# Patient Record
Sex: FEMALE | Race: WHITE | Employment: OTHER | URBAN - METROPOLITAN AREA
[De-identification: names, ages, dates, MRNs, and addresses within clinical notes are randomized per-mention and may not be internally consistent; named-entity substitution may affect disease eponyms.]

---

## 2019-10-08 RX ORDER — DONEPEZIL HYDROCHLORIDE 10 MG/1
10 TABLET, FILM COATED ORAL NIGHTLY
Qty: 90 TABLET | Refills: 1 | Status: SHIPPED | OUTPATIENT
Start: 2019-10-08 | End: 2020-02-11 | Stop reason: SDUPTHER

## 2020-02-10 PROBLEM — R55 SYNCOPE AND COLLAPSE: Status: ACTIVE | Noted: 2020-02-10

## 2020-02-10 PROBLEM — F02.80 EARLY ONSET ALZHEIMER'S DEMENTIA (HCC): Status: ACTIVE | Noted: 2020-02-10

## 2020-02-10 PROBLEM — R41.3 MEMORY LOSS: Status: ACTIVE | Noted: 2020-02-10

## 2020-02-10 PROBLEM — G30.0 EARLY ONSET ALZHEIMER'S DEMENTIA (HCC): Status: ACTIVE | Noted: 2020-02-10

## 2020-02-11 ENCOUNTER — OFFICE VISIT (OUTPATIENT)
Dept: NEUROLOGY | Age: 75
End: 2020-02-11
Payer: MEDICARE

## 2020-02-11 VITALS — DIASTOLIC BLOOD PRESSURE: 70 MMHG | WEIGHT: 135.4 LBS | SYSTOLIC BLOOD PRESSURE: 120 MMHG

## 2020-02-11 PROCEDURE — 99214 OFFICE O/P EST MOD 30 MIN: CPT | Performed by: PSYCHIATRY & NEUROLOGY

## 2020-02-11 RX ORDER — IPRATROPIUM BROMIDE 42 UG/1
SPRAY, METERED NASAL
COMMUNITY
Start: 2019-05-08 | End: 2021-05-05

## 2020-02-11 RX ORDER — LINAGLIPTIN 5 MG/1
TABLET, FILM COATED ORAL
COMMUNITY
Start: 2019-12-07 | End: 2021-05-05

## 2020-02-11 RX ORDER — DONEPEZIL HYDROCHLORIDE 10 MG/1
10 TABLET, FILM COATED ORAL NIGHTLY
Qty: 90 TABLET | Refills: 1 | Status: SHIPPED | OUTPATIENT
Start: 2020-02-11 | End: 2020-10-12 | Stop reason: SDUPTHER

## 2020-02-11 RX ORDER — LEVOTHYROXINE SODIUM 0.05 MG/1
50 TABLET ORAL DAILY
COMMUNITY
Start: 2018-03-29

## 2020-02-11 RX ORDER — MEMANTINE HYDROCHLORIDE 5 MG/1
TABLET ORAL
COMMUNITY
Start: 2019-12-02 | End: 2020-08-11 | Stop reason: CLARIF

## 2020-02-11 RX ORDER — AMLODIPINE BESYLATE 5 MG/1
TABLET ORAL
COMMUNITY
Start: 2019-01-09 | End: 2021-05-05

## 2020-02-11 RX ORDER — FLUOXETINE HYDROCHLORIDE 40 MG/1
40 CAPSULE ORAL DAILY
COMMUNITY
Start: 2018-06-21

## 2020-02-11 RX ORDER — ATORVASTATIN CALCIUM 10 MG/1
TABLET, FILM COATED ORAL NIGHTLY
Status: ON HOLD | COMMUNITY
Start: 2018-03-29 | End: 2021-11-14 | Stop reason: HOSPADM

## 2020-02-11 ASSESSMENT — ENCOUNTER SYMPTOMS
PHOTOPHOBIA: 0
SHORTNESS OF BREATH: 0
CHOKING: 0
NAUSEA: 0
BACK PAIN: 0
TROUBLE SWALLOWING: 0
VOMITING: 0

## 2020-02-11 NOTE — PROGRESS NOTES
Pt resting comfortably in bed. Lethargic, but arousable. Pt able to tell CNA that he had to urinate and was educated on his condom cath. Denies any needs at this time. Bed alarm on. Bed locked and in lowest position. Call light within reach. Will continue to assess and provide care.    Subjective:      Patient ID: Aida Max is a 75 y.o. female who presents today for:  Chief Complaint   Patient presents with    6 Month Follow-Up     patient states she doesnt think her memory is bad but  states that she is still having problems. Pateint states that she does puzzles in the daily paper. HPI     history of dementia and memory loss. Patient has age-related dementia with cognitive impairment. Patient  has amnestic syndrome and continues on Aricept 10 mg and Namenda 5 mg twice a day and we  Have Followed her  in 6 months and she appears to be still independent. Has not quite declined in her activity of daily living. She still dresses and bathes herself and  she has some minor arguments and  did not want to go for the mammogram.  She has some insight to her memory issues.  is her main care provider and does much in the house. Denies  Any falls or hospitalizations or any other medical issues    No past medical history on file. No past surgical history on file.   Social History     Socioeconomic History    Marital status:      Spouse name: Not on file    Number of children: Not on file    Years of education: Not on file    Highest education level: Not on file   Occupational History    Not on file   Social Needs    Financial resource strain: Not on file    Food insecurity:     Worry: Not on file     Inability: Not on file    Transportation needs:     Medical: Not on file     Non-medical: Not on file   Tobacco Use    Smoking status: Never Smoker    Smokeless tobacco: Never Used   Substance and Sexual Activity    Alcohol use: Not on file    Drug use: Not on file    Sexual activity: Not on file   Lifestyle    Physical activity:     Days per week: Not on file     Minutes per session: Not on file    Stress: Not on file   Relationships    Social connections:     Talks on phone: Not on file     Gets together: Not on file     Attends Quaker service: Not on Sensory: No sensory deficit. Motor: No abnormal muscle tone. Coordination: Coordination normal.      Deep Tendon Reflexes: Reflexes are normal and symmetric. Babinski sign absent on the right side. Babinski sign absent on the left side. Mri Mra Brain Wo Contrast    Result Date: 6/16/2016  MRI BRAIN WITHOUT CONTRAST  COMPARISONS  NONE  CLINICAL HISTORY Memory loss, anxiety, family history of dementia. TECHNIQUE Multiplanar, multi-sequence MRI was performed on a 1.2Tesla open magnet. FINDINGS  There are no abnormal sites of restricted diffusion. The ventricles are normal In position. There is no evidence for intraaxial or extraaxial hemorrhage. There is no evidence for mass effect. There is no shift of the midline structures. There are numerous foci of increased signal on FLAIR and T2 ,  in the cerebral deep white matter, left side greater than right, which may be secondary to small vessel ischemic changes, demyelination, or aging. There is mild prominence of the cerebral sulci, commensurate with the patient's age. Flow-voids in the major intracranial blood vessels are identified and are patent by spin-echo criteria. There is a small amount of fluid within the posterior aspect of the sphenoid sinuses. Mastoid air cells are clear. The seventh and eighth nerve complexes and optic nerves are symmetrical.  No evidence for mass in the cerebellopontine angle. The corpus callosum has a normal appearance. The cerebellar tonsils are not ectopic. The pituitary gland is unremarkable. The dura and calvarium are unremarkable. IMPRESSION  NUMEROUS T2 FOCI OF INCREASED SIGNAL IN THE CEREBRAL DEEP WHITE MATTER, LEFT GREATER THAN RIGHT, NONSPECIFIC. THEY MAY BE SECONDARY TO SMALL VESSEL ISCHEMIC CHANGES, DEMYELINATION OR AGING. NO EVIDENCE FOR ACUTE CVA, MASS OR HEMORRHAGE. SMALL AMOUNT OF FLUID WITHIN THE SPHENOID SINUSES.         Weston Faustin M.D. Released By- Yuliet Trujillo M.D. Released Date Time- 06/16/16 1130  This document has been electronically signed. ------------------------------------------------------------------------------      No results found for: WBC, RBC, HGB, HCT, MCV, MCH, MCHC, RDW, PLT, MPV  No results found for: NA, K, CL, CO2, BUN, CREATININE, GFRAA, AGRATIO, LABGLOM, GLUCOSE, PROT, LABALBU, CALCIUM, BILITOT, ALKPHOS, AST, ALT  No results found for: PROTIME, INR  No results found for: TSH, [id number], FOLATE, FERRITIN, IRON, TIBC, PTRFSAT, TSH, FREET4  No results found for: TRIG, HDL, LDLCALC, LDLDIRECT, LABVLDL  No results found for: LABAMPH, BARBSCNU, LABBENZ, CANNAB, COCAINESCRN, LABMETH, OPIATESCREENURINE, PHENCYCLIDINESCREENURINE, PPXUR, ETOH  No results found for: LITHIUM, DILFRTOT, VALPROATE    Assessment:       Diagnosis Orders   1. Early onset Alzheimer's dementia without behavioral disturbance ([facility] [address]     2. Memory loss     Alzheimer's dementia with no notable behavioral issues. Patient actually is doing quite well. Patient still appears to be somewhat independent though requires some help in the house. She does not do cooking or cleaning now the  does it. She does not quite agree on certain things and there may be some argumentative behaviors though not significant. She was recommended to have a mammogram and she would not do this though she has promised me she will. She denies any physical symptoms and has not any hospitalizations. We continue on Aricept 10 mg and Namenda 5 g twice a day and she is not any side effects. She is not developed any behavioral issues either. She maintains her weight very well. She appears to be good in her hygiene. We will keep an eye on this. Plan:      No orders of the defined types were placed in this encounter. No orders of the defined types were placed in this encounter. No follow-ups on file.       Maricruz Maddox MD

## 2020-08-11 ENCOUNTER — OFFICE VISIT (OUTPATIENT)
Dept: NEUROLOGY | Age: 75
End: 2020-08-11
Payer: MEDICARE

## 2020-08-11 VITALS — DIASTOLIC BLOOD PRESSURE: 72 MMHG | WEIGHT: 129 LBS | SYSTOLIC BLOOD PRESSURE: 128 MMHG

## 2020-08-11 PROCEDURE — 99214 OFFICE O/P EST MOD 30 MIN: CPT | Performed by: PSYCHIATRY & NEUROLOGY

## 2020-08-11 RX ORDER — MEMANTINE HYDROCHLORIDE 10 MG/1
10 TABLET ORAL 2 TIMES DAILY
Qty: 180 TABLET | Refills: 1 | Status: SHIPPED | OUTPATIENT
Start: 2020-08-11 | End: 2020-11-16 | Stop reason: SDUPTHER

## 2020-08-11 ASSESSMENT — ENCOUNTER SYMPTOMS
SHORTNESS OF BREATH: 0
NAUSEA: 0
BACK PAIN: 0
TROUBLE SWALLOWING: 0
COLOR CHANGE: 0
PHOTOPHOBIA: 0
CHOKING: 0
VOMITING: 0

## 2020-08-11 NOTE — PROGRESS NOTES
Subjective:      Patient ID: Criss Brady is a 75 y.o. female who presents today for:  Chief Complaint   Patient presents with    Follow-up       HPI 75 right-handed female with history of alcohol impairment. Patient continues on Aricept as well as Namenda. She has shown some slow decline. Ring with us 1 patient is showing some more decline. She forgets where he is at times. She does help some in cleaning after he cooks her but she requires much help otherwise. She sleeps well she is not angry but occasionally frustrated. She denies side effects medication hospitalizations no other behavioral issues. She otherwise is pleasant    No past medical history on file. No past surgical history on file.   Social History     Socioeconomic History    Marital status:      Spouse name: Not on file    Number of children: Not on file    Years of education: Not on file    Highest education level: Not on file   Occupational History    Not on file   Social Needs    Financial resource strain: Not on file    Food insecurity     Worry: Not on file     Inability: Not on file    Transportation needs     Medical: Not on file     Non-medical: Not on file   Tobacco Use    Smoking status: Never Smoker    Smokeless tobacco: Never Used   Substance and Sexual Activity    Alcohol use: Not on file    Drug use: Not on file    Sexual activity: Not on file   Lifestyle    Physical activity     Days per week: Not on file     Minutes per session: Not on file    Stress: Not on file   Relationships    Social connections     Talks on phone: Not on file     Gets together: Not on file     Attends Bahai service: Not on file     Active member of club or organization: Not on file     Attends meetings of clubs or organizations: Not on file     Relationship status: Not on file    Intimate partner violence     Fear of current or ex partner: Not on file     Emotionally abused: Not on file     Physically abused: Not on file Neck:      Musculoskeletal: Normal range of motion. Cardiovascular:      Rate and Rhythm: Normal rate and regular rhythm. Heart sounds: No murmur. Pulmonary:      Effort: Pulmonary effort is normal.      Breath sounds: Normal breath sounds. Abdominal:      General: Bowel sounds are normal.   Musculoskeletal: Normal range of motion. Skin:     General: Skin is warm. Neurological:      Mental Status: She is alert. She is disoriented. Cranial Nerves: No cranial nerve deficit. Sensory: No sensory deficit. Motor: No abnormal muscle tone. Coordination: Coordination normal.      Deep Tendon Reflexes: Reflexes are normal and symmetric. Babinski sign absent on the right side. Babinski sign absent on the left side. Psychiatric:         Mood and Affect: Mood normal.         Mri Mra Brain Wo Contrast    Result Date: 6/16/2016  MRI BRAIN WITHOUT CONTRAST  COMPARISONS  NONE  CLINICAL HISTORY Memory loss, anxiety, family history of dementia. TECHNIQUE Multiplanar, multi-sequence MRI was performed on a 1.2Tesla open magnet. FINDINGS  There are no abnormal sites of restricted diffusion. The ventricles are normal In position. There is no evidence for intraaxial or extraaxial hemorrhage. There is no evidence for mass effect. There is no shift of the midline structures. There are numerous foci of increased signal on FLAIR and T2 ,  in the cerebral deep white matter, left side greater than right, which may be secondary to small vessel ischemic changes, demyelination, or aging. There is mild prominence of the cerebral sulci, commensurate with the patient's age. Flow-voids in the major intracranial blood vessels are identified and are patent by spin-echo criteria. There is a small amount of fluid within the posterior aspect of the sphenoid sinuses. Mastoid air cells are clear.  The seventh and eighth nerve complexes and optic nerves are symmetrical.  No evidence for mass in the cerebellopontine angle. The corpus callosum has a normal appearance. The cerebellar tonsils are not ectopic. The pituitary gland is unremarkable. The dura and calvarium are unremarkable. IMPRESSION  NUMEROUS T2 FOCI OF INCREASED SIGNAL IN THE CEREBRAL DEEP WHITE MATTER, LEFT GREATER THAN RIGHT, NONSPECIFIC. THEY MAY BE SECONDARY TO SMALL VESSEL ISCHEMIC CHANGES, DEMYELINATION OR AGING. NO EVIDENCE FOR ACUTE CVA, MASS OR HEMORRHAGE. SMALL AMOUNT OF FLUID WITHIN THE SPHENOID SINUSES. Ramakrishna Lopez ByLinda Nguyen M.D. Released ByLinda Nguyen M.D. Released Date Time- 06/16/16 3697  This document has been electronically signed. ------------------------------------------------------------------------------      No results found for: WBC, RBC, HGB, HCT, MCV, MCH, MCHC, RDW, PLT, MPV  No results found for: NA, K, CL, CO2, BUN, CREATININE, GFRAA, AGRATIO, LABGLOM, GLUCOSE, PROT, LABALBU, CALCIUM, BILITOT, ALKPHOS, AST, ALT  No results found for: PROTIME, INR  No results found for: TSH, [id number], FOLATE, FERRITIN, IRON, TIBC, PTRFSAT, TSH, FREET4  No results found for: TRIG, HDL, LDLCALC, LDLDIRECT, LABVLDL  No results found for: LABAMPH, BARBSCNU, LABBENZ, CANNAB, COCAINESCRN, LABMETH, OPIATESCREENURINE, PHENCYCLIDINESCREENURINE, PPXUR, ETOH  No results found for: LITHIUM, DILFRTOT, VALPROATE    Assessment:       Diagnosis Orders   1. Early onset Alzheimer's dementia without behavioral disturbance ([facility] [address]     2. Memory loss     Alzheimer's dementia. Initially this started off with mild cognitive impairment we had added Aricept and we have now added Namenda as well. In the last 2 to 3 months there is been some more decline which we expected. She occasionally forgets who her  is and she looks for her cat and dogs which is noted for 15 years. She does become frustrated with this. She is not any major behavioral issues she sleeps well and eats well.   She has not lost any weight. Patient has insight to her memory issues just partial.    We recommend that we increase the Namenda to 10 mg twice a day and maximize the neurocognitive modulators is not much else can be added as there is nothing new in the market either. Safety issues needs to be addressed and we have discussed this. Plan:      No orders of the defined types were placed in this encounter. Orders Placed This Encounter   Medications    memantine (NAMENDA) 10 MG tablet     Sig: Take 1 tablet by mouth 2 times daily     Dispense:  180 tablet     Refill:  1       No follow-ups on file.       Wojciech Wheat MD

## 2020-10-12 RX ORDER — DONEPEZIL HYDROCHLORIDE 10 MG/1
10 TABLET, FILM COATED ORAL NIGHTLY
Qty: 90 TABLET | Refills: 1 | Status: ON HOLD | OUTPATIENT
Start: 2020-10-12 | End: 2021-11-11

## 2020-11-13 NOTE — TELEPHONE ENCOUNTER
Requested Prescriptions     Pending Prescriptions Disp Refills    memantine (NAMENDA) 10 MG tablet 180 tablet 1     Sig: Take 1 tablet by mouth 2 times daily

## 2020-11-16 RX ORDER — MEMANTINE HYDROCHLORIDE 10 MG/1
10 TABLET ORAL 2 TIMES DAILY
Qty: 180 TABLET | Refills: 1 | Status: SHIPPED | OUTPATIENT
Start: 2020-11-16

## 2020-11-23 ENCOUNTER — TELEPHONE (OUTPATIENT)
Dept: NEUROLOGY | Age: 75
End: 2020-11-23

## 2020-11-23 NOTE — TELEPHONE ENCOUNTER
haider for Betty Holguin to call back. He dropped off paper work for me and it was for a PA. .. which I called the pharmacy and the namenda was delivered today with 20 dollar copay so im not under standing what he is wanting to be filled out? Spoke with patient and advised medication would be delivered today .      meg

## 2021-01-13 ENCOUNTER — TELEPHONE (OUTPATIENT)
Dept: NEUROLOGY | Age: 76
End: 2021-01-13

## 2021-01-13 NOTE — TELEPHONE ENCOUNTER
OWEN    Patients  called and states that she is now in [place] in [facility]'s Alzheimers unit .     Major Olney

## 2021-02-10 PROBLEM — F09 MILD COGNITIVE DISORDER: Status: ACTIVE | Noted: 2021-02-10

## 2021-02-10 PROBLEM — R41.82 ALTERED MENTAL STATUS, UNSPECIFIED: Status: ACTIVE | Noted: 2018-11-07

## 2021-02-10 PROBLEM — I10 ESSENTIAL HYPERTENSION: Status: ACTIVE | Noted: 2019-01-08

## 2021-02-10 PROBLEM — I25.10 ARTERIOSCLEROSIS OF CORONARY ARTERY: Status: ACTIVE | Noted: 2019-01-08

## 2021-02-10 PROBLEM — E78.5 HYPERLIPIDEMIA: Status: ACTIVE | Noted: 2021-02-10

## 2021-02-10 PROBLEM — J31.0 CHRONIC RHINITIS: Status: ACTIVE | Noted: 2021-02-10

## 2021-02-10 PROBLEM — F32.A DEPRESSIVE DISORDER: Status: ACTIVE | Noted: 2021-02-10

## 2021-02-10 PROBLEM — Z87.440 PERSONAL HISTORY OF URINARY (TRACT) INFECTIONS: Status: ACTIVE | Noted: 2018-11-07

## 2021-02-10 PROBLEM — E11.9 DIABETES MELLITUS (HCC): Status: ACTIVE | Noted: 2019-01-08

## 2021-02-10 PROBLEM — E89.0 POSTABLATIVE HYPOTHYROIDISM: Status: ACTIVE | Noted: 2019-01-08

## 2021-02-10 PROBLEM — Z78.9 NON-SMOKER: Status: ACTIVE | Noted: 2021-02-10

## 2021-02-10 PROBLEM — R31.9 HEMATURIA, UNSPECIFIED: Status: ACTIVE | Noted: 2018-11-07

## 2021-04-29 ENCOUNTER — OFFICE VISIT (OUTPATIENT)
Dept: GERIATRIC MEDICINE | Age: 76
End: 2021-04-29
Payer: MEDICARE

## 2021-04-29 DIAGNOSIS — G30.0 EARLY ONSET ALZHEIMER'S DEMENTIA WITHOUT BEHAVIORAL DISTURBANCE (HCC): Primary | ICD-10-CM

## 2021-04-29 DIAGNOSIS — M15.9 OSTEOARTHRITIS OF MULTIPLE JOINTS, UNSPECIFIED OSTEOARTHRITIS TYPE: ICD-10-CM

## 2021-04-29 DIAGNOSIS — F02.80 EARLY ONSET ALZHEIMER'S DEMENTIA WITHOUT BEHAVIORAL DISTURBANCE (HCC): Primary | ICD-10-CM

## 2021-04-29 DIAGNOSIS — E11.9 DIABETES MELLITUS WITHOUT COMPLICATION (HCC): ICD-10-CM

## 2021-04-29 DIAGNOSIS — I10 ESSENTIAL HYPERTENSION: ICD-10-CM

## 2021-05-03 LAB
A/G RATIO: NORMAL
ALBUMIN SERPL-MCNC: 4 G/DL
ALP BLD-CCNC: 78 U/L
ALT SERPL-CCNC: 12 U/L
AST SERPL-CCNC: 19 U/L
BASOPHILS ABSOLUTE: NORMAL
BASOPHILS RELATIVE PERCENT: NORMAL
BILIRUB SERPL-MCNC: 0.4 MG/DL (ref 0.1–1.4)
BILIRUBIN DIRECT: NORMAL
BILIRUBIN, INDIRECT: NORMAL
BUN BLDV-MCNC: 18 MG/DL
CALCIUM SERPL-MCNC: 9.5 MG/DL
CHLORIDE BLD-SCNC: 110 MMOL/L
CO2: 20 MMOL/L
CREAT SERPL-MCNC: 0.59 MG/DL
EOSINOPHILS ABSOLUTE: NORMAL
EOSINOPHILS RELATIVE PERCENT: NORMAL
GFR CALCULATED: NORMAL
GLOBULIN: NORMAL
GLUCOSE BLD-MCNC: 201 MG/DL
HCT VFR BLD CALC: 37.9 % (ref 36–46)
HEMOGLOBIN: 12.3 G/DL (ref 12–16)
LYMPHOCYTES ABSOLUTE: NORMAL
LYMPHOCYTES RELATIVE PERCENT: NORMAL
MCH RBC QN AUTO: 31.9 PG
MCHC RBC AUTO-ENTMCNC: 32.5 G/DL
MCV RBC AUTO: 98.4 FL
MONOCYTES ABSOLUTE: NORMAL
MONOCYTES RELATIVE PERCENT: NORMAL
NEUTROPHILS ABSOLUTE: NORMAL
NEUTROPHILS RELATIVE PERCENT: NORMAL
PLATELET # BLD: 217 K/ΜL
PMV BLD AUTO: 10.8 FL
POTASSIUM SERPL-SCNC: 4.9 MMOL/L
PROTEIN TOTAL: 6.7 G/DL
RBC # BLD: 3.85 10^6/ΜL
SODIUM BLD-SCNC: 145 MMOL/L
WBC # BLD: 8.08 10^3/ML

## 2021-05-05 RX ORDER — CHOLECALCIFEROL (VITAMIN D3) 125 MCG
500 CAPSULE ORAL DAILY
COMMUNITY

## 2021-05-05 RX ORDER — TELMISARTAN 80 MG/1
80 TABLET ORAL DAILY
Status: ON HOLD | COMMUNITY
End: 2021-09-21 | Stop reason: HOSPADM

## 2021-05-05 RX ORDER — POTASSIUM CHLORIDE 750 MG/1
10 TABLET, EXTENDED RELEASE ORAL DAILY
COMMUNITY

## 2021-05-05 RX ORDER — GLIMEPIRIDE 4 MG/1
4 TABLET ORAL
Status: ON HOLD | COMMUNITY
End: 2021-11-14 | Stop reason: HOSPADM

## 2021-05-30 NOTE — PROGRESS NOTES
Patient Name: Uriel Ang  YOB: 1945  Medical Record Number: [id number]      History of Present Illness: This 53-year-old woman was seen in her room patient has been clinically stable without evidence of new emesis fevers chills patient was admitted here from community patient has been declining ability maintain her independence functionally is pleasant interactive appears well groomed and appears to be at target weight. Patient is pain-free no recent emesis fevers or chills this time patient having no rashes she is slightly confused but able to communicate appropriately. Review of Systems   Unable to perform ROS: Dementia       Review of Systems: All 14 review of systems negative other than as stated above    Social History     Tobacco Use    Smoking status: Never Smoker    Smokeless tobacco: Never Used   Substance Use Topics    Alcohol use: Not on file    Drug use: Not on file         No past medical history on file. No past surgical history on file.       Current Outpatient Medications on File Prior to Visit   Medication Sig Dispense Refill    glimepiride (AMARYL) 4 MG tablet Take 4 mg by mouth every morning (before breakfast)      potassium chloride (KLOR-CON M) 10 MEQ extended release tablet Take 10 mEq by mouth daily      telmisartan (MICARDIS) 80 MG tablet Take 80 mg by mouth daily Hold if SBP<110 mmhg      vitamin B-12 (CYANOCOBALAMIN) 500 MCG tablet Take 500 mcg by mouth daily      CALAMINE-ZINC OXIDE EX Apply topically three times daily And as needed      memantine (NAMENDA) 10 MG tablet Take 1 tablet by mouth 2 times daily 180 tablet 1    donepezil (ARICEPT) 10 MG tablet Take 1 tablet by mouth nightly 90 tablet 1    atorvastatin (LIPITOR) 10 MG tablet Take by mouth      FLUoxetine (PROZAC) 40 MG capsule Take 40 mg by mouth daily       ONE TOUCH ULTRA TEST strip       levothyroxine (SYNTHROID) 50 MCG tablet Take by mouth       No current facility-administered medications on file prior to visit. Allergies   Allergen Reactions    Penicillins     Sulfa Antibiotics     Penicillin G Rash         No family history on file. Physical Exam:      Physical Exam  Vitals and nursing note reviewed. Constitutional:       Appearance: Normal appearance. HENT:      Head: Atraumatic. Nose: Nose normal.      Mouth/Throat:      Mouth: Mucous membranes are dry. Eyes:      Extraocular Movements: Extraocular movements intact. Cardiovascular:      Rate and Rhythm: Normal rate and regular rhythm. Heart sounds: Normal heart sounds. Pulmonary:      Breath sounds: Normal breath sounds. No stridor. Abdominal:      General: Bowel sounds are normal.      Palpations: Abdomen is soft. Hernia: No hernia is present. Musculoskeletal:         General: No swelling. Cervical back: Neck supple. Left lower leg: No edema. Skin:     General: Skin is dry. Neurological:      Mental Status: She is alert. She is disoriented. Psychiatric:         Mood and Affect: Mood normal.         There were no vitals taken for this visit.       .   Lab Results   Component Value Date    WBC 8.08 05/03/2021    HGB 12.3 05/03/2021    HCT 37.9 05/03/2021    MCV 98.4 05/03/2021     05/03/2021     Lab Results   Component Value Date     05/03/2021    K 4.9 05/03/2021     05/03/2021    CO2 20 05/03/2021    BUN 18 05/03/2021    CREATININE 0.59 05/03/2021    GLUCOSE 201 05/03/2021    CALCIUM 9.5 05/03/2021                ASSESSMENT:  Patient Active Problem List   Diagnosis    Early onset Alzheimer's dementia without behavioral disturbance ([address]    Memory loss    Syncope and collapse    Altered mental status, unspecified    Arteriosclerosis of coronary artery    Hematuria, unspecified    Postablative hypothyroidism    Personal history of urinary (tract) infections    Chronic rhinitis    Depressive disorder    Diabetes mellitus ([address]    Essential

## 2021-07-28 ENCOUNTER — OFFICE VISIT (OUTPATIENT)
Dept: GERIATRIC MEDICINE | Age: 76
End: 2021-07-28
Payer: MEDICARE

## 2021-07-28 DIAGNOSIS — G30.0 EARLY ONSET ALZHEIMER'S DEMENTIA WITHOUT BEHAVIORAL DISTURBANCE (HCC): Primary | ICD-10-CM

## 2021-07-28 DIAGNOSIS — E11.9 DIABETES MELLITUS WITHOUT COMPLICATION (HCC): ICD-10-CM

## 2021-07-28 DIAGNOSIS — I10 ESSENTIAL HYPERTENSION: ICD-10-CM

## 2021-07-28 DIAGNOSIS — F02.80 EARLY ONSET ALZHEIMER'S DEMENTIA WITHOUT BEHAVIORAL DISTURBANCE (HCC): Primary | ICD-10-CM

## 2021-08-28 NOTE — PROGRESS NOTES
SUBJECTIVE:  This 70-year-old woman was seen in follow-up visit for dementia hypertension diabetes. Patient has been clinically stable without evidence of new orthostasis. Patient has not new change in bowel bladder habit no recent falls blood sugars reviewed with nursing staff. Patient's oral intake has been good. Patient remains on the dementia unit rest benefit from close supervision there. ROS: Limited by dementia  The rest of the 14 point ROS negative    PHYSICAL EXAM: VSS per facility record  Frail appearance pupils are small poorly reactive Provigil daily arcus nose bilaterally oral mucosa moist chest showed no crackles no wheezing cardiovascular showed a regular rate abdomen soft nontender extremity trace terrestrial pulse    ASSESSMENT & PLAN:   Diagnosis Orders   1. Early onset Alzheimer's dementia without behavioral disturbance ([facility] [place] [address]     2. Essential hypertension     3. Diabetes mellitus without complication ([facility] [address]         Is on Aricept and Namenda may consider maximizing dose. Has been on Synthroid repeat TSH pending. Is on oral agent glimepiride monitor renal function closely. No past medical history on file. No past surgical history on file.       Current Outpatient Medications on File Prior to Visit   Medication Sig Dispense Refill    glimepiride (AMARYL) 4 MG tablet Take 4 mg by mouth every morning (before breakfast)      potassium chloride (KLOR-CON M) 10 MEQ extended release tablet Take 10 mEq by mouth daily      telmisartan (MICARDIS) 80 MG tablet Take 80 mg by mouth daily Hold if SBP<110 mmhg      vitamin B-12 (CYANOCOBALAMIN) 500 MCG tablet Take 500 mcg by mouth daily      CALAMINE-ZINC OXIDE EX Apply topically three times daily And as needed      memantine (NAMENDA) 10 MG tablet Take 1 tablet by mouth 2 times daily 180 tablet 1    donepezil (ARICEPT) 10 MG tablet Take 1 tablet by mouth nightly 90 tablet 1    atorvastatin (LIPITOR) 10 MG tablet Take by mouth      CALCIUM 9.5 05/03/2021        No results found for: CHOL  No results found for: TRIG  No results found for: HDL  No results found for: LDLCHOLESTEROL, LDLCALC  No results found for: LABVLDL, VLDL  No results found for: CHOLHDLRATIO    No results found for: TSHFT4, TSH    Lab Results   Component Value Date    WBC 8.08 05/03/2021    HGB 12.3 05/03/2021    HCT 37.9 05/03/2021    MCV 98.4 05/03/2021     05/03/2021       Please note orders entered on site at facility after discussion with appropriate facility nursing/therapy/ / nutritional staff. Current longstanding medical problems and acute medical issues addressed with staff. Available data and data elements in on site paper chart reviewed and analyzed. Current external consultant notes reviewed in on site chart. Ordered laboratory testing and imaging will be reviewed when available.

## 2021-09-18 ENCOUNTER — APPOINTMENT (OUTPATIENT)
Dept: GENERAL RADIOLOGY | Age: 76
DRG: 640 | End: 2021-09-18
Payer: MEDICARE

## 2021-09-18 ENCOUNTER — APPOINTMENT (OUTPATIENT)
Dept: CT IMAGING | Age: 76
DRG: 640 | End: 2021-09-18
Payer: MEDICARE

## 2021-09-18 ENCOUNTER — HOSPITAL ENCOUNTER (INPATIENT)
Age: 76
LOS: 2 days | Discharge: HOME HEALTH | DRG: 640 | End: 2021-09-21
Attending: EMERGENCY MEDICINE | Admitting: INTERNAL MEDICINE
Payer: MEDICARE

## 2021-09-18 DIAGNOSIS — E87.0 HYPERNATREMIA: ICD-10-CM

## 2021-09-18 DIAGNOSIS — R73.9 HYPERGLYCEMIA: ICD-10-CM

## 2021-09-18 DIAGNOSIS — N30.00 ACUTE CYSTITIS WITHOUT HEMATURIA: Primary | ICD-10-CM

## 2021-09-18 DIAGNOSIS — R41.82 ALTERED MENTAL STATUS, UNSPECIFIED ALTERED MENTAL STATUS TYPE: ICD-10-CM

## 2021-09-18 PROBLEM — N39.0 UTI (URINARY TRACT INFECTION): Status: ACTIVE | Noted: 2021-09-18

## 2021-09-18 PROBLEM — E86.0 DEHYDRATION: Status: ACTIVE | Noted: 2021-09-18

## 2021-09-18 LAB
ALBUMIN SERPL-MCNC: 4.1 G/DL (ref 3.5–4.6)
ALP BLD-CCNC: 101 U/L (ref 40–130)
ALT SERPL-CCNC: 44 U/L (ref 0–33)
AMMONIA: 21 UMOL/L (ref 11–51)
AMPHETAMINE SCREEN, URINE: NORMAL
ANION GAP SERPL CALCULATED.3IONS-SCNC: 14 MEQ/L (ref 9–15)
AST SERPL-CCNC: 22 U/L (ref 0–35)
BACTERIA: ABNORMAL /HPF
BARBITURATE SCREEN URINE: NORMAL
BASOPHILS ABSOLUTE: 0.1 K/UL (ref 0–0.2)
BASOPHILS RELATIVE PERCENT: 0.4 %
BENZODIAZEPINE SCREEN, URINE: NORMAL
BILIRUB SERPL-MCNC: 0.8 MG/DL (ref 0.2–0.7)
BILIRUBIN URINE: NEGATIVE
BLOOD, URINE: ABNORMAL
BUN BLDV-MCNC: 39 MG/DL (ref 8–23)
CALCIUM SERPL-MCNC: 8.9 MG/DL (ref 8.5–9.9)
CANNABINOID SCREEN URINE: NORMAL
CHLORIDE BLD-SCNC: 126 MEQ/L (ref 95–107)
CLARITY: CLEAR
CO2: 22 MEQ/L (ref 20–31)
COARSE CASTS, UA: ABNORMAL /LPF (ref 0–5)
COCAINE METABOLITE SCREEN URINE: NORMAL
COLOR: YELLOW
CREAT SERPL-MCNC: 1.05 MG/DL (ref 0.5–0.9)
EOSINOPHILS ABSOLUTE: 0 K/UL (ref 0–0.7)
EOSINOPHILS RELATIVE PERCENT: 0.1 %
EPITHELIAL CELLS, UA: ABNORMAL /HPF (ref 0–5)
ETHANOL PERCENT: NORMAL G/DL
ETHANOL: <10 MG/DL (ref 0–0.08)
GFR AFRICAN AMERICAN: >60
GFR NON-AFRICAN AMERICAN: 50.8
GLOBULIN: 2.5 G/DL (ref 2.3–3.5)
GLUCOSE BLD-MCNC: 278 MG/DL (ref 60–115)
GLUCOSE BLD-MCNC: 446 MG/DL (ref 70–99)
GLUCOSE URINE: >=1000 MG/DL
HCT VFR BLD CALC: 54.5 % (ref 37–47)
HEMOGLOBIN: 17.6 G/DL (ref 12–16)
HYALINE CASTS: ABNORMAL /HPF (ref 0–5)
KETONES, URINE: 15 MG/DL
LEUKOCYTE ESTERASE, URINE: NEGATIVE
LYMPHOCYTES ABSOLUTE: 2.4 K/UL (ref 1–4.8)
LYMPHOCYTES RELATIVE PERCENT: 14.3 %
Lab: NORMAL
MAGNESIUM: 2.5 MG/DL (ref 1.7–2.4)
MCH RBC QN AUTO: 31.8 PG (ref 27–31.3)
MCHC RBC AUTO-ENTMCNC: 32.4 % (ref 33–37)
MCV RBC AUTO: 98 FL (ref 82–100)
METHADONE SCREEN, URINE: NORMAL
MONOCYTES ABSOLUTE: 1 K/UL (ref 0.2–0.8)
MONOCYTES RELATIVE PERCENT: 6 %
NEUTROPHILS ABSOLUTE: 13.3 K/UL (ref 1.4–6.5)
NEUTROPHILS RELATIVE PERCENT: 79.2 %
NITRITE, URINE: POSITIVE
OPIATE SCREEN URINE: NORMAL
OXYCODONE URINE: NORMAL
PDW BLD-RTO: 14.3 % (ref 11.5–14.5)
PERFORMED ON: ABNORMAL
PH UA: 5 (ref 5–9)
PHENCYCLIDINE SCREEN URINE: NORMAL
PLATELET # BLD: 258 K/UL (ref 130–400)
POTASSIUM SERPL-SCNC: 4 MEQ/L (ref 3.4–4.9)
PROPOXYPHENE SCREEN: NORMAL
PROTEIN UA: 30 MG/DL
RBC # BLD: 5.55 M/UL (ref 4.2–5.4)
RBC UA: ABNORMAL /HPF (ref 0–5)
REASON FOR REJECTION: NORMAL
REJECTED TEST: NORMAL
SARS-COV-2, NAAT: NOT DETECTED
SODIUM BLD-SCNC: 162 MEQ/L (ref 135–144)
SPECIFIC GRAVITY UA: 1.03 (ref 1–1.03)
TOTAL PROTEIN: 6.6 G/DL (ref 6.3–8)
TROPONIN: <0.01 NG/ML (ref 0–0.01)
TSH SERPL DL<=0.05 MIU/L-ACNC: 0.54 UIU/ML (ref 0.44–3.86)
UROBILINOGEN, URINE: 0.2 E.U./DL
WBC # BLD: 16.8 K/UL (ref 4.8–10.8)
WBC UA: ABNORMAL /HPF (ref 0–5)

## 2021-09-18 PROCEDURE — 96361 HYDRATE IV INFUSION ADD-ON: CPT

## 2021-09-18 PROCEDURE — 80307 DRUG TEST PRSMV CHEM ANLYZR: CPT

## 2021-09-18 PROCEDURE — 87077 CULTURE AEROBIC IDENTIFY: CPT

## 2021-09-18 PROCEDURE — 2580000003 HC RX 258: Performed by: NURSE PRACTITIONER

## 2021-09-18 PROCEDURE — 6370000000 HC RX 637 (ALT 250 FOR IP): Performed by: EMERGENCY MEDICINE

## 2021-09-18 PROCEDURE — 70450 CT HEAD/BRAIN W/O DYE: CPT

## 2021-09-18 PROCEDURE — 83735 ASSAY OF MAGNESIUM: CPT

## 2021-09-18 PROCEDURE — 84484 ASSAY OF TROPONIN QUANT: CPT

## 2021-09-18 PROCEDURE — 82077 ASSAY SPEC XCP UR&BREATH IA: CPT

## 2021-09-18 PROCEDURE — 93005 ELECTROCARDIOGRAM TRACING: CPT | Performed by: EMERGENCY MEDICINE

## 2021-09-18 PROCEDURE — 2580000003 HC RX 258: Performed by: EMERGENCY MEDICINE

## 2021-09-18 PROCEDURE — 96375 TX/PRO/DX INJ NEW DRUG ADDON: CPT

## 2021-09-18 PROCEDURE — 87086 URINE CULTURE/COLONY COUNT: CPT

## 2021-09-18 PROCEDURE — 82140 ASSAY OF AMMONIA: CPT

## 2021-09-18 PROCEDURE — 87186 SC STD MICRODIL/AGAR DIL: CPT

## 2021-09-18 PROCEDURE — 87635 SARS-COV-2 COVID-19 AMP PRB: CPT

## 2021-09-18 PROCEDURE — 71045 X-RAY EXAM CHEST 1 VIEW: CPT

## 2021-09-18 PROCEDURE — 84443 ASSAY THYROID STIM HORMONE: CPT

## 2021-09-18 PROCEDURE — 81001 URINALYSIS AUTO W/SCOPE: CPT

## 2021-09-18 PROCEDURE — 80053 COMPREHEN METABOLIC PANEL: CPT

## 2021-09-18 PROCEDURE — 36415 COLL VENOUS BLD VENIPUNCTURE: CPT

## 2021-09-18 PROCEDURE — 99284 EMERGENCY DEPT VISIT MOD MDM: CPT

## 2021-09-18 PROCEDURE — 6360000002 HC RX W HCPCS: Performed by: EMERGENCY MEDICINE

## 2021-09-18 PROCEDURE — G0378 HOSPITAL OBSERVATION PER HR: HCPCS

## 2021-09-18 PROCEDURE — 6360000002 HC RX W HCPCS

## 2021-09-18 PROCEDURE — 85025 COMPLETE CBC W/AUTO DIFF WBC: CPT

## 2021-09-18 PROCEDURE — 96365 THER/PROPH/DIAG IV INF INIT: CPT

## 2021-09-18 RX ORDER — ACETAMINOPHEN 650 MG/1
650 SUPPOSITORY RECTAL EVERY 6 HOURS PRN
Status: DISCONTINUED | OUTPATIENT
Start: 2021-09-18 | End: 2021-09-21 | Stop reason: HOSPADM

## 2021-09-18 RX ORDER — LORAZEPAM 2 MG/ML
2 INJECTION INTRAMUSCULAR ONCE
Status: COMPLETED | OUTPATIENT
Start: 2021-09-18 | End: 2021-09-18

## 2021-09-18 RX ORDER — 0.9 % SODIUM CHLORIDE 0.9 %
1000 INTRAVENOUS SOLUTION INTRAVENOUS ONCE
Status: COMPLETED | OUTPATIENT
Start: 2021-09-18 | End: 2021-09-18

## 2021-09-18 RX ORDER — SODIUM CHLORIDE 450 MG/100ML
INJECTION, SOLUTION INTRAVENOUS CONTINUOUS
Status: DISCONTINUED | OUTPATIENT
Start: 2021-09-18 | End: 2021-09-18 | Stop reason: ALTCHOICE

## 2021-09-18 RX ORDER — SODIUM CHLORIDE 0.9 % (FLUSH) 0.9 %
5-40 SYRINGE (ML) INJECTION PRN
Status: DISCONTINUED | OUTPATIENT
Start: 2021-09-18 | End: 2021-09-21 | Stop reason: HOSPADM

## 2021-09-18 RX ORDER — POLYETHYLENE GLYCOL 3350 17 G/17G
17 POWDER, FOR SOLUTION ORAL DAILY PRN
Status: DISCONTINUED | OUTPATIENT
Start: 2021-09-18 | End: 2021-09-21 | Stop reason: HOSPADM

## 2021-09-18 RX ORDER — DEXTROSE MONOHYDRATE 25 G/50ML
12.5 INJECTION, SOLUTION INTRAVENOUS PRN
Status: DISCONTINUED | OUTPATIENT
Start: 2021-09-18 | End: 2021-09-21 | Stop reason: HOSPADM

## 2021-09-18 RX ORDER — LORAZEPAM 2 MG/ML
INJECTION INTRAMUSCULAR
Status: COMPLETED
Start: 2021-09-18 | End: 2021-09-18

## 2021-09-18 RX ORDER — DEXTROSE MONOHYDRATE 50 MG/ML
100 INJECTION, SOLUTION INTRAVENOUS PRN
Status: DISCONTINUED | OUTPATIENT
Start: 2021-09-18 | End: 2021-09-21 | Stop reason: HOSPADM

## 2021-09-18 RX ORDER — ONDANSETRON 4 MG/1
4 TABLET, ORALLY DISINTEGRATING ORAL EVERY 8 HOURS PRN
Status: DISCONTINUED | OUTPATIENT
Start: 2021-09-18 | End: 2021-09-21 | Stop reason: HOSPADM

## 2021-09-18 RX ORDER — NICOTINE POLACRILEX 4 MG
15 LOZENGE BUCCAL PRN
Status: DISCONTINUED | OUTPATIENT
Start: 2021-09-18 | End: 2021-09-21 | Stop reason: HOSPADM

## 2021-09-18 RX ORDER — CIPROFLOXACIN 500 MG/1
500 TABLET, FILM COATED ORAL 2 TIMES DAILY
Qty: 20 TABLET | Refills: 0 | Status: SHIPPED | OUTPATIENT
Start: 2021-09-18 | End: 2021-09-28

## 2021-09-18 RX ORDER — ACETAMINOPHEN 325 MG/1
650 TABLET ORAL EVERY 6 HOURS PRN
Status: DISCONTINUED | OUTPATIENT
Start: 2021-09-18 | End: 2021-09-21 | Stop reason: HOSPADM

## 2021-09-18 RX ORDER — SODIUM CHLORIDE 0.9 % (FLUSH) 0.9 %
5-40 SYRINGE (ML) INJECTION EVERY 12 HOURS SCHEDULED
Status: DISCONTINUED | OUTPATIENT
Start: 2021-09-18 | End: 2021-09-21 | Stop reason: HOSPADM

## 2021-09-18 RX ORDER — SODIUM CHLORIDE 9 MG/ML
25 INJECTION, SOLUTION INTRAVENOUS PRN
Status: DISCONTINUED | OUTPATIENT
Start: 2021-09-18 | End: 2021-09-21 | Stop reason: HOSPADM

## 2021-09-18 RX ORDER — ONDANSETRON 2 MG/ML
4 INJECTION INTRAMUSCULAR; INTRAVENOUS EVERY 6 HOURS PRN
Status: DISCONTINUED | OUTPATIENT
Start: 2021-09-18 | End: 2021-09-21 | Stop reason: HOSPADM

## 2021-09-18 RX ORDER — SODIUM CHLORIDE 450 MG/100ML
INJECTION, SOLUTION INTRAVENOUS CONTINUOUS
Status: DISPENSED | OUTPATIENT
Start: 2021-09-18 | End: 2021-09-19

## 2021-09-18 RX ORDER — INSULIN GLARGINE 100 [IU]/ML
10 INJECTION, SOLUTION SUBCUTANEOUS NIGHTLY
Status: DISCONTINUED | OUTPATIENT
Start: 2021-09-19 | End: 2021-09-21 | Stop reason: HOSPADM

## 2021-09-18 RX ADMIN — CEFTRIAXONE SODIUM 1000 MG: 1 INJECTION, POWDER, FOR SOLUTION INTRAMUSCULAR; INTRAVENOUS at 18:21

## 2021-09-18 RX ADMIN — INSULIN HUMAN 12 UNITS: 100 INJECTION, SOLUTION PARENTERAL at 19:22

## 2021-09-18 RX ADMIN — SODIUM CHLORIDE: 4.5 INJECTION, SOLUTION INTRAVENOUS at 21:14

## 2021-09-18 RX ADMIN — LORAZEPAM 2 MG: 2 INJECTION INTRAMUSCULAR at 20:06

## 2021-09-18 RX ADMIN — SODIUM CHLORIDE 1000 ML: 9 INJECTION, SOLUTION INTRAVENOUS at 14:52

## 2021-09-18 RX ADMIN — LORAZEPAM 2 MG: 2 INJECTION INTRAMUSCULAR; INTRAVENOUS at 20:06

## 2021-09-18 RX ADMIN — SODIUM CHLORIDE: 4.5 INJECTION, SOLUTION INTRAVENOUS at 19:30

## 2021-09-18 NOTE — ED PROVIDER NOTES
[address] [facility] ED  eMERGENCYdEPARTMENT eNCOUnter      Pt Name: Acosta Lozano  MRN: [id number]  [place] 1945  Date of evaluation: 9/18/2021  Promise Grant MD    CHIEF COMPLAINT           HPI  Acosta Lozano is a 67 y.o. female per chart review has a h/o Alzheimer's who is normally axox1-2, DM II, HTN, hpl presents to the ED with AMS. Per NH, pt is normally axox1-2 and can carry on a conversation. Since this am pt has not been able to carry on a conversation. Pt denies being in any pain. ROS  Review of Systems   Unable to perform ROS: Dementia       Except as noted above the remainder of the review of systems was reviewed and negative. PAST MEDICAL HISTORY     Past Medical History:   Diagnosis Date    Chronic rhinitis     Depression     Diabetes mellitus ([facility] [address]     Hyperlipidemia     Hypertension          SURGICAL HISTORY     History reviewed. No pertinent surgical history. CURRENTMEDICATIONS       Previous Medications    ATORVASTATIN (LIPITOR) 10 MG TABLET    Take by mouth    CALAMINE-ZINC OXIDE EX    Apply topically three times daily And as needed    DONEPEZIL (ARICEPT) 10 MG TABLET    Take 1 tablet by mouth nightly    FLUOXETINE (PROZAC) 40 MG CAPSULE    Take 40 mg by mouth daily     GLIMEPIRIDE (AMARYL) 4 MG TABLET    Take 4 mg by mouth every morning (before breakfast)    LEVOTHYROXINE (SYNTHROID) 50 MCG TABLET    Take by mouth    MEMANTINE (NAMENDA) 10 MG TABLET    Take 1 tablet by mouth 2 times daily    ONE TOUCH ULTRA TEST STRIP        POTASSIUM CHLORIDE (KLOR-CON M) 10 MEQ EXTENDED RELEASE TABLET    Take 10 mEq by mouth daily    TELMISARTAN (MICARDIS) 80 MG TABLET    Take 80 mg by mouth daily Hold if SBP<110 mmhg    VITAMIN B-12 (CYANOCOBALAMIN) 500 MCG TABLET    Take 500 mcg by mouth daily       ALLERGIES     Penicillins, Sulfa antibiotics, and Penicillin g    FAMILY HISTORY     History reviewed. No pertinent family history.        SOCIAL HISTORY       Social History     Socioeconomic History    Marital status:      Spouse name: None    Number of children: None    Years of education: None    Highest education level: None   Occupational History    None   Tobacco Use    Smoking status: Never Smoker    Smokeless tobacco: Never Used   Substance and Sexual Activity    Alcohol use: Never    Drug use: Never    Sexual activity: Not Currently   Other Topics Concern    None   Social History Narrative    None     Social Determinants of Health     Financial Resource Strain:     Difficulty of Paying Living Expenses:    Food Insecurity:     Worried About Running Out of Food in the Last Year:     Ran Out of Food in the Last Year:    Transportation Needs:     Lack of Transportation (Medical):  Lack of Transportation (Non-Medical):    Physical Activity:     Days of Exercise per Week:     Minutes of Exercise per Session:    Stress:     Feeling of Stress :    Social Connections:     Frequency of Communication with Friends and Family:     Frequency of Social Gatherings with Friends and Family:     Attends Orthodox Services:     Active Member of Clubs or Organizations:     Attends Club or Organization Meetings:     Marital Status:    Intimate Partner Violence:     Fear of Current or Ex-Partner:     Emotionally Abused:     Physically Abused:     Sexually Abused:          PHYSICAL EXAM       ED Triage Vitals   BP Temp Temp src Pulse Resp SpO2 Height Weight   -- -- -- -- -- -- -- --       Physical Exam  Vitals and nursing note reviewed. Constitutional:       Appearance: She is well-developed. HENT:      Head: Normocephalic. Right Ear: External ear normal.      Left Ear: External ear normal.   Eyes:      Conjunctiva/sclera: Conjunctivae normal.      Pupils: Pupils are equal, round, and reactive to light. Cardiovascular:      Rate and Rhythm: Normal rate and regular rhythm. Heart sounds: Normal heart sounds.    Pulmonary:      Effort: Pulmonary effort is normal.      Breath sounds: Normal breath sounds. Abdominal:      General: Bowel sounds are normal. There is no distension. Palpations: Abdomen is soft. Tenderness: There is no abdominal tenderness. Musculoskeletal:         General: Normal range of motion. Cervical back: Normal range of motion and neck supple. Skin:     General: Skin is warm and dry. Neurological:      Comments: Axox1. Diffusely normal strength, sensation   Psychiatric:      Comments: Unable to assess           MDM  68 yo female presents to the ED with AMS. Pt is afebrile, hemodynamically stable. Pt given 1 L NS in the ED. EKG shows NSR with HR 93, normal axis, normal intervals, no ST changes. Labs remarkable for WBC 17, Na 162, glucose 446, BUN 39, Cr 1.05.  UA shows UTI. Tox negative. CT head negative. Pt given IV insulin in the ED. Given severe hypernatremia, pt started on 1/2 NS at 75 cc/hr. However, given hypernatremia, UTI, hyperglycemia, case discussed with Dr. Basilio Streeter (medicine) and pt admitted to medicine in stable condition. FINAL IMPRESSION      1. Acute cystitis without hematuria    2. Altered mental status, unspecified altered mental status type    3. Hyperglycemia    4.  Hypernatremia          DISPOSITION/PLAN   DISPOSITION Admitted 09/18/2021 07:20:09 PM        DISCHARGE MEDICATIONS:  [unfilled]         Gerardo Torres MD(electronically signed)  Attending Emergency Physician            Gerardo Torres MD  09/18/21 524 Nettie Tran MD  09/18/21 [id number]

## 2021-09-19 PROBLEM — E87.0 HYPERNATREMIA: Status: ACTIVE | Noted: 2021-09-19

## 2021-09-19 LAB
ALBUMIN SERPL-MCNC: 3.8 G/DL (ref 3.5–4.6)
ALP BLD-CCNC: 86 U/L (ref 40–130)
ALT SERPL-CCNC: 47 U/L (ref 0–33)
ANION GAP SERPL CALCULATED.3IONS-SCNC: 10 MEQ/L (ref 9–15)
ANION GAP SERPL CALCULATED.3IONS-SCNC: 11 MEQ/L (ref 9–15)
ANION GAP SERPL CALCULATED.3IONS-SCNC: 12 MEQ/L (ref 9–15)
ANION GAP SERPL CALCULATED.3IONS-SCNC: 13 MEQ/L (ref 9–15)
ANION GAP SERPL CALCULATED.3IONS-SCNC: 9 MEQ/L (ref 9–15)
AST SERPL-CCNC: 35 U/L (ref 0–35)
BASOPHILS ABSOLUTE: 0.1 K/UL (ref 0–0.2)
BASOPHILS RELATIVE PERCENT: 0.7 %
BILIRUB SERPL-MCNC: 0.9 MG/DL (ref 0.2–0.7)
BUN BLDV-MCNC: 27 MG/DL (ref 8–23)
BUN BLDV-MCNC: 29 MG/DL (ref 8–23)
BUN BLDV-MCNC: 33 MG/DL (ref 8–23)
BUN BLDV-MCNC: 36 MG/DL (ref 8–23)
BUN BLDV-MCNC: 38 MG/DL (ref 8–23)
CALCIUM SERPL-MCNC: 8.4 MG/DL (ref 8.5–9.9)
CALCIUM SERPL-MCNC: 8.5 MG/DL (ref 8.5–9.9)
CALCIUM SERPL-MCNC: 8.6 MG/DL (ref 8.5–9.9)
CALCIUM SERPL-MCNC: 8.7 MG/DL (ref 8.5–9.9)
CALCIUM SERPL-MCNC: 8.9 MG/DL (ref 8.5–9.9)
CHLORIDE BLD-SCNC: 121 MEQ/L (ref 95–107)
CHLORIDE BLD-SCNC: 121 MEQ/L (ref 95–107)
CHLORIDE BLD-SCNC: 122 MEQ/L (ref 95–107)
CHLORIDE BLD-SCNC: 124 MEQ/L (ref 95–107)
CHLORIDE BLD-SCNC: 124 MEQ/L (ref 95–107)
CO2: 21 MEQ/L (ref 20–31)
CO2: 22 MEQ/L (ref 20–31)
CO2: 24 MEQ/L (ref 20–31)
CO2: 24 MEQ/L (ref 20–31)
CO2: 25 MEQ/L (ref 20–31)
CREAT SERPL-MCNC: 0.71 MG/DL (ref 0.5–0.9)
CREAT SERPL-MCNC: 0.73 MG/DL (ref 0.5–0.9)
CREAT SERPL-MCNC: 0.77 MG/DL (ref 0.5–0.9)
CREAT SERPL-MCNC: 0.94 MG/DL (ref 0.5–0.9)
CREAT SERPL-MCNC: 0.98 MG/DL (ref 0.5–0.9)
EOSINOPHILS ABSOLUTE: 0.1 K/UL (ref 0–0.7)
EOSINOPHILS RELATIVE PERCENT: 0.6 %
GFR AFRICAN AMERICAN: >60
GFR NON-AFRICAN AMERICAN: 55
GFR NON-AFRICAN AMERICAN: 57.8
GFR NON-AFRICAN AMERICAN: >60
GLOBULIN: 2.5 G/DL (ref 2.3–3.5)
GLUCOSE BLD-MCNC: 158 MG/DL (ref 60–115)
GLUCOSE BLD-MCNC: 172 MG/DL (ref 70–99)
GLUCOSE BLD-MCNC: 276 MG/DL (ref 60–115)
GLUCOSE BLD-MCNC: 280 MG/DL (ref 70–99)
GLUCOSE BLD-MCNC: 280 MG/DL (ref 70–99)
GLUCOSE BLD-MCNC: 283 MG/DL (ref 60–115)
GLUCOSE BLD-MCNC: 337 MG/DL (ref 70–99)
GLUCOSE BLD-MCNC: 436 MG/DL (ref 60–115)
GLUCOSE BLD-MCNC: 479 MG/DL (ref 70–99)
HCT VFR BLD CALC: 43.2 % (ref 37–47)
HEMOGLOBIN: 14.2 G/DL (ref 12–16)
LACTIC ACID: 1.3 MMOL/L (ref 0.5–2.2)
LYMPHOCYTES ABSOLUTE: 2.6 K/UL (ref 1–4.8)
LYMPHOCYTES RELATIVE PERCENT: 16 %
MCH RBC QN AUTO: 32.2 PG (ref 27–31.3)
MCHC RBC AUTO-ENTMCNC: 32.8 % (ref 33–37)
MCV RBC AUTO: 97.9 FL (ref 82–100)
MONOCYTES ABSOLUTE: 0.9 K/UL (ref 0.2–0.8)
MONOCYTES RELATIVE PERCENT: 5.6 %
NEUTROPHILS ABSOLUTE: 12.3 K/UL (ref 1.4–6.5)
NEUTROPHILS RELATIVE PERCENT: 77.1 %
PDW BLD-RTO: 13.9 % (ref 11.5–14.5)
PERFORMED ON: ABNORMAL
PLATELET # BLD: 202 K/UL (ref 130–400)
POTASSIUM REFLEX MAGNESIUM: 3.9 MEQ/L (ref 3.4–4.9)
POTASSIUM SERPL-SCNC: 3.4 MEQ/L (ref 3.4–4.9)
POTASSIUM SERPL-SCNC: 3.6 MEQ/L (ref 3.4–4.9)
POTASSIUM SERPL-SCNC: 3.7 MEQ/L (ref 3.4–4.9)
POTASSIUM SERPL-SCNC: 3.9 MEQ/L (ref 3.4–4.9)
RBC # BLD: 4.41 M/UL (ref 4.2–5.4)
SODIUM BLD-SCNC: 154 MEQ/L (ref 135–144)
SODIUM BLD-SCNC: 155 MEQ/L (ref 135–144)
SODIUM BLD-SCNC: 157 MEQ/L (ref 135–144)
SODIUM BLD-SCNC: 158 MEQ/L (ref 135–144)
SODIUM BLD-SCNC: 159 MEQ/L (ref 135–144)
TOTAL PROTEIN: 6.3 G/DL (ref 6.3–8)
WBC # BLD: 16 K/UL (ref 4.8–10.8)

## 2021-09-19 PROCEDURE — 2580000003 HC RX 258: Performed by: NURSE PRACTITIONER

## 2021-09-19 PROCEDURE — 6370000000 HC RX 637 (ALT 250 FOR IP): Performed by: INTERNAL MEDICINE

## 2021-09-19 PROCEDURE — 36415 COLL VENOUS BLD VENIPUNCTURE: CPT

## 2021-09-19 PROCEDURE — 85025 COMPLETE CBC W/AUTO DIFF WBC: CPT

## 2021-09-19 PROCEDURE — 6360000002 HC RX W HCPCS: Performed by: INTERNAL MEDICINE

## 2021-09-19 PROCEDURE — 80053 COMPREHEN METABOLIC PANEL: CPT

## 2021-09-19 PROCEDURE — 6360000002 HC RX W HCPCS: Performed by: NURSE PRACTITIONER

## 2021-09-19 PROCEDURE — 6370000000 HC RX 637 (ALT 250 FOR IP): Performed by: NURSE PRACTITIONER

## 2021-09-19 PROCEDURE — 2060000000 HC ICU INTERMEDIATE R&B

## 2021-09-19 PROCEDURE — 83605 ASSAY OF LACTIC ACID: CPT

## 2021-09-19 RX ORDER — HALOPERIDOL 5 MG/ML
2 INJECTION INTRAMUSCULAR EVERY 6 HOURS PRN
Status: DISCONTINUED | OUTPATIENT
Start: 2021-09-19 | End: 2021-09-21 | Stop reason: HOSPADM

## 2021-09-19 RX ORDER — SODIUM CHLORIDE 450 MG/100ML
INJECTION, SOLUTION INTRAVENOUS CONTINUOUS
Status: DISCONTINUED | OUTPATIENT
Start: 2021-09-19 | End: 2021-09-21 | Stop reason: HOSPADM

## 2021-09-19 RX ADMIN — HALOPERIDOL LACTATE 2 MG: 5 INJECTION, SOLUTION INTRAMUSCULAR at 20:23

## 2021-09-19 RX ADMIN — INSULIN GLARGINE 10 UNITS: 100 INJECTION, SOLUTION SUBCUTANEOUS at 01:05

## 2021-09-19 RX ADMIN — SODIUM CHLORIDE, PRESERVATIVE FREE 10 ML: 5 INJECTION INTRAVENOUS at 20:24

## 2021-09-19 RX ADMIN — ENOXAPARIN SODIUM 40 MG: 40 INJECTION SUBCUTANEOUS at 10:21

## 2021-09-19 RX ADMIN — CEFTRIAXONE SODIUM 1000 MG: 1 INJECTION, POWDER, FOR SOLUTION INTRAMUSCULAR; INTRAVENOUS at 16:07

## 2021-09-19 RX ADMIN — INSULIN LISPRO 12 UNITS: 100 INJECTION, SOLUTION INTRAVENOUS; SUBCUTANEOUS at 16:05

## 2021-09-19 RX ADMIN — INSULIN GLARGINE 10 UNITS: 100 INJECTION, SOLUTION SUBCUTANEOUS at 20:22

## 2021-09-19 RX ADMIN — HALOPERIDOL LACTATE 2 MG: 5 INJECTION, SOLUTION INTRAMUSCULAR at 15:27

## 2021-09-19 RX ADMIN — SODIUM CHLORIDE: 4.5 INJECTION, SOLUTION INTRAVENOUS at 04:31

## 2021-09-19 ASSESSMENT — PAIN SCALES - GENERAL: PAINLEVEL_OUTOF10: 0

## 2021-09-19 NOTE — FLOWSHEET NOTE
Pt arrived from ED, pt is out from ativan given to the er. V/s stable, on RA. NP at bedside assessing pt. [id number]/hr infusing. 2125- Meds updated based from the papers that came fro [address] [place]. NP notified, aware. 0440- Pt is still lethargic, arousable but then goes right back to sleep. V/S stable, sats stable on RA.

## 2021-09-19 NOTE — H&P
[facility]  MEDICINE    HISTORY AND PHYSICAL EXAM    PATIENT NAME:  Luisito Guevara    MRN:  [id number]  SERVICE DATE:  9/18/2021   SERVICE TIME:  8:28 PM    Primary Care Physician: Gama Truong MD     SUBJECTIVE  CHIEF COMPLAINT:  AMS    HPI:  Luisito Guevara is a 67 y.o., , female who  has a past medical history of Chronic rhinitis, Depression, Diabetes mellitus ([facility] [address] Hyperlipidemia, and Hypertension. that is hospitalized for dehydration, UTI. HPI   Sent to ER from NH w/ complaint of AMS - usually conversant but now sleepy, not talking ;since this AM.   She is afebrile. Not answering questions. Sleepy but arousable. Labs indicate severe dehydration. She has UTI. Admitted for further management. PAST MEDICAL HISTORY:    Past Medical History:   Diagnosis Date    Chronic rhinitis     Depression     Diabetes mellitus ([facility] [address]     Hyperlipidemia     Hypertension      PAST SURGICAL HISTORY:  History reviewed. No pertinent surgical history. FAMILY HISTORY:  History reviewed. No pertinent family history. SOCIAL HISTORY:    Social History     Socioeconomic History    Marital status:      Spouse name: Not on file    Number of children: Not on file    Years of education: Not on file    Highest education level: Not on file   Occupational History    Not on file   Tobacco Use    Smoking status: Never Smoker    Smokeless tobacco: Never Used   Substance and Sexual Activity    Alcohol use: Never    Drug use: Never    Sexual activity: Not Currently   Other Topics Concern    Not on file   Social History Narrative    Not on file     Social Determinants of Health     Financial Resource Strain:     Difficulty of Paying Living Expenses:    Food Insecurity:     Worried About Running Out of Food in the Last Year:     [address] in the Last Year:    Transportation Needs:     Lack of Transportation (Medical):      Lack of Transportation (Non-Medical):    Physical Activity:  Days of Exercise per Week:     Minutes of Exercise per Session:    Stress:     Feeling of Stress :    Social Connections:     Frequency of Communication with Friends and Family:     Frequency of Social Gatherings with Friends and Family:     Attends Scientologist Services:     Active Member of Clubs or Organizations:     Attends Club or Organization Meetings:     Marital Status:    Intimate Partner Violence:     Fear of Current or Ex-Partner:     Emotionally Abused:     Physically Abused:     Sexually Abused:      MEDICATIONS:   Prior to Admission medications    Medication Sig Start Date End Date Taking?  Authorizing Provider   ciprofloxacin (CIPRO) 500 MG tablet Take 1 tablet by mouth 2 times daily for 10 days 9/18/21 9/28/21 Yes Melodie Morales MD   glimepiride (AMARYL) 4 MG tablet Take 4 mg by mouth every morning (before breakfast)    Historical Provider, MD   potassium chloride (KLOR-CON M) 10 MEQ extended release tablet Take 10 mEq by mouth daily    Historical Provider, MD   telmisartan (MICARDIS) 80 MG tablet Take 80 mg by mouth daily Hold if SBP<110 mmhg    Historical Provider, MD   vitamin B-12 (CYANOCOBALAMIN) 500 MCG tablet Take 500 mcg by mouth daily    Historical Provider, MD   [facility] OXIDE EX Apply topically three times daily And as needed    Historical Provider, MD   memantine (NAMENDA) 10 MG tablet Take 1 tablet by mouth 2 times daily 11/16/20   Madeline Spears MD   donepezil (ARICEPT) 10 MG tablet Take 1 tablet by mouth nightly 10/12/20 1/10/21  Madeline Spears MD   atorvastatin (LIPITOR) 10 MG tablet Take by mouth 3/29/18   Historical Provider, MD   FLUoxetine (PROZAC) 40 MG capsule Take 40 mg by mouth daily  6/21/18   Historical Provider, MD   ONE TOUCH ULTRA TEST strip  1/29/20   Historical Provider, MD   levothyroxine (SYNTHROID) 50 MCG tablet Take by mouth 3/29/18   Historical Provider, MD       ALLERGIES: Penicillins, Sulfa antibiotics, and Penicillin g    REVIEW OF SYSTEM: Review of Systems   Unable to perform ROS: Dementia        OBJECTIVE  PHYSICAL EXAM:   Physical Exam  Vitals and nursing note reviewed. Constitutional:       General: She is awake. She is not in acute distress. Appearance: She is ill-appearing. She is not diaphoretic. Comments: Sleepy but arousable  Not answering questions. HENT:      Head: Normocephalic and atraumatic. Nose: Nose normal.      Mouth/Throat:      Mouth: Mucous membranes are dry. Eyes:      Conjunctiva/sclera: Conjunctivae normal.      Pupils: Pupils are equal, round, and reactive to light. Neck:      Vascular: No carotid bruit. Cardiovascular:      Rate and Rhythm: Normal rate and regular rhythm. Pulses: Normal pulses. Heart sounds: Normal heart sounds. No murmur heard. Comments: Occasionally tachycardic  Pulmonary:      Effort: Pulmonary effort is normal.      Breath sounds: Normal breath sounds. No wheezing or rhonchi. Abdominal:      General: Abdomen is flat. Bowel sounds are normal.      Palpations: Abdomen is soft. Tenderness: There is no abdominal tenderness. Musculoskeletal:         General: Normal range of motion. Cervical back: No muscular tenderness. Right lower leg: No edema. Left lower leg: No edema. Lymphadenopathy:      Cervical: No cervical adenopathy. Skin:     General: Skin is warm and dry. Capillary Refill: Capillary refill takes less than 2 seconds. Neurological:      Comments: Dementia. Psychiatric:         Behavior: Behavior is cooperative. /60   Pulse 90   Temp 97.8 °F (36.6 °C) (Temporal)   Resp 24   Ht 5' 4\" (1.626 m)   Wt 130 lb (59 kg)   SpO2 98%   BMI 22.31 kg/m²     DATA:     Diagnostic tests reviewed for today's visit:    Most recent labs and imaging results reviewed.      LABS:    Recent Results (from the past 24 hour(s))   CBC Auto Differential    Collection Time: 09/18/21  2:30 PM   Result Value Ref Range    WBC 16.8 (H) 4.8 - 10.8 K/uL    RBC 5.55 (H) 4.20 - 5.40 M/uL    Hemoglobin 17.6 (H) 12.0 - 16.0 g/dL    Hematocrit 54.5 (H) 37.0 - 47.0 %    MCV 98.0 82.0 - 100.0 fL    MCH 31.8 (H) 27.0 - 31.3 pg    MCHC 32.4 (L) 33.0 - 37.0 %    RDW 14.3 11.5 - 14.5 %    Platelets 979 375 - 151 K/uL    Neutrophils % 79.2 %    Lymphocytes % 14.3 %    Monocytes % 6.0 %    Eosinophils % 0.1 %    Basophils % 0.4 %    Neutrophils Absolute 13.3 (H) 1.4 - 6.5 K/uL    Lymphocytes Absolute 2.4 1.0 - 4.8 K/uL    Monocytes Absolute 1.0 (H) 0.2 - 0.8 K/uL    Eosinophils Absolute 0.0 0.0 - 0.7 K/uL    Basophils Absolute 0.1 0.0 - 0.2 K/uL   Urinalysis    Collection Time: 09/18/21  2:30 PM   Result Value Ref Range    Color, UA Yellow Straw/Yellow    Clarity, UA Clear Clear    Glucose, Ur >=1000 (A) Negative mg/dL    Bilirubin Urine Negative Negative    Ketones, Urine 15 (A) Negative mg/dL    Specific Gravity, UA 1.033 1.005 - 1.030    Blood, Urine TRACE (A) Negative    pH, UA 5.0 5.0 - 9.0    Protein, UA 30 (A) Negative mg/dL    Urobilinogen, Urine 0.2 <2.0 E.U./dL    Nitrite, Urine POSITIVE (A) Negative    Leukocyte Esterase, Urine Negative Negative   TSH without Reflex    Collection Time: 09/18/21  2:30 PM   Result Value Ref Range    TSH 0.543 0.440 - 3.860 uIU/mL   Ammonia    Collection Time: 09/18/21  2:30 PM   Result Value Ref Range    Ammonia 21 11 - 51 umol/L   Urine Drug Screen    Collection Time: 09/18/21  2:30 PM   Result Value Ref Range    Amphetamine Screen, Urine Neg Negative <1000 ng/mL    Barbiturate Screen, Ur Neg Negative < 200 ng/mL    Benzodiazepine Screen, Urine Neg Negative < 200 ng/mL    Cannabinoid Scrn, Ur Neg Negative < 50 ng/mL    Cocaine Metabolite Screen, Urine Neg Negative < 300 ng/mL    Opiate Scrn, Ur Neg Negative < 300 ng/mL    PCP Screen, Urine Neg Negative < 25 ng/mL    Methadone Screen, Urine Neg Negative <300 ng/mL    Propoxyphene Scrn, Ur Neg Negative <300 ng/mL    Oxycodone Urine Neg Negative <100 ng/mL    Drug Screen Comment: see below    Ethanol    Collection Time: 09/18/21  2:30 PM   Result Value Ref Range    Ethanol Lvl <10 mg/dL    Ethanol percent Not indicated G/dL   Microscopic Urinalysis    Collection Time: 09/18/21  2:30 PM   Result Value Ref Range    Coarse Casts, UA 0-1 0 - 5 /LPF    Bacteria, UA MANY (A) Negative /HPF    Hyaline Casts, UA 1-3 0 - 5 /HPF    WBC, UA 10-20 (A) 0 - 5 /HPF    RBC, UA 0-2 0 - 5 /HPF    Epithelial Cells, UA 0-2 0 - 5 /HPF   EKG 12 Lead - Chest Pain    Collection Time: 09/18/21  2:43 PM   Result Value Ref Range    Ventricular Rate 93 BPM    Atrial Rate 93 BPM    P-R Interval 154 ms    QRS Duration 80 ms    Q-T Interval 388 ms    QTc Calculation (Bazett) 482 ms    P Axis 32 degrees    R Axis -15 degrees    T Axis 203 degrees   COVID-19, Rapid    Collection Time: 09/18/21  2:53 PM    Specimen: Nasopharyngeal Swab   Result Value Ref Range    SARS-CoV-2, NAAT Not Detected Not Detected   SPECIMEN REJECTION    Collection Time: 09/18/21  3:39 PM   Result Value Ref Range    Rejected Test CMP/MG/TROP     Reason for Rejection see below    Comprehensive Metabolic Panel    Collection Time: 09/18/21  6:00 PM   Result Value Ref Range    Sodium 162 (HH) 135 - 144 mEq/L    Potassium 4.0 3.4 - 4.9 mEq/L    Chloride 126 (H) 95 - 107 mEq/L    CO2 22 20 - 31 mEq/L    Anion Gap 14 9 - 15 mEq/L    Glucose 446 (HH) 70 - 99 mg/dL    BUN 39 (H) 8 - 23 mg/dL    CREATININE 1.05 (H) 0.50 - 0.90 mg/dL    GFR Non-African American 50.8 (L) >60    GFR  >60.0 >60    Calcium 8.9 8.5 - 9.9 mg/dL    Total Protein 6.6 6.3 - 8.0 g/dL    Albumin 4.1 3.5 - 4.6 g/dL    Total Bilirubin 0.8 (H) 0.2 - 0.7 mg/dL    Alkaline Phosphatase 101 40 - 130 U/L    ALT 44 (H) 0 - 33 U/L    AST 22 0 - 35 U/L    Globulin 2.5 2.3 - 3.5 g/dL   Magnesium    Collection Time: 09/18/21  6:00 PM   Result Value Ref Range    Magnesium 2.5 (H) 1.7 - 2.4 mg/dL   Troponin    Collection Time: 09/18/21  6:00 PM   Result Value Ref Range Troponin <0.010 0.000 - 0.010 ng/mL       IMAGING:  CT Head WO Contrast    Result Date: 9/18/2021  EXAMINATION: CT of the brain without contrast HISTORY: Altered mental status COMPARISON: MRI brain June 16, 2016 TECHNIQUE: Multiple contiguous axial images were obtained of the brain from the skull base through the vertex. Multiplanar reformats were obtained. FINDINGS: Prominence of the sulci and ventricles compatible with moderate generalized parenchymal volume loss. Gray-white matter differentiation is preserved. No acute hemorrhage or abnormal extra-axial fluid collection. No mass effect or midline shift. The visualized paranasal sinuses and mastoid air cells are clear. Calvarium is intact. No acute intracranial process. All CT scans at this facility use dose modulation, iterative reconstruction, and/or weight based dosing when appropriate to reduce radiation dose to as low as reasonably achievable. XR CHEST PORTABLE    Result Date: 9/18/2021  Exam: XR CHEST PORTABLE History: Altered mental status Technique: AP portable view of the chest obtained. Comparison: Chest x-rays November 10, 2006 Findings: The cardiomediastinal silhouette is within normal limits. No pneumothorax, pleural effusion, or consolidation. Bones of the thorax appear intact. No radiographic evidence of acute intrathoracic process. VTE Prophylaxis: low molecular weight heparin -  start    ASSESSMENT AND PLAN  Principal Problem:    Dehydration  She appears dry,  Na 162  BUN 39  Received 1L fluid in ER. Plan: Hydrate . 45% NS,  q 6 hr BMP,  Treat hyperglycemia. Active Problems:    UTI  UA w nitrites, many bacteria, She is afebrile  No lower abdominal pain or hematuria. Plan: Rocephin  Await culture results. Early onset Alzheimer's dementia without behavioral disturbance   Nursing home resident, on namenda and aricept. Sleepy but arousable. Plan: continue meds. Diabetes mellitus   Take amaryl.     Received 10U RHI in ER  Plan: SSI. Resume oral meds at discharge. Hypertension takes micardis   /60    Plan: continue meds. Awaiting verification. Hyperlipidemia  Stable on statin - lipitor 10 mg   Hx DM2, no stroke or MI. Plan: continue statin     Hypothyroid  TSH 0.543  On synthroid 50 mcg  Plan: continue meds.          SIGNATURE: LANA Griffin - CNP  DATE: September 18, 2021  TIME: 8:28 PM   Kranthi Sigala MD  - supervising physician

## 2021-09-19 NOTE — CARE COORDINATION
[facility] EMERGENCY [facility] Case Management Initial Discharge Assessment    Met with Patient to discuss discharge plan. PCP: Shae Ponce MD                                Date of Last Visit: JULY    Discharge Planning    Living Arrangements: [facility] ASSISTED LIVING MEMORY CARE UNIT    Who do you live with? [facility]    Who helps you with your care:  self or [facility] STAFF    If lives at home:     Do you have any barriers navigating in your home? no    Patient can perform ADL? Yes WITH ASSIST OF STAFF    Current Services (outpatient and in home) : [facility]    Dialysis: No    Is transportation available to get to your appointments? Yes    DME Equipment:  No, PER SPOUSE PT WAS AMBULATING INDEPENDENTLY    Respiratory equipment: None    Respiratory provider:  no     [id number]?  No      Patient agreeable to [facility]? Yes, Company WILL DISCUSS WITH [facility]    Patient agreeable to SNF/Rehab? Other discharge needs identified? PALL CARE      Does Patient Have a High-Risk for Readmission Diagnosis (CHF, PN, MI, COPD)? No    Initial Discharge Plan? (Note: please see concurrent daily documentation for any updates after initial note). MET WITH PATIENT'S SPOUSE ALTHEA AT BEDSIDE. SPOUSE STATES PT HAS BEEN AT [facility] SINCE January. PER SPOUSE SHE WAS ABLE TO AMBULATE WITHOUT EQUIPMENT. DISCUSSED DC NEEDS, SPOUSE IS OPEN TO [facility] IF ABLE. WILL FOLLOW UP WITH [facility] TOMORROW ONCE MEDICAL PLAN KNOWN.   SPOUSE AGREES.      Readmission Risk              Risk of Unplanned Readmission:  0         Electronically signed by Jacoby Pena RN on 9/19/2021 at 10:44 AM

## 2021-09-19 NOTE — PROGRESS NOTES
Pt continuously trying to climb out of bed and remove gown. Attempts to redirect only momentarily effective. Haldol 2mg IM given in left deltoid. Will reassess.

## 2021-09-19 NOTE — PROGRESS NOTES
Hospitalist Progress Note      PCP: Gonsalo Welsh MD    Date of Admission: 9/18/2021    Chief Complaint:  No acute events, somnolent, arousable, afebrile, stable HD, on RA    Medications:  Reviewed    Infusion Medications    sodium chloride      sodium chloride      dextrose       Scheduled Medications    sodium chloride flush  5-40 mL IntraVENous 2 times per day    enoxaparin  40 mg SubCUTAneous Daily    cefTRIAXone (ROCEPHIN) IV  1,000 mg IntraVENous Q24H    insulin lispro  0-12 Units SubCUTAneous TID WC    insulin lispro  0-6 Units SubCUTAneous Nightly    insulin glargine  10 Units SubCUTAneous Nightly     PRN Meds: haloperidol lactate, sodium chloride flush, sodium chloride, ondansetron **OR** ondansetron, acetaminophen **OR** acetaminophen, polyethylene glycol, glucose, dextrose, glucagon (rDNA), dextrose      Intake/Output Summary (Last 24 hours) at 9/19/2021 1139  Last data filed at 9/18/2021 1919  Gross per 24 hour   Intake 896.67 ml   Output --   Net 896.67 ml       Exam:    /70   Pulse 62   Temp 98.8 °F (37.1 °C) (Oral)   Resp 16   Ht 5' 4\" (1.626 m)   Wt 125 lb (56.7 kg)   SpO2 (!) 86%   BMI 21.46 kg/m²     General appearance: somnolent, arousable  Respiratory:  clear to auscultation, bilaterally   Cardiovascular: Regular rate and rhythm, S1/S2   Abdomen: Soft, active bowel sounds. Musculoskeletal: No edema bilaterally. Labs:   Recent Labs     09/18/21  1430 09/19/21  0353   WBC 16.8* 16.0*   HGB 17.6* 14.2   HCT 54.5* 43.2    202     Recent Labs     09/18/21  2358 09/19/21  0353 09/19/21  0915   * 158* 157*   K 3.4 3.9 3.7   * 124* 122*   CO2 24 25 22   BUN 38* 36* 33*   CREATININE 0.98* 0.94* 0.73   CALCIUM 8.7 8.6 8.5     Recent Labs     09/18/21  1800 09/19/21  0353   AST 22 35   ALT 44* 47*   BILITOT 0.8* 0.9*   ALKPHOS 101 86     No results for input(s): INR in the last 72 hours.   Recent Labs     09/18/21  1800   TROPONINI <0.010       Urinalysis: Lab Results   Component Value Date    NITRU POSITIVE 09/18/2021    WBCUA 10-20 09/18/2021    BACTERIA MANY 09/18/2021    RBCUA 0-2 09/18/2021    BLOODU TRACE 09/18/2021    SPECGRAV 1.033 09/18/2021    GLUCOSEU >=1000 09/18/2021       Radiology:  XR CHEST PORTABLE   Final Result      No radiographic evidence of acute intrathoracic process. CT Head WO Contrast   Final Result      No acute intracranial process. All CT scans at this facility use dose modulation, iterative reconstruction, and/or weight based dosing when appropriate to reduce radiation dose to as low as reasonably achievable.               Assessment/Plan:    68 y/o female NH patient with history of HTN, DM2, dementia who presented with:    Metabolic encephalopathy  - in the setting of hypernatremia, UTI, underlying dementia  - CT head was negative for acute findings  - neurology consulted     Severe hypernatremia   - slowly improving with 1/2 NS infusion  - nephrology consulted    UTI  - on IV rocephin   - follow urine culture    DM2 with hyperglycemia  - lantus, ISS, hypoglycemia protocol              Electronically signed by Edinson Deng MD on 9/19/2021 at 11:39 AM

## 2021-09-20 ENCOUNTER — APPOINTMENT (OUTPATIENT)
Dept: MRI IMAGING | Age: 76
DRG: 640 | End: 2021-09-20
Payer: MEDICARE

## 2021-09-20 LAB
ALBUMIN SERPL-MCNC: 3.3 G/DL (ref 3.5–4.6)
ALP BLD-CCNC: 78 U/L (ref 40–130)
ALT SERPL-CCNC: 35 U/L (ref 0–33)
ANION GAP SERPL CALCULATED.3IONS-SCNC: 10 MEQ/L (ref 9–15)
ANION GAP SERPL CALCULATED.3IONS-SCNC: 11 MEQ/L (ref 9–15)
ANION GAP SERPL CALCULATED.3IONS-SCNC: 9 MEQ/L (ref 9–15)
AST SERPL-CCNC: 30 U/L (ref 0–35)
BASOPHILS ABSOLUTE: 0 K/UL (ref 0–0.2)
BASOPHILS RELATIVE PERCENT: 0.5 %
BILIRUB SERPL-MCNC: 0.9 MG/DL (ref 0.2–0.7)
BUN BLDV-MCNC: 22 MG/DL (ref 8–23)
BUN BLDV-MCNC: 22 MG/DL (ref 8–23)
BUN BLDV-MCNC: 24 MG/DL (ref 8–23)
CALCIUM SERPL-MCNC: 7.8 MG/DL (ref 8.5–9.9)
CALCIUM SERPL-MCNC: 8.4 MG/DL (ref 8.5–9.9)
CALCIUM SERPL-MCNC: 8.5 MG/DL (ref 8.5–9.9)
CHLORIDE BLD-SCNC: 114 MEQ/L (ref 95–107)
CHLORIDE BLD-SCNC: 116 MEQ/L (ref 95–107)
CHLORIDE BLD-SCNC: 122 MEQ/L (ref 95–107)
CO2: 19 MEQ/L (ref 20–31)
CO2: 22 MEQ/L (ref 20–31)
CO2: 22 MEQ/L (ref 20–31)
CREAT SERPL-MCNC: 0.59 MG/DL (ref 0.5–0.9)
CREAT SERPL-MCNC: 0.6 MG/DL (ref 0.5–0.9)
CREAT SERPL-MCNC: 0.61 MG/DL (ref 0.5–0.9)
EKG ATRIAL RATE: 93 BPM
EKG P AXIS: 32 DEGREES
EKG P-R INTERVAL: 154 MS
EKG Q-T INTERVAL: 388 MS
EKG QRS DURATION: 80 MS
EKG QTC CALCULATION (BAZETT): 482 MS
EKG R AXIS: -15 DEGREES
EKG T AXIS: 203 DEGREES
EKG VENTRICULAR RATE: 93 BPM
EOSINOPHILS ABSOLUTE: 0.2 K/UL (ref 0–0.7)
EOSINOPHILS RELATIVE PERCENT: 1.6 %
GFR AFRICAN AMERICAN: >60
GFR NON-AFRICAN AMERICAN: >60
GLOBULIN: 2.3 G/DL (ref 2.3–3.5)
GLUCOSE BLD-MCNC: 110 MG/DL (ref 60–115)
GLUCOSE BLD-MCNC: 162 MG/DL (ref 70–99)
GLUCOSE BLD-MCNC: 168 MG/DL (ref 60–115)
GLUCOSE BLD-MCNC: 187 MG/DL (ref 70–99)
GLUCOSE BLD-MCNC: 315 MG/DL (ref 60–115)
GLUCOSE BLD-MCNC: 329 MG/DL (ref 70–99)
GLUCOSE BLD-MCNC: 425 MG/DL (ref 60–115)
HCT VFR BLD CALC: 39.7 % (ref 37–47)
HEMOGLOBIN: 13.3 G/DL (ref 12–16)
LYMPHOCYTES ABSOLUTE: 2.3 K/UL (ref 1–4.8)
LYMPHOCYTES RELATIVE PERCENT: 23.8 %
MAGNESIUM: 2 MG/DL (ref 1.7–2.4)
MCH RBC QN AUTO: 32.9 PG (ref 27–31.3)
MCHC RBC AUTO-ENTMCNC: 33.6 % (ref 33–37)
MCV RBC AUTO: 98 FL (ref 82–100)
MONOCYTES ABSOLUTE: 0.5 K/UL (ref 0.2–0.8)
MONOCYTES RELATIVE PERCENT: 5.3 %
NEUTROPHILS ABSOLUTE: 6.8 K/UL (ref 1.4–6.5)
NEUTROPHILS RELATIVE PERCENT: 68.8 %
ORGANISM: ABNORMAL
PDW BLD-RTO: 13.6 % (ref 11.5–14.5)
PERFORMED ON: ABNORMAL
PERFORMED ON: NORMAL
PLATELET # BLD: 160 K/UL (ref 130–400)
POTASSIUM REFLEX MAGNESIUM: 3.3 MEQ/L (ref 3.4–4.9)
POTASSIUM SERPL-SCNC: 3.2 MEQ/L (ref 3.4–4.9)
POTASSIUM SERPL-SCNC: 3.4 MEQ/L (ref 3.4–4.9)
RBC # BLD: 4.05 M/UL (ref 4.2–5.4)
SODIUM BLD-SCNC: 142 MEQ/L (ref 135–144)
SODIUM BLD-SCNC: 149 MEQ/L (ref 135–144)
SODIUM BLD-SCNC: 154 MEQ/L (ref 135–144)
TOTAL PROTEIN: 5.6 G/DL (ref 6.3–8)
TSH REFLEX: 0.81 UIU/ML (ref 0.44–3.86)
URINE CULTURE, ROUTINE: ABNORMAL
WBC # BLD: 9.8 K/UL (ref 4.8–10.8)

## 2021-09-20 PROCEDURE — 80053 COMPREHEN METABOLIC PANEL: CPT

## 2021-09-20 PROCEDURE — 36415 COLL VENOUS BLD VENIPUNCTURE: CPT

## 2021-09-20 PROCEDURE — 6360000002 HC RX W HCPCS: Performed by: PSYCHIATRY & NEUROLOGY

## 2021-09-20 PROCEDURE — 2580000003 HC RX 258: Performed by: INTERNAL MEDICINE

## 2021-09-20 PROCEDURE — 85025 COMPLETE CBC W/AUTO DIFF WBC: CPT

## 2021-09-20 PROCEDURE — 6370000000 HC RX 637 (ALT 250 FOR IP): Performed by: INTERNAL MEDICINE

## 2021-09-20 PROCEDURE — 83735 ASSAY OF MAGNESIUM: CPT

## 2021-09-20 PROCEDURE — 2060000000 HC ICU INTERMEDIATE R&B

## 2021-09-20 PROCEDURE — 2580000003 HC RX 258: Performed by: NURSE PRACTITIONER

## 2021-09-20 PROCEDURE — 95816 EEG AWAKE AND DROWSY: CPT

## 2021-09-20 PROCEDURE — 93010 ELECTROCARDIOGRAM REPORT: CPT | Performed by: INTERNAL MEDICINE

## 2021-09-20 PROCEDURE — 99221 1ST HOSP IP/OBS SF/LOW 40: CPT | Performed by: PSYCHIATRY & NEUROLOGY

## 2021-09-20 PROCEDURE — 84443 ASSAY THYROID STIM HORMONE: CPT

## 2021-09-20 PROCEDURE — 6360000002 HC RX W HCPCS: Performed by: NURSE PRACTITIONER

## 2021-09-20 RX ORDER — LORAZEPAM 2 MG/ML
1 INJECTION INTRAMUSCULAR
Status: COMPLETED | OUTPATIENT
Start: 2021-09-20 | End: 2021-09-20

## 2021-09-20 RX ORDER — DONEPEZIL HYDROCHLORIDE 10 MG/1
10 TABLET, FILM COATED ORAL NIGHTLY
Status: DISCONTINUED | OUTPATIENT
Start: 2021-09-20 | End: 2021-09-21 | Stop reason: HOSPADM

## 2021-09-20 RX ORDER — CYANOCOBALAMIN 1000 UG/ML
1000 INJECTION INTRAMUSCULAR; SUBCUTANEOUS ONCE
Status: COMPLETED | OUTPATIENT
Start: 2021-09-20 | End: 2021-09-20

## 2021-09-20 RX ORDER — LEVOTHYROXINE SODIUM 0.05 MG/1
50 TABLET ORAL DAILY
Status: DISCONTINUED | OUTPATIENT
Start: 2021-09-20 | End: 2021-09-21 | Stop reason: HOSPADM

## 2021-09-20 RX ORDER — MEMANTINE HYDROCHLORIDE 10 MG/1
10 TABLET ORAL DAILY
Status: DISCONTINUED | OUTPATIENT
Start: 2021-09-20 | End: 2021-09-21 | Stop reason: HOSPADM

## 2021-09-20 RX ADMIN — LEVOTHYROXINE SODIUM 50 MCG: 0.05 TABLET ORAL at 09:50

## 2021-09-20 RX ADMIN — INSULIN LISPRO 8 UNITS: 100 INJECTION, SOLUTION INTRAVENOUS; SUBCUTANEOUS at 16:44

## 2021-09-20 RX ADMIN — CEFTRIAXONE SODIUM 1000 MG: 1 INJECTION, POWDER, FOR SOLUTION INTRAMUSCULAR; INTRAVENOUS at 18:13

## 2021-09-20 RX ADMIN — INSULIN LISPRO 2 UNITS: 100 INJECTION, SOLUTION INTRAVENOUS; SUBCUTANEOUS at 09:51

## 2021-09-20 RX ADMIN — SODIUM CHLORIDE: 4.5 INJECTION, SOLUTION INTRAVENOUS at 16:14

## 2021-09-20 RX ADMIN — DONEPEZIL HYDROCHLORIDE 10 MG: 10 TABLET, FILM COATED ORAL at 20:50

## 2021-09-20 RX ADMIN — MEMANTINE HYDROCHLORIDE 10 MG: 10 TABLET ORAL at 09:50

## 2021-09-20 RX ADMIN — SODIUM CHLORIDE: 4.5 INJECTION, SOLUTION INTRAVENOUS at 03:38

## 2021-09-20 RX ADMIN — CYANOCOBALAMIN 1000 MCG: 1000 INJECTION INTRAMUSCULAR; SUBCUTANEOUS at 09:49

## 2021-09-20 RX ADMIN — SODIUM CHLORIDE, PRESERVATIVE FREE 10 ML: 5 INJECTION INTRAVENOUS at 09:50

## 2021-09-20 RX ADMIN — ENOXAPARIN SODIUM 40 MG: 40 INJECTION SUBCUTANEOUS at 09:51

## 2021-09-20 RX ADMIN — LORAZEPAM 1 MG: 2 INJECTION INTRAMUSCULAR; INTRAVENOUS at 12:01

## 2021-09-20 RX ADMIN — DEXTROSE MONOHYDRATE 500 ML: 50 INJECTION, SOLUTION INTRAVENOUS at 09:49

## 2021-09-20 ASSESSMENT — ENCOUNTER SYMPTOMS
NAUSEA: 0
VOMITING: 0
BACK PAIN: 0
COLOR CHANGE: 0
SHORTNESS OF BREATH: 0
DIARRHEA: 0
COUGH: 0
CHOKING: 0
PHOTOPHOBIA: 0
TROUBLE SWALLOWING: 0

## 2021-09-20 NOTE — PROGRESS NOTES
Progress Note  Date:2021       Room:80/W180-01  Patient Roxana Rodriguez     YOB: 1945     Age:75 y.o. Subjective    Subjective:  Symptoms:  No shortness of breath, malaise, cough, chest pain, weakness, headache, chest pressure, anorexia, diarrhea or anxiety. Review of Systems   Respiratory: Negative for cough and shortness of breath. Cardiovascular: Negative for chest pain. Gastrointestinal: Negative for anorexia and diarrhea. Neurological: Negative for weakness. Objective         Vitals Last 24 Hours:  TEMPERATURE:  Temp  Av.6 °F (37 °C)  Min: 98.2 °F (36.8 °C)  Max: 99.3 °F (37.4 °C)  RESPIRATIONS RANGE: Resp  Av  Min: 18  Max: 18  PULSE OXIMETRY RANGE: SpO2  Av.5 %  Min: 95 %  Max: 98 %  PULSE RANGE: Pulse  Av  Min: 63  Max: 96  BLOOD PRESSURE RANGE: Systolic (05MXT), RIB:297 , Min:113 , RID:649   ; Diastolic (24HGE), WLM:80, Min:40, Max:117    I/O (24Hr): Intake/Output Summary (Last 24 hours) at 2021 1014  Last data filed at 2021 0954  Gross per 24 hour   Intake 240 ml   Output --   Net 240 ml     Objective:  General Appearance:  Comfortable, well-appearing and in no acute distress. Vital signs: (most recent): Blood pressure 113/66, pulse 63, temperature 98.2 °F (36.8 °C), temperature source Oral, resp. rate 18, height 5' 4\" (1.626 m), weight 127 lb (57.6 kg), SpO2 98 %. HEENT: Normal HEENT exam.    Lungs:  Normal effort. Heart: Normal rate. Regular rhythm. S1 normal.    Abdomen: Abdomen is soft. There is no epigastric area tenderness. Pulses: Distal pulses are intact. Neurological: Patient is alert. Pupils:  Pupils are equal, round, and reactive to light. Skin:  Warm and dry.       Labs/Imaging/Diagnostics    Labs:  CBC:  Recent Labs     21  1430 21  0353 21  0307   WBC 16.8* 16.0* 9.8   RBC 5.55* 4.41 4.05*   HGB 17.6* 14.2 13.3   HCT 54.5* 43.2 39.7   MCV 98.0 97.9 98.0   RDW 14.3 13.9 13.6    202 160     CHEMISTRIES:  Recent Labs     09/18/21  1800 09/18/21  2358 09/19/21  1500 09/19/21  2100 09/20/21  0307   *   < > 154* 155* 154*   K 4.0   < > 3.9 3.6 3.3*   *   < > 121* 121* 122*   CO2 22   < > 21 24 22   BUN 39*   < > 29* 27* 24*   CREATININE 1.05*   < > 0.77 0.71 0.61   GLUCOSE 446*   < > 479* 172* 162*   MG 2.5*  --   --   --  2.0    < > = values in this interval not displayed. PT/INR:No results for input(s): PROTIME, INR in the last 72 hours. APTT:No results for input(s): APTT in the last 72 hours. LIVER PROFILE:  Recent Labs     09/18/21  1800 09/19/21  0353 09/20/21  0307   AST 22 35 30   ALT 44* 47* 35*   BILITOT 0.8* 0.9* 0.9*   ALKPHOS 101 86 78       Imaging Last 24 Hours:  CT Head WO Contrast    Result Date: 9/18/2021  EXAMINATION: CT of the brain without contrast HISTORY: Altered mental status COMPARISON: MRI brain June 16, 2016 TECHNIQUE: Multiple contiguous axial images were obtained of the brain from the skull base through the vertex. Multiplanar reformats were obtained. FINDINGS: Prominence of the sulci and ventricles compatible with moderate generalized parenchymal volume loss. Gray-white matter differentiation is preserved. No acute hemorrhage or abnormal extra-axial fluid collection. No mass effect or midline shift. The visualized paranasal sinuses and mastoid air cells are clear. Calvarium is intact. No acute intracranial process. All CT scans at this facility use dose modulation, iterative reconstruction, and/or weight based dosing when appropriate to reduce radiation dose to as low as reasonably achievable. XR CHEST PORTABLE    Result Date: 9/18/2021  Exam: XR CHEST PORTABLE History: Altered mental status Technique: AP portable view of the chest obtained. Comparison: Chest x-rays November 10, 2006 Findings: The cardiomediastinal silhouette is within normal limits. No pneumothorax, pleural effusion, or consolidation.   Bones of the thorax appear intact. No radiographic evidence of acute intrathoracic process. Assessment//Plan           Hospital Problems         Last Modified POA    * (Principal) Dehydration 9/18/2021 Yes    Early onset Alzheimer's dementia without behavioral disturbance ([address] 9/18/2021 Yes    Diabetes mellitus ([address] 9/18/2021 Yes    Hypertension 9/18/2021 Yes    Hyperlipidemia 9/18/2021 Yes    UTI (urinary tract infection) 9/18/2021 Yes    Hypothyroid 9/18/2021 Yes    Hypernatremia 9/19/2021 Yes        Assessment & Plan  9/20: dementia, metabolic encephalopathy resolved. FU urine cx and sensitivity, pt needs 24 hour care for her dementia, spoke with case management about her care.   Electronically signed by Jesus Bolden MD on 9/20/21 at 10:14 AM EDT

## 2021-09-20 NOTE — CONSULTS
[address]                      [address], [address] [facility]                                  CONSULTATION    PATIENT NAME: Charis Aiken                    :        1945  MED REC NO:   [id number]                            ROOM:       W180  ACCOUNT NO:   [de-identified]                           ADMIT DATE: 2021  PROVIDER:     Chantal Montano DO    CONSULT DATE:  2021    RENAL CONSULT    HISTORY OF PRESENT ILLNESS:  A 79-year-old thin  female  admitted to the hospital with noted hypernatremia with sodium 162 and  minor change in GFR of 51 mL per minute. The patient is a poor  historian with suspected underlying dementia and at least altered mental  status. Records show that she is diabetic, hypertensive with  hyperlipidemia. She is living at home at the present time. On  admission to the hospital, the patient was noted to have evidence of  urinary tract infection. Further information would not be allowed due  to her mental status change. PAST MEDICAL HISTORY:  Dementia, hypertension, diabetes mellitus type 2,  hyperlipidemia, hypothyroidism, _____ hypernatremia. PAST SURGICAL HISTORY:  None on chart. FAMILY HISTORY:  Noncontributory. HABITS:  Noncontributory. MEDICATIONS:  At the time of her admission; Aricept, Synthroid, Prozac,  Lipitor, Namenda, Klor-Con, Micardis, Amaryl, and Rocephin IV. ALLERGIES TO MEDICATIONS:  Obtained from the chart PENICILLIN and SULFA. REVIEW OF SYSTEMS:  Noncontributory. PHYSICAL EXAMINATION:  VITAL SIGNS:  5 feet and 4 inches, 127 pounds. Blood pressure 116/60,  heart rate 85, respirations 18, temperature 99.3. HEENT:  Normocephalic. Pupils reactive to light. Sclerae are clear. Throat shows no exudates. No dryness. NECK:  Supple. No JVD, bruits, or adenopathy. CHEST:  Lungs are clear. CARDIOVASCULAR:  Heart is regular without murmurs, gallops, clicks, or  rubs.   ABDOMEN: Soft.  No guarding or rigidity. Bowel sounds are present. EXTREMITIES:  Showed the patient to have movement of all limbs. SKIN:  Warm and dry. IMPRESSION:  1. Hypernatremia related to dehydration. Urine with positive nitrites  and glucose greater than 1000 suspecting nephrotic syndrome. 2.  Diabetes mellitus type 2.  3.  Hypertension. 4.  Hyperlipidemia. 5.  Dementia. PLAN:  IV fluids, free water. Treat the infection with antibiotics  until final results culture and sensitivity to return.         Karthik Lockwood DO    D: 09/20/2021 8:29:21       T: 09/20/2021 8:32:56     [id number]  Job#: [id number]     Doc#: [id number]    CC:

## 2021-09-20 NOTE — PROGRESS NOTES
Physician Progress Note      Philippe Kuhn  [facility] #:                  [id number]  :                       1945  ADMIT DATE:       2021 2:16 PM  [address] [place] DATE:  RESPONDING  PROVIDER #:        Papo Gordillo MD          QUERY TEXT:    Pt admitted with hypernatremia, dehydration. Pt noted to have elevation in   renal labs. If possible, please document in the progress notes and discharge   summary if you are evaluating and/or treating any of the following: The medical record reflects the following:  Risk Factors: UTI, encephalopathy, HTN, DM, hypothyroidism  Clinical Indicators: creat/gfr 1.05/50.8, . 98/55.0, .94/57.8, .73/>60,   .77/>60, .71/>60, .61/>60  Treatment:  NS 1L,  [email or web address], then@100ml/hr, then@75ml/hr    Defined by Kidney Disease Improving Global Outcomes (KDIGO) clinical practice   guideline for acute kidney injury:?  -Increase in?SCr?by greater than or equal to 0.3 mg/dl within 48?hours;?or?  -Increase or decrease in?SCr?to greater than or equal to 1.5 times baseline,   which is known or presumed to have occurred within the prior 7?days;?or?  -Urine volume < 0.5ml/kg/h for 6 hours? Harper Flores BSN, RN, [facility]  [phone]  Options provided:  -- Acute kidney failure  -- Acute kidney injury  -- Other - I will add my own diagnosis  -- Disagree - Not applicable / Not valid  -- Disagree - Clinically unable to determine / Unknown  -- Refer to Clinical Documentation Reviewer    PROVIDER RESPONSE TEXT:    This patient is in Acute kidney failure.     Query created by: Carol Odell on 2021 9:52 AM      Electronically signed by:  Papo Gordillo MD 2021 12:39 PM

## 2021-09-20 NOTE — CARE COORDINATION
The LSW talked to the  at [facility] and she will have the director of nursing call the LSW back. The patient agrees to [address]. Electronically signed by VAUGHN Calvillo on 9/20/21 at 4:08 PM EDT    The LSW met with the patient's  (the patient was sleeping soundly). The patient's  agrees to [address] and verbally signed the IMM. The patient's  expressed an understanding of the medicare appeal process.  Electronically signed by VAUGHN Calvillo on 9/20/21 at 4:14 PM EDT

## 2021-09-20 NOTE — PROGRESS NOTES
Sons at the bedside, assisting with feeding. Patient also received a visit from her  earlier. Patient only eating about 50% of meals. Paula care performed. Patient now resting comfortably for the night.

## 2021-09-20 NOTE — CONSULTS
Renal consult dictated    Hypernatremia  DM type-2        UTI suspected  Nephrotic syndrome  Dementia  Hx Hypertension  Hyperlipidemia    Plan  Bolus dextrose this morning  Await urine culture and sensitivity  Repeat urine albumin/cr ratio

## 2021-09-20 NOTE — CONSULTS
Subjective:      Patient ID: Gustavo Hilario is a 67 y.o. female who presents today for encephalopathy. Dr James Roberto  HPI 72-year right-handed female transferred from a nursing home. Patient carries a diagnosis of dementia though it is not very well documented anywhere though she is in [place] when seeing her patient is very sick with word finding difficulty and she does have a tract infection her baseline is not known to me. She has garbled speech not making much sense intermittently. Review of Systems   Constitutional: Negative for fever. HENT: Negative for ear pain, tinnitus and trouble swallowing. Eyes: Negative for photophobia and visual disturbance. Respiratory: Negative for choking and shortness of breath. Cardiovascular: Negative for chest pain and palpitations. Gastrointestinal: Negative for nausea and vomiting. Musculoskeletal: Negative for back pain, gait problem, joint swelling, myalgias, neck pain and neck stiffness. Skin: Negative for color change. Allergic/Immunologic: Negative for food allergies. Neurological: Negative for dizziness, tremors, seizures, syncope, facial asymmetry, speech difficulty, weakness, light-headedness, numbness and headaches. Psychiatric/Behavioral: Negative for behavioral problems, confusion, hallucinations and sleep disturbance. Past Medical History:   Diagnosis Date    Chronic rhinitis     Depression     Diabetes mellitus (HCC)     Hyperlipidemia     Hypertension     Type 2 diabetes mellitus without complication ([facility]      History reviewed. No pertinent surgical history.   Social History     Socioeconomic History    Marital status:      Spouse name: Not on file    Number of children: Not on file    Years of education: Not on file    Highest education level: Not on file   Occupational History    Not on file   Tobacco Use    Smoking status: Never Smoker    Smokeless tobacco: Never Used   Substance and Sexual Activity  Alcohol use: Never    Drug use: Never    Sexual activity: Not Currently   Other Topics Concern    Not on file   Social History Narrative    Not on file     Social Determinants of Health     Financial Resource Strain:     Difficulty of Paying Living Expenses:    Food Insecurity:     Worried About Running Out of Food in the Last Year:     [address] in the Last Year:    Transportation Needs:     Lack of Transportation (Medical):  Lack of Transportation (Non-Medical):    Physical Activity:     Days of Exercise per Week:     Minutes of Exercise per Session:    Stress:     Feeling of Stress :    Social Connections:     Frequency of Communication with Friends and Family:     Frequency of Social Gatherings with Friends and Family:     Attends Congregation Services:     Active Member of Clubs or Organizations:     Attends Club or Organization Meetings:     Marital Status:    Intimate Partner Violence:     Fear of Current or Ex-Partner:     Emotionally Abused:     Physically Abused:     Sexually Abused:      History reviewed. No pertinent family history.   Allergies   Allergen Reactions    Penicillins     Sulfa Antibiotics     Penicillin G Rash     Current Facility-Administered Medications   Medication Dose Route Frequency Provider Last Rate Last Admin    dextrose 5% bolus 500 mL  500 mL IntraVENous Once Virgia Daily, DO        memantine [facility]) tablet 10 mg  10 mg Oral Daily Jung Puff Bescak, DO        donepezil (ARICEPT) tablet 10 mg  10 mg Oral Nightly Virgia Daily, DO        levothyroxine (SYNTHROID) tablet 50 mcg  50 mcg Oral Daily Jung Puff Bescak, DO        0.45 % sodium chloride infusion   IntraVENous Continuous Virgia Daily,  mL/hr at 09/20/21 0338 New Bag at 09/20/21 0338    haloperidol lactate (HALDOL) injection 2 mg  2 mg IntraMUSCular Q6H PRN Liliam Poole MD   2 mg at 09/19/21 2023    sodium chloride flush 0.9 % injection 5-40 mL  5-40 mL IntraVENous 2 times per day LANA Bourgeois CNP   10 mL at 09/19/21 2024    sodium chloride flush 0.9 % injection 5-40 mL  5-40 mL IntraVENous PRN LANA Bourgeois CNP        0.9 % sodium chloride infusion  25 mL IntraVENous PRN LANA Bourgeois CNP        enoxaparin (LOVENOX) injection 40 mg  40 mg SubCUTAneous Daily LANA Bourgeois CNP   40 mg at 09/19/21 1021    ondansetron (ZOFRAN-ODT) disintegrating tablet 4 mg  4 mg Oral Q8H PRN LANA Bourgeois CNP        Or    [facility]etron [facility]) injection 4 mg  4 mg IntraVENous Q6H PRN LANA Bourgeois CNP        acetaminophen (TYLENOL) tablet 650 mg  650 mg Oral Q6H PRN LANA Bourgeois CNP        Or    acetaminophen (TYLENOL) suppository 650 mg  650 mg Rectal Q6H PRN LANA Bourgeois CNP        polyethylene glycol (GLYCOLAX) packet 17 g  17 g Oral Daily PRN LANA Bourgeois CNP        cefTRIAXone (ROCEPHIN) 1000 mg IVPB in 50 mL D5W minibag  1,000 mg IntraVENous Q24H LANA Bourgeois CNP   Stopped at 09/19/21 1640    insulin lispro (HUMALOG) injection vial 0-12 Units  0-12 Units SubCUTAneous TID WC LANA Bourgeois CNP   12 Units at 09/19/21 1605    insulin lispro (HUMALOG) injection vial 0-6 Units  0-6 Units SubCUTAneous Nightly LANA Bourgeois CNP   1 Units at 09/19/21 2022    glucose (GLUTOSE) 40 % oral gel 15 g  15 g Oral PRN LANA Bourgeois CNP        dextrose 50 % IV solution  12.5 g IntraVENous PRN LANA Bourgeois CNP        glucagon (rDNA) injection 1 mg  1 mg IntraMUSCular PRN LANA Bourgeois CNP        dextrose 5 % solution  100 mL/hr IntraVENous PRN LANA Bourgeois CNP        insulin glargine (LANTUS) injection vial 10 Units  10 Units SubCUTAneous Nightly Bennie Story MD   10 Units at 09/19/21 2022 Objective:   /66   Pulse 63   Temp 98.2 °F (36.8 °C) (Oral)   Resp 18   Ht 5' 4\" (1.626 m)   Wt 127 lb (57.6 kg)   SpO2 98%   BMI 21.80 kg/m²     Physical Exam  Vitals reviewed. Eyes:      Pupils: Pupils are equal, round, and reactive to light. Cardiovascular:      Rate and Rhythm: Normal rate and regular rhythm. Heart sounds: No murmur heard. Pulmonary:      Effort: Pulmonary effort is normal.      Breath sounds: Normal breath sounds. Abdominal:      General: Bowel sounds are normal.   Musculoskeletal:         General: Normal range of motion. Cervical back: Normal range of motion. Skin:     General: Skin is warm. Neurological:      Mental Status: She is alert. She is disoriented. Cranial Nerves: No cranial nerve deficit. Sensory: No sensory deficit. Motor: No abnormal muscle tone. Coordination: Coordination normal.      Deep Tendon Reflexes: Reflexes are normal and symmetric. Babinski sign absent on the right side. Babinski sign absent on the left side. Psychiatric:         Mood and Affect: Mood normal.       In addition to this patient has a suggestion of aphasia. This appears to be mostly in expressive speech disorder which could be part of the Alzheimer's dementia or she has something new that needs to be pinpointed  CT Head WO Contrast    Result Date: 9/18/2021  EXAMINATION: CT of the brain without contrast HISTORY: Altered mental status COMPARISON: MRI brain June 16, 2016 TECHNIQUE: Multiple contiguous axial images were obtained of the brain from the skull base through the vertex. Multiplanar reformats were obtained. FINDINGS: Prominence of the sulci and ventricles compatible with moderate generalized parenchymal volume loss. Gray-white matter differentiation is preserved. No acute hemorrhage or abnormal extra-axial fluid collection. No mass effect or midline shift. The visualized paranasal sinuses and mastoid air cells are clear.  Calvarium is intact. No acute intracranial process. All CT scans at this facility use dose modulation, iterative reconstruction, and/or weight based dosing when appropriate to reduce radiation dose to as low as reasonably achievable. XR CHEST PORTABLE    Result Date: 9/18/2021  Exam: XR CHEST PORTABLE History: Altered mental status Technique: AP portable view of the chest obtained. Comparison: Chest x-rays November 10, 2006 Findings: The cardiomediastinal silhouette is within normal limits. No pneumothorax, pleural effusion, or consolidation. Bones of the thorax appear intact. No radiographic evidence of acute intrathoracic process.        Lab Results   Component Value Date    WBC 9.8 09/20/2021    RBC 4.05 09/20/2021    HGB 13.3 09/20/2021    HCT 39.7 09/20/2021    MCV 98.0 09/20/2021    MCH 32.9 09/20/2021    MCHC 33.6 09/20/2021    RDW 13.6 09/20/2021     09/20/2021    MPV 10.8 05/03/2021     Lab Results   Component Value Date     09/20/2021    K 3.3 09/20/2021     09/20/2021    CO2 22 09/20/2021    BUN 24 09/20/2021    CREATININE 0.61 09/20/2021    GFRAA >60.0 09/20/2021    LABGLOM >60.0 09/20/2021    GLUCOSE 162 09/20/2021    PROT 5.6 09/20/2021    PROT 6.7 05/03/2021    LABALBU 3.3 09/20/2021    CALCIUM 8.4 09/20/2021    BILITOT 0.9 09/20/2021    ALKPHOS 78 09/20/2021    AST 30 09/20/2021    ALT 35 09/20/2021     No results found for: PROTIME, INR  Lab Results   Component Value Date    TSH 0.543 09/18/2021     No results found for: TRIG, HDL, LDLCALC, LDLDIRECT, LABVLDL  Lab Results   Component Value Date    LABAMPH Neg 09/18/2021    BARBSCNU Neg 09/18/2021    LABBENZ Neg 09/18/2021    LABMETH Neg 09/18/2021    OPIATESCREENURINE Neg 09/18/2021    PHENCYCLIDINESCREENURINE Neg 09/18/2021    ETOH <10 09/18/2021     No results found for: LITHIUM, DILFRTOT, VALPROATE    Assessment:   Alzheimer's dementia with further worsening secondary tract infection or truly there appears to be is something underlying given the speech findings that I see at the bedside. Her baseline is not known to me. This warrants an MRI given that the examination is somewhat difficult at this time. We will continue to keep observation and follow her for the same    [facility] R.  Judy Ferguson MD, Rachel Hernandez, American Board of Psychiatry & Neurology  Board Certified in Vascular Neurology  Board Certified in Neuromuscular Medicine  Certified in [facility]:

## 2021-09-21 VITALS
BODY MASS INDEX: 21.68 KG/M2 | DIASTOLIC BLOOD PRESSURE: 57 MMHG | RESPIRATION RATE: 18 BRPM | SYSTOLIC BLOOD PRESSURE: 131 MMHG | HEART RATE: 63 BPM | HEIGHT: 64 IN | WEIGHT: 127 LBS | TEMPERATURE: 97.7 F | OXYGEN SATURATION: 96 %

## 2021-09-21 LAB
ALBUMIN SERPL-MCNC: 3.2 G/DL (ref 3.5–4.6)
ALP BLD-CCNC: 77 U/L (ref 40–130)
ALT SERPL-CCNC: 28 U/L (ref 0–33)
ANION GAP SERPL CALCULATED.3IONS-SCNC: 10 MEQ/L (ref 9–15)
AST SERPL-CCNC: 23 U/L (ref 0–35)
BASOPHILS ABSOLUTE: 0 K/UL (ref 0–0.2)
BASOPHILS RELATIVE PERCENT: 0.5 %
BILIRUB SERPL-MCNC: 0.6 MG/DL (ref 0.2–0.7)
BUN BLDV-MCNC: 13 MG/DL (ref 8–23)
CALCIUM SERPL-MCNC: 8.2 MG/DL (ref 8.5–9.9)
CHLORIDE BLD-SCNC: 117 MEQ/L (ref 95–107)
CO2: 18 MEQ/L (ref 20–31)
CREAT SERPL-MCNC: 0.43 MG/DL (ref 0.5–0.9)
EOSINOPHILS ABSOLUTE: 0.1 K/UL (ref 0–0.7)
EOSINOPHILS RELATIVE PERCENT: 1.1 %
GFR AFRICAN AMERICAN: >60
GFR NON-AFRICAN AMERICAN: >60
GLOBULIN: 2.2 G/DL (ref 2.3–3.5)
GLUCOSE BLD-MCNC: 139 MG/DL (ref 70–99)
GLUCOSE BLD-MCNC: 140 MG/DL (ref 60–115)
GLUCOSE BLD-MCNC: 142 MG/DL (ref 60–115)
GLUCOSE BLD-MCNC: 306 MG/DL (ref 60–115)
HCT VFR BLD CALC: 34.5 % (ref 37–47)
HEMOGLOBIN: 11.7 G/DL (ref 12–16)
LYMPHOCYTES ABSOLUTE: 2.1 K/UL (ref 1–4.8)
LYMPHOCYTES RELATIVE PERCENT: 28.1 %
MAGNESIUM: 1.9 MG/DL (ref 1.7–2.4)
MCH RBC QN AUTO: 32.4 PG (ref 27–31.3)
MCHC RBC AUTO-ENTMCNC: 33.9 % (ref 33–37)
MCV RBC AUTO: 95.5 FL (ref 82–100)
MONOCYTES ABSOLUTE: 0.5 K/UL (ref 0.2–0.8)
MONOCYTES RELATIVE PERCENT: 6.1 %
NEUTROPHILS ABSOLUTE: 4.8 K/UL (ref 1.4–6.5)
NEUTROPHILS RELATIVE PERCENT: 64.2 %
PDW BLD-RTO: 13.3 % (ref 11.5–14.5)
PERFORMED ON: ABNORMAL
PLATELET # BLD: 144 K/UL (ref 130–400)
POTASSIUM REFLEX MAGNESIUM: 3.5 MEQ/L (ref 3.4–4.9)
POTASSIUM SERPL-SCNC: 3.5 MEQ/L (ref 3.4–4.9)
RBC # BLD: 3.61 M/UL (ref 4.2–5.4)
SODIUM BLD-SCNC: 145 MEQ/L (ref 135–144)
TOTAL PROTEIN: 5.4 G/DL (ref 6.3–8)
WBC # BLD: 7.5 K/UL (ref 4.8–10.8)

## 2021-09-21 PROCEDURE — 97166 OT EVAL MOD COMPLEX 45 MIN: CPT

## 2021-09-21 PROCEDURE — 97162 PT EVAL MOD COMPLEX 30 MIN: CPT

## 2021-09-21 PROCEDURE — APPSS30 APP SPLIT SHARED TIME 16-30 MINUTES: Performed by: NURSE PRACTITIONER

## 2021-09-21 PROCEDURE — 6370000000 HC RX 637 (ALT 250 FOR IP): Performed by: INTERNAL MEDICINE

## 2021-09-21 PROCEDURE — 36415 COLL VENOUS BLD VENIPUNCTURE: CPT

## 2021-09-21 PROCEDURE — 6360000002 HC RX W HCPCS: Performed by: INTERNAL MEDICINE

## 2021-09-21 PROCEDURE — 99233 SBSQ HOSP IP/OBS HIGH 50: CPT | Performed by: PSYCHIATRY & NEUROLOGY

## 2021-09-21 PROCEDURE — 83735 ASSAY OF MAGNESIUM: CPT

## 2021-09-21 PROCEDURE — 80053 COMPREHEN METABOLIC PANEL: CPT

## 2021-09-21 PROCEDURE — 85025 COMPLETE CBC W/AUTO DIFF WBC: CPT

## 2021-09-21 PROCEDURE — 6360000002 HC RX W HCPCS: Performed by: NURSE PRACTITIONER

## 2021-09-21 RX ADMIN — INSULIN LISPRO 8 UNITS: 100 INJECTION, SOLUTION INTRAVENOUS; SUBCUTANEOUS at 13:04

## 2021-09-21 RX ADMIN — ENOXAPARIN SODIUM 40 MG: 40 INJECTION SUBCUTANEOUS at 08:17

## 2021-09-21 RX ADMIN — INSULIN LISPRO 2 UNITS: 100 INJECTION, SOLUTION INTRAVENOUS; SUBCUTANEOUS at 16:14

## 2021-09-21 RX ADMIN — HALOPERIDOL LACTATE 2 MG: 5 INJECTION, SOLUTION INTRAMUSCULAR at 00:36

## 2021-09-21 RX ADMIN — INSULIN LISPRO 2 UNITS: 100 INJECTION, SOLUTION INTRAVENOUS; SUBCUTANEOUS at 08:18

## 2021-09-21 RX ADMIN — MEMANTINE HYDROCHLORIDE 10 MG: 10 TABLET ORAL at 08:17

## 2021-09-21 RX ADMIN — LEVOTHYROXINE SODIUM 50 MCG: 0.05 TABLET ORAL at 06:43

## 2021-09-21 ASSESSMENT — PAIN SCALES - GENERAL
PAINLEVEL_OUTOF10: 0

## 2021-09-21 ASSESSMENT — ENCOUNTER SYMPTOMS
VOMITING: 0
ABDOMINAL PAIN: 0
TROUBLE SWALLOWING: 0
COUGH: 0

## 2021-09-21 NOTE — PROGRESS NOTES
Physical Therapy Med Surg Initial Assessment  [facility]  [facility]W180-01       NAME: Genevieve Miramontes  : 1945 [de-identified]67 y.o.)  MRN: [id number]  CODE STATUS: Prior    Date of Service: 2021    Patient Diagnosis(es): Dehydration [E86.0]  Hypernatremia [E87.0]  Hyperglycemia [R73.9]  Acute cystitis without hematuria [N30.00]  Altered mental status, unspecified altered mental status type [R41.82]   Chief Complaint   Patient presents with    Altered Mental Status     Patient Active Problem List    Diagnosis Date Noted    Hypernatremia 2021    Dehydration 2021    UTI (urinary tract infection) 2021    Hypothyroid     Chronic rhinitis 02/10/2021    Depressive disorder 02/10/2021    Hyperlipidemia 02/10/2021    Mild cognitive disorder 02/10/2021    Non-smoker 02/10/2021    Early onset Alzheimer's dementia without behavioral disturbance ([facility] [address] 02/10/2020    Memory loss 02/10/2020    Syncope and collapse 02/10/2020    Arteriosclerosis of coronary artery 2019    Postablative hypothyroidism 2019    Diabetes mellitus ([address] 2019    Hypertension 2019    Altered mental status, unspecified 2018    Hematuria, unspecified 2018    Personal history of urinary (tract) infections 2018        Past Medical History:   Diagnosis Date    Chronic rhinitis     Depression     Diabetes mellitus ([facility] [address]     Hyperlipidemia     Hypertension     Type 2 diabetes mellitus without complication ([facility] [address]      History reviewed. No pertinent surgical history. Chart Reviewed: Yes  Patient assessed for rehabilitation services?: Yes  Family / Caregiver Present: No  General Comment  Comments: Pt laying in bed, agreeable to PT eval    Restrictions:  Restrictions/Precautions: Fall Risk (high fall risk per Dominique score)     SUBJECTIVE: Subjective: Pt states \"I am feeling better. \" Pt is oriented to self only  Response To Previous Treatment: Patient with no complaints from previous session. ;Not applicable    Pain  Pre Treatment Pain Screening  Pain at present: 0  Scale Used: Numeric Score  Intervention List: Patient able to continue with treatment    Post Treatment Pain Screening:   Pain Screening  Patient Currently in Pain: No  Pain Assessment  Pain Assessment: 0-10  Pain Level: 0  Patient's Stated Pain Goal: No pain    Prior Level of Function:  Social/Functional History  Additional Comments: Pt is a poor and inconsistant historian. Per chart review pt was at [facility] prior to admission    OBJECTIVE:   Vision: Within Functional Limits  Hearing: Within functional limits    Cognition:  Overall Orientation Status: Impaired  Orientation Level: Oriented to person, Disoriented to place, Disoriented to time, Disoriented to situation  Follows Commands: Impaired (requires redirection, motivated by functional tasks)         ROM:  RLE PROM: WFL  RLE AROM: WFL  LLE PROM: WFL  LLE AROM : WFL    Strength:  Strength RLE  Strength RLE:  (observed functionally >/=3+/5)  Strength LLE  Strength LLE:  (observed functionally >/=3+/5)    Neuro:         Tone RLE  RLE Tone: Normotonic  Tone LLE  LLE Tone: Normotonic  Coordination  Movements Are Fluid And Coordinated: Yes  Motor Control  Gross Motor?: WFL       Bed mobility  Supine to Sit: Stand by assistance  Sit to Supine: Stand by assistance    Transfers  Sit to Stand: Contact guard assistance  Stand to sit: Contact guard assistance  Bed to Chair: Contact guard assistance (to/from chair with out arm rests)    Ambulation  Ambulation?: Yes  Ambulation 1  Surface: level tile  Device: Hand-Held Assist (at times but progressed to no AD)  Assistance: Minimal assistance (progressing to CGA)  Quality of Gait: narrow micaela, increased postural sway   Gait Deviations: Decreased step length;Decreased step height;Decreased arm swing (downward gaze, flexed posture)  Distance: 25ft x 2  Comments: Pt motivated by functional task of restroom and sink use    Stairs/Curb  Stairs?: No         Activity Tolerance  Activity Tolerance: Patient Tolerated treatment well  PT Equipment Recommendations  Equipment Needed:  (TBD)               ASSESSMENT:   Body structures, Functions, Activity limitations: Decreased functional mobility ; Decreased strength;Decreased safe awareness;Decreased cognition;Decreased balance;Decreased posture  Decision Making: High Complexity  History: high  Exam: high  Clinical Presentation: med    Neurological  Neurological (WDL): Within Defined Limits  Prognosis: Fair  Barriers to Learning: cognitive and memory deficits    DISCHARGE RECOMMENDATIONS:  Discharge Recommendations: Continue to assess pending progress (24/7 care)    Assessment: Pt demonstrates the above deficits and decline in functional mobility status placing them at increased risk for falls. Pt would benefit from physical therapy to address above deficits and allow for safe return home at highest level of function, decrease risk for falls, and improve QOL.     REQUIRES PT FOLLOW UP: Yes      PLAN OF CARE:  Plan  Times per week: 3-6  Times per day: Daily  Current Treatment Recommendations: Strengthening, ROM, Transfer Training, Functional Mobility Training, Balance Training, Cognitive/Perceptual Training, Neuromuscular Re-education, Cognitive Reorientation, Home Exercise Program, Safety Education & Training, Patient/Caregiver Education & Training  Safety Devices  Type of devices: Bed alarm in place, Left in bed, Call light within reach, Telesitter in use  Restraints  Initially in place: No    Goals:  Patient goals : Pt unable to state  Long term goals  Long term goal 1: Pt will be indep withbed mobility  Long term goal 2: Pt will be indep with functional transfers   Long term goal 3: Pt will be supervision with ambulation 50ft with appropriate AD vs no AD  Long term goal 4: Pt will perform 5xSTS <15.0 seconds to demonstrate decreased fall risk   Long term goal 5: Pt will be SBA for HEP to improve LE strength, balance, and activity tolerance    [facility] (6 CLICK) BASIC MOBILITY  AM-PAC Inpatient Mobility Raw Score : 17     Therapy Time:   Individual   Time In 0920   Time Out 0935   Minutes 100 Iliana Peoples [place], 09/21/21 at 10:17 AM       Definitions for assistance levels  Independent = pt does not require any physical supervision or assistance from another person for activity completion. Device may be needed.   Stand by assistance = pt requires verbal cues or instructions from another person, close to but not touching, to perform the activity  Minimal assistance= pt performs 75% or more of the activity; assistance is required to complete the activity  Moderate assistance= pt performs 50% of the activity; assistance is required to complete the activity  Maximal assistance = pt performs 25% of the activity; assistance is required to complete the activity  Dependent = pt requires total physical assistance to accomplish the task

## 2021-09-21 NOTE — PROGRESS NOTES
Nephrology Progress Note    Assessment:  Resolved SAGAR-dehdration  Dementia  Suspected UTI  DM type-2  Hypertension history      Plan: will sign off control BP/glucose  Treat UTI as required    Patient Active Problem List:     Early onset Alzheimer's dementia without behavioral disturbance (HCC)     Memory loss     Syncope and collapse     Altered mental status, unspecified     Arteriosclerosis of coronary artery     Hematuria, unspecified     Postablative hypothyroidism     Personal history of urinary (tract) infections     Chronic rhinitis     Depressive disorder     Diabetes mellitus (HCC)     Hypertension     Hyperlipidemia     Mild cognitive disorder     Non-smoker     Dehydration     UTI (urinary tract infection)     Hypothyroid     Hypernatremia      Subjective:  Admit Date: 9/18/2021    Interval History: pleasant  Afebrile      Medications:  Scheduled Meds:   memantine  10 mg Oral Daily    donepezil  10 mg Oral Nightly    levothyroxine  50 mcg Oral Daily    sodium chloride flush  5-40 mL IntraVENous 2 times per day    enoxaparin  40 mg SubCUTAneous Daily    cefTRIAXone (ROCEPHIN) IV  1,000 mg IntraVENous Q24H    insulin lispro  0-12 Units SubCUTAneous TID WC    insulin lispro  0-6 Units SubCUTAneous Nightly    insulin glargine  10 Units SubCUTAneous Nightly     Continuous Infusions:   sodium chloride 125 mL/hr at 09/20/21 1614    sodium chloride      dextrose         CBC:   Recent Labs     09/20/21  0307 09/21/21  0641   WBC 9.8 7.5   HGB 13.3 11.7*    144     CMP:    Recent Labs     09/20/21  0307 09/20/21  0907 09/20/21  1639   * 149* 142   K 3.3* 3.2* 3.4   * 116* 114*   CO2 22 22 19*   BUN 24* 22 22   CREATININE 0.61 0.60 0.59   GLUCOSE 162* 187* 329*   CALCIUM 8.4* 8.5 7.8*   LABGLOM >60.0 >60.0 >60.0     Troponin:   Recent Labs     09/18/21  1800   TROPONINI <0.010     BNP: No results for input(s): BNP in the last 72 hours.   INR: No results for input(s): INR in the last 72 hours. Lipids: No results for input(s): CHOL, LDLDIRECT, TRIG, HDL, AMYLASE, LIPASE in the last 72 hours. Liver:   Recent Labs     09/20/21  0307   AST 30   ALT 35*   ALKPHOS 78   PROT 5.6*   LABALBU 3.3*   BILITOT 0.9*     Iron:  No results for input(s): IRONS, FERRITIN in the last 72 hours. Invalid input(s): LABIRONS  Urinalysis: No results for input(s): UA in the last 72 hours.     Objective:  Vitals: /70   Pulse 62   Temp 99.2 °F (37.3 °C) (Oral)   Resp 18   Ht 5' 4\" (1.626 m)   Wt 127 lb (57.6 kg)   SpO2 95%   BMI 21.80 kg/m²    Wt Readings from Last 3 Encounters:   09/20/21 127 lb (57.6 kg)   08/11/20 129 lb (58.5 kg)   02/11/20 135 lb 6.4 oz (61.4 kg)      24HR INTAKE/OUTPUT:      Intake/Output Summary (Last 24 hours) at 9/21/2021 0754  Last data filed at 9/20/2021 1830  Gross per 24 hour   Intake 440 ml   Output --   Net 440 ml       General: alert, in no apparent distress  HEENT: normocephalic, atraumatic, anicteric  Neck: supple, no mass  Lungs: non-labored respirations, clear to auscultation bilaterally  Heart: regular rate and rhythm, no murmurs or rubs  Abdomen: soft, non-tender, non-distended  Ext: no cyanosis, no peripheral edema  Neuro: alert and oriented, no gross abnormalities  Psych: normal mood and affect  Skin: no rash      Electronically signed by Ryland Shelley DO, MD

## 2021-09-21 NOTE — DISCHARGE INSTR - COC
Continuity of Care Form    Patient Name: Jamel Madsen   :  1945  MRN:  [id number]    Admit date:  2021  Discharge date:  2021    Code Status Order: Prior   Advance Directives:     Admitting Physician: Paxton Valadez MD  PCP: Dagmar Garcia MD    Discharging Nurse: [facility] Unit/Room#: S937/M492-17  Discharging Unit Phone Number: [phone]    Emergency Contact:   Extended Emergency Contact Information  Primary Emergency Contact: García Gleason  Home Phone: [phone]  Relation: Spouse   needed? No  Secondary Emergency ContactRicky Jacome  Mobile Phone: [phone]  Relation: Child    Past Surgical History:  History reviewed. No pertinent surgical history. Immunization History: There is no immunization history on file for this patient.     Active Problems:  Patient Active Problem List   Diagnosis Code    Early onset Alzheimer's dementia without behavioral disturbance (HCC) G30.0, F02.80    Memory loss R41.3    Syncope and collapse R55    Altered mental status, unspecified R41.82    Arteriosclerosis of coronary artery I25.10    Hematuria, unspecified R31.9    Postablative hypothyroidism E89.0    Personal history of urinary (tract) infections Z87.440    Chronic rhinitis J31.0    Depressive disorder F32.9    Diabetes mellitus ([address] E11.9    Hypertension I10    Hyperlipidemia E78.5    Mild cognitive disorder F09    Non-smoker Z78.9    Dehydration E86.0    UTI (urinary tract infection) N39.0    Hypothyroid E03.9    Hypernatremia E87.0       Isolation/Infection:   Isolation          No Isolation        Patient Infection Status     Infection Onset Added Last Indicated Last Indicated By Review Planned Expiration Resolved Resolved By    None active    Resolved    COVID-19 Rule Out 21 COVID-19, Rapid (Ordered)   21 Rule-Out Test Resulted          Nurse Assessment:  Last Vital Signs: BP (!) 131/57   Pulse 63   Temp 97.7 °F (36.5 °C) (Oral)   Resp 18   Ht 5' 4\" (1.626 m)   Wt 127 lb (57.6 kg)   SpO2 96%   BMI 21.80 kg/m²     Last documented pain score (0-10 scale): Pain Level: 0  Last Weight:   Wt Readings from Last 1 Encounters:   09/20/21 127 lb (57.6 kg)     Mental Status:  oriented, alert and oriented to person only     IV Access:  - None    Nursing Mobility/ADLs:  Walking   Assisted  Transfer  Assisted  Bathing  Assisted  Dressing  Assisted  Toileting  Assisted  Feeding  Assisted  Med Admin  Assisted  Med Delivery   none    Wound Care Documentation and Therapy:        Elimination:  Continence:   · Bowel: No  · Bladder: Yes  Urinary Catheter: None   Colostomy/Ileostomy/Ileal Conduit: No       Date of Last BM: ***    Intake/Output Summary (Last 24 hours) at 9/21/2021 1316  Last data filed at 9/21/2021 1258  Gross per 24 hour   Intake 800 ml   Output --   Net 800 ml     I/O last 3 completed shifts: In: 26 [P.O.:440]  Out: -     Safety Concerns: At Risk for Falls    Impairments/Disabilities:      confusion     Nutrition Therapy:  Current Nutrition Therapy:   - Oral Diet:  General    Routes of Feeding: Oral  Liquids: Thin Liquids  Daily Fluid Restriction: no  Last Modified Barium Swallow with Video (Video Swallowing Test): {Done Not Done [facility]:[id number]}    Treatments at the Time of Hospital Discharge:   Respiratory Treatments: ***  Oxygen Therapy:  is not on home oxygen therapy.   Ventilator:    - No ventilator support    Rehab Therapies: Physical Therapy and Occupational Therapy  Weight Bearing Status/Restrictions: No weight bearing restirctions  Other Medical Equipment (for information only, NOT a DME order):  wheelchair, cane, walker and bedside commode  Other Treatments: ***    Patient's personal belongings (please select all that are sent with patient):  None    RN SIGNATURE:  {Esignature:[id number]}    CASE MANAGEMENT/SOCIAL WORK SECTION    Inpatient Status Date: ***    Readmission Risk Assessment

## 2021-09-21 NOTE — PROGRESS NOTES
[facility] Neurology Daily Progress Note  Name: Camden Fuller  Age: 67 y.o. Gender: female  CodeStatus: Prior  Allergies: Penicillins  Sulfa Antibiotics  Penicillin G    Chief Complaint:Altered Mental Status    Primary Care Provider: Elda Gordon MD  InpatientTreatment Team: Treatment Team: Attending Provider: Catrachita Baldwin MD; Consulting Physician: Kae Pace MD; Utilization Reviewer: Nehemias Jackson, RN; Registered Nurse: Inez Del Castillo RN; : Cal Lima RN; : VAUGHN Givens  Admission Date: 9/18/2021      HPI   Pt seen and examined for neuro follow up for acute metabolic encephalopathy in the setting of UTI with underlying Alzheimer's dementia.  at bedside. Patient with significant cognitive impairment at baseline. She currently resides in memory care unit of assisted living. No focal deficits. No seizures. Afebrile. Pt refused MRI    Vitals:    09/21/21 1024   BP:    Pulse: 63   Resp:    Temp:    SpO2:       Review of Systems   Unable to perform ROS: Dementia   Constitutional: Negative for fever. HENT: Negative for trouble swallowing. Respiratory: Negative for cough. Gastrointestinal: Negative for abdominal pain and vomiting. Neurological: Negative for tremors, facial asymmetry and speech difficulty. Psychiatric/Behavioral: Positive for confusion. Negative for agitation and behavioral problems. Physical Exam  Vitals and nursing note reviewed. Constitutional:       General: She is not in acute distress. Appearance: She is not diaphoretic. HENT:      Head: Normocephalic and atraumatic. Eyes:      Pupils: Pupils are equal, round, and reactive to light. Cardiovascular:      Rate and Rhythm: Normal rate and regular rhythm. Pulmonary:      Effort: Pulmonary effort is normal. No respiratory distress. Breath sounds: Normal breath sounds. Abdominal:      General: Bowel sounds are normal. There is no distension.       Palpations: Abdomen is soft. Tenderness: There is no abdominal tenderness. Skin:     General: Skin is warm and dry. Neurological:      General: No focal deficit present. Mental Status: She is alert. She is disoriented. Cranial Nerves: No cranial nerve deficit, dysarthria or facial asymmetry. Motor: No weakness, tremor or seizure activity. Coordination: Finger-Nose-Finger Test normal.               Medications:  Reviewed    Infusion Medications:    sodium chloride 125 mL/hr at 09/20/21 1614    sodium chloride      dextrose       Scheduled Medications:    memantine  10 mg Oral Daily    donepezil  10 mg Oral Nightly    levothyroxine  50 mcg Oral Daily    sodium chloride flush  5-40 mL IntraVENous 2 times per day    enoxaparin  40 mg SubCUTAneous Daily    cefTRIAXone (ROCEPHIN) IV  1,000 mg IntraVENous Q24H    insulin lispro  0-12 Units SubCUTAneous TID WC    insulin lispro  0-6 Units SubCUTAneous Nightly    insulin glargine  10 Units SubCUTAneous Nightly     PRN Meds: haloperidol lactate, sodium chloride flush, sodium chloride, ondansetron **OR** ondansetron, acetaminophen **OR** acetaminophen, polyethylene glycol, glucose, dextrose, glucagon (rDNA), dextrose    Labs:   Recent Labs     09/19/21  0353 09/20/21  0307 09/21/21  0641   WBC 16.0* 9.8 7.5   HGB 14.2 13.3 11.7*   HCT 43.2 39.7 34.5*    160 144     Recent Labs     09/20/21  0907 09/20/21  0907 09/20/21  1639 09/21/21  0641   *  --  142 145*   K 3.2*   < > 3.4 3.5  3.5   *  --  114* 117*   CO2 22  --  19* 18*   BUN 22  --  22 13   CREATININE 0.60  --  0.59 0.43*   CALCIUM 8.5  --  7.8* 8.2*    < > = values in this interval not displayed. Recent Labs     09/19/21  0353 09/20/21  0307 09/21/21  0641   AST 35 30 23   ALT 47* 35* 28   BILITOT 0.9* 0.9* 0.6   ALKPHOS 86 78 77     No results for input(s): INR in the last 72 hours.   Recent Labs     09/18/21  1800   TROPONINI <0.010       Urinalysis:   Lab Results   Component visit. Assessment/Plan:  Alzheimer's dementia with further worsening secondary tract infection or truly there appears to be is something underlying given the speech findings that I see at the bedside. Her baseline is not known to me. This warrants an MRI given that the examination is somewhat difficult at this time. We will continue to keep observation and follow her for the same    Acute metabolic encephalopathy in the setting of urinary tract infection underlying Alzheimer's dementia. CT head negative for any acute findings. Nonfocal  Patient to be discharged to [address] [place] [address]  assisted living memory care unit with home health care today. I have personally performed a face to face diagnostic evaluation on this patient, reviewed all data and investigations, and am the sole provider of all clinical decisions on the neurological status of this patient. encephalopathy/ baseline what I am told, pt refused MRI, await d/c       Donnell Kiser MD, [address], American Board of Psychiatry & Neurology  Board Certified in Vascular Neurology  Board Certified in Neuromuscular Medicine  Certified in Neurorehabilitation       Collaborating physicians: Dr Kervin Kiser    Electronically signed by LANA Nichols CNP on 9/21/2021 at 3:50 PM

## 2021-09-21 NOTE — PROGRESS NOTES
Report/update  called to [facility]; plan  time of 4:00PM. No present acute resp distress or discomfort.  at bedside and updated on time. Aware HHC to follow at discharge. Will d/c when ambulance arrives. Patient alert to person only; reoriented as needed to place time and situation.  Mayda Thompson MSN, RN

## 2021-09-21 NOTE — PROGRESS NOTES
Patient left via ambulance to [address] [place] [address]  NO questions or concerns voiced ; here and aware.

## 2021-09-21 NOTE — DISCHARGE SUMMARY
Discharge Summary    Date: 9/21/2021  Patient Name: Rere Aleman YOB: 1945 Age: 67 y.o. Admit Date: 9/18/2021  Discharge Date: 9/21/2021  Discharge Condition:    Admission Diagnosis  Dehydration (E86.0); Hypernatremia (E87.0); Hyperglycemia (R73.9); Acute cystitis without hematuria (N30.00); Altered mental status, unspecified altered mental status type (R41.82)     Discharge Diagnosis  Principal Problem: DehydrationActive Problems: Early onset Alzheimer's dementia without behavioral disturbance ([facility] [address] Diabetes mellitus ([facility] [address] Hypertension Hyperlipidemia UTI (urinary tract infection)  Hypothyroid  HypernatremiaResolved Problems:  * No resolved hospital problems. [facility] Stay  Narrative of Hospital Course:  Patient comes with metabolic encephalopathy secondary to UTI. Patient will be discharged on ciprofloxacin based on urine culture sensitivity results. Renal function improved with IV hydration. Patient has severe dementia needs to have 24-hour care. Mental status improved    Consultants:  IP CONSULT TO NEPHROLOGYIP CONSULT TO NEUROLOGYIP CONSULT TO PALLIATIVE CARE    Surgeries/procedures Performed:       Treatments:    Antibiotics    Ceftriaxone    Discharge Plan/Disposition:  Home    Hospital/Incidental Findings Requiring Follow Up:    Patient Instructions:    Diet: Regular Diet    Activity:Activity as Tolerated  For number of days (if applicable): Other Instructions:    Provider Follow-Up:   No follow-ups on file. Significant Diagnostic Studies:    Recent Labs:  Admission on 09/18/2021No results displayed because visit has over 200 results. ------------    Radiology last 7 days:  CT Head WO ContrastResult Date: 9/18/2021No acute intracranial process. All CT scans at this facility use dose modulation, iterative reconstruction, and/or weight based dosing when appropriate to reduce radiation dose to as low as reasonably achievable. XR CHEST PORTABLEResult Date: 9/18/2021No radiographic evidence of acute intrathoracic process. [unfilled]    Discharge Medications    Current Discharge Medication ListSTART taking these medicationsciprofloxacin (CIPRO) 500 MG tabletTake 1 tablet by mouth 2 times daily for 10 daysQty: 20 tablet Refills: 0    Current Discharge Medication List    Current Discharge Medication ListCONTINUE these medications which have NOT CHANGEDglimepiride (AMARYL) 4 MG tabletTake 4 mg by mouth every morning (before breakfast)potassium chloride (KLOR-CON M) 10 MEQ extended release tabletTake 10 mEq by mouth dailyvitamin B-12 (CYANOCOBALAMIN) 500 MCG tabletTake 500 mcg by mouth dailyCALAMINE-ZINC OXIDE EXApply topically three times daily And as neededmemantine (NAMENDA) 10 MG tabletTake 1 tablet by mouth 2 times dailyQty: 180 tablet Refills: 1atorvastatin (LIPITOR) 10 MG tabletTake by mouthFLUoxetine (PROZAC) 40 MG capsuleTake 40 mg by mouth daily levothyroxine (SYNTHROID) 50 MCG tabletTake by mouthdonepezil (ARICEPT) 10 MG tabletTake 1 tablet by mouth nightlyQty: 90 tablet Refills: 1ONE TOUCH ULTRA TEST strip    Current Discharge Medication ListSTOP taking these medicationstelmisartan (MICARDIS) 80 MG tabletComments:Reason for Stopping:    Time Spent on Discharge:E] minutes were spent in patient examination, evaluation, counseling as well as medication reconciliation, prescriptions for required medications, discharge plan, and follow up.     Electronically signed by Paul Hutson MD on 9/21/21 at 11:15 AM EDT   overtime on dc summary was 45 min

## 2021-09-21 NOTE — PROGRESS NOTES
[facility] OCCUPATIONAL THERAPY EVALUATION - ACUTE     NAME: Iqra Scruggs  : 1945 (67 y.o.)  MRN: [id number]  CODE STATUS: Prior  Room: 80/W180-01    Date of Service: 2021    Patient Diagnosis(es): Dehydration [E86.0]  Hypernatremia [E87.0]  Hyperglycemia [R73.9]  Acute cystitis without hematuria [N30.00]  Altered mental status, unspecified altered mental status type [R41.82]   Chief Complaint   Patient presents with    Altered Mental Status     Patient Active Problem List    Diagnosis Date Noted    Hypernatremia 2021    Dehydration 2021    UTI (urinary tract infection) 2021    Hypothyroid     Chronic rhinitis 02/10/2021    Depressive disorder 02/10/2021    Hyperlipidemia 02/10/2021    Mild cognitive disorder 02/10/2021    Non-smoker 02/10/2021    Early onset Alzheimer's dementia without behavioral disturbance ([facility] [address] 02/10/2020    Memory loss 02/10/2020    Syncope and collapse 02/10/2020    Arteriosclerosis of coronary artery 2019    Postablative hypothyroidism 2019    Diabetes mellitus ([facility] [address] 2019    Hypertension 2019    Altered mental status, unspecified 2018    Hematuria, unspecified 2018    Personal history of urinary (tract) infections 2018        Past Medical History:   Diagnosis Date    Chronic rhinitis     Depression     Diabetes mellitus ([facility] [address]     Hyperlipidemia     Hypertension     Type 2 diabetes mellitus without complication ([facility] [address]      History reviewed. No pertinent surgical history. Restrictions  Restrictions/Precautions: Fall Risk     Safety Devices: Safety Devices  Safety Devices in place: Yes  Type of devices: Nurse notified, All fall risk precautions in place, Bed alarm in place (GHADA Ball; Jean-Paul Botello)        Subjective  Pre Treatment Pain Screening  Pain at present: 0  Scale Used:  Faces    Pain Reassessment:   Pain Assessment  Patient Currently in Pain: Denies       Prior Level of Strength  Gross RUE Strength: Exceptions to [facility]  R Hand General: 3+/5  RUE Strength Comment: gross assessment    Coordination, Tone, Quality of Movement: Tone RUE  RUE Tone: Normotonic  Tone LUE  LUE Tone: Normotonic  Coordination  Movements Are Fluid And Coordinated: No  Coordination and Movement description: Fine motor impairments, Decreased speed, Decreased accuracy    Hand Dominance:  Hand Dominance  Hand Dominance: Right    ADL Status:  ADL  Feeding: Stand by assistance, Verbal cueing  Grooming: Minimal assistance, Verbal cueing  UE Bathing: Minimal assistance, Verbal cueing  LE Bathing: Minimal assistance, Verbal cueing  UE Dressing: Minimal assistance  LE Dressing: Moderate assistance  Toileting: Maximum assistance  Additional Comments: simulated ADL seated EOB.  levels as anticipated          Therapy key for assistance levels -   Independent = Pt. is able to perform task with no assistance but may require a device   Stand by assistance = Pt. does not perform task at an independent level but does not need physical assistance, requires verbal cues  Minimal, Moderate, Maximal Assistance = Pt. requires physical assistance (25%, 50%, 75% assist from helper) for task but is able to actively participate in task   Dependent = Pt. requires total assistance with task and is not able to actively participate with task completion     Functional Mobility:  Functional Mobility  Functional - Mobility Device: Other (hand held)  Assist Level: Minimal assistance  Functional Mobility Comments: side steps EOB  Transfers  Sit to stand: Minimal assistance  Stand to sit: Minimal assistance    Bed Mobility  Bed mobility  Supine to Sit: Stand by assistance  Sit to Supine: Stand by assistance  Comment: step by step verbal cues    Seated and Standing Balance:       Functional Endurance:  Activity Tolerance  Activity Tolerance: Patient Tolerated treatment well    D/C Recommendations:  OT D/C RECOMMENDATIONS  REQUIRES OT FOLLOW UP: Yes    Equipment Recommendations:       OT Education:   OT Education  OT Education: OT Role, Plan of Care    OT Follow Up:  OT D/C RECOMMENDATIONS  REQUIRES OT FOLLOW UP: Yes       Assessment/Discharge Disposition:  Assessment: Pt is a 67 yr old female presenting to [facility] with the above functional deficits. Pt would benefit from occupational therapy services to maximize safety and independence with ADL tasks, improve overall strength/endurance and balance to return to [facility].   Performance deficits / Impairments: Decreased functional mobility , Decreased strength, Decreased endurance, Decreased balance, Decreased cognition, Decreased safe awareness, Decreased ADL status  Prognosis: Good  Discharge Recommendations: Continue to assess pending progress  Decision Making: Medium Complexity  History: multiple  Exam: 7 perf deficits  Assistance / Modification: mod/max    Six Click Score   How much help for putting on and taking off regular lower body clothing?: A Lot  How much help for Bathing?: A Lot  How much help for Toileting?: A Lot  How much help for putting on and taking off regular upper body clothing?: A Little  How much help for taking care of personal grooming?: A Little  How much help for eating meals?: A Little  AM-[facility] Inpatient Daily Activity Raw Score: 15  AM-[facility] Inpatient ADL T-Scale Score : 34.69  ADL Inpatient CMS 0-100% Score: 56.46    Plan:  Plan  Times per week: 1-3x/wk  Plan weeks: length of acute stay  Current Treatment Recommendations: Strengthening, Endurance Training, Patient/Caregiver Education & Training, Equipment Evaluation, Education, & procurement, Self-Care / ADL, Balance Training, Functional Mobility Training, Safety Education & Training, Cognitive Reorientation    Goals:   Patient will:    - Improve functional endurance to tolerate/complete 30 mins of ADL's  - Be SBA in UB ADLs   - Be SBA in LB ADLs  - Be SBA in ADL transfers without LOB  - Be SBA in toileting tasks  - Improve B UE strength and endurance to 4/5 in order to participate in self-care activities as projected. - Access appropriate D/C site with as few architectural barriers as possible.     Patient Goal: Patient goals : per spouse; to return to [address]  =   Discussed and agreed upon: Yes Comments:     Therapy Time:   OT Individual Minutes  Time In: 1410  Time Out: 6815  Minutes: 15    Eval: 15 minutes     Electronically signed by:    Archie Valladares OT, ERICKR/L  9/21/2021, 2:42 PM

## 2021-09-21 NOTE — FLOWSHEET NOTE
0036: Patient medicated with haldol 2 mg for agitation. Patient continues to get out of bed. Aguilars placed in room for patient's safety.

## 2021-09-21 NOTE — CARE COORDINATION
The LSW talked with Yimi Amado. Edyth Severe states the patient's  may choose a HHC. [facility] does not need a covid test prior to discharge. [facility] [place] to call report to the floor RN at [facility]. The patient's RN was updated ([phone]). Electronically signed by VAUGHN Stone on 9/21/21 at 1:11 PM EDT    The patient's  would like [facility] (freedom of choice was given). The patient's RN and  were updated. The  will obtain and call the [place] [phone] referral.  The LSW set the discharge by cot (patient has alzheimers/dementia) for 4 pm. The LSW called and notified Db Craig,  at [facility] of the discharge time. The patient's  is also aware the patient is to return to [facility] today.  Electronically signed by Elzbieta Amador on 9/21/21 at 1:14 PM EDT

## 2021-09-21 NOTE — PLAN OF CARE
See OT evaluation for all goals and OT POC.  Electronically signed by Hany Zaman OT on 9/21/2021 at 2:44 PM

## 2021-09-25 PROCEDURE — 95816 EEG AWAKE AND DROWSY: CPT | Performed by: PSYCHIATRY & NEUROLOGY

## 2021-09-25 NOTE — PROCEDURES
[address]                      [address], [address] [facility]                          ELECTROENCEPHALOGRAM REPORT    PATIENT NAME: Ismael Crouch                    :        1945  MED REC NO:   [id number]                            ROOM:       W180  ACCOUNT NO:   [de-identified]                           ADMIT DATE: 2021  PROVIDER:     Rey Barrett MD    DATE OF EE2021    MEDICATIONS:  Namenda, Aricept, Synthroid, Lovenox, Humulin insulin. EEG FINDINGS:  This is a spontaneous 21-channel recording for this  patient with dizzy spells. This EEG is done to rule out seizures and  arrhythmias. The background rhythm of this EEG shows well-regulated low  amplitude tracings though there appeared to be mostly slowing in the  theta range. This continues throughout the EEG recording. There is no  suggestion of any asymmetries. Photic stimulation is performed without  any photic responses. There is no photo response to seizures. Hyperventilation is not performed. Surface muscle artifacts are seen  throughout the EEG recording. There is no suggestion of other  asymmetries. Low amplitude tracings continue throughout the EEG  recording. IMPRESSION:  This is an abnormal EEG recording by the virtue of diffuse  slowing consistent with a diffuse cerebral dysfunction seen in toxic  metabolic or degenerative etiology. Clinical correlation is  recommended.         Gely Eagle MD    D: 2021 10:36:24       T: 2021 10:38:47     [email or web address]  Job#: [id number]     Doc#: [id number]    CC:

## 2021-10-18 PROBLEM — N39.0 UTI (URINARY TRACT INFECTION): Status: RESOLVED | Noted: 2021-09-18 | Resolved: 2021-10-18

## 2021-10-18 PROBLEM — E86.0 DEHYDRATION: Status: RESOLVED | Noted: 2021-09-18 | Resolved: 2021-10-18

## 2021-10-29 LAB
ALBUMIN SERPL-MCNC: 4.3 G/DL
BILIRUBIN, URINE: NEGATIVE
BLOOD, URINE: POSITIVE
BUN / CREAT RATIO: ABNORMAL
BUN BLDV-MCNC: 27 MG/DL
CALCIUM SERPL-MCNC: 9.3 MG/DL
CHLORIDE BLD-SCNC: 4.2 MMOL/L
CLARITY: ABNORMAL
CO2: 22 MMOL/L
COLOR: YELLOW
CREAT SERPL-MCNC: 0.64 MG/DL
GLUCOSE URINE: ABNORMAL
GLUCOSE: 372
KETONES, URINE: POSITIVE
LEUKOCYTE ESTERASE, URINE: POSITIVE
NITRITE, URINE: NEGATIVE
PH UA: 6 (ref 4.5–8)
PHOSPHORUS: 3.9 MG/DL
POTASSIUM SERPL-SCNC: 108 MMOL/L
PROTEIN UA: POSITIVE
SODIUM BLD-SCNC: 146 MMOL/L
SPECIFIC GRAVITY, URINE: 1.03
UROBILINOGEN, URINE: NORMAL

## 2021-11-03 ENCOUNTER — OFFICE VISIT (OUTPATIENT)
Dept: GERIATRIC MEDICINE | Age: 76
End: 2021-11-03
Payer: MEDICARE

## 2021-11-03 DIAGNOSIS — R63.4 UNINTENTIONAL WEIGHT LOSS: Primary | ICD-10-CM

## 2021-11-09 ENCOUNTER — OFFICE VISIT (OUTPATIENT)
Dept: GERIATRIC MEDICINE | Age: 76
End: 2021-11-09
Payer: MEDICARE

## 2021-11-09 DIAGNOSIS — E11.69 TYPE 2 DIABETES MELLITUS WITH OTHER SPECIFIED COMPLICATION, WITH LONG-TERM CURRENT USE OF INSULIN (HCC): Primary | ICD-10-CM

## 2021-11-09 DIAGNOSIS — Z79.4 TYPE 2 DIABETES MELLITUS WITH OTHER SPECIFIED COMPLICATION, WITH LONG-TERM CURRENT USE OF INSULIN (HCC): Primary | ICD-10-CM

## 2021-11-11 ENCOUNTER — APPOINTMENT (OUTPATIENT)
Dept: GENERAL RADIOLOGY | Age: 76
DRG: 638 | End: 2021-11-11
Payer: MEDICARE

## 2021-11-11 ENCOUNTER — APPOINTMENT (OUTPATIENT)
Dept: CT IMAGING | Age: 76
DRG: 638 | End: 2021-11-11
Payer: MEDICARE

## 2021-11-11 ENCOUNTER — HOSPITAL ENCOUNTER (INPATIENT)
Age: 76
LOS: 4 days | Discharge: NURSING, REHAB OR LONG-TERM CARE | DRG: 638 | End: 2021-11-15
Attending: EMERGENCY MEDICINE
Payer: MEDICARE

## 2021-11-11 DIAGNOSIS — E03.9 HYPOTHYROIDISM, UNSPECIFIED TYPE: ICD-10-CM

## 2021-11-11 DIAGNOSIS — Z79.4 TYPE 2 DIABETES MELLITUS WITH OTHER SPECIFIED COMPLICATION, WITH LONG-TERM CURRENT USE OF INSULIN (HCC): ICD-10-CM

## 2021-11-11 DIAGNOSIS — E13.10 DIABETIC KETOACIDOSIS WITHOUT COMA ASSOCIATED WITH OTHER SPECIFIED DIABETES MELLITUS (HCC): Primary | ICD-10-CM

## 2021-11-11 DIAGNOSIS — N17.9 AKI (ACUTE KIDNEY INJURY) (HCC): ICD-10-CM

## 2021-11-11 DIAGNOSIS — E11.69 TYPE 2 DIABETES MELLITUS WITH OTHER SPECIFIED COMPLICATION, WITH LONG-TERM CURRENT USE OF INSULIN (HCC): ICD-10-CM

## 2021-11-11 DIAGNOSIS — E87.0 HYPERNATREMIA: ICD-10-CM

## 2021-11-11 DIAGNOSIS — E11.10 DKA, TYPE 2, NOT AT GOAL (HCC): ICD-10-CM

## 2021-11-11 LAB
ALBUMIN SERPL-MCNC: 4.4 G/DL (ref 3.5–4.6)
ALP BLD-CCNC: 116 U/L (ref 40–130)
ALT SERPL-CCNC: 20 U/L (ref 0–33)
ANION GAP SERPL CALCULATED.3IONS-SCNC: 14 MEQ/L (ref 9–15)
ANION GAP SERPL CALCULATED.3IONS-SCNC: 22 MEQ/L (ref 9–15)
AST SERPL-CCNC: 16 U/L (ref 0–35)
BACTERIA: ABNORMAL /HPF
BASE EXCESS VENOUS: -5 (ref -3–3)
BASOPHILS ABSOLUTE: 0.1 K/UL (ref 0–0.2)
BASOPHILS RELATIVE PERCENT: 0.8 %
BETA-HYDROXYBUTYRATE: 9.8 MG/DL (ref 0.2–2.8)
BILIRUB SERPL-MCNC: 0.5 MG/DL (ref 0.2–0.7)
BILIRUBIN URINE: NEGATIVE
BLOOD, URINE: NEGATIVE
BUN BLDV-MCNC: 30 MG/DL (ref 8–23)
BUN BLDV-MCNC: 32 MG/DL (ref 8–23)
CALCIUM IONIZED: 1.14 MMOL/L (ref 1.12–1.32)
CALCIUM SERPL-MCNC: 9.5 MG/DL (ref 8.5–9.9)
CALCIUM SERPL-MCNC: 9.9 MG/DL (ref 8.5–9.9)
CHLORIDE BLD-SCNC: 111 MEQ/L (ref 95–107)
CHLORIDE BLD-SCNC: 124 MEQ/L (ref 95–107)
CHP ED QC CHECK: NORMAL
CLARITY: CLEAR
CO2: 19 MEQ/L (ref 20–31)
CO2: 23 MEQ/L (ref 20–31)
COLOR: YELLOW
CREAT SERPL-MCNC: 0.85 MG/DL (ref 0.5–0.9)
CREAT SERPL-MCNC: 1.21 MG/DL (ref 0.5–0.9)
EKG ATRIAL RATE: 100 BPM
EKG P AXIS: 53 DEGREES
EKG P-R INTERVAL: 182 MS
EKG Q-T INTERVAL: 360 MS
EKG QRS DURATION: 100 MS
EKG QTC CALCULATION (BAZETT): 464 MS
EKG R AXIS: -41 DEGREES
EKG T AXIS: 254 DEGREES
EKG VENTRICULAR RATE: 100 BPM
EOSINOPHILS ABSOLUTE: 0.1 K/UL (ref 0–0.7)
EOSINOPHILS RELATIVE PERCENT: 0.7 %
EPITHELIAL CELLS, UA: ABNORMAL /HPF (ref 0–5)
GFR AFRICAN AMERICAN: 52.2
GFR AFRICAN AMERICAN: 58
GFR AFRICAN AMERICAN: >60
GFR NON-AFRICAN AMERICAN: 43.1
GFR NON-AFRICAN AMERICAN: 48
GFR NON-AFRICAN AMERICAN: >60
GLOBULIN: 3.2 G/DL (ref 2.3–3.5)
GLUCOSE BLD-MCNC: 225 MG/DL (ref 60–115)
GLUCOSE BLD-MCNC: 237 MG/DL (ref 60–115)
GLUCOSE BLD-MCNC: 266 MG/DL (ref 60–115)
GLUCOSE BLD-MCNC: 272 MG/DL (ref 60–115)
GLUCOSE BLD-MCNC: 274 MG/DL (ref 60–115)
GLUCOSE BLD-MCNC: 287 MG/DL (ref 60–115)
GLUCOSE BLD-MCNC: 305 MG/DL (ref 60–115)
GLUCOSE BLD-MCNC: 316 MG/DL (ref 70–99)
GLUCOSE BLD-MCNC: 367 MG/DL (ref 60–115)
GLUCOSE BLD-MCNC: 431 MG/DL
GLUCOSE BLD-MCNC: 431 MG/DL (ref 60–115)
GLUCOSE BLD-MCNC: 741 MG/DL (ref 70–99)
GLUCOSE BLD-MCNC: >600 MG/DL (ref 60–115)
GLUCOSE BLD-MCNC: >700 MG/DL (ref 60–115)
GLUCOSE URINE: >=1000 MG/DL
HCO3 VENOUS: 21 MMOL/L (ref 23–29)
HCT VFR BLD CALC: 48.7 % (ref 37–47)
HEMOGLOBIN: 15.7 G/DL (ref 12–16)
HEMOGLOBIN: 17.5 GM/DL (ref 12–16)
HYALINE CASTS: ABNORMAL /HPF (ref 0–5)
KETONES, URINE: 40 MG/DL
LACTATE: 8.25 MMOL/L (ref 0.4–2)
LEUKOCYTE ESTERASE, URINE: NEGATIVE
LYMPHOCYTES ABSOLUTE: 1.6 K/UL (ref 1–4.8)
LYMPHOCYTES RELATIVE PERCENT: 11.1 %
MAGNESIUM: 2.7 MG/DL (ref 1.7–2.4)
MAGNESIUM: 3 MG/DL (ref 1.7–2.4)
MCH RBC QN AUTO: 31.9 PG (ref 27–31.3)
MCHC RBC AUTO-ENTMCNC: 32.3 % (ref 33–37)
MCV RBC AUTO: 98.8 FL (ref 82–100)
MONOCYTES ABSOLUTE: 1.2 K/UL (ref 0.2–0.8)
MONOCYTES RELATIVE PERCENT: 8.3 %
NEUTROPHILS ABSOLUTE: 11.3 K/UL (ref 1.4–6.5)
NEUTROPHILS RELATIVE PERCENT: 79.1 %
NITRITE, URINE: POSITIVE
O2 SAT, VEN: 92 %
PCO2, VEN: 39.9 MM HG (ref 40–50)
PDW BLD-RTO: 13.9 % (ref 11.5–14.5)
PERFORMED ON: ABNORMAL
PH UA: 5.5 (ref 5–9)
PH VENOUS: 7.33 (ref 7.35–7.45)
PHOSPHORUS: 2.9 MG/DL (ref 2.3–4.8)
PLATELET # BLD: 272 K/UL (ref 130–400)
PO2, VEN: 67 MM HG
POC CHLORIDE: 118 MEQ/L (ref 99–110)
POC CREATININE: 1.1 MG/DL (ref 0.6–1.2)
POC HEMATOCRIT: 51 % (ref 36–48)
POC POTASSIUM: 4.3 MEQ/L (ref 3.5–5.1)
POC SAMPLE TYPE: ABNORMAL
POC SODIUM: 154 MEQ/L (ref 136–145)
POTASSIUM SERPL-SCNC: 3.9 MEQ/L (ref 3.4–4.9)
POTASSIUM SERPL-SCNC: 4.6 MEQ/L (ref 3.4–4.9)
PROTEIN UA: 30 MG/DL
RBC # BLD: 4.92 M/UL (ref 4.2–5.4)
RBC UA: ABNORMAL /HPF (ref 0–5)
SODIUM BLD-SCNC: 152 MEQ/L (ref 135–144)
SODIUM BLD-SCNC: 161 MEQ/L (ref 135–144)
SPECIFIC GRAVITY UA: 1.03 (ref 1–1.03)
TCO2 CALC VENOUS: 22 MMOL/L
TOTAL PROTEIN: 7.6 G/DL (ref 6.3–8)
TROPONIN: <0.01 NG/ML (ref 0–0.01)
UROBILINOGEN, URINE: 0.2 E.U./DL
WBC # BLD: 13.5 K/UL (ref 4.8–10.8)
WBC UA: ABNORMAL /HPF (ref 0–5)

## 2021-11-11 PROCEDURE — 82435 ASSAY OF BLOOD CHLORIDE: CPT

## 2021-11-11 PROCEDURE — 2580000003 HC RX 258: Performed by: EMERGENCY MEDICINE

## 2021-11-11 PROCEDURE — 2500000003 HC RX 250 WO HCPCS: Performed by: INTERNAL MEDICINE

## 2021-11-11 PROCEDURE — 70450 CT HEAD/BRAIN W/O DYE: CPT

## 2021-11-11 PROCEDURE — 83036 HEMOGLOBIN GLYCOSYLATED A1C: CPT

## 2021-11-11 PROCEDURE — 93005 ELECTROCARDIOGRAM TRACING: CPT | Performed by: EMERGENCY MEDICINE

## 2021-11-11 PROCEDURE — 80053 COMPREHEN METABOLIC PANEL: CPT

## 2021-11-11 PROCEDURE — 83735 ASSAY OF MAGNESIUM: CPT

## 2021-11-11 PROCEDURE — 2060000000 HC ICU INTERMEDIATE R&B

## 2021-11-11 PROCEDURE — 72170 X-RAY EXAM OF PELVIS: CPT

## 2021-11-11 PROCEDURE — 71045 X-RAY EXAM CHEST 1 VIEW: CPT

## 2021-11-11 PROCEDURE — 82948 REAGENT STRIP/BLOOD GLUCOSE: CPT

## 2021-11-11 PROCEDURE — 85025 COMPLETE CBC W/AUTO DIFF WBC: CPT

## 2021-11-11 PROCEDURE — 82330 ASSAY OF CALCIUM: CPT

## 2021-11-11 PROCEDURE — 93010 ELECTROCARDIOGRAM REPORT: CPT | Performed by: INTERNAL MEDICINE

## 2021-11-11 PROCEDURE — 82565 ASSAY OF CREATININE: CPT

## 2021-11-11 PROCEDURE — 84484 ASSAY OF TROPONIN QUANT: CPT

## 2021-11-11 PROCEDURE — 72125 CT NECK SPINE W/O DYE: CPT

## 2021-11-11 PROCEDURE — 81001 URINALYSIS AUTO W/SCOPE: CPT

## 2021-11-11 PROCEDURE — 6360000002 HC RX W HCPCS: Performed by: INTERNAL MEDICINE

## 2021-11-11 PROCEDURE — 84132 ASSAY OF SERUM POTASSIUM: CPT

## 2021-11-11 PROCEDURE — 36415 COLL VENOUS BLD VENIPUNCTURE: CPT

## 2021-11-11 PROCEDURE — 6370000000 HC RX 637 (ALT 250 FOR IP): Performed by: INTERNAL MEDICINE

## 2021-11-11 PROCEDURE — 6830039000 HC L3 TRAUMA ALERT

## 2021-11-11 PROCEDURE — 82803 BLOOD GASES ANY COMBINATION: CPT

## 2021-11-11 PROCEDURE — 6370000000 HC RX 637 (ALT 250 FOR IP): Performed by: EMERGENCY MEDICINE

## 2021-11-11 PROCEDURE — 83605 ASSAY OF LACTIC ACID: CPT

## 2021-11-11 PROCEDURE — 99285 EMERGENCY DEPT VISIT HI MDM: CPT

## 2021-11-11 PROCEDURE — 85014 HEMATOCRIT: CPT

## 2021-11-11 PROCEDURE — 82010 KETONE BODYS QUAN: CPT

## 2021-11-11 PROCEDURE — 84295 ASSAY OF SERUM SODIUM: CPT

## 2021-11-11 PROCEDURE — 84100 ASSAY OF PHOSPHORUS: CPT

## 2021-11-11 RX ORDER — MEMANTINE HYDROCHLORIDE 10 MG/1
10 TABLET ORAL 2 TIMES DAILY
Status: DISCONTINUED | OUTPATIENT
Start: 2021-11-11 | End: 2021-11-15 | Stop reason: HOSPADM

## 2021-11-11 RX ORDER — 0.9 % SODIUM CHLORIDE 0.9 %
15 INTRAVENOUS SOLUTION INTRAVENOUS ONCE
Status: DISCONTINUED | OUTPATIENT
Start: 2021-11-11 | End: 2021-11-12

## 2021-11-11 RX ORDER — SODIUM CHLORIDE 9 MG/ML
INJECTION, SOLUTION INTRAVENOUS CONTINUOUS
Status: DISCONTINUED | OUTPATIENT
Start: 2021-11-11 | End: 2021-11-12

## 2021-11-11 RX ORDER — 0.9 % SODIUM CHLORIDE 0.9 %
1000 INTRAVENOUS SOLUTION INTRAVENOUS ONCE
Status: COMPLETED | OUTPATIENT
Start: 2021-11-11 | End: 2021-11-11

## 2021-11-11 RX ORDER — LEVOTHYROXINE SODIUM 0.05 MG/1
50 TABLET ORAL DAILY
Status: DISCONTINUED | OUTPATIENT
Start: 2021-11-12 | End: 2021-11-15 | Stop reason: HOSPADM

## 2021-11-11 RX ORDER — ACETAMINOPHEN 325 MG/1
650 TABLET ORAL EVERY 6 HOURS PRN
Status: DISCONTINUED | OUTPATIENT
Start: 2021-11-11 | End: 2021-11-15 | Stop reason: HOSPADM

## 2021-11-11 RX ORDER — DEXTROSE MONOHYDRATE 25 G/50ML
12.5 INJECTION, SOLUTION INTRAVENOUS PRN
Status: DISCONTINUED | OUTPATIENT
Start: 2021-11-11 | End: 2021-11-15 | Stop reason: HOSPADM

## 2021-11-11 RX ORDER — CHOLECALCIFEROL (VITAMIN D3) 125 MCG
500 CAPSULE ORAL DAILY
Status: DISCONTINUED | OUTPATIENT
Start: 2021-11-11 | End: 2021-11-15 | Stop reason: HOSPADM

## 2021-11-11 RX ORDER — DEXTROSE MONOHYDRATE 50 MG/ML
100 INJECTION, SOLUTION INTRAVENOUS PRN
Status: DISCONTINUED | OUTPATIENT
Start: 2021-11-11 | End: 2021-11-15 | Stop reason: HOSPADM

## 2021-11-11 RX ORDER — POTASSIUM CHLORIDE 7.45 MG/ML
10 INJECTION INTRAVENOUS PRN
Status: DISCONTINUED | OUTPATIENT
Start: 2021-11-11 | End: 2021-11-12

## 2021-11-11 RX ORDER — ONDANSETRON 4 MG/1
4 TABLET, ORALLY DISINTEGRATING ORAL EVERY 8 HOURS PRN
Status: DISCONTINUED | OUTPATIENT
Start: 2021-11-11 | End: 2021-11-15 | Stop reason: HOSPADM

## 2021-11-11 RX ORDER — NICOTINE POLACRILEX 4 MG
15 LOZENGE BUCCAL PRN
Status: DISCONTINUED | OUTPATIENT
Start: 2021-11-11 | End: 2021-11-15 | Stop reason: HOSPADM

## 2021-11-11 RX ORDER — FLUOXETINE HYDROCHLORIDE 20 MG/1
40 CAPSULE ORAL DAILY
Status: DISCONTINUED | OUTPATIENT
Start: 2021-11-11 | End: 2021-11-15 | Stop reason: HOSPADM

## 2021-11-11 RX ORDER — ACETAMINOPHEN 650 MG/1
650 SUPPOSITORY RECTAL EVERY 6 HOURS PRN
Status: DISCONTINUED | OUTPATIENT
Start: 2021-11-11 | End: 2021-11-15 | Stop reason: HOSPADM

## 2021-11-11 RX ORDER — ATORVASTATIN CALCIUM 40 MG/1
40 TABLET, FILM COATED ORAL NIGHTLY
Status: DISCONTINUED | OUTPATIENT
Start: 2021-11-11 | End: 2021-11-15 | Stop reason: HOSPADM

## 2021-11-11 RX ORDER — SODIUM CHLORIDE 450 MG/100ML
INJECTION, SOLUTION INTRAVENOUS CONTINUOUS
Status: DISCONTINUED | OUTPATIENT
Start: 2021-11-11 | End: 2021-11-12

## 2021-11-11 RX ORDER — ONDANSETRON 2 MG/ML
4 INJECTION INTRAMUSCULAR; INTRAVENOUS EVERY 6 HOURS PRN
Status: DISCONTINUED | OUTPATIENT
Start: 2021-11-11 | End: 2021-11-15 | Stop reason: HOSPADM

## 2021-11-11 RX ORDER — MAGNESIUM SULFATE 1 G/100ML
1000 INJECTION INTRAVENOUS PRN
Status: DISCONTINUED | OUTPATIENT
Start: 2021-11-11 | End: 2021-11-12

## 2021-11-11 RX ORDER — DEXTROSE, SODIUM CHLORIDE, AND POTASSIUM CHLORIDE 5; .45; .15 G/100ML; G/100ML; G/100ML
INJECTION INTRAVENOUS CONTINUOUS PRN
Status: DISCONTINUED | OUTPATIENT
Start: 2021-11-11 | End: 2021-11-12

## 2021-11-11 RX ORDER — TELMISARTAN 80 MG/1
80 TABLET ORAL DAILY
Status: ON HOLD | COMMUNITY
End: 2021-11-14 | Stop reason: HOSPADM

## 2021-11-11 RX ADMIN — SODIUM CHLORIDE 1 UNITS/HR: 9 INJECTION, SOLUTION INTRAVENOUS at 15:02

## 2021-11-11 RX ADMIN — SODIUM CHLORIDE 1000 ML: 9 INJECTION, SOLUTION INTRAVENOUS at 11:23

## 2021-11-11 RX ADMIN — SODIUM CHLORIDE: 9 INJECTION, SOLUTION INTRAVENOUS at 14:35

## 2021-11-11 RX ADMIN — FLUOXETINE 40 MG: 20 CAPSULE ORAL at 20:59

## 2021-11-11 RX ADMIN — ENOXAPARIN SODIUM 40 MG: 100 INJECTION SUBCUTANEOUS at 22:34

## 2021-11-11 RX ADMIN — CYANOCOBALAMIN TAB 500 MCG 500 MCG: 500 TAB at 20:59

## 2021-11-11 RX ADMIN — POTASSIUM CHLORIDE, DEXTROSE MONOHYDRATE AND SODIUM CHLORIDE: 150; 5; 450 INJECTION, SOLUTION INTRAVENOUS at 20:50

## 2021-11-11 RX ADMIN — MEMANTINE HYDROCHLORIDE 10 MG: 10 TABLET ORAL at 20:59

## 2021-11-11 RX ADMIN — ATORVASTATIN CALCIUM 40 MG: 40 TABLET, FILM COATED ORAL at 20:59

## 2021-11-11 NOTE — ED PROVIDER NOTES
[address] ED  eMERGENCYdEPARTMENT eNCOUnter      Pt Name: Minnie Guzmán  MRN: [id number]  [place] 1945  Date of evaluation: 11/11/2021  Carlos Lancaster MD    CHIEF COMPLAINT           HPI  Minnie Guzmán is a 67 y.o. female per chart review has a h/o DM II, HTN, hpl, dementia who is baseline axox1. Per NH, pt had a fall prior to arrival and hit her head. Pt is axox0 upon arrival however denies any pain. ROS  Review of Systems   Unable to perform ROS: Dementia       Except as noted above the remainder of the review of systems was reviewed and negative. PAST MEDICAL HISTORY     Past Medical History:   Diagnosis Date    Chronic rhinitis     Depression     Diabetes mellitus ([facility] [address]     Hyperlipidemia     Hypertension     Type 2 diabetes mellitus without complication ([facility] [address]          SURGICAL HISTORY     No past surgical history on file. CURRENTMEDICATIONS       Previous Medications    ATORVASTATIN (LIPITOR) 10 MG TABLET    Take by mouth    CALAMINE-ZINC OXIDE EX    Apply topically three times daily And as needed    DONEPEZIL (ARICEPT) 10 MG TABLET    Take 1 tablet by mouth nightly    FLUOXETINE (PROZAC) 40 MG CAPSULE    Take 40 mg by mouth daily     GLIMEPIRIDE (AMARYL) 4 MG TABLET    Take 4 mg by mouth every morning (before breakfast)    LEVOTHYROXINE (SYNTHROID) 50 MCG TABLET    Take by mouth    MEMANTINE (NAMENDA) 10 MG TABLET    Take 1 tablet by mouth 2 times daily    ONE TOUCH ULTRA TEST STRIP        POTASSIUM CHLORIDE (KLOR-CON M) 10 MEQ EXTENDED RELEASE TABLET    Take 10 mEq by mouth daily    VITAMIN B-12 (CYANOCOBALAMIN) 500 MCG TABLET    Take 500 mcg by mouth daily       ALLERGIES     Penicillins, Sulfa antibiotics, and Penicillin g    FAMILY HISTORY     No family history on file.        SOCIAL HISTORY       Social History     Socioeconomic History    Marital status:      Spouse name: Not on file    Number of children: Not on file    Years of education: Not on file    Highest education level: Not on file   Occupational History    Not on file   Tobacco Use    Smoking status: Never Smoker    Smokeless tobacco: Never Used   Substance and Sexual Activity    Alcohol use: Never    Drug use: Never    Sexual activity: Not Currently   Other Topics Concern    Not on file   Social History Narrative    Not on file     Social Determinants of Health     Financial Resource Strain:     Difficulty of Paying Living Expenses: Not on file   Food Insecurity:     Worried About Running Out of Food in the Last Year: Not on file    Daryl of Food in the Last Year: Not on file   Transportation Needs:     Lack of Transportation (Medical): Not on file    Lack of Transportation (Non-Medical): Not on file   Physical Activity:     Days of Exercise per Week: Not on file    Minutes of Exercise per Session: Not on file   Stress:     Feeling of Stress : Not on file   Social Connections:     Frequency of Communication with Friends and Family: Not on file    Frequency of Social Gatherings with Friends and Family: Not on file    Attends Holiness Services: Not on file    Active Member of [address] [place] [address] or Organizations: Not on file    Attends Club or Organization Meetings: Not on file    Marital Status: Not on file   Intimate Partner Violence:     Fear of Current or Ex-Partner: Not on file    Emotionally Abused: Not on file    Physically Abused: Not on file    Sexually Abused: Not on file   Housing Stability:     Unable to Pay for Housing in the Last Year: Not on file    Number of [place] in the Last Year: Not on file    Unstable Housing in the Last Year: Not on file         PHYSICAL EXAM       ED Triage Vitals   BP Temp Temp src Pulse Resp SpO2 Height Weight   -- -- -- -- -- -- -- --       Physical Exam  Vitals and nursing note reviewed. Constitutional:       Appearance: She is well-developed. HENT:      Head: Normocephalic.       Right Ear: External ear normal.      Left Ear: External ear normal.   Eyes:      Conjunctiva/sclera: Conjunctivae normal.      Pupils: Pupils are equal, round, and reactive to light. Cardiovascular:      Rate and Rhythm: Normal rate and regular rhythm. Heart sounds: Normal heart sounds. Pulmonary:      Effort: Pulmonary effort is normal.      Breath sounds: Normal breath sounds. Abdominal:      General: Bowel sounds are normal. There is no distension. Palpations: Abdomen is soft. Tenderness: There is no abdominal tenderness. Musculoskeletal:         General: Normal range of motion. Cervical back: Normal range of motion and neck supple. Skin:     General: Skin is warm and dry. Neurological:      Mental Status: She is alert. Comments: Axox0. Diffusely normal strength, sensation   Psychiatric:      Comments: Unable to assess           MDM  68 yo female presents to the ED s/p fall. Pt is afebrile, hemodynamically stable. Pt given 1 L NS in the ED. EKG shows NSR with , left axis, normal intervals, no ST changes. Labs remarkable for glucose 741, BUN 32, Cr 1.21, WBC 13, Na 152 ketones 9.8. VBG shows pH 7.32. Pt with mild DKA. This was likely the cause of pt's fall. CXR negative. Pelvis XR negative. CT head, cspine negative. Pt started on NS maintenance fluids. Pt also started on IV insulin gtt. Given fall, DKA, hypernatremia, case discussed with Dr. Jackie Kapadia (medicine) and pt admitted to medicine in serious condition. Critical care time:  36 min excluding procedures        FINAL IMPRESSION      1. Diabetic ketoacidosis without coma associated with other specified diabetes mellitus ([facility] [address]    2. SAGAR (acute kidney injury) ([facility] [address]    3.  Hypernatremia          DISPOSITION/PLAN   DISPOSITION Admitted 11/11/2021 02:02:57 PM        DISCHARGE MEDICATIONS:  [unfilled]         Patricia Shoemaker MD(electronically signed)  Attending Emergency Physician            Patricia Shoemaker MD  11/11/21 3011

## 2021-11-11 NOTE — ED NOTES
Call placed to Debra at this time. Nurse stating that pt is a assist Autumn Pt is indepedant with dressing with set up. Pt is continent of bowel and bladder. Pt need assistance with pericare. Pt does not form sentences. Pt will attempt to show staff when she needs something. Pt is independent with ambulation. Pt has dementia and is alert to self. Pt naps often throughout the day. Pt will awaken easily when staff come in. Pt just finished ATB for UTI yesterday morning. Pt stay in bed often throughout the day. Alert to self. Staff states that she may recognize. Pt is retired nurse.      Cristy Logan RN  11/11/21 [id number]

## 2021-11-11 NOTE — CARE COORDINATION
Pt is from [facility]. I spoke to Jelena there and she said that pt will be able to go back there, as they are holding her space.

## 2021-11-11 NOTE — H&P
Hospital Medicine  History and Physical    Patient:  Jackie Mcrae  MRN: [id number]    CHIEF COMPLAINT:    Chief Complaint   Patient presents with    Fall       History Obtained From:  patient, electronic medical record  Primary Care Physician: Prabhakar Gar MD    HISTORY OF PRESENT ILLNESS:   The patient is a 67 y.o. female who presents with fall while at skilled nursing facility. Patient is a 79-year-old female with advanced dementia which is nonconversational at baseline she has a history of diabetes hypertension hyperlipidemia.  is at the bedside for history and states that she was found on the floor in her room at the nursing home after an unwitnessed fall. She is apparently had no symptoms that he can note and states that he recently took her out of the nursing home to eat for dinner and she was able to eat her dinner without problems. Her dementia is to the point where she is not able to provide any subjective history, patient is afebrile T-max 98.2 respirations 18 pulse 96 blood pressure 130/90 saturating 95% on room air on arrival to the ED, BMP shows sodium 152 potassium 4.6 chloride 111 BUN 32 creatinine 1.21. Lactic acid 8.21 on arrival.  Glucose of 41 with an anion gap of 22 white count 13.5 hemoglobin 17.5 platelets 734. CT head and cervical spine negative. Past Medical History:      Diagnosis Date    Chronic rhinitis     Depression     Diabetes mellitus (HCC)     Hyperlipidemia     Hypertension     Type 2 diabetes mellitus without complication ([facility] [address]        Past Surgical History:  No past surgical history on file. Medications Prior to Admission:    Prior to Admission medications    Medication Sig Start Date End Date Taking?  Authorizing Provider   telmisartan (MICARDIS) 80 MG tablet Take 80 mg by mouth daily Hold sbp less than 110   Yes Historical Provider, MD   glimepiride (AMARYL) 4 MG tablet Take 4 mg by mouth every morning (before breakfast)   Yes Historical Provider, MD potassium chloride (KLOR-CON M) 10 MEQ extended release tablet Take 10 mEq by mouth daily   Yes Historical Provider, MD   vitamin B-12 (CYANOCOBALAMIN) 500 MCG tablet Take 500 mcg by mouth daily   Yes Historical Provider, MD   memantine (NAMENDA) 10 MG tablet Take 1 tablet by mouth 2 times daily 11/16/20  Yes Sara Bal MD   atorvastatin (LIPITOR) 10 MG tablet Take by mouth nightly  3/29/18  Yes Historical Provider, MD   FLUoxetine (PROZAC) 40 MG capsule Take 40 mg by mouth daily  6/21/18  Yes Historical Provider, MD   levothyroxine (SYNTHROID) 50 MCG tablet Take by mouth 3/29/18  Yes Historical Provider, MD   [address] topically three times daily And as needed    Historical Provider, MD   ONE TOUCH ULTRA TEST strip  1/29/20   Historical Provider, MD       Allergies:  Penicillins, Sulfa antibiotics, and Penicillin g    Social History:   TOBACCO:   reports that she has never smoked. She has never used smokeless tobacco.  ETOH:   reports no history of alcohol use. OCCUPATION:  none    Family History:   No family history on file. REVIEW OF SYSTEMS:  Ten systems reviewed and negative except for as above. Physical Exam:    Vitals: BP (!) 157/93   Pulse 79   Temp 98.6 °F (37 °C) (Axillary)   Resp 22   Ht 5' 4\" (1.626 m)   Wt 127 lb (57.6 kg)   SpO2 100%   BMI 21.80 kg/m²   Constitutional: \Awake and alert but not speaking intelligible sentences  Skin: Skin color, texture, turgor normal. No rashes or lesions  Eyes:Eye: Normal external eye, conjunctiva, YASMANI. ENT: Head: Normocephalic, no lesions, without obvious abnormality.   Neck: no adenopathy, no carotid bruit, no JVD, supple, symmetrical, trachea midline and thyroid not enlarged, symmetric, no tenderness/mass/nodules  Respiratory: clear to auscultation bilaterally  Cardiovascular: regular rate and rhythm, S1, S2 normal, no murmur, click, rub or gallop  Gastrointestinal: soft, non-tender; bowel sounds normal; no masses,  no organomegaly  Genitourinary: Deferred  Musculoskeletal:extremities normal, atraumatic, no cyanosis or edema  Neurologic: Mental status AAOx0 No facial asymmetry or droop. Normal muscle strength b/l. Psychiatric: Appropriate mood and affect. Good insight and judgement  Hematologic: No obvious bruising or bleeding    Recent Labs     11/11/21  1100 11/11/21  1316   WBC 13.5*  --    HGB 15.7 17.5*     --      Recent Labs     11/11/21  1100 11/11/21  1316 11/11/21  1506   *  --   --    K 4.6  --   --    *  --   --    CO2 19*  --   --    BUN 32*  --   --    CREATININE 1.21* 1.1  --    GLUCOSE 741*  --  431   AST 16  --   --    ALT 20  --   --    BILITOT 0.5  --   --    ALKPHOS 116  --   --      Troponin T:   Recent Labs     11/11/21  1100   TROPONINI <0.010     URINALYSIS:  Recent Labs     11/11/21  1100   COLORU Yellow   PHUR 5.5   WBCUA 3-5   RBCUA 0-2   BACTERIA FEW*   CLARITYU Clear   SPECGRAV 1.030   LEUKOCYTESUR Negative   UROBILINOGEN 0.2   BILIRUBINUR Negative   BLOODU Negative   GLUCOSEU >=1000*     -----------------------------------------------------------------   XR PELVIS (1-2 VIEWS)    Result Date: 11/11/2021  EXAMINATION: AP PELVIS  CLINICAL HISTORY: Trauma, fall  COMPARISONS: None  FINDINGS: Single AP view of the pelvis is submitted There is diffuse generalized osteopenia. There is degenerative changes as well as pseudoarthrosis of visualized lower lumbar spine. Is bilateral degenerative changes of both SI joints. Possible small bone island left femoral head. No acute fractures. No dislocations. No focal bony abnormalities      NO ACUTE FRACTURES. CT Head WO Contrast    Result Date: 11/11/2021  EXAMINATION:  CT HEAD WO CONTRAST HISTORY:  Fall, closed head injury TECHNIQUE:  Serial axial images without IV contrast were obtained from the vertex to the foramen magnum. Sagittal and coronal reconstructions.  All CT scans at this facility use dose modulation, iterative reconstruction, and/or weight based dosing when appropriate to reduce radiation dose to as low as reasonably achievable. COMPARISON:  CT head 9/18/2021 RESULT: Acute change:   No evidence of an acute infarct or other acute parenchymal process. Hemorrhage:    No evidence of acute intracranial hemorrhage. Mass Lesion / Mass Effect:   There is no evidence of an intracranial mass or extraaxial fluid collection. No significant mass effect. Chronic change:   Scattered patchy foci of low attenuation are present within supratentorial white matter which is a nonspecific finding but likely represents mild microvascular ischemia. Parenchyma: There is mild to moderate generalized volume loss. Ventricles:   Ventricular enlargement concordant with the degree of parenchymal volume loss. Paranasal sinuses and skull base: There is mild opacification of the bilateral sphenoid sinuses with air-fluid levels, this may be acute in the appropriate clinical setting. Mastoid air cells are clear. The skull base is unremarkable. Soft tissues unremarkable. No acute intracranial process or significant interval change from prior. Chronic microvascular ischemic changes. CT CERVICAL SPINE WO CONTRAST    Result Date: 11/11/2021  EXAMINATION:  CT CERVICAL SPINE WITHOUT CONTRAST HISTORY:   Fall, neck pain . TECHNIQUE:  CT of the cervical spine without IV contrast.   Spiral, high resolution axial images were obtained from the skull base to the cervicothoracic junction with sagittal and coronal planar reconstructions. All CT scans at this facility use dose modulation, iterative reconstruction, and/or weight based dosing when appropriate to reduce radiation dose to as low as reasonably achievable. COMPARISON: None. RESULT: Counting reference:  Craniocervical junction. Cervical soft tissues: The paraspinal soft tissues planes are maintained. Alignment:    No traumatic malalignment.  Straightening of the cervical lordosis, may be positional or related to muscle spasm. Craniocervical junction:    Craniocervical junction is normal. Osseous structures/fracture:    No acute fracture. No destructive osseous lesions. Intervertebral disc spaces: There is mild multilevel loss of intervertebral disc height. Cervical levels: There is mild to moderate scattered multilevel degenerative changes of the cervical spinal resulting in varying degrees of central osseous and bilateral neural foraminal stenosis. The lung apices are clear. No acute fracture or traumatic malalignment. Mild to moderate multilevel degenerative changes of the cervical spine. XR CHEST PORTABLE    Result Date: 11/11/2021  EXAMINATION: CHEST PORTABLE SUPINE VIEW  CLINICAL HISTORY: Trauma, fall COMPARISONS: September 18, 2021 1503 hours  FINDINGS: Single  views of the chest is submitted. The cardiac silhouette is of normal size configuration. Pulmonary vascular unremarkable. Right sided trachea. No focal infiltrates. No Pneumothoraces. NO ACUTE ACTIVE CARDIOPULMONARY PROCESS      EKG: Normal sinus rhythm    Assessment and Plan   1. DKA: Start aggressive hydration normal saline bolus followed by D5 one half normal saline with 20 potassium per DKA protocol. Insulin infusion. Monitor electrolytes every 4 hours with potassium magnesium Mikal protocol. Check hemoglobin A1c.  2. Alzheimer's dementia without behavioral disturbance continue Namenda supportive care. 3. Hypertension: As needed hydralazine  4. Hyperlipidemia statin  5. Hypothyroid disease Synthroid check TSH  6.  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

## 2021-11-12 LAB
ANION GAP SERPL CALCULATED.3IONS-SCNC: 12 MEQ/L (ref 9–15)
ANION GAP SERPL CALCULATED.3IONS-SCNC: 8 MEQ/L (ref 9–15)
BASOPHILS ABSOLUTE: 0.1 K/UL (ref 0–0.2)
BASOPHILS RELATIVE PERCENT: 1 %
BUN BLDV-MCNC: 30 MG/DL (ref 8–23)
BUN BLDV-MCNC: 30 MG/DL (ref 8–23)
CALCIUM SERPL-MCNC: 9 MG/DL (ref 8.5–9.9)
CALCIUM SERPL-MCNC: 9.1 MG/DL (ref 8.5–9.9)
CHLORIDE BLD-SCNC: 121 MEQ/L (ref 95–107)
CHLORIDE BLD-SCNC: 124 MEQ/L (ref 95–107)
CO2: 24 MEQ/L (ref 20–31)
CO2: 25 MEQ/L (ref 20–31)
CREAT SERPL-MCNC: 0.75 MG/DL (ref 0.5–0.9)
CREAT SERPL-MCNC: 0.83 MG/DL (ref 0.5–0.9)
EOSINOPHILS ABSOLUTE: 0.4 K/UL (ref 0–0.7)
EOSINOPHILS RELATIVE PERCENT: 3.1 %
GFR AFRICAN AMERICAN: >60
GFR AFRICAN AMERICAN: >60
GFR NON-AFRICAN AMERICAN: >60
GFR NON-AFRICAN AMERICAN: >60
GLUCOSE BLD-MCNC: 156 MG/DL (ref 60–115)
GLUCOSE BLD-MCNC: 170 MG/DL (ref 60–115)
GLUCOSE BLD-MCNC: 183 MG/DL (ref 60–115)
GLUCOSE BLD-MCNC: 184 MG/DL (ref 60–115)
GLUCOSE BLD-MCNC: 186 MG/DL (ref 70–99)
GLUCOSE BLD-MCNC: 189 MG/DL (ref 60–115)
GLUCOSE BLD-MCNC: 196 MG/DL (ref 60–115)
GLUCOSE BLD-MCNC: 205 MG/DL (ref 60–115)
GLUCOSE BLD-MCNC: 208 MG/DL (ref 60–115)
GLUCOSE BLD-MCNC: 217 MG/DL (ref 60–115)
GLUCOSE BLD-MCNC: 277 MG/DL (ref 70–99)
GLUCOSE BLD-MCNC: 486 MG/DL (ref 60–115)
HBA1C MFR BLD: 10.9 % (ref 4.8–5.9)
HCT VFR BLD CALC: 43.8 % (ref 37–47)
HEMOGLOBIN: 14.2 G/DL (ref 12–16)
LYMPHOCYTES ABSOLUTE: 2.5 K/UL (ref 1–4.8)
LYMPHOCYTES RELATIVE PERCENT: 21.4 %
MAGNESIUM: 2.5 MG/DL (ref 1.7–2.4)
MAGNESIUM: 2.7 MG/DL (ref 1.7–2.4)
MCH RBC QN AUTO: 31.9 PG (ref 27–31.3)
MCHC RBC AUTO-ENTMCNC: 32.4 % (ref 33–37)
MCV RBC AUTO: 98.4 FL (ref 82–100)
MONOCYTES ABSOLUTE: 0.8 K/UL (ref 0.2–0.8)
MONOCYTES RELATIVE PERCENT: 7.2 %
NEUTROPHILS ABSOLUTE: 7.7 K/UL (ref 1.4–6.5)
NEUTROPHILS RELATIVE PERCENT: 67.3 %
PDW BLD-RTO: 14.1 % (ref 11.5–14.5)
PERFORMED ON: ABNORMAL
PHOSPHORUS: 2.6 MG/DL (ref 2.3–4.8)
PHOSPHORUS: 2.7 MG/DL (ref 2.3–4.8)
PLATELET # BLD: 207 K/UL (ref 130–400)
POTASSIUM SERPL-SCNC: 3.9 MEQ/L (ref 3.4–4.9)
POTASSIUM SERPL-SCNC: 4.1 MEQ/L (ref 3.4–4.9)
RBC # BLD: 4.46 M/UL (ref 4.2–5.4)
SODIUM BLD-SCNC: 153 MEQ/L (ref 135–144)
SODIUM BLD-SCNC: 161 MEQ/L (ref 135–144)
WBC # BLD: 11.5 K/UL (ref 4.8–10.8)

## 2021-11-12 PROCEDURE — 84100 ASSAY OF PHOSPHORUS: CPT

## 2021-11-12 PROCEDURE — 6370000000 HC RX 637 (ALT 250 FOR IP): Performed by: INTERNAL MEDICINE

## 2021-11-12 PROCEDURE — 2500000003 HC RX 250 WO HCPCS: Performed by: INTERNAL MEDICINE

## 2021-11-12 PROCEDURE — 83735 ASSAY OF MAGNESIUM: CPT

## 2021-11-12 PROCEDURE — 2580000003 HC RX 258: Performed by: INTERNAL MEDICINE

## 2021-11-12 PROCEDURE — 80048 BASIC METABOLIC PNL TOTAL CA: CPT

## 2021-11-12 PROCEDURE — 1210000000 HC MED SURG R&B

## 2021-11-12 PROCEDURE — 36415 COLL VENOUS BLD VENIPUNCTURE: CPT

## 2021-11-12 PROCEDURE — 6360000002 HC RX W HCPCS: Performed by: INTERNAL MEDICINE

## 2021-11-12 PROCEDURE — 85025 COMPLETE CBC W/AUTO DIFF WBC: CPT

## 2021-11-12 RX ORDER — INSULIN GLARGINE 100 [IU]/ML
10 INJECTION, SOLUTION SUBCUTANEOUS NIGHTLY
Status: DISCONTINUED | OUTPATIENT
Start: 2021-11-12 | End: 2021-11-13

## 2021-11-12 RX ORDER — SODIUM CHLORIDE, SODIUM LACTATE, POTASSIUM CHLORIDE, CALCIUM CHLORIDE 600; 310; 30; 20 MG/100ML; MG/100ML; MG/100ML; MG/100ML
INJECTION, SOLUTION INTRAVENOUS CONTINUOUS
Status: DISCONTINUED | OUTPATIENT
Start: 2021-11-12 | End: 2021-11-15 | Stop reason: HOSPADM

## 2021-11-12 RX ADMIN — SODIUM CHLORIDE 0.96 UNITS/HR: 9 INJECTION, SOLUTION INTRAVENOUS at 04:14

## 2021-11-12 RX ADMIN — ENOXAPARIN SODIUM 40 MG: 100 INJECTION SUBCUTANEOUS at 09:34

## 2021-11-12 RX ADMIN — INSULIN GLARGINE 10 UNITS: 100 INJECTION, SOLUTION SUBCUTANEOUS at 21:45

## 2021-11-12 RX ADMIN — INSULIN LISPRO 12 UNITS: 100 INJECTION, SOLUTION INTRAVENOUS; SUBCUTANEOUS at 12:03

## 2021-11-12 RX ADMIN — FLUOXETINE 40 MG: 20 CAPSULE ORAL at 09:34

## 2021-11-12 RX ADMIN — INSULIN LISPRO 4 UNITS: 100 INJECTION, SOLUTION INTRAVENOUS; SUBCUTANEOUS at 17:36

## 2021-11-12 RX ADMIN — ATORVASTATIN CALCIUM 40 MG: 40 TABLET, FILM COATED ORAL at 21:45

## 2021-11-12 RX ADMIN — POTASSIUM CHLORIDE, DEXTROSE MONOHYDRATE AND SODIUM CHLORIDE: 150; 5; 450 INJECTION, SOLUTION INTRAVENOUS at 03:13

## 2021-11-12 RX ADMIN — MEMANTINE HYDROCHLORIDE 10 MG: 10 TABLET ORAL at 21:45

## 2021-11-12 RX ADMIN — MEMANTINE HYDROCHLORIDE 10 MG: 10 TABLET ORAL at 09:34

## 2021-11-12 RX ADMIN — CYANOCOBALAMIN TAB 500 MCG 500 MCG: 500 TAB at 09:34

## 2021-11-12 RX ADMIN — LEVOTHYROXINE SODIUM 50 MCG: 0.05 TABLET ORAL at 06:23

## 2021-11-12 RX ADMIN — SODIUM CHLORIDE, POTASSIUM CHLORIDE, SODIUM LACTATE AND CALCIUM CHLORIDE: 600; 310; 30; 20 INJECTION, SOLUTION INTRAVENOUS at 21:49

## 2021-11-12 RX ADMIN — INSULIN LISPRO 2 UNITS: 100 INJECTION, SOLUTION INTRAVENOUS; SUBCUTANEOUS at 09:35

## 2021-11-12 ASSESSMENT — PAIN SCALES - WONG BAKER: WONGBAKER_NUMERICALRESPONSE: 0

## 2021-11-12 NOTE — FLOWSHEET NOTE
11/12/21 0124   Provider Notification   Reason for Communication Critical Value (comment)  (Na 161)   Provider Name Dr. Mariam Rahman   Provider Notification Physician   Method of Communication Secure Message   Response No new orders   Notification Time 0124   Dr. Mariam Rahman made aware pts Na at 161. RN to encourage PO fluids-sips of water to accommodate diet order. Will continue to monitor.

## 2021-11-12 NOTE — FLOWSHEET NOTE
Pt resting in bed finishing breakfast.  Pt more alert and talkative today. Bed alarm on, call bell in reach. 1300  Pt being transferred to 4wt.  at bedside. Pt left the unit per bed.   Dr Tiarra Lay to change diet from regular to a carb control

## 2021-11-12 NOTE — DISCHARGE INSTR - COC
Continuity of Care Form    Patient Name: Hong Bowen   :  1945  MRN:  [id number]    Admit date:  2021  Discharge date:  11/15/21    Code Status Order: Full Code   Advance Directives:      Admitting Physician:  Elzbieta Patton DO  PCP: Atul Gordon MD    Discharging Nurse: April, Jayant  Unit/Room#: Q103/N964-77  Discharging Unit Phone Number: [phone]    Emergency Contact:   Extended Emergency Contact Information  Primary Emergency Contact: Noah [address] Phone: [phone]  Relation: Spouse   needed? No  Secondary Emergency ContactDaniben Archibald  [employer] Phone: [phone]  Relation: Child    Past Surgical History:  No past surgical history on file. Immunization History: There is no immunization history on file for this patient.     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    Personal history of urinary (tract) infections Z87.440    Chronic rhinitis J31.0    Depressive disorder F32.9    Diabetes mellitus ([facility] [address] E11.9    Hypertension I10    Hyperlipidemia E78.5    Mild cognitive disorder F09    Non-smoker Z78.9    Hypothyroid E03.9    Hypernatremia E87.0    DKA, type 2, not at goal [facility]) E11.10       Isolation/Infection:   Isolation            No Isolation          Patient Infection Status       Infection Onset Added Last Indicated Last Indicated By Review Planned Expiration Resolved Resolved By    None active    Resolved    COVID-19 Rule Out 21 COVID-19, Rapid (Ordered)   21 Rule-Out Test Resulted            Nurse Assessment:  Last Vital Signs: /75   Pulse 77   Temp 98.2 °F (36.8 °C) (Oral)   Resp 16 Ht 5' 4\" (1.626 m)   Wt 124 lb 4.8 oz (56.4 kg)   SpO2 98%   BMI 21.34 kg/m²     Last documented pain score (0-10 scale):    Last Weight:   Wt Readings from Last 1 Encounters:   11/12/21 124 lb 4.8 oz (56.4 kg)     Mental Status:  disoriented, alert, and dementia hx    IV Access:  - None    Nursing Mobility/ADLs:  Walking   Assisted  Transfer  Assisted  Bathing  Assisted  Dressing  Assisted  Toileting  Assisted  Feeding  Assisted  Med Admin  Assisted  Med Delivery   whole    Wound Care Documentation and Therapy:        Elimination:  Continence: Bowel: Yes  Bladder: Yes  Urinary Catheter: None   Colostomy/Ileostomy/Ileal Conduit: Yes       Date of Last BM: 11/15/21    Intake/Output Summary (Last 24 hours) at 11/12/2021 1052  Last data filed at 11/12/2021 0616  Gross per 24 hour   Intake 2492.15 ml   Output 600 ml   Net 1892.15 ml     I/O last 3 completed shifts: In: 2492.2 [P.O.:480; I.V.:2012.2]  Out: 600 [Urine:600]    Safety Concerns:     Sundowners [place] and At Risk for Falls    Impairments/Disabilities:      None    Nutrition Therapy:  Current Nutrition Therapy:   - Oral Diet:  General and Carb Control 3 carbs/meal (1500kcals/day)    Routes of Feeding: Oral  Liquids: No Restrictions  Daily Fluid Restriction: no  Last Modified Barium Swallow with Video (Video Swallowing Test): not done    Treatments at the Time of Hospital Discharge:   Respiratory Treatments: n/a  Oxygen Therapy:  is not on home oxygen therapy.   Ventilator:    - No ventilator support    Rehab Therapies: Physical Therapy and Occupational Therapy  Weight Bearing Status/Restrictions: No weight bearing restirctions  Other Medical Equipment (for information only, NOT a DME order):  walker  Other Treatments:     Patient's personal belongings (please select all that are sent with patient):  None    RN SIGNATURE:  Electronically signed by Alycia Lamas RN on 11/15/21 at 4:06 PM EST    CASE MANAGEMENT/SOCIAL WORK SECTION    Inpatient Status Date: ***    Readmission Risk Assessment Score:  Readmission Risk              Risk of Unplanned Readmission:  17           Discharging to Facility/ Agency   Name: [facility]  Address:  [facility]:[phone]  Fax:    Dialysis Facility (if applicable)   Name:  Address:  Dialysis Schedule:  Phone:  Fax:    / signature: {Esignature:[id number]}    PHYSICIAN SECTION    Prognosis: {Prognosis:4585374949}    Condition at Discharge: [address] [place] [address] Patient Condition:302095324}    Rehab Potential (if transferring to Rehab): {Prognosis:4905449832}    Recommended Labs or Other Treatments After Discharge: ***    Physician Certification: I certify the above information and transfer of Hendricks Hotter  is necessary for the continuing treatment of the diagnosis listed and that she requires {Admit to Appropriate Level of Care:46633} for {GREATER/LESS:119069849} 30 days.      Update Admission H&P: {CHP DME Changes in UAUNV:590553925}    PHYSICIAN SIGNATURE:  {Esignature:[id number]}

## 2021-11-12 NOTE — FLOWSHEET NOTE
11/11/21 2031   Provider Notification   Reason for Communication Critical Value (comment)  (Na 161)   Provider Name Dr. Leavitt Seen   Provider Notification Physician   Method of Communication Secure Message   Response No new orders   Notification Time 2031   Dr. Leavitt Seen notified pt with Na of 161. Previous Na was 152 at 1100 today. Pt with no acute complaints. No new orders. Will continue to monitor.

## 2021-11-12 NOTE — CARE COORDINATION
[facility] Case Management Initial Discharge Assessment    The LSW talked to Milli Sotelo, to discuss discharge plan. PCP: Sarah Turpin MD                                Date of Last Visit:     If no PCP, list provided? N/A    Discharge Planning    Living Arrangements: From Memory Care at [facility]    Who do you live with? From [address]    Who helps you with your care:  Staff at [facility]    If lives at home:     Do you have any barriers navigating in your home? no    Patient can perform ADL? Staff assists patient    Current Services (outpatient and in home) :  None    Dialysis: No    Is transportation available to get to your appointments? Yes    DME Equipment:  Unsure    Respiratory equipment: None    Respiratory provider:  N/A     Pharmacy:      Consult with Medication Assistance Program?  No      Patient agreeable to [place] [address]? [facility]? Patient agreeable to SNF/Rehab? N/A    Other discharge needs identified? N/A     Does Patient Have a High-Risk for Readmission Diagnosis (CHF, PN, MI, COPD)? Initial Discharge Plan? (Note: please see concurrent daily documentation for any updates after initial note). Discharge plan is to return to [address] Readmission Risk              Risk of Unplanned Readmission:  16            DCCOP was complete.   Electronically signed by Candance Bramble on 11/12/2021 at 10:49 AM

## 2021-11-13 LAB
ANION GAP SERPL CALCULATED.3IONS-SCNC: 10 MEQ/L (ref 9–15)
BASOPHILS ABSOLUTE: 0.1 K/UL (ref 0–0.2)
BASOPHILS RELATIVE PERCENT: 0.9 %
BUN BLDV-MCNC: 22 MG/DL (ref 8–23)
CALCIUM SERPL-MCNC: 8.3 MG/DL (ref 8.5–9.9)
CHLORIDE BLD-SCNC: 113 MEQ/L (ref 95–107)
CO2: 21 MEQ/L (ref 20–31)
CREAT SERPL-MCNC: 0.5 MG/DL (ref 0.5–0.9)
EOSINOPHILS ABSOLUTE: 0.2 K/UL (ref 0–0.7)
EOSINOPHILS RELATIVE PERCENT: 2.4 %
GFR AFRICAN AMERICAN: >60
GFR NON-AFRICAN AMERICAN: >60
GLUCOSE BLD-MCNC: 126 MG/DL (ref 60–115)
GLUCOSE BLD-MCNC: 136 MG/DL (ref 60–115)
GLUCOSE BLD-MCNC: 249 MG/DL (ref 70–99)
GLUCOSE BLD-MCNC: 267 MG/DL (ref 60–115)
GLUCOSE BLD-MCNC: 53 MG/DL (ref 60–115)
HCT VFR BLD CALC: 36.3 % (ref 37–47)
HEMOGLOBIN: 12.3 G/DL (ref 12–16)
LYMPHOCYTES ABSOLUTE: 1.9 K/UL (ref 1–4.8)
LYMPHOCYTES RELATIVE PERCENT: 22.1 %
MCH RBC QN AUTO: 32.4 PG (ref 27–31.3)
MCHC RBC AUTO-ENTMCNC: 33.9 % (ref 33–37)
MCV RBC AUTO: 95.6 FL (ref 82–100)
MONOCYTES ABSOLUTE: 0.3 K/UL (ref 0.2–0.8)
MONOCYTES RELATIVE PERCENT: 3.8 %
NEUTROPHILS ABSOLUTE: 6 K/UL (ref 1.4–6.5)
NEUTROPHILS RELATIVE PERCENT: 70.8 %
PDW BLD-RTO: 13.7 % (ref 11.5–14.5)
PERFORMED ON: ABNORMAL
PLATELET # BLD: 180 K/UL (ref 130–400)
POTASSIUM SERPL-SCNC: 3.6 MEQ/L (ref 3.4–4.9)
RBC # BLD: 3.8 M/UL (ref 4.2–5.4)
SODIUM BLD-SCNC: 144 MEQ/L (ref 135–144)
WBC # BLD: 8.5 K/UL (ref 4.8–10.8)

## 2021-11-13 PROCEDURE — 1210000000 HC MED SURG R&B

## 2021-11-13 PROCEDURE — 2580000003 HC RX 258: Performed by: INTERNAL MEDICINE

## 2021-11-13 PROCEDURE — 85025 COMPLETE CBC W/AUTO DIFF WBC: CPT

## 2021-11-13 PROCEDURE — 6370000000 HC RX 637 (ALT 250 FOR IP): Performed by: INTERNAL MEDICINE

## 2021-11-13 PROCEDURE — 80048 BASIC METABOLIC PNL TOTAL CA: CPT

## 2021-11-13 PROCEDURE — 6360000002 HC RX W HCPCS: Performed by: INTERNAL MEDICINE

## 2021-11-13 PROCEDURE — 36415 COLL VENOUS BLD VENIPUNCTURE: CPT

## 2021-11-13 RX ORDER — INSULIN GLARGINE 100 [IU]/ML
12 INJECTION, SOLUTION SUBCUTANEOUS NIGHTLY
Status: DISCONTINUED | OUTPATIENT
Start: 2021-11-13 | End: 2021-11-15 | Stop reason: HOSPADM

## 2021-11-13 RX ADMIN — INSULIN LISPRO 6 UNITS: 100 INJECTION, SOLUTION INTRAVENOUS; SUBCUTANEOUS at 12:27

## 2021-11-13 RX ADMIN — LEVOTHYROXINE SODIUM 50 MCG: 0.05 TABLET ORAL at 05:39

## 2021-11-13 RX ADMIN — MEMANTINE HYDROCHLORIDE 10 MG: 10 TABLET ORAL at 10:13

## 2021-11-13 RX ADMIN — ENOXAPARIN SODIUM 40 MG: 100 INJECTION SUBCUTANEOUS at 10:12

## 2021-11-13 RX ADMIN — INSULIN GLARGINE 12 UNITS: 100 INJECTION, SOLUTION SUBCUTANEOUS at 20:19

## 2021-11-13 RX ADMIN — SODIUM CHLORIDE, POTASSIUM CHLORIDE, SODIUM LACTATE AND CALCIUM CHLORIDE: 600; 310; 30; 20 INJECTION, SOLUTION INTRAVENOUS at 20:16

## 2021-11-13 RX ADMIN — MEMANTINE HYDROCHLORIDE 10 MG: 10 TABLET ORAL at 20:16

## 2021-11-13 RX ADMIN — ATORVASTATIN CALCIUM 40 MG: 40 TABLET, FILM COATED ORAL at 20:16

## 2021-11-13 RX ADMIN — FLUOXETINE 40 MG: 20 CAPSULE ORAL at 10:13

## 2021-11-13 RX ADMIN — SODIUM CHLORIDE, POTASSIUM CHLORIDE, SODIUM LACTATE AND CALCIUM CHLORIDE: 600; 310; 30; 20 INJECTION, SOLUTION INTRAVENOUS at 10:13

## 2021-11-13 RX ADMIN — CYANOCOBALAMIN TAB 500 MCG 500 MCG: 500 TAB at 10:13

## 2021-11-13 NOTE — PROGRESS NOTES
Hospitalist Daily Progress Note  Name: Gerber Francis  Age: 67 y.o. Gender: female  CodeStatus: Full Code  Allergies: Penicillins  Sulfa Antibiotics  Penicillin G    Chief Complaint:Fall    Primary Care Provider: Zamzam Bhatia MD  InpatientTreatment Team: Treatment Team: Attending Provider: Raven Marks DO; : VAUGHN Maurice; Utilization Reviewer: Kitty Meléndez RN; Registered Nurse: Osvaldo Ann RN; Patient Care Tech: [facility]  Admission Date: 11/11/2021      Subjective: Patient is seen evaluated bedside remains confused however glucose is controlled she is afebrile and vitals are stable tolerating oral intake well no acute nursing concerns    Physical Exam  Constitutional:       Appearance: Normal appearance. She is not ill-appearing or diaphoretic. HENT:      Head: Normocephalic and atraumatic. Nose: Nose normal.      Mouth/Throat:      Mouth: Mucous membranes are dry. Pharynx: Oropharynx is clear. Cardiovascular:      Rate and Rhythm: Normal rate. Heart sounds: No murmur heard. No friction rub. No gallop. Pulmonary:      Breath sounds: No wheezing, rhonchi or rales. Abdominal:      General: There is no distension. Tenderness: There is no abdominal tenderness. There is no guarding. Musculoskeletal:      Right lower leg: No edema. Left lower leg: No edema. Neurological:      Mental Status: She is alert.       Comments: Awake alert oriented to self   Psychiatric:      Comments: Baseline mood and affect poor insight         Review of Systems  Reliable ROS unable to be obtained due to dementia medications:  Reviewed    Infusion Medications:    lactated ringers      dextrose       Scheduled Medications:    insulin lispro  0-12 Units SubCUTAneous TID     insulin lispro  0-6 Units SubCUTAneous Nightly    insulin glargine  10 Units SubCUTAneous Nightly    enoxaparin  40 mg SubCUTAneous Daily    vitamin B-12  500 mcg Oral Daily    memantine  10 mg Oral BID    levothyroxine  50 mcg Oral Daily    FLUoxetine  40 mg Oral Daily    atorvastatin  40 mg Oral Nightly     PRN Meds: glucose, dextrose, glucagon (rDNA), dextrose, dextrose, ondansetron **OR** ondansetron, acetaminophen **OR** acetaminophen    Labs:   Recent Labs     11/11/21  1100 11/11/21  1316 11/12/21  0712   WBC 13.5*  --  11.5*   HGB 15.7 17.5* 14.2   HCT 48.7*  --  43.8     --  207     Recent Labs     11/11/21  1838 11/11/21  2333 11/12/21  0426   * 161* 153*   K 3.9 4.1 3.9   * 124* 121*   CO2 23 25 24   BUN 30* 30* 30*   CREATININE 0.85 0.83 0.75   CALCIUM 9.5 9.1 9.0   PHOS 2.9 2.6 2.7     Recent Labs     11/11/21  1100   AST 16   ALT 20   BILITOT 0.5   ALKPHOS 116     No results for input(s): INR in the last 72 hours. Recent Labs     11/11/21  1100   TROPONINI <0.010       Urinalysis:   Lab Results   Component Value Date    NITRU POSITIVE 11/11/2021    WBCUA 3-5 11/11/2021    BACTERIA FEW 11/11/2021    RBCUA 0-2 11/11/2021    BLOODU Negative 11/11/2021    SPECGRAV 1.030 11/11/2021    GLUCOSEU >=1000 11/11/2021       Radiology:   Most recent    Chest CT      WITH CONTRAST:No results found for this or any previous visit. WITHOUT CONTRAST: No results found for this or any previous visit. CXR      2-view: No results found for this or any previous visit. Portable: Results for orders placed during the hospital encounter of 11/11/21    XR CHEST PORTABLE    Narrative  EXAMINATION: CHEST PORTABLE SUPINE VIEW    CLINICAL HISTORY: Trauma, fall    COMPARISONS: September 18, 2021 1503 hours    FINDINGS:    Single  views of the chest is submitted. The cardiac silhouette is of normal size configuration. Pulmonary vascular unremarkable. Right sided trachea. No focal infiltrates. No Pneumothoraces. Impression  NO ACUTE ACTIVE CARDIOPULMONARY PROCESS      Echo No results found for this or any previous visit.             Assessment/Plan:    Active Hospital Problems Diagnosis Date Noted    DKA, type 2, not at goal [facility]) [E11.10] 11/11/2021     1. DKA versus hyperosmolar hyperglycemic state: Continue  mL/h DKA resolved continue sliding scale insulin coverage. Alzheimer's dementia without behavioral disturbance continue Namenda supportive care. Currently on 10 units Lantus Humalog sliding scale    2. Hypertension: As needed hydralazine  3. Hyperlipidemia statin  4. Hypothyroid disease Synthroid check TSH  5. DVT prophylaxis Lovenox      Additional work up or/and treatment plan may be added today or then after based on clinical progression. I am managing a portion of pt care. Some medical issues are handled byother specialists. Additional work up and treatment should be done in out pt setting by pt PCP and other out pt providers. In addition to examining and evaluating pt, I spent additional time explaining care, normaland abnormal findings, and treatment plan. All of pt questions were answered. Counseling, diet and education were provided. Case will be discussed with nursing staff when appropriate. Family will be updated if and whenappropriate.       Electronically signed by Chin Whitmore DO on 11/12/2021 at 7:20 PM

## 2021-11-13 NOTE — PROGRESS NOTES
Pt assessment and vitals complete and stable. Patient 100% on room air. 1:1 for patient safety as pt is very impulsive and the avasys is not able to redirect pt. Pt tolerating 1:1 very well.

## 2021-11-13 NOTE — PROGRESS NOTES
Hospitalist Daily Progress Note  Name: Aida Cifuentes  Age: 67 y.o. Gender: female  CodeStatus: Full Code  Allergies: Penicillins  Sulfa Antibiotics  Penicillin G    Chief Complaint:Fall    Primary Care Provider: Celestine Sandifer, MD  InpatientTreatment Team: Treatment Team: Attending Provider: Heike Saez DO; : VAUGHN Spencer; Utilization Reviewer: Marva Galeazzi, RN; : Jet Asencio RN; Registered Nurse: Mel Rodriguez RN; Respiratory Therapist (Day): Roberta Early RCP; Utilization Reviewer: Catina Kaufman RN  Admission Date: 11/11/2021      Subjective: Patient is seen evaluated bedside. DKA resolved, pt remains hyperglycemic. Pt is confused and oriented only to self. No new concerns. pt afebrile. Vitals stable. Physical Exam  Constitutional:       Appearance: Normal appearance. She is not ill-appearing or diaphoretic. HENT:      Head: Normocephalic and atraumatic. Nose: Nose normal.      Mouth/Throat:      Mouth: Mucous membranes are dry. Pharynx: Oropharynx is clear. Cardiovascular:      Rate and Rhythm: Normal rate. Heart sounds: No murmur heard. No friction rub. No gallop. Pulmonary:      Breath sounds: No wheezing, rhonchi or rales. Abdominal:      General: There is no distension. Tenderness: There is no abdominal tenderness. There is no guarding. Musculoskeletal:      Right lower leg: No edema. Left lower leg: No edema. Neurological:      Mental Status: She is alert.       Comments: Awake alert oriented to self   Psychiatric:      Comments: Baseline mood and affect poor insight         Review of Systems  Reliable ROS unable to be obtained due to dementia medications:  Reviewed    Infusion Medications:    lactated ringers 100 mL/hr at 11/13/21 1013    dextrose       Scheduled Medications:    insulin lispro  4 Units SubCUTAneous TID WC    insulin glargine  12 Units SubCUTAneous Nightly    insulin lispro  0-12 Units SubCUTAneous TID WC  insulin lispro  0-6 Units SubCUTAneous Nightly    enoxaparin  40 mg SubCUTAneous Daily    vitamin B-12  500 mcg Oral Daily    memantine  10 mg Oral BID    levothyroxine  50 mcg Oral Daily    FLUoxetine  40 mg Oral Daily    atorvastatin  40 mg Oral Nightly     PRN Meds: glucose, dextrose, glucagon (rDNA), dextrose, dextrose, ondansetron **OR** ondansetron, acetaminophen **OR** acetaminophen    Labs:   Recent Labs     11/11/21  1100 11/11/21  1100 11/11/21  1316 11/12/21  0712 11/13/21  1235   WBC 13.5*  --   --  11.5* 8.5   HGB 15.7   < > 17.5* 14.2 12.3   HCT 48.7*  --   --  43.8 36.3*     --   --  207 180    < > = values in this interval not displayed. Recent Labs     11/11/21  1838 11/11/21  1838 11/11/21  2333 11/12/21  0426 11/13/21  1235   *   < > 161* 153* 144   K 3.9   < > 4.1 3.9 3.6   *   < > 124* 121* 113*   CO2 23   < > 25 24 21   BUN 30*   < > 30* 30* 22   CREATININE 0.85   < > 0.83 0.75 0.50   CALCIUM 9.5   < > 9.1 9.0 8.3*   PHOS 2.9  --  2.6 2.7  --     < > = values in this interval not displayed. Recent Labs     11/11/21  1100   AST 16   ALT 20   BILITOT 0.5   ALKPHOS 116     No results for input(s): INR in the last 72 hours. Recent Labs     11/11/21  1100   TROPONINI <0.010       Urinalysis:   Lab Results   Component Value Date    NITRU POSITIVE 11/11/2021    WBCUA 3-5 11/11/2021    BACTERIA FEW 11/11/2021    RBCUA 0-2 11/11/2021    BLOODU Negative 11/11/2021    SPECGRAV 1.030 11/11/2021    GLUCOSEU >=1000 11/11/2021       Radiology:   Most recent    Chest CT      WITH CONTRAST:No results found for this or any previous visit. WITHOUT CONTRAST: No results found for this or any previous visit. CXR      2-view: No results found for this or any previous visit.        Portable: Results for orders placed during the hospital encounter of 11/11/21    XR CHEST PORTABLE    Narrative  EXAMINATION: CHEST PORTABLE SUPINE VIEW    CLINICAL HISTORY: Trauma,

## 2021-11-14 LAB
ALBUMIN SERPL-MCNC: 2.9 G/DL (ref 3.5–4.6)
ALP BLD-CCNC: 82 U/L (ref 40–130)
ALT SERPL-CCNC: 23 U/L (ref 0–33)
ANION GAP SERPL CALCULATED.3IONS-SCNC: 7 MEQ/L (ref 9–15)
AST SERPL-CCNC: 17 U/L (ref 0–35)
BASOPHILS ABSOLUTE: 0 K/UL (ref 0–0.2)
BASOPHILS RELATIVE PERCENT: 0.7 %
BILIRUB SERPL-MCNC: 0.8 MG/DL (ref 0.2–0.7)
BUN BLDV-MCNC: 13 MG/DL (ref 8–23)
CALCIUM SERPL-MCNC: 8.1 MG/DL (ref 8.5–9.9)
CHLORIDE BLD-SCNC: 115 MEQ/L (ref 95–107)
CO2: 23 MEQ/L (ref 20–31)
CREAT SERPL-MCNC: 0.53 MG/DL (ref 0.5–0.9)
EOSINOPHILS ABSOLUTE: 0.1 K/UL (ref 0–0.7)
EOSINOPHILS RELATIVE PERCENT: 2 %
GFR AFRICAN AMERICAN: >60
GFR NON-AFRICAN AMERICAN: >60
GLOBULIN: 2.4 G/DL (ref 2.3–3.5)
GLUCOSE BLD-MCNC: 143 MG/DL (ref 60–115)
GLUCOSE BLD-MCNC: 203 MG/DL (ref 60–115)
GLUCOSE BLD-MCNC: 250 MG/DL (ref 60–115)
GLUCOSE BLD-MCNC: 267 MG/DL (ref 70–99)
GLUCOSE BLD-MCNC: 70 MG/DL (ref 60–115)
HCT VFR BLD CALC: 35.4 % (ref 37–47)
HEMOGLOBIN: 11.8 G/DL (ref 12–16)
LYMPHOCYTES ABSOLUTE: 1.4 K/UL (ref 1–4.8)
LYMPHOCYTES RELATIVE PERCENT: 23.4 %
MCH RBC QN AUTO: 32.1 PG (ref 27–31.3)
MCHC RBC AUTO-ENTMCNC: 33.3 % (ref 33–37)
MCV RBC AUTO: 96.2 FL (ref 82–100)
MONOCYTES ABSOLUTE: 0.3 K/UL (ref 0.2–0.8)
MONOCYTES RELATIVE PERCENT: 5.1 %
NEUTROPHILS ABSOLUTE: 4 K/UL (ref 1.4–6.5)
NEUTROPHILS RELATIVE PERCENT: 68.8 %
PDW BLD-RTO: 13.3 % (ref 11.5–14.5)
PERFORMED ON: ABNORMAL
PERFORMED ON: NORMAL
PLATELET # BLD: 158 K/UL (ref 130–400)
POTASSIUM SERPL-SCNC: 3.5 MEQ/L (ref 3.4–4.9)
RBC # BLD: 3.68 M/UL (ref 4.2–5.4)
SODIUM BLD-SCNC: 145 MEQ/L (ref 135–144)
TOTAL PROTEIN: 5.3 G/DL (ref 6.3–8)
WBC # BLD: 5.9 K/UL (ref 4.8–10.8)

## 2021-11-14 PROCEDURE — 85025 COMPLETE CBC W/AUTO DIFF WBC: CPT

## 2021-11-14 PROCEDURE — 36415 COLL VENOUS BLD VENIPUNCTURE: CPT

## 2021-11-14 PROCEDURE — 6360000002 HC RX W HCPCS: Performed by: INTERNAL MEDICINE

## 2021-11-14 PROCEDURE — 80053 COMPREHEN METABOLIC PANEL: CPT

## 2021-11-14 PROCEDURE — 6370000000 HC RX 637 (ALT 250 FOR IP): Performed by: INTERNAL MEDICINE

## 2021-11-14 PROCEDURE — 2580000003 HC RX 258: Performed by: INTERNAL MEDICINE

## 2021-11-14 PROCEDURE — 99222 1ST HOSP IP/OBS MODERATE 55: CPT | Performed by: PHYSICIAN ASSISTANT

## 2021-11-14 PROCEDURE — 1210000000 HC MED SURG R&B

## 2021-11-14 RX ORDER — ONDANSETRON 4 MG/1
4 TABLET, ORALLY DISINTEGRATING ORAL EVERY 8 HOURS PRN
DISCHARGE
Start: 2021-11-14 | End: 2021-11-19

## 2021-11-14 RX ORDER — GLUCAGON INJECTION, SOLUTION 1 MG/.2ML
1 INJECTION, SOLUTION SUBCUTANEOUS
Qty: 2 EACH | Refills: 3 | DISCHARGE
Start: 2021-11-14

## 2021-11-14 RX ORDER — NICOTINE POLACRILEX 4 MG
15 LOZENGE BUCCAL PRN
Qty: 45 G | Refills: 1 | DISCHARGE
Start: 2021-11-14

## 2021-11-14 RX ORDER — ATORVASTATIN CALCIUM 40 MG/1
40 TABLET, FILM COATED ORAL NIGHTLY
Qty: 30 TABLET | Refills: 3 | DISCHARGE
Start: 2021-11-14

## 2021-11-14 RX ORDER — INSULIN GLARGINE 100 [IU]/ML
12 INJECTION, SOLUTION SUBCUTANEOUS NIGHTLY
Qty: 10 ML | Refills: 3 | DISCHARGE
Start: 2021-11-14 | End: 2022-04-11 | Stop reason: SDUPTHER

## 2021-11-14 RX ADMIN — CYANOCOBALAMIN TAB 500 MCG 500 MCG: 500 TAB at 09:48

## 2021-11-14 RX ADMIN — LEVOTHYROXINE SODIUM 50 MCG: 0.05 TABLET ORAL at 06:34

## 2021-11-14 RX ADMIN — SODIUM CHLORIDE, POTASSIUM CHLORIDE, SODIUM LACTATE AND CALCIUM CHLORIDE: 600; 310; 30; 20 INJECTION, SOLUTION INTRAVENOUS at 06:33

## 2021-11-14 RX ADMIN — MEMANTINE HYDROCHLORIDE 10 MG: 10 TABLET ORAL at 09:42

## 2021-11-14 RX ADMIN — FLUOXETINE 40 MG: 20 CAPSULE ORAL at 09:42

## 2021-11-14 RX ADMIN — INSULIN LISPRO 2 UNITS: 100 INJECTION, SOLUTION INTRAVENOUS; SUBCUTANEOUS at 09:44

## 2021-11-14 RX ADMIN — MEMANTINE HYDROCHLORIDE 10 MG: 10 TABLET ORAL at 21:06

## 2021-11-14 RX ADMIN — INSULIN LISPRO 4 UNITS: 100 INJECTION, SOLUTION INTRAVENOUS; SUBCUTANEOUS at 12:07

## 2021-11-14 RX ADMIN — INSULIN GLARGINE 12 UNITS: 100 INJECTION, SOLUTION SUBCUTANEOUS at 21:08

## 2021-11-14 RX ADMIN — ATORVASTATIN CALCIUM 40 MG: 40 TABLET, FILM COATED ORAL at 21:06

## 2021-11-14 RX ADMIN — ENOXAPARIN SODIUM 40 MG: 100 INJECTION SUBCUTANEOUS at 09:42

## 2021-11-14 ASSESSMENT — PAIN SCALES - GENERAL: PAINLEVEL_OUTOF10: 0

## 2021-11-14 NOTE — FLOWSHEET NOTE
Received report from Faye Ulloa RN. Pt resting comfortable in room. Standard safety precaution. Avasys in room. Will continue to monitor.

## 2021-11-14 NOTE — PROGRESS NOTES
Assessment completed. 1:1 in room for pt safety, falls prevention. Denies pain or discomfort. Confused but directable. No concerns at this time. Will continue to monitor.

## 2021-11-14 NOTE — DISCHARGE SUMMARY
Hospital Medicine Discharge Summary    Coni Sheehan  :  1945  MRN:  [id number]    Admit date:  2021  Discharge date:  21    Admitting Physician:  Marynan Wiggins DO  Primary Care Physician:  Maria Alejandra Mckeon MD      Discharge Diagnoses: Active Problems:    DKA, type 2, not at goal [facility])  Resolved Problems:    * No resolved hospital problems. *  Dementia  DKA  Type 2 diabetes  Hypertension      Hospital Course:   Coni Sheehan is a 67 y.o. female that was admitted and treated at [facility] for diabetic ketoacidosis. Patient is 50-year-old female at skilled nursing [facility] who been managed with oral hypoglycemics presented to the ED with worsening confusion dehydration elevated anion gap and was diagnosed DKA. Patient was started on aggressive hydration with insulin infusion slow steady improvement cognition improved slightly. Patient anion gap is now closed she is tolerating oral diet and on subcu insulin. She will be discharged back to [facility] on Lantus 12 units nightly Humalog 4 units 3 times daily AC with sliding scale coverage. Patient is to follow-up with endocrinology as scheduled. Patient's diuretics were held at the time of discharge given recent dehydration for close monitoring before reinitiation        Patient was seen by the following consultants while admitted to [facility]:   Consults:  [address]    Physical Exam:   Constitutional:       Appearance: Normal appearance. She is not ill-appearing or diaphoretic. HENT:      Head: Normocephalic and atraumatic. Nose: Nose normal.      Mouth/Throat:      Mouth: Mucous membranes are dry. Pharynx: Oropharynx is clear. Cardiovascular:      Rate and Rhythm: Normal rate. Heart sounds: No murmur heard. No friction rub. No gallop. Pulmonary:      Breath sounds: No wheezing, rhonchi or rales. Abdominal:      General: There is no distension. Tenderness: There is no abdominal tenderness. There is no guarding. Musculoskeletal:      Right lower leg: No edema. Left lower leg: No edema. Neurological:      Mental Status: She is alert. Comments: Awake alert oriented to self   Psychiatric:      Comments: Baseline mood and affect poor insight     Significant Diagnostic Studies:  Xray hip  Narrative   EXAMINATION: AP PELVIS       CLINICAL HISTORY: Trauma, fall       COMPARISONS: None       FINDINGS:       Single AP view of the pelvis is submitted   There is diffuse generalized osteopenia. There is degenerative changes as well as pseudoarthrosis of visualized lower lumbar spine. Is bilateral degenerative changes of both SI joints. Possible small bone island left femoral head. No acute fractures. No dislocations. No focal bony abnormalities           Impression   NO ACUTE FRACTURES. Discharge Medications:  Lantus 12 units nightly  Humalog 3 units 3 times daily AC  Insulin sliding scale  Zofran as needed  Lipitor 40 daily  Fluoxetine 40 mg daily  Synthroid 50 mg daily  Memantine 10 mg daily  Potassium chloride 10 meq daily  B12 500 mcg daily  Lantus currently held given recentdehydration, evaluate volume status before initiating again    Disposition:   Discharged to [address] [place] [address]  Any [facility] needs that were indicated and/or required as been addressed and set up by Social Work. Condition at discharge: Pt was medically stable at the time of discharge. Activity: activity as tolerated    Total time taken for discharging this patient: 40 minutes. Greater than 70% of time was spent focused exclusively on this patient. Time was taken to review chart, discuss plans with consultants, reconciling medications, discussing plan answering questions with patient.      Girma Wells DO  11/14/2021, 4:02 PM

## 2021-11-14 NOTE — PROGRESS NOTES
[facility] called and stated they are unable to accommodate patient for admission tonight. They would also not be able to get her insulin this evening. Discharge in for tomorrow morning, as soon as they are able to take patient.   at bedside notified      Electronically signed by Esme Ortega RN on 11/14/2021 at 4:55 PM

## 2021-11-14 NOTE — CONSULTS
Endocrinology Consult      Ciera Ham  1945  [id number]    Date of Admission:  11/11/2021 10:50 AM  Date of Consultation:11/14/2021    Consultant:Leonid Mo PA-C  Supervising Physician: Karoline Asencio MD  PCP:  Nan Hammond MD       Reason for Consultation: Hyperglycemia    C/C:   Chief Complaint   Patient presents with    Fall       Assessment-  1. Type 2 diabetes  2. DKA-resolved  3. Hyperglycemia  4. Alzheimer's dementia with memory loss  5. Hypothyroidism    Plan-  1. Lantus 12 units at bedtime  2. Humalog 4 units 3 times daily before meals  3. Low-dose sliding scale Humalog coverage  4. Monitor glycemic control closely, avoid hypoglycemia  5. Patient may be discharged to SNF from endocrinology standpoint      HPI  History of Present Illness: Patient is a 67-year-old type II diabetic female who was admitted from the [facility] memory unit after being found on the floor of her room after an unwitnessed fall. Prior to that she was doing well with and asymptomatic, and had eaten dinner. She was taking glimepiride 4 mg by mouth daily to control her blood sugars. Upon arrival to the ED laboratory studies showed DKA and she was started on insulin drip. Her laboratory studies normalized quickly and she was placed on basal and bolus insulin with good glycemic control. She also has a history of hypothyroidism and is taking levothyroxine 50 mcg by mouth daily. Recent TSH 9/20/2021 was 0.805. Going to discontinue her glimepiride, start her on basal and bolus insulin at the nursing facility with POCT before meals and at bedtime glucose monitoring. Allergies: Allergies   Allergen Reactions    Penicillins     Sulfa Antibiotics     Penicillin G Rash       PMH: Patient has a past medical history of Chronic rhinitis, Depression, Diabetes mellitus ([address] Hyperlipidemia, Hypertension, and Type 2 diabetes mellitus without complication ([address] PSH: Patient has no past surgical history on file.     Social sounds: Normal heart sounds. Pulmonary:      Effort: Pulmonary effort is normal.      Breath sounds: Normal breath sounds. Abdominal:      General: Bowel sounds are normal.      Palpations: Abdomen is soft. Musculoskeletal:         General: No swelling. Normal range of motion. Cervical back: Normal range of motion and neck supple. Skin:     General: Skin is warm and dry. Neurological:      Mental Status: She is alert. She is disoriented and confused.          Labs:  Lab Results   Component Value Date     11/13/2021    K 3.6 11/13/2021     (H) 11/13/2021    CO2 21 11/13/2021    BUN 22 11/13/2021    CREATININE 0.50 11/13/2021    GLUCOSE 249 (H) 11/13/2021    CALCIUM 8.3 (L) 11/13/2021    PROT 7.6 11/11/2021    LABALBU 4.4 11/11/2021    BILITOT 0.5 11/11/2021    ALKPHOS 116 11/11/2021    AST 16 11/11/2021    ALT 20 11/11/2021    LABGLOM >60.0 11/13/2021    GFRAA >60.0 11/13/2021    GLOB 3.2 11/11/2021     Lab Results   Component Value Date    WBC 8.5 11/13/2021    HGB 12.3 11/13/2021    HCT 36.3 (L) 11/13/2021    MCV 95.6 11/13/2021     11/13/2021     Lab Results   Component Value Date    LABA1C 10.9 (H) 11/11/2021     No results found for: CHOLFAST, TRIGLYCFAST, HDL, LDLCALC, CHOL, TRIG  No results found for: TESTM  Lab Results   Component Value Date    TSH 0.543 09/18/2021    TSHREFLEX 0.805 09/20/2021     No results found for: TPOABS      Electronicallysigned by ANAND Garvey on 11/14/2021 at 11:40 AM

## 2021-11-14 NOTE — CARE COORDINATION
SPOKE WITH JADON AT [facility] ([phone]) ABOUT PATIENT POTENTIAL RETURN THIS WEEK. NOTIFIED THAT PATIENT WILL BE COMING BACK WITH INSULIN INJECTIONS WHICH ARE NEW FOR HER. MESSAGE LEFT ON IDENTIFYING VOICE MAIL WITH Dajuanar Tamy ([phone]) TO RETURN THIS CM CALL. 1600- UPDATE RECEIVED FROM [facility] THAT THEY ARE UNABLE TO ACCEPT PATIENT DUE TO HER INSULIN NEEDS AND LACK OF NURSING STAFF. UPDATED MD. F/U WITH [facility] IN THE AM. PHONE NUMBER [phone].  IS JADON OR PA.

## 2021-11-15 VITALS
DIASTOLIC BLOOD PRESSURE: 66 MMHG | WEIGHT: 136.4 LBS | OXYGEN SATURATION: 95 % | HEIGHT: 64 IN | BODY MASS INDEX: 23.29 KG/M2 | HEART RATE: 55 BPM | SYSTOLIC BLOOD PRESSURE: 168 MMHG | TEMPERATURE: 98.1 F | RESPIRATION RATE: 16 BRPM

## 2021-11-15 LAB
ALBUMIN SERPL-MCNC: 2.9 G/DL (ref 3.5–4.6)
ALP BLD-CCNC: 81 U/L (ref 40–130)
ALT SERPL-CCNC: 20 U/L (ref 0–33)
ANION GAP SERPL CALCULATED.3IONS-SCNC: 9 MEQ/L (ref 9–15)
AST SERPL-CCNC: 33 U/L (ref 0–35)
BASOPHILS ABSOLUTE: 0.1 K/UL (ref 0–0.2)
BASOPHILS RELATIVE PERCENT: 1.3 %
BILIRUB SERPL-MCNC: 0.8 MG/DL (ref 0.2–0.7)
BUN BLDV-MCNC: 10 MG/DL (ref 8–23)
CALCIUM SERPL-MCNC: 8.5 MG/DL (ref 8.5–9.9)
CHLORIDE BLD-SCNC: 113 MEQ/L (ref 95–107)
CO2: 22 MEQ/L (ref 20–31)
CREAT SERPL-MCNC: 0.46 MG/DL (ref 0.5–0.9)
EOSINOPHILS ABSOLUTE: 0.2 K/UL (ref 0–0.7)
EOSINOPHILS RELATIVE PERCENT: 2.7 %
GFR AFRICAN AMERICAN: >60
GFR NON-AFRICAN AMERICAN: >60
GLOBULIN: 2.7 G/DL (ref 2.3–3.5)
GLUCOSE BLD-MCNC: 115 MG/DL (ref 60–115)
GLUCOSE BLD-MCNC: 170 MG/DL (ref 70–99)
GLUCOSE BLD-MCNC: 182 MG/DL (ref 60–115)
HCT VFR BLD CALC: 35.3 % (ref 37–47)
HEMOGLOBIN: 11.9 G/DL (ref 12–16)
LYMPHOCYTES ABSOLUTE: 1.7 K/UL (ref 1–4.8)
LYMPHOCYTES RELATIVE PERCENT: 26.7 %
MCH RBC QN AUTO: 32.1 PG (ref 27–31.3)
MCHC RBC AUTO-ENTMCNC: 33.7 % (ref 33–37)
MCV RBC AUTO: 95.1 FL (ref 82–100)
MONOCYTES ABSOLUTE: 0.3 K/UL (ref 0.2–0.8)
MONOCYTES RELATIVE PERCENT: 5 %
NEUTROPHILS ABSOLUTE: 4.1 K/UL (ref 1.4–6.5)
NEUTROPHILS RELATIVE PERCENT: 64.3 %
PDW BLD-RTO: 13.4 % (ref 11.5–14.5)
PERFORMED ON: ABNORMAL
PERFORMED ON: NORMAL
PLATELET # BLD: 161 K/UL (ref 130–400)
POTASSIUM SERPL-SCNC: 4.5 MEQ/L (ref 3.4–4.9)
RBC # BLD: 3.71 M/UL (ref 4.2–5.4)
SODIUM BLD-SCNC: 144 MEQ/L (ref 135–144)
TOTAL PROTEIN: 5.6 G/DL (ref 6.3–8)
WBC # BLD: 6.3 K/UL (ref 4.8–10.8)

## 2021-11-15 PROCEDURE — 36415 COLL VENOUS BLD VENIPUNCTURE: CPT

## 2021-11-15 PROCEDURE — 80053 COMPREHEN METABOLIC PANEL: CPT

## 2021-11-15 PROCEDURE — 99231 SBSQ HOSP IP/OBS SF/LOW 25: CPT | Performed by: PHYSICIAN ASSISTANT

## 2021-11-15 PROCEDURE — 6370000000 HC RX 637 (ALT 250 FOR IP): Performed by: INTERNAL MEDICINE

## 2021-11-15 PROCEDURE — 85025 COMPLETE CBC W/AUTO DIFF WBC: CPT

## 2021-11-15 PROCEDURE — 6360000002 HC RX W HCPCS: Performed by: INTERNAL MEDICINE

## 2021-11-15 RX ADMIN — CYANOCOBALAMIN TAB 500 MCG 500 MCG: 500 TAB at 08:09

## 2021-11-15 RX ADMIN — LEVOTHYROXINE SODIUM 50 MCG: 0.05 TABLET ORAL at 05:06

## 2021-11-15 RX ADMIN — INSULIN LISPRO 2 UNITS: 100 INJECTION, SOLUTION INTRAVENOUS; SUBCUTANEOUS at 10:27

## 2021-11-15 RX ADMIN — FLUOXETINE 40 MG: 20 CAPSULE ORAL at 08:09

## 2021-11-15 RX ADMIN — ENOXAPARIN SODIUM 40 MG: 100 INJECTION SUBCUTANEOUS at 08:08

## 2021-11-15 RX ADMIN — MEMANTINE HYDROCHLORIDE 10 MG: 10 TABLET ORAL at 08:09

## 2021-11-15 NOTE — PROGRESS NOTES
Hospitalist Daily Progress Note  Name: Minnie Guzmán  Age: 67 y.o. Gender: female  CodeStatus: Full Code  Allergies: Penicillins  Sulfa Antibiotics  Penicillin G    Chief Complaint:Fall    Primary Care Provider: Liliane Meléndez MD  InpatientTreatment Team: Treatment Team: Attending Provider: Elena Ortiz DO; Utilization Reviewer: Albert Jarquin RN; Utilization Reviewer: Gricelda Schwartz RN; Consulting Physician: Cristobal Charlton MD; : Betina Bernal RN; Registered Nurse: Irma Lay RN  Admission Date: 11/11/2021      Subjective: No complaints at this time. Eating and drinking well. Physical Exam  Constitutional:       Appearance: Normal appearance. She is not ill-appearing or diaphoretic. HENT:      Head: Normocephalic and atraumatic. Nose: Nose normal.      Mouth/Throat:      Mouth: Mucous membranes are dry. Pharynx: Oropharynx is clear. Cardiovascular:      Rate and Rhythm: Normal rate. Heart sounds: No murmur heard. No friction rub. No gallop. Pulmonary:      Breath sounds: No wheezing, rhonchi or rales. Abdominal:      General: There is no distension. Tenderness: There is no abdominal tenderness. There is no guarding. Musculoskeletal:      Right lower leg: No edema. Left lower leg: No edema. Neurological:      Mental Status: She is alert.       Comments: Awake alert oriented to self   Psychiatric:      Comments: Baseline mood and affect poor insight         Review of Systems  Reliable ROS unable to be obtained due to dementia medications:  Reviewed    Infusion Medications:    lactated ringers 100 mL/hr at 11/14/21 0633    dextrose       Scheduled Medications:    insulin lispro  4 Units SubCUTAneous TID     insulin glargine  12 Units SubCUTAneous Nightly    enoxaparin  40 mg SubCUTAneous Daily    vitamin B-12  500 mcg Oral Daily    memantine  10 mg Oral BID    levothyroxine  50 mcg Oral Daily    FLUoxetine  40 mg Oral Daily    atorvastatin  40 mg 1. DKA versus hyperosmolar hyperglycemic state: Resolved. Transition to subcutaneous insulin per endocrinology recommendation  2. Alzheimer's dementia without behavioral disturbance continue Namenda supportive care. Currently on 10 units Lantus Humalog sliding scale    3. Hypertension: As needed hydralazine  4. Hyperlipidemia statin  5. Hypothyroid disease Synthroid check TSH  6. DVT prophylaxis Lovenox    Patient will be transferring to [address] [place] [address] later this afternoon. Please see discharge summary from 11/14.     Electronically signed by Cat Dowell DO on 11/15/2021 at 3:06 PM

## 2021-11-15 NOTE — CARE COORDINATION
This LSW spoke with patient and  at bedside. D/C order in for patient. I confirmed with Autumn/ham @ [facility] that patient is able to return. She verified and stated that insulin order has been submitted and will be delivered this evening. Patient to be transported via spouse to [address] [place] [address] after 5:00 insulin.   Electronically signed by Dennison Aschoff, MSW, VAUGHN on 11/15/21 at 11:50 AM EST

## 2021-11-15 NOTE — PROGRESS NOTES
Endocrinology progress note    Cristobal Gillis  1945  [id number]    C/C:   Chief Complaint   Patient presents with    Fall       Assessment-  1. Type 2 diabetes  2. DKA-resolved  3. Hyperglycemia  4. Alzheimer's dementia with memory loss  5. Hypothyroidism    Plan-  1. Lantus 12 units at bedtime  2. Humalog 4 units 3 times daily before meals  3. Low-dose sliding scale Humalog coverage  4. Monitor glycemic control closely, avoid hypoglycemia  5. Patient may be discharged to SNF from endocrinology standpoint  6. No changes to current insulin dosing regiment      HPI  History of Present Illness: Patient is a 41-year-old type II diabetic female who was admitted from the [facility] memory unit after being found on the floor of her room after an unwitnessed fall. Prior to that she was doing well with and asymptomatic, and had eaten dinner. She was taking glimepiride 4 mg by mouth daily to control her blood sugars. Upon arrival to the ED laboratory studies showed DKA and she was started on insulin drip. Her laboratory studies normalized quickly and she was placed on basal and bolus insulin with good glycemic control. She also has a history of hypothyroidism and is taking levothyroxine 50 mcg by mouth daily. Recent TSH 9/20/2021 was 0.805. I am going to discontinue her glimepiride, start her on basal and bolus insulin at the nursing facility with POCT before meals and at bedtime glucose monitoring. Glycemic control has improved on current insulin dosing regiment. Patient may be discharged to SNF from endocrinology standpoint     Allergies: Allergies   Allergen Reactions    Penicillins     Sulfa Antibiotics     Penicillin G Rash       PMH: Patient has a past medical history of Chronic rhinitis, Depression, Diabetes mellitus ([address] Hyperlipidemia, Hypertension, and Type 2 diabetes mellitus without complication ([address] PSH: Patient has no past surgical history on file.     Social History: Patient reports that she has never smoked. She has never used smokeless tobacco. She reports that she does not drink alcohol and does not use drugs. Family History: Patientsfamily history is not on file. ROS:  Review of Systems   Unable to perform ROS: Dementia       Current Meds: insulin lispro (HUMALOG) injection vial 4 Units, TID WC  insulin glargine (LANTUS) injection vial 12 Units, Nightly  lactated ringers infusion, Continuous  glucose (GLUTOSE) 40 % oral gel 15 g, PRN  dextrose 50 % IV solution, PRN  glucagon (rDNA) injection 1 mg, PRN  dextrose 5 % solution, PRN  dextrose 50 % IV solution, PRN  enoxaparin (LOVENOX) injection 40 mg, Daily  ondansetron (ZOFRAN-ODT) disintegrating tablet 4 mg, Q8H PRN   Or  ondansetron (ZOFRAN) injection 4 mg, Q6H PRN  acetaminophen (TYLENOL) tablet 650 mg, Q6H PRN   Or  acetaminophen (TYLENOL) suppository 650 mg, Q6H PRN  vitamin B-12 (CYANOCOBALAMIN) tablet 500 mcg, Daily  memantine (NAMENDA) tablet 10 mg, BID  levothyroxine (SYNTHROID) tablet 50 mcg, Daily  FLUoxetine (PROZAC) capsule 40 mg, Daily  atorvastatin (LIPITOR) tablet 40 mg, Nightly          Vitals:  BP (!) 168/66   Pulse 55   Temp 98.1 °F (36.7 °C) (Oral)   Resp 16   Ht 5' 4\" (1.626 m)   Wt 136 lb 6.4 oz (61.9 kg)   SpO2 95%   BMI 23.41 kg/m²   I/O (24Hr): Intake/Output Summary (Last 24 hours) at 11/15/2021 1800  Last data filed at 11/15/2021 0602  Gross per 24 hour   Intake 420 ml   Output --   Net 420 ml             Physical Exam  Vitals reviewed. Constitutional:       Appearance: She is well-developed. HENT:      Head: Normocephalic and atraumatic. Nose: No congestion. Eyes:      Conjunctiva/sclera: Conjunctivae normal.   Neck:      Comments: No thyromegaly or palpable nodules  Cardiovascular:      Rate and Rhythm: Normal rate and regular rhythm. Heart sounds: Normal heart sounds. Pulmonary:      Effort: Pulmonary effort is normal.      Breath sounds: Normal breath sounds.    Abdominal: General: Bowel sounds are normal.      Palpations: Abdomen is soft. Musculoskeletal:         General: No swelling. Normal range of motion. Cervical back: Normal range of motion and neck supple. Skin:     General: Skin is warm and dry. Neurological:      Mental Status: She is alert. She is disoriented and confused.          Labs:  Lab Results   Component Value Date     11/15/2021    K 4.5 11/15/2021     (H) 11/15/2021    CO2 22 11/15/2021    BUN 10 11/15/2021    CREATININE 0.46 (L) 11/15/2021    GLUCOSE 170 (H) 11/15/2021    CALCIUM 8.5 11/15/2021    PROT 5.6 (L) 11/15/2021    LABALBU 2.9 (L) 11/15/2021    BILITOT 0.8 (H) 11/15/2021    ALKPHOS 81 11/15/2021    AST 33 11/15/2021    ALT 20 11/15/2021    LABGLOM >60.0 11/15/2021    GFRAA >60.0 11/15/2021    GLOB 2.7 11/15/2021     Lab Results   Component Value Date    WBC 6.3 11/15/2021    HGB 11.9 (L) 11/15/2021    HCT 35.3 (L) 11/15/2021    MCV 95.1 11/15/2021     11/15/2021     Lab Results   Component Value Date    LABA1C 10.9 (H) 11/11/2021     No results found for: CHOLFAST, TRIGLYCFAST, HDL, LDLCALC, CHOL, TRIG  No results found for: TESTM  Lab Results   Component Value Date    TSH 0.543 09/18/2021    TSHREFLEX 0.805 09/20/2021     No results found for: TPOABS      Electronicallysigned by ANAND Arreola on 11/15/2021 at 6:00 PM

## 2021-11-15 NOTE — PROGRESS NOTES
Pt shift assessment completed. Pt is alert to self. Was able to state name and birthday. Vitals stable. Denies pain at this time. Resting comfortably. Avasys in room for pt safety. Fall precautions in place. Will continue to monitor.      Electronically signed by Tiffanie Chavez RN on 11/15/2021 at 12:28 AM

## 2021-11-15 NOTE — PROGRESS NOTES
PATIENT:  Mark Anderson    :  1945    DOS:  11/3/21    [facility]     HPI:  Patient seen today for complaint of decreased weight loss. Vital signs reviewed, within normal limits. Patient has been losing weight over the past several months. Nursing staff report fair oral intake of food and fluids. However, he is weighing approximately 120 pounds at this time. Initially admitted to facility at 135 pounds, it is his average weight. Patient is globally frail. If his levels are generally low, he stays in his room most of the time without significant activity, general fatigue with exertion, 3/5 strength on good strength. He has overall good mentation, and sharp memory. Alert and oriented 3/3. Weight loss has been ongoing. Discussed with patient. No new symptoms otherwise. He has finished approximately 50% of his meal today in the dining raza where we are meeting at his private table. Denies any headache, confusion, lightheadedness. Denies any chest pain, orthopnea, PND, or KAY. Denies any cough, SOB, POI, or wheezing. Denies any bruising, change in skin condition/wounds. Denies any indigestion, dysphagia. No new constipation or abdominal pain. Denies any urinary issues. He is semi incontinent of urine. General strength is fair. Overall stable with walker independently. Denies any depression or anxiety symptoms at this time. MEDICATIONS:  Reviewed. ALLERGIES:  Reviewed. REVIEW OF SYSTEMS:  See HPI. PHYSICAL EXAMINATION:  General:  Good hygiene, flat affect, no acute distress. Pleasant, cooperative, brightened affect with further conversation. Normal hearing bilaterally. MMM, no erythema, exudate on oropharyngeal exam.  Cardiopulmonary:  RRR, no significant lower leg edema. LSCTA. Abdominal:  Normal bowel sounds. Nontender abdomen. No distention noted. Skin:  Skin turgor within normal limits. Mental Status:  Patient is awake, alert, oriented 3/3. ASSESSMENT AND PLAN:  Unintentional weight loss-add weekly weights. We will add prealbumin as well on Friday. We will add increased supplementation and/or discuss adding appetite-stimulatory medication as well.         Electronically Signed By: Adri Navarro PA-C on 11/04/2021 17:26:58  ______________________________  Adri Navarro PA-C [id number]  D: 11/03/2021 17:27:32  T: 11/04/2021 13:33:06    cc: - Delfina

## 2021-11-15 NOTE — PROGRESS NOTES
Spiritual Care Services     Summary of Visit:      Spiritual Assessment/Intervention/Outcomes:    Encounter Summary  Services provided to[de-identified] Patient and family together  Referral/Consult From[de-identified] Rounding  Support System: Spouse  Continue Visiting: No  Complexity of Encounter: Low  Length of Encounter: 15 minutes  Spiritual Assessment Completed: Yes  Routine  Type: Initial  Assessment: Calm, Approachable  Intervention: Active listening, Sustaining presence/ Ministry of presence  Outcome: Receptive                               Care Plan:        Spiritual Care Services   Electronically signed by Eva Koch on 11/15/21 at 4:19 PM EST     To reach a  for emotional and spiritual support, place an [facility]'S \[facility] [place] [facility]\"" consult request.   If a  is needed immediately, dial 0 and ask to page the on-call .

## 2021-11-15 NOTE — PROGRESS NOTES
Pt assessment complete. Pt alert to self. Vital signs stable. Denies pain at this time. Lung sounds diminished throughout. SpO2 95% on room air. Avasys in place due to safety and confusion. Fall precautions in place. Pt pleasant and cooperative. One assist to the bathroom for safety.  called with privacy code and updated on pt's status. Call light within reach.

## 2021-11-17 LAB
BUN BLDV-MCNC: 11 MG/DL
CALCIUM SERPL-MCNC: 8.5 MG/DL
CHLORIDE BLD-SCNC: 109 MMOL/L
CO2: 21 MMOL/L
CREAT SERPL-MCNC: 0.55 MG/DL
GFR CALCULATED: NORMAL
GLUCOSE BLD-MCNC: 274 MG/DL
POTASSIUM SERPL-SCNC: 4.1 MMOL/L
SODIUM BLD-SCNC: 143 MMOL/L

## 2021-12-01 ENCOUNTER — OFFICE VISIT (OUTPATIENT)
Dept: GERIATRIC MEDICINE | Age: 76
End: 2021-12-01
Payer: MEDICARE

## 2021-12-01 DIAGNOSIS — R73.9 HYPERGLYCEMIA: ICD-10-CM

## 2021-12-01 DIAGNOSIS — Z79.4 TYPE 2 DIABETES MELLITUS WITH OTHER SPECIFIED COMPLICATION, WITH LONG-TERM CURRENT USE OF INSULIN (HCC): Primary | ICD-10-CM

## 2021-12-01 DIAGNOSIS — E11.69 TYPE 2 DIABETES MELLITUS WITH OTHER SPECIFIED COMPLICATION, WITH LONG-TERM CURRENT USE OF INSULIN (HCC): Primary | ICD-10-CM

## 2021-12-09 ENCOUNTER — TELEMEDICINE (OUTPATIENT)
Dept: GERIATRIC MEDICINE | Age: 76
End: 2021-12-09
Payer: MEDICARE

## 2021-12-09 DIAGNOSIS — E16.2 HYPOGLYCEMIA: ICD-10-CM

## 2021-12-09 DIAGNOSIS — Z79.4 TYPE 2 DIABETES MELLITUS WITH OTHER SPECIFIED COMPLICATION, WITH LONG-TERM CURRENT USE OF INSULIN (HCC): Primary | ICD-10-CM

## 2021-12-09 DIAGNOSIS — E11.69 TYPE 2 DIABETES MELLITUS WITH OTHER SPECIFIED COMPLICATION, WITH LONG-TERM CURRENT USE OF INSULIN (HCC): Primary | ICD-10-CM

## 2021-12-09 PROCEDURE — 99442 PR PHYS/QHP TELEPHONE EVALUATION 11-20 MIN: CPT | Performed by: PHYSICIAN ASSISTANT

## 2021-12-22 ENCOUNTER — HOSPITAL ENCOUNTER (EMERGENCY)
Age: 76
Discharge: NURSING, REHAB OR LONG-TERM CARE | End: 2021-12-22
Attending: EMERGENCY MEDICINE
Payer: MEDICARE

## 2021-12-22 ENCOUNTER — APPOINTMENT (OUTPATIENT)
Dept: CT IMAGING | Age: 76
End: 2021-12-22
Payer: MEDICARE

## 2021-12-22 VITALS
TEMPERATURE: 98.9 F | WEIGHT: 136.4 LBS | DIASTOLIC BLOOD PRESSURE: 81 MMHG | SYSTOLIC BLOOD PRESSURE: 150 MMHG | HEART RATE: 70 BPM | BODY MASS INDEX: 23.41 KG/M2 | RESPIRATION RATE: 14 BRPM | OXYGEN SATURATION: 98 %

## 2021-12-22 DIAGNOSIS — S09.90XA CLOSED HEAD INJURY, INITIAL ENCOUNTER: ICD-10-CM
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
## 2021-12-22 PROCEDURE — 70450 CT HEAD/BRAIN W/O DYE: CPT

## 2021-12-22 PROCEDURE — 99285 EMERGENCY DEPT VISIT HI MDM: CPT

## 2021-12-22 PROCEDURE — 72125 CT NECK SPINE W/O DYE: CPT

## 2021-12-22 PROCEDURE — 6370000000 HC RX 637 (ALT 250 FOR IP): Performed by: EMERGENCY MEDICINE

## 2021-12-22 PROCEDURE — 6830039000 HC L3 TRAUMA ALERT

## 2021-12-22 RX ORDER — TRAMADOL HYDROCHLORIDE 50 MG/1
50 TABLET ORAL EVERY 4 HOURS PRN
Qty: 18 TABLET | Refills: 0 | Status: SHIPPED | OUTPATIENT
Start: 2021-12-22 | End: 2021-12-25

## 2021-12-22 RX ORDER — OXYCODONE HYDROCHLORIDE AND ACETAMINOPHEN 5; 325 MG/1; MG/1
1 TABLET ORAL ONCE
Status: COMPLETED | OUTPATIENT
Start: 2021-12-22 | End: 2021-12-22

## 2021-12-22 RX ADMIN — OXYCODONE AND ACETAMINOPHEN 1 TABLET: 5; 325 TABLET ORAL at 16:47

## 2021-12-22 ASSESSMENT — ENCOUNTER SYMPTOMS
NAUSEA: 0
VOMITING: 0
SHORTNESS OF BREATH: 0
BACK PAIN: 0
SORE THROAT: 0
COUGH: 0
DIARRHEA: 0
ABDOMINAL PAIN: 0

## 2021-12-22 ASSESSMENT — PAIN DESCRIPTION - PAIN TYPE: TYPE: ACUTE PAIN

## 2021-12-22 ASSESSMENT — PAIN DESCRIPTION - DESCRIPTORS: DESCRIPTORS: ACHING

## 2021-12-22 ASSESSMENT — PAIN SCALES - GENERAL
PAINLEVEL_OUTOF10: 4
PAINLEVEL_OUTOF10: 4
PAINLEVEL_OUTOF10: 5

## 2021-12-22 ASSESSMENT — PAIN DESCRIPTION - FREQUENCY: FREQUENCY: CONTINUOUS

## 2021-12-22 ASSESSMENT — PAIN DESCRIPTION - LOCATION: LOCATION: HEAD

## 2021-12-22 ASSESSMENT — PAIN DESCRIPTION - ORIENTATION: ORIENTATION: RIGHT

## 2021-12-22 NOTE — ED PROVIDER NOTES
than 70, patient unable to take oral glucose, or if she is symptomatic)    GLUCOSE (GLUTOSE) 40 % GEL    Take 37.5 mLs by mouth as needed (Glucose less than 70)    INSULIN GLARGINE (LANTUS) 100 UNIT/ML INJECTION VIAL    Inject 12 Units into the skin nightly    INSULIN LISPRO (HUMALOG) 100 UNIT/ML INJECTION CARTRIDGE    Inject 3 times daily before meals. 120-160 4 units, 161-200 5 units, 201-240 6 units, 241-280 7 units, 281 or higher 8 units    INSULIN SYRINGE-NEEDLE U-100 30G X 1/2\" 1 ML MISC    1 each by Does not apply route 4 times daily (before meals and nightly)    LEVOTHYROXINE (SYNTHROID) 50 MCG TABLET    Take 50 mcg by mouth Daily Indications: Underactive Thyroid     LINAGLIPTIN (TRADJENTA) 5 MG TABLET    Take 5 mg by mouth daily Indications: Diabetes    MEMANTINE (NAMENDA) 10 MG TABLET    Take 1 tablet by mouth 2 times daily    ONE TOUCH ULTRA TEST STRIP        POTASSIUM CHLORIDE (KLOR-CON M) 10 MEQ EXTENDED RELEASE TABLET    Take 10 mEq by mouth daily Indications: Nutritional Support     VITAMIN B-12 (CYANOCOBALAMIN) 500 MCG TABLET    Take 500 mcg by mouth daily Indications: Nutritional Support        ALLERGIES     Penicillins, Sulfa antibiotics, and Penicillin g    FAMILY HISTORY     History reviewed. No pertinent family history.        SOCIAL HISTORY       Social History     Socioeconomic History    Marital status:      Spouse name: None    Number of children: None    Years of education: None    Highest education level: None   Occupational History    None   Tobacco Use    Smoking status: Never Smoker    Smokeless tobacco: Never Used   Substance and Sexual Activity    Alcohol use: Never    Drug use: Never    Sexual activity: Not Currently   Other Topics Concern    None   Social History Narrative    None     Social Determinants of Health     Financial Resource Strain:     Difficulty of Paying Living Expenses: Not on file   Food Insecurity:     Worried About Running Out of Food in the Last Year: Not on file    Ran Out of Food in the Last Year: Not on file   Transportation Needs:     Lack of Transportation (Medical): Not on file    Lack of Transportation (Non-Medical): Not on file   Physical Activity:     Days of Exercise per Week: Not on file    Minutes of Exercise per Session: Not on file   Stress:     Feeling of Stress : Not on file   Social Connections:     Frequency of Communication with Friends and Family: Not on file    Frequency of Social Gatherings with Friends and Family: Not on file    Attends Buddhism Services: Not on file    Active Member of [address] [place] [address] [employer] or Organizations: Not on file    Attends Club or Organization Meetings: Not on file    Marital Status: Not on file   Intimate Partner Violence:     Fear of Current or Ex-Partner: Not on file    Emotionally Abused: Not on file    Physically Abused: Not on file    Sexually Abused: Not on file   Housing Stability:     Unable to Pay for Housing in the Last Year: Not on file    Number of [place] in the Last Year: Not on file    Unstable Housing in the Last Year: Not on file         PHYSICAL EXAM       ED Triage Vitals   BP Temp Temp src Pulse Resp SpO2 Height Weight   -- -- -- -- -- -- -- --       Physical Exam  Vitals and nursing note reviewed. Constitutional:       Appearance: She is well-developed. HENT:      Head: Normocephalic. Comments: 3 cm cephalhematoma noted over R forehead     Right Ear: External ear normal.      Left Ear: External ear normal.   Eyes:      Conjunctiva/sclera: Conjunctivae normal.      Pupils: Pupils are equal, round, and reactive to light. Cardiovascular:      Rate and Rhythm: Normal rate and regular rhythm. Heart sounds: Normal heart sounds. Pulmonary:      Effort: Pulmonary effort is normal.      Breath sounds: Normal breath sounds. Abdominal:      General: Bowel sounds are normal. There is no distension. Palpations: Abdomen is soft. Tenderness:  There is no abdominal tenderness. Musculoskeletal:         General: Normal range of motion. Cervical back: Normal range of motion and neck supple. Skin:     General: Skin is warm and dry. Neurological:      Mental Status: She is alert and oriented to person, place, and time. Psychiatric:         Mood and Affect: Mood normal.           MDM  66 yo female presents to the ED s/p fall with closed head injury. Pt given PO percocet in the ED. CT head, cspine negative. Pt reassessed and doing well. Pt educated about falls and closed head injury. Pt given prescription for tramadol, given head injury warning signs and will f/u with pcp. FINAL IMPRESSION      1. Fall, initial encounter    2.  Closed head injury, initial encounter          DISPOSITION/PLAN   DISPOSITION Decision To Discharge 12/22/2021 05:49:44 PM        DISCHARGE MEDICATIONS:  [unfilled]         Cam Ba MD(electronically signed)  Attending Emergency Physician            Cam Ba MD  12/22/21 [id number]

## 2021-12-22 NOTE — ED NOTES
Patient resting in bed with lights on and call light within reach. Equal rise and fall of chest. No distress noted. Nurse will continue to monitor.       Johanna Shields RN  12/22/21 [id number]

## 2021-12-22 NOTE — ED NOTES
Patient medicated per MAR. All questions answered and allergies reviewed. Will continue to monitor.         Spencer Reina RN  12/22/21 5929 24.4

## 2021-12-22 NOTE — ED NOTES
Discharge instructions reviewed with patient. Medications reviewed and explained to patient. Patient denies any further questions at this time. Pt encouraged to make follow up appointments with PCP and any speciality referrals.       Spencer Reina RN  12/22/21 7950

## 2021-12-22 NOTE — ED NOTES
Pt will be DC'd back to [facility] once Ambulance is available. Pt resting in room with call light in reach.       Vanessa Ramirez RN  12/22/21 [id number]

## 2021-12-23 ENCOUNTER — OFFICE VISIT (OUTPATIENT)
Dept: GERIATRIC MEDICINE | Age: 76
End: 2021-12-23
Payer: MEDICARE

## 2021-12-23 DIAGNOSIS — R73.9 CHRONIC HYPERGLYCEMIA: ICD-10-CM

## 2021-12-23 DIAGNOSIS — Z79.4 TYPE 2 DIABETES MELLITUS WITH OTHER SPECIFIED COMPLICATION, WITH LONG-TERM CURRENT USE OF INSULIN (HCC): Primary | ICD-10-CM

## 2021-12-23 DIAGNOSIS — E11.69 TYPE 2 DIABETES MELLITUS WITH OTHER SPECIFIED COMPLICATION, WITH LONG-TERM CURRENT USE OF INSULIN (HCC): Primary | ICD-10-CM

## 2022-01-12 ENCOUNTER — OFFICE VISIT (OUTPATIENT)
Dept: GERIATRIC MEDICINE | Age: 77
End: 2022-01-12
Payer: MEDICARE

## 2022-01-12 DIAGNOSIS — R55 SYNCOPE AND COLLAPSE: ICD-10-CM

## 2022-01-12 DIAGNOSIS — I10 HYPERTENSION, UNSPECIFIED TYPE: Primary | ICD-10-CM

## 2022-01-12 DIAGNOSIS — E11.69 TYPE 2 DIABETES MELLITUS WITH OTHER SPECIFIED COMPLICATION, WITH LONG-TERM CURRENT USE OF INSULIN (HCC): ICD-10-CM

## 2022-01-12 DIAGNOSIS — Z87.440 PERSONAL HISTORY OF URINARY (TRACT) INFECTIONS: ICD-10-CM

## 2022-01-12 DIAGNOSIS — E03.9 HYPOTHYROIDISM, UNSPECIFIED TYPE: ICD-10-CM

## 2022-01-12 DIAGNOSIS — Z79.4 TYPE 2 DIABETES MELLITUS WITH OTHER SPECIFIED COMPLICATION, WITH LONG-TERM CURRENT USE OF INSULIN (HCC): ICD-10-CM

## 2022-01-14 NOTE — PROGRESS NOTES
[facility]    Vital signs have been reviewed via monthly chart. HPI:  Seen today for DM type 2. Patient is on dementia unit with severe end-stage dementia with A1c 10.5. Is on only glipizide 4 mg p.o. q.d. This treatment seems to be inappropriate for her at this time. Will sign new orders today for glimepiride 5 mg p.o. q.d., linagliptin 5 mg p.o. q.d., which may have to await for prior authorization. Accu-Cheks t.i.d. x14 days. To assess hemoglobin A1c in 6 weeks. Unable to establish any meaningful dialog with patient due to severe dementia. Otherwise, nursing staff reports no new changes. Patient is eating, drinking well without issue. Getting well with others including staff. Will reassess patient in the next few weeks to monitor Accu-Cheks and make any dosage changes as needed. MEDICATIONS:  Reviewed. ALLERGIES:  Reviewed. REVIEW OF SYSTEMS:  Unable to establish due to patient's dementia. PHYSICAL EXAM:  GENERAL:  Overall good hygiene, pleasant and cooperative during examination which is limited due to dementia. HEENT:  EOMs WNL. MMM. No erythema, exudate on limited oropharyngeal exam.  Pupils equal, round, and react to light equally, minimal.  CARDIOPULMONARY:  RRR. Respirations within normal limits. Adarsh Carver globally. ABDOMINAL:  Normoactive bowel sounds. MENTAL STATUS:  Patient is awake, alert, oriented to self only. ASSESSMENT AND PLAN:  DM type 2 See HPI for orders. We will increase meds and add linagliptin daily.   We will reassess A1c in 6 weeks

## 2022-01-21 ENCOUNTER — OFFICE VISIT (OUTPATIENT)
Dept: GERIATRIC MEDICINE | Age: 77
End: 2022-01-21
Payer: MEDICARE

## 2022-01-21 DIAGNOSIS — G30.0 EARLY ONSET ALZHEIMER'S DEMENTIA WITHOUT BEHAVIORAL DISTURBANCE (HCC): ICD-10-CM

## 2022-01-21 DIAGNOSIS — F09 MILD COGNITIVE DISORDER: ICD-10-CM

## 2022-01-21 DIAGNOSIS — F02.80 EARLY ONSET ALZHEIMER'S DEMENTIA WITHOUT BEHAVIORAL DISTURBANCE (HCC): ICD-10-CM

## 2022-01-21 DIAGNOSIS — E11.69 TYPE 2 DIABETES MELLITUS WITH OTHER SPECIFIED COMPLICATION, WITH LONG-TERM CURRENT USE OF INSULIN (HCC): Primary | ICD-10-CM

## 2022-01-21 DIAGNOSIS — Z79.4 TYPE 2 DIABETES MELLITUS WITH OTHER SPECIFIED COMPLICATION, WITH LONG-TERM CURRENT USE OF INSULIN (HCC): Primary | ICD-10-CM

## 2022-01-24 ENCOUNTER — TELEMEDICINE (OUTPATIENT)
Dept: GERIATRIC MEDICINE | Age: 77
End: 2022-01-24
Payer: MEDICARE

## 2022-01-24 DIAGNOSIS — E16.2 HYPOGLYCEMIA: Primary | ICD-10-CM

## 2022-01-24 DIAGNOSIS — E11.69 TYPE 2 DIABETES MELLITUS WITH OTHER SPECIFIED COMPLICATION, WITH LONG-TERM CURRENT USE OF INSULIN (HCC): ICD-10-CM

## 2022-01-24 DIAGNOSIS — Z79.4 TYPE 2 DIABETES MELLITUS WITH OTHER SPECIFIED COMPLICATION, WITH LONG-TERM CURRENT USE OF INSULIN (HCC): ICD-10-CM

## 2022-01-24 PROCEDURE — 99442 PR PHYS/QHP TELEPHONE EVALUATION 11-20 MIN: CPT | Performed by: PHYSICIAN ASSISTANT

## 2022-01-25 NOTE — PROGRESS NOTES
Kendall Median    :  1945    DOS:  2021    ABBI YO    HISTORY OF PRESENT ILLNESS:  The patient is seen today for DM type 2 and hyperglycemia. Today's the patient BG was greater than 300 on repeat attempts with Accu-Cheks, currently getting 5 units of Humalog per meal as well as 12 units of Lantus q.h.s. The patient is on to the memory care unit. Unable to establish any ROS for her today, unintelligible conversations. Due to this elevated BG level, we will increase Lantus to 14 units at h.s. and mealtime insulin to 6 units q. meal.  We will have Accu-Cheks done 4 times daily for 2 weeks and sent to our office to review. We will continue any oral medications she is currently taking, see chart. The patient's appetite maintains the same level. Overall good intake of food and fluids. No further concerns today. No new complaints of neuropathy or foot pain from the patient per nursing staff. The patient appears to have intact sensation to touch, temperature and pressure on lower leg/foot examination. MEDICATIONS:  Reviewed. ALLERGIES:  Reviewed. REVIEW OF SYSTEMS:  See HPI. Unable to establish due to the patient's dementia. PHYSICAL EXAMINATION:  GENERAL:  Hygiene appears to be within normal limits. Normal affect. Pleasant, uncooperative secondary to dementia. HEENT:  PERRLA, MMM on limited exam.  No erythema or exudate, moist.  CARDIOPULMONARY:  RRR. LSCTA. No significant lower leg edema. ABDOMINAL:  Normoactive bowel sounds. SKIN/NEURO:  Bilateral feet show no new evidence of wounds or skin breakdown. Intact sensation to pressure, touch, temperature to both feet. Mental Status: The patient is awake, alert, oriented x1. ASSESSMENT AND PLAN:  DM type 2/hyperglycemia. Maintain oral medications.   Increase Lantus to 14 units at h.s. and 6 units of Humalog q. meal.  Accu-Cheks 4 times daily for 2 weeks, then review and adjust insulin as needed.         Electronically Signed By: Addison Karimi PA-C on 01/24/2022 11:41:37  ______________________________  JAYSON Celestin/[id number]  D: 01/20/2022 21:58:40  T: 01/20/2022 22:31:01    cc: Dwayne Mcallister

## 2022-01-25 NOTE — PROGRESS NOTES
Hernan Ha is a 67 y.o. female evaluated via telephone on 2022. Consent:  She and/or health care decision maker is aware that that she may receive a bill for this telephone service, which includes applicable co-pays, depending on her insurance coverage, and has provided verbal consent to proceed. Documentation:  I communicated with the patient and/or health care decision maker about     PATIENT:  Lars Strickland    :  1945    DOS:  4-    [facility]    Televisit. Patient on memory care unit. Was discussed with nursing staff today patient's recent bouts of hypoglycemia, falling into the low 70s, mid 60s at times. Does receive 6 units Humalog with meals. We will change to 5 units Humalog q. meal and do a.c. and h.s. Accu-Cheks to monitor to see if hypoglycemic improves. Patient has a very little diet. We will dose post-meal if possible to ensure BG does not fall due to lack of intake. Electronically Signed By: Fernanda Mead PA-C on 2022 11:41:20  ______________________________  Fernanda Mead PA-C  IN/[id number]  D: 2022 10:47:39  T: 2022 11:04:20    [facility] Details of this discussion including any medical advice provided:   See above    I affirm this is a Patient Initiated Episode with a Patient who has not had a related appointment within my department in the past 7 days or scheduled within the next 24 hours. Patient identification was verified at the start of the visit: Yes    Total Time: minutes: 11-20 minutes    Hernan Ha was evaluated through a synchronous (real-time) audio encounter. The patient was located at home in a state where the provider was licensed to provide care.     Note: not billable if this call serves to triage the patient into an appointment for the relevant concern      Fernanda Mead, [address]

## 2022-01-31 NOTE — PROGRESS NOTES
Christel Alves    :  [birth year]-    DOS:  2021    ABBI YO    HISTORY OF PRESENT ILLNESS:  The patient seen today on regular unit. Continues to have elevated blood glucose levels in high 200s to low 300s. We will maintain current medications, however, will increase Lantus to 50 units daily. We will continue titrating up Lantus until we achieved a new baseline of 150 to 225 mg per dL on Accu-Cheks. Attempt to mitigate excessive simple carbohydrate ingestion. MEDICATIONS:  Reviewed. ALLERGIES:  Reviewed. ROS:  Unable to establish due to the patient's dementia. Physical Exam  Vitals and nursing note reviewed. Constitutional:       General: She is not in acute distress. Appearance: Normal appearance. She is not ill-appearing. HENT:      Head: Normocephalic and atraumatic. Mouth/Throat:      Mouth: Mucous membranes are moist.      Pharynx: Oropharynx is clear. Eyes:      Extraocular Movements: Extraocular movements intact. Conjunctiva/sclera: Conjunctivae normal.      Pupils: Pupils are equal, round, and reactive to light. Cardiovascular:      Rate and Rhythm: Normal rate and regular rhythm. Pulses: Normal pulses. Pulmonary:      Effort: Pulmonary effort is normal.      Breath sounds: Normal breath sounds. Abdominal:      General: Abdomen is flat. Bowel sounds are normal.      Palpations: Abdomen is soft. Musculoskeletal:         General: No swelling or tenderness. Normal range of motion. Cervical back: Normal range of motion. Skin:     General: Skin is warm and dry. Neurological:      Mental Status: She is alert. She is disoriented. Sensory: Sensory deficit (bilateral toes; difficult assesment d/t dementia) present. Motor: Weakness present.       Coordination: Coordination abnormal.      Comments: Very confused; disjointed speech   Psychiatric:         Mood and Affect: Mood normal.      Comments: Oriented to self only; impaired memory  Pleasant         ASSESSMENT AND PLAN:  DM type 2/hyperglycemia-increase Lantus to 50 units daily. Continue current remaining medications in the same level.          Electronically Signed By: Tono Urena PA-C on 01/26/2022 12:55:18  ______________________________  Tono Urena PA-C  /[id number]  D: 01/25/2022 23:29:41  T: 01/26/2022 00:11:27    cc: Chelsie Carpenter

## 2022-02-21 NOTE — PROGRESS NOTES
Hernan Ha is a 67 y.o. female evaluated via telephone on 12/9/2021. Consent:  She and/or health care decision maker is aware that that she may receive a bill for this telephone service, which includes applicable co-pays, depending on her insurance coverage, and has provided verbal consent to proceed. Documentation:  I communicated with the patient and/or health care decision maker about patient memory care unit seen today with nursing staff present for hypoglycemia. Patient is on sliding scale insulin. Did have overnight low blood glucose level. Will reduce patient sliding scale and general insulin management. See order sheet for details. Will reevaluate her trends and BG next time in facility to assess whether further changes need to be made. Details of this discussion including any medical advice provided: see above      I affirm this is a Patient Initiated Episode with a Patient who has not had a related appointment within my department in the past 7 days or scheduled within the next 24 hours. Patient identification was verified at the start of the visit: Yes    Total Time: minutes: 11-20 minutes    Hernan Ha was evaluated through a synchronous (real-time) audio encounter. The patient was located at home in a state where the provider was licensed to provide care.     Note: not billable if this call serves to triage the patient into an appointment for the relevant concern      Joanna Lopez

## 2022-02-22 NOTE — PROGRESS NOTES
PATIENT:  Aminah Guido    :  [birth year]-    DOS:  2022    ABBI YO    HISTORY OF PRESENT ILLNESS:  The patient seen today on dementia unit for annual exam.  Patient unable to establish any reliable ROS due to significantly advanced dementia. See full medical history below. No new acute changes. Patient has been routinely monitored over the past several weeks/months for her diabetes mellitus. Has had some dynamic blood sugars. Have been attempting to increase patient's insulin coverage to find a in keeping patient between 100-200 mg/dL. Mostly successful to this point, however patient does have some days where she is moderately elevated. She is frequently encouraged to follow a carb controlled diet, however does have some snacks and treats brought in from home occasionally making it difficult to adhere. Patient otherwise has been fairly stable without any increased decompensation compared to her baseline visits. Patient is very forgetful and has no orientation to where she is. Is alert to self. Patient is up-to-date on vaccinations currently. Showing no signs of cardiorespiratory distress. No evidence of stepwise decline from A cognitive standpoint. Very pleasant and gets along well with her fellow patients and care staff. MEDICATIONS:  Reviewed. ALLERGIES:  Reviewed. ROS:  Unable to establish due to the patient's dementia. Physical Exam  Vitals and nursing note reviewed. Constitutional:       General: She is not in acute distress. Appearance: Normal appearance. She is obese. She is not ill-appearing. HENT:      Head: Normocephalic and atraumatic. Mouth/Throat:      Mouth: Mucous membranes are moist.      Pharynx: Oropharynx is clear. Eyes:      Extraocular Movements: Extraocular movements intact. Conjunctiva/sclera: Conjunctivae normal.      Pupils: Pupils are equal, round, and reactive to light.    Cardiovascular:      Rate and Rhythm: Normal rate and regular rhythm. Pulses: Normal pulses. Pulmonary:      Effort: Pulmonary effort is normal.      Breath sounds: Normal breath sounds. Abdominal:      General: Abdomen is flat. Bowel sounds are normal.      Palpations: Abdomen is soft. Musculoskeletal:         General: No swelling or tenderness. Normal range of motion. Cervical back: Normal range of motion. Skin:     General: Skin is warm and dry. Neurological:      Mental Status: She is alert. Mental status is at baseline. She is disoriented. Sensory: Sensory deficit (?poor sensation in bilateral toes) present. Motor: Weakness present. Coordination: Coordination abnormal.      Comments: Confused; fragmented speech   Psychiatric:         Mood and Affect: Mood normal.      Comments: Oriented to self only; impaired memory  Pleasant         ASSESSMENT AND PLAN:    1. Hypertension-issues. Vital signs been stable. See chart for details. Monitoring and current medications without change  2. Hypothyroidism -TSH/free T4 annually. Will additional labs if abnormal conditions as needed  3. Type 2 diabetes mellitus with long-term use of insulin -we will continue insulin orders as stated. See chart. LifeVest daily. Will adjust insulin/meds as needed. No new changes today. 4.  Personal history of UTI -no evidence of UTI. Continue monitoring closely. 5.  Syncope and collapse history -no new recent syncope. Monitor closely due to dementia and poor cognition/generalized weakness. Keep close proximity to nursing staff at all times when out of room. 6.  Arteriosclerosis of coronary arteries -no new evidence of acute coronary distress, difficulty breathing, chest pain, etc.  Continue monitor closely. Vital signs as needed.

## 2022-02-24 LAB
ALBUMIN SERPL-MCNC: 4 G/DL
ALP BLD-CCNC: 90 U/L
ALT SERPL-CCNC: 14 U/L
ANION GAP SERPL CALCULATED.3IONS-SCNC: 12 MMOL/L
AST SERPL-CCNC: 15 U/L
BILIRUB SERPL-MCNC: 0.2 MG/DL (ref 0.1–1.4)
BUN BLDV-MCNC: 13 MG/DL
CALCIUM SERPL-MCNC: 9.2 MG/DL
CHLORIDE BLD-SCNC: 108 MMOL/L
CO2: 22 MMOL/L
CREAT SERPL-MCNC: 0.73 MG/DL
GFR CALCULATED: >60
GLUCOSE BLD-MCNC: 204 MG/DL
POTASSIUM SERPL-SCNC: 4.5 MMOL/L
SODIUM BLD-SCNC: 142 MMOL/L
TOTAL PROTEIN: 7.1

## 2022-02-27 NOTE — PROGRESS NOTES
Subjective:      Patient ID: Hernan Ha is a pleasant 67 y.o. female who presents today for:  No chief complaint on file. Patient seen on memory care unit for diabetes mellitus type 2. Patient is unable to provide a good ROS. Patient's Accu-Cheks have been elevated, see chart for Accu-Cheks trends. Patient is very pleasant cooperative throughout visit however unable to maintain discourse. 10 general thinking and some word salad noted throughout visit. Due to patient's dynamic and often elevated blood sugar levels, will increase Lantus to 15 units daily as well as increase mealtime NovoLog to 5 units q. meal while holding parameters for less than 25% of meal adjust.  Patient has been frequently getting some treats and high sugar foods which can get her up stable blood sugar levels. We will revisit Accu-Cheks with the next several weeks to assess and adjust medication as needed at that time. Patient Active Problem List   Diagnosis    Early onset Alzheimer's dementia without behavioral disturbance ([address]    Memory loss    Syncope and collapse    Altered mental status, unspecified    Arteriosclerosis of coronary artery    Hematuria, unspecified    Postablative hypothyroidism    Personal history of urinary (tract) infections    Chronic rhinitis    Depressive disorder    Diabetes mellitus ([address]    Hypertension    Hyperlipidemia    Mild cognitive disorder    Non-smoker    Hypothyroid    Hypernatremia    DKA, type 2, not at goal [facility])     Past Medical History:   Diagnosis Date    Chronic rhinitis     Depression     Diabetes mellitus ([address]     Hyperlipidemia     Hypertension     Type 2 diabetes mellitus without complication ([facility] [address]      No past surgical history on file.   Social History     Socioeconomic History    Marital status:      Spouse name: Not on file    Number of children: Not on file    Years of education: Not on file    Highest education level: Not on file Occupational History    Not on file   Tobacco Use    Smoking status: Never Smoker    Smokeless tobacco: Never Used   Substance and Sexual Activity    Alcohol use: Never    Drug use: Never    Sexual activity: Not Currently   Other Topics Concern    Not on file   Social History Narrative    Not on file     Social Determinants of Health     Financial Resource Strain:     Difficulty of Paying Living Expenses: Not on file   Food Insecurity:     Worried About Running Out of Food in the Last Year: Not on file    Daryl of Food in the Last Year: Not on file   Transportation Needs:     Lack of Transportation (Medical): Not on file    Lack of Transportation (Non-Medical): Not on file   Physical Activity:     Days of Exercise per Week: Not on file    Minutes of Exercise per Session: Not on file   Stress:     Feeling of Stress : Not on file   Social Connections:     Frequency of Communication with Friends and Family: Not on file    Frequency of Social Gatherings with Friends and Family: Not on file    Attends Worship Services: Not on file    Active Member of [address] [place] [address] or Organizations: Not on file    Attends Club or Organization Meetings: Not on file    Marital Status: Not on file   Intimate Partner Violence:     Fear of Current or Ex-Partner: Not on file    Emotionally Abused: Not on file    Physically Abused: Not on file    Sexually Abused: Not on file   Housing Stability:     Unable to Pay for Housing in the Last Year: Not on file    Number of [place] in the Last Year: Not on file    Unstable Housing in the Last Year: Not on file     No family history on file. Allergies   Allergen Reactions    Penicillins     Sulfa Antibiotics     Penicillin G Rash         Review of Systems   Unable to perform ROS: Dementia       Objective: There were no vitals taken for this visit. Physical Exam  Vitals and nursing note reviewed. Constitutional:       General: She is not in acute distress. Appearance: Normal appearance. She is obese. She is not ill-appearing. HENT:      Head: Normocephalic and atraumatic. Mouth/Throat:      Mouth: Mucous membranes are moist.      Pharynx: Oropharynx is clear. Eyes:      Extraocular Movements: Extraocular movements intact. Conjunctiva/sclera: Conjunctivae normal.      Pupils: Pupils are equal, round, and reactive to light. Abdominal:      General: Abdomen is flat. Bowel sounds are normal.      Palpations: Abdomen is soft. Musculoskeletal:         General: No swelling or tenderness. Normal range of motion. Cervical back: Normal range of motion. Skin:     General: Skin is warm and dry. Neurological:      Mental Status: She is alert. Mental status is at baseline. She is disoriented. Sensory: Sensory deficit (?poor sensation in bilateral toes) present. Motor: Weakness present. Coordination: Coordination abnormal.      Comments: Confused; fragmented speech   Psychiatric:         Mood and Affect: Mood normal.      Comments: Oriented to self only; impaired memory  Pleasant         Assessment:       Diagnosis Orders   1. Type 2 diabetes mellitus with other specified complication, with long-term current use of insulin ([facility] [address]     2. Early onset Alzheimer's dementia without behavioral disturbance ([facility] [address]     3. Mild cognitive disorder           Plan:      No orders of the defined types were placed in this encounter. No orders of the defined types were placed in this encounter. Up to 15 units daily. Increase mealtime insulin to 5 units q. meal, utilize postmeal and hold for anything adjusted less than 25% try to mitigate ingestion of refined carb foods and sugary beverages as tolerated. No follow-ups on file. Side effects, adverse effects of the medication prescribed today, as well as treatment plan and result expectations have been discussed withthe patient who expresses understanding and desires to proceed.     Jose Alejandro Colunga, [address]

## 2022-03-01 LAB
ALBUMIN SERPL-MCNC: 4 G/DL
ALP BLD-CCNC: 90 U/L
ALT SERPL-CCNC: 14 U/L
ANION GAP SERPL CALCULATED.3IONS-SCNC: 12 MMOL/L
AST SERPL-CCNC: 15 U/L
AVERAGE GLUCOSE: 186
BILIRUB SERPL-MCNC: 0.2 MG/DL (ref 0.1–1.4)
BUN BLDV-MCNC: 13 MG/DL
CALCIUM SERPL-MCNC: 9.2 MG/DL
CHLORIDE BLD-SCNC: 108 MMOL/L
CO2: 22 MMOL/L
CREAT SERPL-MCNC: 0.73 MG/DL
GFR CALCULATED: >60
GLUCOSE BLD-MCNC: 204 MG/DL
HBA1C MFR BLD: 8.1 %
POTASSIUM SERPL-SCNC: 4.5 MMOL/L
SODIUM BLD-SCNC: 142 MMOL/L
T4 FREE: 1
TOTAL PROTEIN: 7.1
TSH SERPL DL<=0.05 MIU/L-ACNC: 1.5 UIU/ML

## 2022-04-07 LAB
AVERAGE GLUCOSE: 177
HBA1C MFR BLD: 7.8 %

## 2022-04-11 RX ORDER — INSULIN GLARGINE 100 [IU]/ML
17 INJECTION, SOLUTION SUBCUTANEOUS NIGHTLY
Qty: 2 PEN | Refills: 3 | Status: SHIPPED | OUTPATIENT
Start: 2022-04-11 | End: 2022-05-11

## 2022-06-01 RX ORDER — TELMISARTAN 80 MG/1
TABLET ORAL
COMMUNITY
Start: 2022-03-17 | End: 2022-06-01 | Stop reason: SDUPTHER

## 2022-06-01 RX ORDER — TELMISARTAN 80 MG/1
80 TABLET ORAL DAILY
Qty: 30 TABLET | Refills: 3 | Status: SHIPPED | OUTPATIENT
Start: 2022-06-01 | End: 2022-09-29

## 2022-06-08 ENCOUNTER — OFFICE VISIT (OUTPATIENT)
Dept: GERIATRIC MEDICINE | Age: 77
End: 2022-06-08
Payer: MEDICARE

## 2022-06-08 DIAGNOSIS — Z79.4 TYPE 2 DIABETES MELLITUS WITH OTHER SPECIFIED COMPLICATION, WITH LONG-TERM CURRENT USE OF INSULIN (HCC): Primary | ICD-10-CM

## 2022-06-08 DIAGNOSIS — E11.69 TYPE 2 DIABETES MELLITUS WITH OTHER SPECIFIED COMPLICATION, WITH LONG-TERM CURRENT USE OF INSULIN (HCC): Primary | ICD-10-CM

## 2022-06-08 LAB
BILIRUBIN, URINE: NEGATIVE
BLOOD, URINE: NEGATIVE
BUN BLDV-MCNC: 18 MG/DL
CALCIUM SERPL-MCNC: 9.2 MG/DL
CHLORIDE BLD-SCNC: 105 MMOL/L
CLARITY: CLEAR
CO2: 25 MMOL/L
COLOR: NORMAL
CREAT SERPL-MCNC: 0.73 MG/DL
GFR CALCULATED: 85
GLUCOSE BLD-MCNC: 205 MG/DL
GLUCOSE URINE: NORMAL
KETONES, URINE: POSITIVE
LEUKOCYTE ESTERASE, URINE: POSITIVE
NITRITE, URINE: POSITIVE
PH UA: 6.5 (ref 4.5–8)
POTASSIUM SERPL-SCNC: 4 MMOL/L
PROTEIN UA: NEGATIVE
SODIUM BLD-SCNC: 142 MMOL/L
SPECIFIC GRAVITY, URINE: 1.02
UROBILINOGEN, URINE: NORMAL

## 2022-06-08 PROCEDURE — 1123F ACP DISCUSS/DSCN MKR DOCD: CPT | Performed by: PHYSICIAN ASSISTANT

## 2022-06-08 PROCEDURE — 3051F HG A1C>EQUAL 7.0%<8.0%: CPT | Performed by: PHYSICIAN ASSISTANT

## 2022-06-20 ENCOUNTER — OFFICE VISIT (OUTPATIENT)
Dept: GERIATRIC MEDICINE | Age: 77
End: 2022-06-20
Payer: MEDICARE

## 2022-06-20 DIAGNOSIS — Z79.4 TYPE 2 DIABETES MELLITUS WITH OTHER SPECIFIED COMPLICATION, WITH LONG-TERM CURRENT USE OF INSULIN (HCC): Primary | ICD-10-CM

## 2022-06-20 DIAGNOSIS — E11.69 TYPE 2 DIABETES MELLITUS WITH OTHER SPECIFIED COMPLICATION, WITH LONG-TERM CURRENT USE OF INSULIN (HCC): Primary | ICD-10-CM

## 2022-06-20 DIAGNOSIS — E11.65 HYPERGLYCEMIA DUE TO DIABETES MELLITUS (HCC): ICD-10-CM

## 2022-06-20 PROCEDURE — 3046F HEMOGLOBIN A1C LEVEL >9.0%: CPT | Performed by: PHYSICIAN ASSISTANT

## 2022-06-20 PROCEDURE — 1123F ACP DISCUSS/DSCN MKR DOCD: CPT | Performed by: PHYSICIAN ASSISTANT

## 2022-06-22 LAB
BASOPHILS ABSOLUTE: NORMAL
BASOPHILS RELATIVE PERCENT: NORMAL
EOSINOPHILS ABSOLUTE: NORMAL
EOSINOPHILS RELATIVE PERCENT: NORMAL
HCT VFR BLD CALC: 44 % (ref 36–46)
HEMOGLOBIN: 13.7 G/DL (ref 12–16)
LYMPHOCYTES ABSOLUTE: NORMAL
LYMPHOCYTES RELATIVE PERCENT: NORMAL
MCH RBC QN AUTO: 30.9 PG
MCHC RBC AUTO-ENTMCNC: 31.1 G/DL
MCV RBC AUTO: 99.1 FL
MONOCYTES ABSOLUTE: NORMAL
MONOCYTES RELATIVE PERCENT: NORMAL
NEUTROPHILS ABSOLUTE: NORMAL
NEUTROPHILS RELATIVE PERCENT: NORMAL
PLATELET # BLD: 233 K/ΜL
PMV BLD AUTO: 11.6 FL
RBC # BLD: 4.44 10^6/ΜL
WBC # BLD: 9.11 10^3/ML

## 2022-06-24 LAB
ALBUMIN SERPL-MCNC: NORMAL G/DL
ALP BLD-CCNC: NORMAL U/L
ALT SERPL-CCNC: NORMAL U/L
ANION GAP SERPL CALCULATED.3IONS-SCNC: NORMAL MMOL/L
AST SERPL-CCNC: NORMAL U/L
BASOPHILS ABSOLUTE: 0.12 /ΜL
BASOPHILS RELATIVE PERCENT: 1 %
BILIRUB SERPL-MCNC: NORMAL MG/DL
BILIRUBIN, URINE: NEGATIVE
BLOOD, URINE: NEGATIVE
BUN BLDV-MCNC: 24 MG/DL
BUN BLDV-MCNC: NORMAL MG/DL
CALCIUM SERPL-MCNC: 9.1 MG/DL
CALCIUM SERPL-MCNC: NORMAL MG/DL
CHLORIDE BLD-SCNC: 115 MMOL/L
CHLORIDE BLD-SCNC: NORMAL MMOL/L
CLARITY: CLEAR
CO2: 26 MMOL/L
CO2: NORMAL
COLOR: ABNORMAL
CREAT SERPL-MCNC: 0.9 MG/DL
CREAT SERPL-MCNC: NORMAL MG/DL
EOSINOPHILS ABSOLUTE: 0.26 /ΜL
EOSINOPHILS RELATIVE PERCENT: 2.1 %
GFR CALCULATED: 66
GFR CALCULATED: NORMAL
GLUCOSE BLD-MCNC: 210 MG/DL
GLUCOSE BLD-MCNC: NORMAL MG/DL
GLUCOSE URINE: ABNORMAL
HCT VFR BLD CALC: 44.8 % (ref 36–46)
HEMOGLOBIN: 14.3 G/DL (ref 12–16)
KETONES, URINE: NEGATIVE
LEUKOCYTE ESTERASE, URINE: NEGATIVE
LYMPHOCYTES ABSOLUTE: 4 /ΜL
LYMPHOCYTES RELATIVE PERCENT: 32.3 %
MCH RBC QN AUTO: 31.1 PG
MCHC RBC AUTO-ENTMCNC: 319 G/DL
MCV RBC AUTO: 97.4 FL
MONOCYTES ABSOLUTE: 0.7 /ΜL
MONOCYTES RELATIVE PERCENT: 5.7 %
NEUTROPHILS ABSOLUTE: 7.25 /ΜL
NEUTROPHILS RELATIVE PERCENT: 58.6 %
NITRITE, URINE: ABNORMAL
PH UA: 6 (ref 4.5–8)
PLATELET # BLD: 285 K/ΜL
PMV BLD AUTO: 11.3 FL
POTASSIUM SERPL-SCNC: 4.1 MMOL/L
POTASSIUM SERPL-SCNC: NORMAL MMOL/L
PROTEIN UA: POSITIVE
RBC # BLD: 4.6 10^6/ΜL
SODIUM BLD-SCNC: 153 MMOL/L
SODIUM BLD-SCNC: NORMAL MMOL/L
SPECIFIC GRAVITY, URINE: 1.01
TOTAL PROTEIN: NORMAL
UROBILINOGEN, URINE: NORMAL
WBC # BLD: 12.37 10^3/ML

## 2022-06-27 LAB
BASOPHILS ABSOLUTE: 0.12 /ΜL
BASOPHILS RELATIVE PERCENT: 1.1 %
BUN BLDV-MCNC: 14 MG/DL
CALCIUM SERPL-MCNC: 8.4 MG/DL
CHLORIDE BLD-SCNC: 108 MMOL/L
CO2: 26 MMOL/L
CREAT SERPL-MCNC: 0.82 MG/DL
EOSINOPHILS ABSOLUTE: 0.26 /ΜL
EOSINOPHILS RELATIVE PERCENT: 2.5 %
GFR CALCULATED: 74
GLUCOSE BLD-MCNC: 431 MG/DL
HCT VFR BLD CALC: 39.1 % (ref 36–46)
HEMOGLOBIN: 13.2 G/DL (ref 12–16)
LYMPHOCYTES ABSOLUTE: 2.43 /ΜL
LYMPHOCYTES RELATIVE PERCENT: 23 %
MCH RBC QN AUTO: 32.8 PG
MCHC RBC AUTO-ENTMCNC: 33.8 G/DL
MCV RBC AUTO: 97.3 FL
MONOCYTES ABSOLUTE: 0.57 /ΜL
MONOCYTES RELATIVE PERCENT: 5.4 %
NEUTROPHILS ABSOLUTE: 7.12 /ΜL
NEUTROPHILS RELATIVE PERCENT: 67.5 %
PLATELET # BLD: 254 K/ΜL
PMV BLD AUTO: 11.6 FL
POTASSIUM SERPL-SCNC: 4.5 MMOL/L
RBC # BLD: 4.02 10^6/ΜL
SODIUM BLD-SCNC: 144 MMOL/L
WBC # BLD: 10.55 10^3/ML

## 2022-06-30 NOTE — PROGRESS NOTES
Subjective:      Patient ID: Tasneem Duran is a pleasant 67 y.o. female who presents today for:  No chief complaint on file. [address] [facility]    Patient seen in memory care unit for elevated blood glucose levels and increased AMS per nursing staff. Patient does have history of DM type II that has been sometimes difficult to control. Patient is eating and drinking well without issue. Currently on Lantus 17 units subcu nightly as well as glimepiride 5 mg p.o. daily. Has been fairly well controlled on this for some time, however lately she is having increased elevated blood glucose levels. See chart. We will plan on addition of adding additional Lantus 20 units at at bedtime and monitoring Accu-Cheks with forward. No further c/o at this time. Patient Active Problem List   Diagnosis    Early onset Alzheimer's dementia without behavioral disturbance ([address]    Memory loss    Syncope and collapse    Altered mental status, unspecified    Arteriosclerosis of coronary artery    Hematuria, unspecified    Postablative hypothyroidism    Personal history of urinary (tract) infections    Chronic rhinitis    Depressive disorder    Diabetes mellitus ([address]    Hypertension    Hyperlipidemia    Mild cognitive disorder    Non-smoker    Hypothyroid    Hypernatremia    DKA, type 2, not at goal [facility])     Past Medical History:   Diagnosis Date    Chronic rhinitis     Depression     Diabetes mellitus ([facility] [address]     Hyperlipidemia     Hypertension     Type 2 diabetes mellitus without complication ([address]      No past surgical history on file.   Social History     Socioeconomic History    Marital status:      Spouse name: Not on file    Number of children: Not on file    Years of education: Not on file    Highest education level: Not on file   Occupational History    Not on file   Tobacco Use    Smoking status: Never Smoker    Smokeless tobacco: Never Used   Substance and Sexual Activity    Alcohol use: Never    Drug use: Never    Sexual activity: Not Currently   Other Topics Concern    Not on file   Social History Narrative    Not on file     Social Determinants of Health     Financial Resource Strain:     Difficulty of Paying Living Expenses: Not on file   Food Insecurity:     Worried About Running Out of Food in the Last Year: Not on file    Daryl of Food in the Last Year: Not on file   Transportation Needs:     Lack of Transportation (Medical): Not on file    Lack of Transportation (Non-Medical): Not on file   Physical Activity:     Days of Exercise per Week: Not on file    Minutes of Exercise per Session: Not on file   Stress:     Feeling of Stress : Not on file   Social Connections:     Frequency of Communication with Friends and Family: Not on file    Frequency of Social Gatherings with Friends and Family: Not on file    Attends Christianity Services: Not on file    Active Member of [address] [place] [address] or Organizations: Not on file    Attends Club or Organization Meetings: Not on file    Marital Status: Not on file   Intimate Partner Violence:     Fear of Current or Ex-Partner: Not on file    Emotionally Abused: Not on file    Physically Abused: Not on file    Sexually Abused: Not on file   Housing Stability:     Unable to Pay for Housing in the Last Year: Not on file    Number of [place] in the Last Year: Not on file    Unstable Housing in the Last Year: Not on file     No family history on file. Allergies   Allergen Reactions    Penicillins     Sulfa Antibiotics     Penicillin G Rash         Review of Systems   Unable to perform ROS: Dementia       Objective: There were no vitals taken for this visit. Physical Exam  Vitals and nursing note reviewed. Constitutional:       General: She is not in acute distress. Appearance: Normal appearance. She is obese. She is not ill-appearing. HENT:      Head: Normocephalic and atraumatic.       Mouth/Throat: Mouth: Mucous membranes are moist.      Pharynx: Oropharynx is clear. Cardiovascular:      Rate and Rhythm: Normal rate. Abdominal:      General: Bowel sounds are normal.      Palpations: Abdomen is soft. Tenderness: There is no abdominal tenderness. Musculoskeletal:         General: No swelling or tenderness. Normal range of motion. Cervical back: Normal range of motion. Skin:     General: Skin is warm and dry. Neurological:      Mental Status: She is alert. Mental status is at baseline. She is disoriented. Sensory: Sensory deficit (?poor sensation in bilateral toes) present. Motor: Weakness present. Coordination: Coordination abnormal.      Comments: Confused; fragmented speech   Psychiatric:         Mood and Affect: Mood normal.      Comments: Oriented to self only; impaired memory  Pleasant         Assessment:       Diagnosis Orders   1. Type 2 diabetes mellitus with other specified complication, with long-term current use of insulin (HCC)           Plan:        Increase Lantus to 20 units subcu nightly. Continue glimepiride glimepiride at current dose. We will follow-up with hemoglobin A1c within the next 3 to 6 months to assess for changes in long-term blood glucose levels. No follow-ups on file. Side effects, adverse effects of the medication prescribed today, as well as treatment plan and result expectations have been discussed withthe patient who expresses understanding and desires to proceed.     Joanna Villareal

## 2022-07-01 ENCOUNTER — OFFICE VISIT (OUTPATIENT)
Dept: GERIATRIC MEDICINE | Age: 77
End: 2022-07-01
Payer: MEDICARE

## 2022-07-01 DIAGNOSIS — Z87.440 HX OF RECURRENT URINARY TRACT INFECTION: ICD-10-CM

## 2022-07-01 DIAGNOSIS — R41.82 ALTERED MENTAL STATUS, UNSPECIFIED ALTERED MENTAL STATUS TYPE: Primary | ICD-10-CM

## 2022-07-01 PROCEDURE — 1123F ACP DISCUSS/DSCN MKR DOCD: CPT | Performed by: PHYSICIAN ASSISTANT

## 2022-07-05 LAB
AVERAGE GLUCOSE: 212
HBA1C MFR BLD: 9 %

## 2022-07-07 ENCOUNTER — OFFICE VISIT (OUTPATIENT)
Dept: GERIATRIC MEDICINE | Age: 77
End: 2022-07-07
Payer: MEDICARE

## 2022-07-07 DIAGNOSIS — E11.40 TYPE 2 DIABETES MELLITUS WITH DIABETIC NEUROPATHY, WITH LONG-TERM CURRENT USE OF INSULIN (HCC): Primary | ICD-10-CM

## 2022-07-07 DIAGNOSIS — R63.5 WEIGHT GAIN, ABNORMAL: ICD-10-CM

## 2022-07-07 DIAGNOSIS — Z79.4 TYPE 2 DIABETES MELLITUS WITH DIABETIC NEUROPATHY, WITH LONG-TERM CURRENT USE OF INSULIN (HCC): Primary | ICD-10-CM

## 2022-07-07 PROCEDURE — 3046F HEMOGLOBIN A1C LEVEL >9.0%: CPT | Performed by: PHYSICIAN ASSISTANT

## 2022-07-07 PROCEDURE — 1123F ACP DISCUSS/DSCN MKR DOCD: CPT | Performed by: PHYSICIAN ASSISTANT

## 2022-07-20 NOTE — PROGRESS NOTES
Subjective:      Patient ID: Marek Hyman is a pleasant 67 y.o. female who presents today for:  No chief complaint on file. 234 [facility]    Patient seen today memory care unit for changes in blood glucose trends, increasing hyperglycemia, as well as follow-up on UTI symptoms. Patient currently not showing any new signs of suprapubic tenderness to palpation or evidence of dysuria. Patient has not Been complaining since being on Macrobid. We will continue through full course. Patient currently taking combination of sliding-scale NovoLog, Lantus Lantus 20 nightly subcu, but upright 5 mg p.o. daily, as well as Tradjenta 5 mg p.o. daily. Patient continues to have elevated blood glucose levels ranging from high 100s to low 300s. A1c poorly controlled. (See lab in chart for details). Unable to establish any ROS with patient due to severe dementia. We will increase glimepiride to 6 mg p.o. daily as well as an increase in Lantus 23 units subcu nightly. Follow-up with blood glucose monitoring at later date. Patient Active Problem List   Diagnosis    Early onset Alzheimer's dementia without behavioral disturbance (HCC)    Memory loss    Syncope and collapse    Altered mental status, unspecified    Arteriosclerosis of coronary artery    Hematuria, unspecified    Postablative hypothyroidism    Personal history of urinary (tract) infections    Chronic rhinitis    Depressive disorder    Diabetes mellitus ([address]    Hypertension    Hyperlipidemia    Mild cognitive disorder    Non-smoker    Hypothyroid    Hypernatremia    DKA, type 2, not at goal [facility])     Past Medical History:   Diagnosis Date    Chronic rhinitis     Depression     Diabetes mellitus ([facility] [address]     Hyperlipidemia     Hypertension     Type 2 diabetes mellitus without complication ([address]      No past surgical history on file.   Social History     Socioeconomic History    Marital status:      Spouse name: Not on file    Number of children: Not on file    Years of education: Not on file    Highest education level: Not on file   Occupational History    Not on file   Tobacco Use    Smoking status: Never    Smokeless tobacco: Never   Substance and Sexual Activity    Alcohol use: Never    Drug use: Never    Sexual activity: Not Currently   Other Topics Concern    Not on file   Social History Narrative    Not on file     Social Determinants of Health     Financial Resource Strain: Not on file   Food Insecurity: Not on file   Transportation Needs: Not on file   Physical Activity: Not on file   Stress: Not on file   Social Connections: Not on file   Intimate Partner Violence: Not on file   Housing Stability: Not on file     No family history on file. Allergies   Allergen Reactions    Penicillins     Sulfa Antibiotics     Penicillin G Rash         Review of Systems   Unable to perform ROS: Dementia     Objective:   SEE CHART FOR VS.    Physical Exam  Vitals and nursing note reviewed. Constitutional:       General: She is not in acute distress. Appearance: Normal appearance. She is obese. She is not ill-appearing. HENT:      Head: Normocephalic and atraumatic. Mouth/Throat:      Mouth: Mucous membranes are moist.      Pharynx: Oropharynx is clear. Cardiovascular:      Rate and Rhythm: Normal rate. Abdominal:      General: Bowel sounds are normal.      Palpations: Abdomen is soft. Tenderness: There is no abdominal tenderness. Musculoskeletal:         General: No swelling or tenderness. Normal range of motion. Cervical back: Normal range of motion. Skin:     General: Skin is warm and dry. Neurological:      Mental Status: She is alert. Mental status is at baseline. She is disoriented. Sensory: Sensory deficit (?poor sensation in bilateral toes) present. Motor: Weakness present.       Coordination: Coordination abnormal.      Comments: Confused; fragmented speech   Psychiatric:         Mood and Affect: Mood normal.      Comments: Oriented to self only         Assessment:       Diagnosis Orders   1. Type 2 diabetes mellitus with other specified complication, with long-term current use of insulin ([facility] [address]        2. Hyperglycemia due to diabetes mellitus (HCC)              Plan:      Increase glimepiride to 6 mg p.o. daily, increase Lantus to 20 units subcu nightly. Maintain remaining medications. Monitor Accu-Cheks routinely. Reassess Accu-Chek trends at next visit. No follow-ups on file. Side effects, adverse effects of the medication prescribed today, as well as treatment plan and result expectations have been discussed withthe patient who expresses understanding and desires to proceed.     Fermin Blanchard, [address]

## 2022-07-31 NOTE — PROGRESS NOTES
Subjective:      Patient ID: Ciera Ham is a pleasant 67 y.o. female who presents today for:  No chief complaint on file. [address] [place] [address]    Patient seen today for complaint of some ultimately status has been a reoccurring issue lately. She recently did finish Macrobid for history of recurrent UTI. No new evidence of dysuria, suprapubic tenderness, or change in urinary status. No new foul-smelling urine per nursing staff and aids. Did perform physical exam and interview with patient today in memory care unit due to advanced dementia. No new significant change to physical.  Patient is mobile and otherwise stable from a cognitive standpoint. No new stepwise decline. No new behaviors. Continue to monitor and will perform further UA C&S if needed as well as further lab work such as renal panel patient is eating and drinking adequately at this point we will continue current care plan unless any further changes occur. Patient Active Problem List   Diagnosis    Early onset Alzheimer's dementia without behavioral disturbance (HCC)    Memory loss    Syncope and collapse    Altered mental status, unspecified    Arteriosclerosis of coronary artery    Hematuria, unspecified    Postablative hypothyroidism    Personal history of urinary (tract) infections    Chronic rhinitis    Depressive disorder    Diabetes mellitus ([facility] [address]    Hypertension    Hyperlipidemia    Mild cognitive disorder    Non-smoker    Hypothyroid    Hypernatremia    DKA, type 2, not at goal [facility])     Past Medical History:   Diagnosis Date    Chronic rhinitis     Depression     Diabetes mellitus ([facility] [address]     Hyperlipidemia     Hypertension     Type 2 diabetes mellitus without complication ([facility] [address]      No past surgical history on file.   Social History     Socioeconomic History    Marital status:      Spouse name: Not on file    Number of children: Not on file    Years of education: Not on file    Highest education level: Not on file   Occupational History    Not on file   Tobacco Use    Smoking status: Never    Smokeless tobacco: Never   Substance and Sexual Activity    Alcohol use: Never    Drug use: Never    Sexual activity: Not Currently   Other Topics Concern    Not on file   Social History Narrative    Not on file     Social Determinants of Health     Financial Resource Strain: Not on file   Food Insecurity: Not on file   Transportation Needs: Not on file   Physical Activity: Not on file   Stress: Not on file   Social Connections: Not on file   Intimate Partner Violence: Not on file   Housing Stability: Not on file     No family history on file. Allergies   Allergen Reactions    Penicillins     Sulfa Antibiotics     Penicillin G Rash         Review of Systems   Unable to perform ROS: Dementia     Objective:       Physical Exam  Vitals (See VS monthly chart) and nursing note reviewed. Constitutional:       General: She is not in acute distress. Appearance: Normal appearance. She is obese. She is not ill-appearing. HENT:      Head: Normocephalic and atraumatic. Mouth/Throat:      Mouth: Mucous membranes are moist.      Pharynx: Oropharynx is clear. Eyes:      General: No scleral icterus. Right eye: No discharge. Left eye: No discharge. Conjunctiva/sclera: Conjunctivae normal.      Pupils: Pupils are equal, round, and reactive to light. Cardiovascular:      Rate and Rhythm: Normal rate. Abdominal:      General: Bowel sounds are normal.      Palpations: Abdomen is soft. Tenderness: There is no abdominal tenderness. Musculoskeletal:         General: No swelling, tenderness or deformity. Normal range of motion. Cervical back: Normal range of motion. Right lower leg: Edema (mild) present. Left lower leg: Edema (mild) present. Skin:     General: Skin is warm and dry. Neurological:      Mental Status: She is alert. Mental status is at baseline. She is disoriented.       Motor: Weakness present. Coordination: Coordination abnormal.      Comments: Confused; fragmented speech   Psychiatric:         Mood and Affect: Mood normal.      Comments: Oriented to self only         Assessment:       Diagnosis Orders   1. Altered mental status, unspecified altered mental status type        2. Hx of recurrent urinary tract infection              Plan:        Patient showing any signs of significant change in mental status. Does have ongoing progressive dementia. No new stepwise decline at the moment. Continue current medications. Continue monitoring for dysuria and foul-smelling urine due to patient's history of recurrent UTIs and sometimes poor hygiene. Follow-up as needed. No follow-ups on file. Side effects, adverse effects of the medication prescribed today, as well as treatment plan and result expectations have been discussed withthe patient who expresses understanding and desires to proceed.     Joanna Aviles

## 2022-08-03 ENCOUNTER — OFFICE VISIT (OUTPATIENT)
Dept: GERIATRIC MEDICINE | Age: 77
End: 2022-08-03
Payer: MEDICARE

## 2022-08-03 DIAGNOSIS — G30.0 EARLY ONSET ALZHEIMER'S DEMENTIA WITHOUT BEHAVIORAL DISTURBANCE (HCC): ICD-10-CM

## 2022-08-03 DIAGNOSIS — E11.40 TYPE 2 DIABETES MELLITUS WITH DIABETIC NEUROPATHY, WITH LONG-TERM CURRENT USE OF INSULIN (HCC): Primary | ICD-10-CM

## 2022-08-03 DIAGNOSIS — F02.80 EARLY ONSET ALZHEIMER'S DEMENTIA WITHOUT BEHAVIORAL DISTURBANCE (HCC): ICD-10-CM

## 2022-08-03 DIAGNOSIS — Z79.4 TYPE 2 DIABETES MELLITUS WITH DIABETIC NEUROPATHY, WITH LONG-TERM CURRENT USE OF INSULIN (HCC): Primary | ICD-10-CM

## 2022-08-03 PROCEDURE — 1123F ACP DISCUSS/DSCN MKR DOCD: CPT | Performed by: PHYSICIAN ASSISTANT

## 2022-08-03 PROCEDURE — 3046F HEMOGLOBIN A1C LEVEL >9.0%: CPT | Performed by: PHYSICIAN ASSISTANT

## 2022-08-08 PROBLEM — N39.0 RECURRENT UTI (URINARY TRACT INFECTION): Status: ACTIVE | Noted: 2018-11-07

## 2022-08-08 PROBLEM — Z12.31 ENCOUNTER FOR SCREENING MAMMOGRAM FOR MALIGNANT NEOPLASM OF BREAST: Status: ACTIVE | Noted: 2018-01-19

## 2022-08-08 NOTE — PROGRESS NOTES
Subjective:      Patient ID: Rina Nuñez is a pleasant 67 y.o. female who presents today for:  No chief complaint on file. 234 [facility]    Patient seen in memory care unit today to go over her hemoglobin A1c of 9.1%. Patient has had weight gain as well with the past several months from 135.6 LBS early spring 2/1/1952 4 pounds in June. Patient is currently on a combination of glipizide, NovoLog with meals and Lantus 15 units at bedtime. Also takes Tradjenta 5 mg p.o. daily every day. Intermittent issues with medication compliance also refusing secondary to dementia. She maintains a good appetite, and this could be contributing to her weight gain. There is no evident fluid overload on exam.  Overall uneventful visit today as patient cannot provide adequate history. Physical exam was the same, it does appear we will intensify insulin regimen by increasing Lantus (see orders). May add additional Humalog coverage for mealtimes if Accu-Cheks deemed necessary in the coming weeks. Patient Active Problem List   Diagnosis    Early onset Alzheimer's dementia without behavioral disturbance (HCC)    Memory loss    Syncope and collapse    Altered mental status, unspecified    Arteriosclerosis of coronary artery    Hematuria, unspecified    Postablative hypothyroidism    Personal history of urinary (tract) infections    Chronic rhinitis    Depressive disorder    Diabetes mellitus ([address]    Hypertension    Hyperlipidemia    Mild cognitive disorder    Non-smoker    Hypothyroid    Hypernatremia    DKA, type 2, not at goal [facility])    Encounter for screening mammogram for malignant neoplasm of breast    Recurrent UTI (urinary tract infection)     Past Medical History:   Diagnosis Date    Chronic rhinitis     Depression     Diabetes mellitus ([address]     Hyperlipidemia     Hypertension     Type 2 diabetes mellitus without complication ([address]      No past surgical history on file.   Social History Socioeconomic History    Marital status:      Spouse name: Not on file    Number of children: Not on file    Years of education: Not on file    Highest education level: Not on file   Occupational History    Not on file   Tobacco Use    Smoking status: Never    Smokeless tobacco: Never   Substance and Sexual Activity    Alcohol use: Never    Drug use: Never    Sexual activity: Not Currently   Other Topics Concern    Not on file   Social History Narrative    Not on file     Social Determinants of Health     Financial Resource Strain: Not on file   Food Insecurity: Not on file   Transportation Needs: Not on file   Physical Activity: Not on file   Stress: Not on file   Social Connections: Not on file   Intimate Partner Violence: Not on file   Housing Stability: Not on file     No family history on file. Allergies   Allergen Reactions    Penicillins     Sulfa Antibiotics     Penicillin G Rash         Review of Systems   Unable to perform ROS: Dementia     Objective: There were no vitals taken for this visit. Physical Exam  Vitals (See VS monthly chart) and nursing note reviewed. Constitutional:       General: She is not in acute distress. Appearance: Normal appearance. She is obese. She is not ill-appearing. HENT:      Head: Normocephalic and atraumatic. Mouth/Throat:      Mouth: Mucous membranes are moist.      Pharynx: Oropharynx is clear. Eyes:      General: No scleral icterus. Right eye: No discharge. Left eye: No discharge. Conjunctiva/sclera: Conjunctivae normal.      Pupils: Pupils are equal, round, and reactive to light. Cardiovascular:      Rate and Rhythm: Normal rate and regular rhythm. Pulses: Normal pulses. Abdominal:      General: Bowel sounds are normal.      Palpations: Abdomen is soft. Tenderness: There is no abdominal tenderness. Musculoskeletal:         General: No swelling, tenderness or deformity. Normal range of motion. Cervical back: Normal range of motion. Right lower leg: Edema (mild) present. Left lower leg: Edema (mild) present. Skin:     General: Skin is warm and dry. Coloration: Skin is pale. Skin is not jaundiced. Neurological:      Mental Status: She is alert. Mental status is at baseline. She is disoriented. Sensory: Sensory deficit (?  Lack of sensation to bilateral toes with light touch.) present. Motor: Weakness present. Coordination: Coordination abnormal.      Comments: Confused; fragmented speech   Psychiatric:         Mood and Affect: Mood normal.      Comments: Oriented to self only         Assessment:       Diagnosis Orders   1. Type 2 diabetes mellitus with diabetic neuropathy, with long-term current use of insulin ([facility] [address]        2. Weight gain, abnormal              Plan: We will adjust patient's Lantus and based on her most recent Accu-Cheks and hemoglobin A1c of 9.1%. Encourage medication compliance, encourage nursing staff to attempt multiple routes administration of oral medications such as utilize to help deliver. No follow-ups on file. Side effects, adverse effects of the medication prescribed today, as well as treatment plan and result expectations have been discussed withthe patient who expresses understanding and desires to proceed.     Joanna Akbar

## 2022-08-17 ENCOUNTER — OFFICE VISIT (OUTPATIENT)
Dept: GERIATRIC MEDICINE | Age: 77
End: 2022-08-17
Payer: MEDICARE

## 2022-08-17 DIAGNOSIS — E11.40 TYPE 2 DIABETES MELLITUS WITH DIABETIC NEUROPATHY, WITH LONG-TERM CURRENT USE OF INSULIN (HCC): ICD-10-CM

## 2022-08-17 DIAGNOSIS — Z79.4 TYPE 2 DIABETES MELLITUS WITH DIABETIC NEUROPATHY, WITH LONG-TERM CURRENT USE OF INSULIN (HCC): ICD-10-CM

## 2022-08-17 DIAGNOSIS — Z97.8 USES SELF-APPLIED CONTINUOUS GLUCOSE MONITORING DEVICE: Primary | ICD-10-CM

## 2022-08-17 PROCEDURE — 1123F ACP DISCUSS/DSCN MKR DOCD: CPT | Performed by: PHYSICIAN ASSISTANT

## 2022-08-17 PROCEDURE — 3046F HEMOGLOBIN A1C LEVEL >9.0%: CPT | Performed by: PHYSICIAN ASSISTANT

## 2022-08-23 ENCOUNTER — OFFICE VISIT (OUTPATIENT)
Dept: GERIATRIC MEDICINE | Age: 77
End: 2022-08-23
Payer: MEDICARE

## 2022-08-23 DIAGNOSIS — R19.5 LOOSE STOOLS: Primary | ICD-10-CM

## 2022-08-23 DIAGNOSIS — R21 RASH OF NECK: ICD-10-CM

## 2022-08-23 PROCEDURE — 1123F ACP DISCUSS/DSCN MKR DOCD: CPT | Performed by: PHYSICIAN ASSISTANT

## 2022-08-26 LAB
ANION GAP SERPL CALCULATED.3IONS-SCNC: 10 MEQ/L (ref 9–15)
BUN BLDV-MCNC: 13 MG/DL (ref 8–23)
CALCIUM SERPL-MCNC: 8.8 MG/DL (ref 8.5–9.9)
CHLORIDE BLD-SCNC: 111 MEQ/L (ref 95–107)
CO2: 26 MEQ/L (ref 20–31)
CREAT SERPL-MCNC: 0.76 MG/DL (ref 0.5–0.9)
GFR AFRICAN AMERICAN: >60
GFR NON-AFRICAN AMERICAN: >60
GLUCOSE BLD-MCNC: 64 MG/DL (ref 70–99)
HCT VFR BLD CALC: 35.8 % (ref 37–47)
HEMOGLOBIN: 12.2 G/DL (ref 12–16)
MCH RBC QN AUTO: 32.2 PG (ref 27–31.3)
MCHC RBC AUTO-ENTMCNC: 34.1 % (ref 33–37)
MCV RBC AUTO: 94.5 FL (ref 82–100)
PDW BLD-RTO: 13.9 % (ref 11.5–14.5)
PLATELET # BLD: 220 K/UL (ref 130–400)
POTASSIUM SERPL-SCNC: 3.8 MEQ/L (ref 3.4–4.9)
RBC # BLD: 3.79 M/UL (ref 4.2–5.4)
SODIUM BLD-SCNC: 147 MEQ/L (ref 135–144)
WBC # BLD: 7.6 K/UL (ref 4.8–10.8)

## 2022-09-06 ENCOUNTER — OFFICE VISIT (OUTPATIENT)
Dept: GERIATRIC MEDICINE | Age: 77
End: 2022-09-06
Payer: MEDICARE

## 2022-09-06 DIAGNOSIS — G30.0 EARLY ONSET ALZHEIMER'S DEMENTIA WITHOUT BEHAVIORAL DISTURBANCE (HCC): ICD-10-CM

## 2022-09-06 DIAGNOSIS — E78.5 HYPERLIPIDEMIA, UNSPECIFIED HYPERLIPIDEMIA TYPE: ICD-10-CM

## 2022-09-06 DIAGNOSIS — Z79.4 TYPE 2 DIABETES MELLITUS WITH DIABETIC NEUROPATHY, WITH LONG-TERM CURRENT USE OF INSULIN (HCC): ICD-10-CM

## 2022-09-06 DIAGNOSIS — I10 HYPERTENSION, UNSPECIFIED TYPE: ICD-10-CM

## 2022-09-06 DIAGNOSIS — E89.0 POSTABLATIVE HYPOTHYROIDISM: ICD-10-CM

## 2022-09-06 DIAGNOSIS — N39.0 RECURRENT UTI: Primary | ICD-10-CM

## 2022-09-06 DIAGNOSIS — F02.80 EARLY ONSET ALZHEIMER'S DEMENTIA WITHOUT BEHAVIORAL DISTURBANCE (HCC): ICD-10-CM

## 2022-09-06 DIAGNOSIS — Z85.3 HISTORY OF BREAST CANCER IN ADULTHOOD: ICD-10-CM

## 2022-09-06 DIAGNOSIS — E11.40 TYPE 2 DIABETES MELLITUS WITH DIABETIC NEUROPATHY, WITH LONG-TERM CURRENT USE OF INSULIN (HCC): ICD-10-CM

## 2022-09-06 DIAGNOSIS — F32.A DEPRESSIVE DISORDER: ICD-10-CM

## 2022-09-06 PROCEDURE — 1123F ACP DISCUSS/DSCN MKR DOCD: CPT | Performed by: PHYSICIAN ASSISTANT

## 2022-09-06 PROCEDURE — 3046F HEMOGLOBIN A1C LEVEL >9.0%: CPT | Performed by: PHYSICIAN ASSISTANT

## 2022-09-07 PROBLEM — Z12.31 ENCOUNTER FOR SCREENING MAMMOGRAM FOR MALIGNANT NEOPLASM OF BREAST: Status: RESOLVED | Noted: 2018-01-19 | Resolved: 2022-09-07

## 2022-09-07 NOTE — PROGRESS NOTES
Subjective:      Patient ID: Ratna Stallworth is a pleasant 67 y.o. female who presents today for:  Chief Complaint   Patient presents with    Other     DM2,          234 [facility]    Patient seen on memory care unit for DM2. Pt is oriented to self only. Poor hx. Accuchecks beginning to show elevations. Will plan on increasing pt Lantus slightly to improve avg BG. Family to be updated. No fruther issues. Patient Active Problem List   Diagnosis    Early onset Alzheimer's dementia without behavioral disturbance (HCC)    Memory loss    Syncope and collapse    Altered mental status, unspecified    Arteriosclerosis of coronary artery    Hematuria, unspecified    Postablative hypothyroidism    Personal history of urinary (tract) infections    Chronic rhinitis    Depressive disorder    Diabetes mellitus ([facility]    Hypertension    Hyperlipidemia    Mild cognitive disorder    Non-smoker    Hypothyroid    Hypernatremia    DKA, type 2, not at goal [facility])    Recurrent UTI (urinary tract infection)     Past Medical History:   Diagnosis Date    Chronic rhinitis     Depression     Diabetes mellitus ([facility] [address]     Hyperlipidemia     Hypertension     Type 2 diabetes mellitus without complication ([facility] [address]      No past surgical history on file.   Social History     Socioeconomic History    Marital status:      Spouse name: Not on file    Number of children: Not on file    Years of education: Not on file    Highest education level: Not on file   Occupational History    Not on file   Tobacco Use    Smoking status: Never    Smokeless tobacco: Never   Substance and Sexual Activity    Alcohol use: Never    Drug use: Never    Sexual activity: Not Currently   Other Topics Concern    Not on file   Social History Narrative    Not on file     Social Determinants of Health     Financial Resource Strain: Not on file   Food Insecurity: Not on file   Transportation Needs: Not on file   Physical Activity: Not on file   Stress: Not on file   Social Connections: Not on file   Intimate Partner Violence: Not on file   Housing Stability: Not on file     No family history on file. Allergies   Allergen Reactions    Penicillins     Sulfa Antibiotics     Penicillin G Rash         Review of Systems   Unable to perform ROS: Dementia     Objective: There were no vitals taken for this visit. Physical Exam  Vitals (See VS monthly chart) reviewed. Constitutional:       General: She is not in acute distress. Appearance: Normal appearance. She is obese. She is not ill-appearing. HENT:      Head: Normocephalic and atraumatic. Mouth/Throat:      Mouth: Mucous membranes are moist.      Pharynx: Oropharynx is clear. Cardiovascular:      Rate and Rhythm: Normal rate and regular rhythm. Pulses: Normal pulses. Abdominal:      General: Bowel sounds are normal. There is no distension. Palpations: Abdomen is soft. Tenderness: There is no abdominal tenderness. Genitourinary:     Comments: Deferred  Musculoskeletal:         General: No swelling, tenderness or deformity. Normal range of motion. Cervical back: Normal range of motion. Right lower leg: Edema (mild) present. Left lower leg: Edema (mild) present. Skin:     General: Skin is warm and dry. Coloration: Skin is pale. Skin is not jaundiced. Neurological:      Mental Status: She is alert. Mental status is at baseline. She is disoriented. Sensory: Sensory deficit (?  Lack of sensation to bilateral toes with light touch.) present. Motor: Weakness present. Coordination: Coordination abnormal.      Comments: Pleasantly confused throughout visit   Psychiatric:         Mood and Affect: Mood normal.      Comments: Oriented to self only         Assessment:       Diagnosis Orders   1.  Type 2 diabetes mellitus with diabetic neuropathy, with long-term current use of insulin ([facility]        2. Early onset Alzheimer's dementia without behavioral disturbance [facility])              Plan: Will increase Lantus to 33u and cont to monitor fluctations in BG. F/u in 1-2 weeks. No follow-ups on file. Side effects, adverse effects of the medication prescribed today, as well as treatment plan and result expectations have been discussed withthe patient who expresses understanding and desires to proceed.     Joanna Hernandez

## 2022-09-13 ENCOUNTER — OFFICE VISIT (OUTPATIENT)
Dept: GERIATRIC MEDICINE | Age: 77
End: 2022-09-13
Payer: MEDICARE

## 2022-09-13 DIAGNOSIS — E11.65 UNCONTROLLED TYPE 2 DIABETES MELLITUS WITH HYPERGLYCEMIA (HCC): Primary | ICD-10-CM

## 2022-09-13 PROCEDURE — 1123F ACP DISCUSS/DSCN MKR DOCD: CPT | Performed by: PHYSICIAN ASSISTANT

## 2022-09-13 PROCEDURE — 3046F HEMOGLOBIN A1C LEVEL >9.0%: CPT | Performed by: PHYSICIAN ASSISTANT

## 2022-09-14 NOTE — PROGRESS NOTES
Subjective:      Patient ID: Link Warner is a pleasant 67 y.o. female who presents today for:  No chief complaint on file. [address] [facility] [address]    Patient unable to keep freestyle yesi on, seen for DM type II issue. Patient is in memory care unit with DM type II. Blood glucose levels tend to be fairly dynamic. Patient has a sweet tooth and likes to eat desserts frequently. Has had freestyle yesi for several weeks, however at this point she is having difficulty keeping it on, constantly picking at them trying to peel it off of her arm. Today we will begin placing Tegaderm over the area to prevent unnecessary manipulation to avoid costly replacement. Patient Active Problem List   Diagnosis    Early onset Alzheimer's dementia without behavioral disturbance (HCC)    Memory loss    Syncope and collapse    Altered mental status, unspecified    Arteriosclerosis of coronary artery    Hematuria, unspecified    Postablative hypothyroidism    Personal history of urinary (tract) infections    Chronic rhinitis    Depressive disorder    Diabetes mellitus ([facility] [address]    Hypertension    Hyperlipidemia    Mild cognitive disorder    Non-smoker    Hypothyroid    Hypernatremia    DKA, type 2, not at goal [facility])    Recurrent UTI (urinary tract infection)     Past Medical History:   Diagnosis Date    Chronic rhinitis     Depression     Diabetes mellitus ([facility] [address]     Hyperlipidemia     Hypertension     Type 2 diabetes mellitus without complication ([facility] [address]      No past surgical history on file.   Social History     Socioeconomic History    Marital status:      Spouse name: Not on file    Number of children: Not on file    Years of education: Not on file    Highest education level: Not on file   Occupational History    Not on file   Tobacco Use    Smoking status: Never    Smokeless tobacco: Never   Substance and Sexual Activity    Alcohol use: Never    Drug use: Never    Sexual activity: Not Currently   Other Topics Concern    Not on file   Social History Narrative    Not on file     Social Determinants of Health     Financial Resource Strain: Not on file   Food Insecurity: Not on file   Transportation Needs: Not on file   Physical Activity: Not on file   Stress: Not on file   Social Connections: Not on file   Intimate Partner Violence: Not on file   Housing Stability: Not on file     No family history on file. Allergies   Allergen Reactions    Penicillins     Sulfa Antibiotics     Penicillin G Rash         Review of Systems   Unable to perform ROS: Dementia     Objective: There were no vitals taken for this visit. Physical Exam  Vitals (See VS monthly chart) and nursing note reviewed. Constitutional:       General: She is not in acute distress. Appearance: Normal appearance. She is obese. She is not ill-appearing. HENT:      Head: Normocephalic and atraumatic. Mouth/Throat:      Mouth: Mucous membranes are moist.      Pharynx: Oropharynx is clear. Eyes:      Conjunctiva/sclera: Conjunctivae normal.   Cardiovascular:      Rate and Rhythm: Normal rate and regular rhythm. Pulmonary:      Effort: Pulmonary effort is normal. No respiratory distress. Breath sounds: Normal breath sounds. No stridor. No rhonchi. Abdominal:      General: Bowel sounds are normal.      Palpations: Abdomen is soft. Musculoskeletal:      Right lower leg: Right lower leg edema: mild. Left lower leg: Left lower leg edema: mild. Skin:     General: Skin is warm and dry. Coloration: Skin is pale (chronic). Skin is not jaundiced. Neurological:      Mental Status: She is alert. Mental status is at baseline. She is disoriented. Comments: Confused; fragmented speech   Psychiatric:         Mood and Affect: Mood normal.      Comments: Oriented to self only. Difficult to redirect from attempted Aiken removal.       Assessment:       Diagnosis Orders   1. Uses self-applied continuous glucose monitoring device        2. Type 2 diabetes mellitus with diabetic neuropathy, with long-term current use of insulin ([facility] [address]              Plan: Will apply Tegaderm and possible Tubigrip over the affected area. Attempt to redirect patient as much as possible when noticing attempt to grab/pick at [employer]. Utilize Accu-Cheks when/if patient removes yesi inadvertently. No follow-ups on file. Side effects, adverse effects of the medication prescribed today, as well as treatment plan and result expectations have been discussed withthe patient who expresses understanding and desires to proceed.     Joanna Thomas

## 2022-09-15 NOTE — PROGRESS NOTES
Subjective:      Patient ID: Jackie Mcrae is a pleasant 67 y.o. female who presents today for:  Chief Complaint   Patient presents with    Other     Neck rash, increased loose stools       [address] [facility]    Patient seen today for loose stools started week 1 week ago present week ago. Patient had questionable rash in her neck. Patient presents with nursing staff for request for evaluation. Patient currently pain-free. Oral intake is questionable. Patient Active Problem List   Diagnosis    Early onset Alzheimer's dementia without behavioral disturbance (HCC)    Memory loss    Syncope and collapse    Altered mental status, unspecified    Arteriosclerosis of coronary artery    Hematuria, unspecified    Postablative hypothyroidism    Personal history of urinary (tract) infections    Chronic rhinitis    Depressive disorder    Diabetes mellitus ([facility] [address]    Hypertension    Hyperlipidemia    Mild cognitive disorder    Non-smoker    Hypothyroid    Hypernatremia    DKA, type 2, not at goal [facility])    Recurrent UTI (urinary tract infection)     Past Medical History:   Diagnosis Date    Chronic rhinitis     Depression     Diabetes mellitus ([facility] [address]     Hyperlipidemia     Hypertension     Type 2 diabetes mellitus without complication ([facility] [address]      No past surgical history on file.   Social History     Socioeconomic History    Marital status:      Spouse name: Not on file    Number of children: Not on file    Years of education: Not on file    Highest education level: Not on file   Occupational History    Not on file   Tobacco Use    Smoking status: Never    Smokeless tobacco: Never   Substance and Sexual Activity    Alcohol use: Never    Drug use: Never    Sexual activity: Not Currently   Other Topics Concern    Not on file   Social History Narrative    Not on file     Social Determinants of Health     Financial Resource Strain: Not on file   Food Insecurity: Not on file   Transportation Needs: Not on file Physical Activity: Not on file   Stress: Not on file   Social Connections: Not on file   Intimate Partner Violence: Not on file   Housing Stability: Not on file     No family history on file. Allergies   Allergen Reactions    Penicillins     Sulfa Antibiotics     Penicillin G Rash         Review of Systems   Unable to perform ROS: Dementia     Objective:       Physical Exam  Vitals (See VS monthly chart) and nursing note reviewed. Constitutional:       General: She is not in acute distress. Appearance: Normal appearance. She is obese. She is not ill-appearing. HENT:      Head: Normocephalic and atraumatic. Mouth/Throat:      Mouth: Mucous membranes are moist.      Pharynx: Oropharynx is clear. Eyes:      Conjunctiva/sclera: Conjunctivae normal.   Cardiovascular:      Rate and Rhythm: Normal rate and regular rhythm. Pulmonary:      Effort: Pulmonary effort is normal. No respiratory distress. Breath sounds: Normal breath sounds. No stridor. No rhonchi. Abdominal:      General: Bowel sounds are normal.      Palpations: Abdomen is soft. Musculoskeletal:      Right lower leg: Right lower leg edema: mild. Left lower leg: Left lower leg edema: mild. Skin:     General: Skin is warm and dry. Coloration: Skin is pale (chronic). Skin is not jaundiced. Comments: Small rash without blisters or tracking   Neurological:      Mental Status: She is alert. Mental status is at baseline. She is disoriented. Comments: Confused; fragmented speech   Psychiatric:         Mood and Affect: Mood normal.      Comments: Oriented to self only. Difficult to redirect from attempted [facility] removal.       Assessment:       Diagnosis Orders   1. Loose stools        2. Rash of neck              Plan: We will check the patient stool for C. difficile? Consider starting Imodium. Refer to nursing also detailed.   Consider the hydrocortisone cream.  We will follow CBC BMP reevaluate as needed. No follow-ups on file. Side effects, adverse effects of the medication prescribed today, as well as treatment plan and result expectations have been discussed withthe patient who expresses understanding and desires to proceed.     Joanna Wright

## 2022-09-20 PROBLEM — R19.7 DIARRHEA: Status: ACTIVE | Noted: 2021-02-16

## 2022-09-20 PROBLEM — F33.9 RECURRENT MAJOR DEPRESSIVE EPISODES (HCC): Status: ACTIVE | Noted: 2021-02-16

## 2022-09-20 PROBLEM — E03.9 HYPOTHYROIDISM: Status: ACTIVE | Noted: 2021-02-16

## 2022-09-20 PROBLEM — E87.6 HYPOKALEMIA: Status: ACTIVE | Noted: 2021-02-16

## 2022-09-20 PROBLEM — Z85.3 HISTORY OF BREAST CANCER IN ADULTHOOD: Status: ACTIVE | Noted: 2022-09-20

## 2022-09-20 NOTE — PROGRESS NOTES
Subjective:      Patient ID: Amarilys Escalante is a pleasant 67 y.o. female who presents today for:  No chief complaint on file. [address] [place] [address]    Patient being seen today for annual visit for hypertension, early onset Alzheimer's dementia, depressive disorder, diabetes mellitus, history of breast cancer, hyperlipidemia, hypothyroidism, hypokalemia, and recurrent UTI. Patient has been near or around baseline for the past several months. She has had some frequent issues with elevated blood glucose levels. Currently on sliding-scale Humalog, 6 mg glimepiride daily, and Lantus 33 units at night. Did have freestyle yesi in place however frequently ripping off the sensor early on in application, wasting its use. Due to this family and I decided to discontinue due to cost.  No new evidence of depression anxiety, no new behaviors. She does have advanced dementia and very poor recall. Unable to establish any ROS or significant dialogue. She is pleasant and very friendly on a regular basis. Patient is currently a full code, allergies to sulfa and PCN, her diet is carb controlled. We will need to review vaccinations today, no evidence of pneumococcal or Shingrix vaccines. Patient is up-to-date on COVID-19 vaccinations including dual series and first and second booster, most recent 7/12/2022. She is eating and drinking well and maintaining her weight. Only main issue is her blood glucose levels and monitoring these. Nursing staff report no additional concerns at the moment. There is no obvious new gross to chest wall. We will continue monitoring via nursing staff and as needed visits. We will find out if family has any plans if any recurrence.     Patient Active Problem List   Diagnosis    Early onset Alzheimer's dementia without behavioral disturbance (HCC)    Memory loss    Syncope and collapse    Altered mental status, unspecified    Arteriosclerosis of coronary artery    Hematuria, unspecified    Postablative hypothyroidism    Personal history of urinary (tract) infections    Chronic rhinitis    Depressive disorder    Diabetes mellitus ([facility]    Hypertension    Hyperlipidemia    Mild cognitive disorder    Non-smoker    Hypothyroidism    Hypernatremia    DKA, type 2, not at goal [facility])    Recurrent UTI (urinary tract infection)    Diarrhea    Hypokalemia    Recurrent major depressive episodes ([facility]    Uncontrolled type 2 diabetes mellitus ([facility]    History of breast cancer in adulthood     Past Medical History:   Diagnosis Date    Chronic rhinitis     Depression     Diabetes mellitus ([facility]     Hyperlipidemia     Hypertension     Type 2 diabetes mellitus without complication ([facility]      No past surgical history on file. Social History     Socioeconomic History    Marital status:      Spouse name: Not on file    Number of children: Not on file    Years of education: Not on file    Highest education level: Not on file   Occupational History    Not on file   Tobacco Use    Smoking status: Never    Smokeless tobacco: Never   Substance and Sexual Activity    Alcohol use: Never    Drug use: Never    Sexual activity: Not Currently   Other Topics Concern    Not on file   Social History Narrative    Not on file     Social Determinants of Health     Financial Resource Strain: Not on file   Food Insecurity: Not on file   Transportation Needs: Not on file   Physical Activity: Not on file   Stress: Not on file   Social Connections: Not on file   Intimate Partner Violence: Not on file   Housing Stability: Not on file     No family history on file. Allergies   Allergen Reactions    Penicillins     Sulfa Antibiotics     Penicillin G Rash         Review of Systems   Unable to perform ROS: Dementia     Objective:   VS: See Karla Reyese. Reviewed. Physical Exam  Vitals (See VS monthly chart) and nursing note reviewed. Constitutional:       General: She is not in acute distress. Appearance: Normal appearance. levothyroxine. We will check TSH/free T4 during annual labs. 3.  Patient continues to struggle with BG control. Dementia complicates. DC freestyle yesi due to poor adherence. Continue Accu-Cheks routinely prior to meals. Will increase glimepiride to 4 mg p.o. every morning and 3 mg p.o. nightly. See orders for increase in sliding scale Humalog and Lantus. A1c along with annual labs. 4.  No new changes. No new acute behaviors. Continue monitoring and assist with ADLs. 5.  Noted evidence of depression. Continue monitoring. Continue current medications. Follow with psych 360  6. We will recheck annual labs. No new changes to meds or orders. 7.  BP well controlled see mother vital sign chart. No follow-ups on file. Joanna Villalta        Please note orders entered on site at facility after discussion with appropriate facility nursing/therapy/ / nutritional staff. Current longstanding medical problems and acute medical issues addressed with staff. Available data and data elements in on site paper chart reviewed and analyzed. Current external consultant notes reviewed in on site chart. Ordered laboratory testing and imaging will be reviewed when available. This patient's need for psychiatric medication has been reviewed. Will consider further adjustment and possible further evaluations by mental health services. Side effects, adverse effects of the medication prescribed today, as well as treatment plan and result expectations have been discussed withthe patient who expresses understanding and desires to proceed.

## 2022-09-21 NOTE — PROGRESS NOTES
Subjective:      Patient ID: Minnie Guzmán is a pleasant 67 y.o. female who presents today for:  No chief complaint on file. [address] [facility]  Patient seen today for ongoing issues with freestyle yesi CGM. Did attempt after last visit at [employer] and direct placement to an area where patient could not remove. Patient continues to grab at the area and remove Tegaderm and freestyle yesi. At this point we will completely discontinue the CGM for good due to ongoing cost prohibition. Patient Active Problem List   Diagnosis    Early onset Alzheimer's dementia without behavioral disturbance (HCC)    Memory loss    Syncope and collapse    Altered mental status, unspecified    Arteriosclerosis of coronary artery    Hematuria, unspecified    Postablative hypothyroidism    Personal history of urinary (tract) infections    Chronic rhinitis    Depressive disorder    Diabetes mellitus ([facility]    Hypertension    Hyperlipidemia    Mild cognitive disorder    Non-smoker    Hypothyroidism    Hypernatremia    DKA, type 2, not at goal [facility])    Recurrent UTI (urinary tract infection)    Diarrhea    Hypokalemia    Recurrent major depressive episodes ([facility]    Uncontrolled type 2 diabetes mellitus ([address]    History of breast cancer in adulthood     Past Medical History:   Diagnosis Date    Chronic rhinitis     Depression     Diabetes mellitus ([address]     Hyperlipidemia     Hypertension     Type 2 diabetes mellitus without complication ([address]      No past surgical history on file.   Social History     Socioeconomic History    Marital status:      Spouse name: Not on file    Number of children: Not on file    Years of education: Not on file    Highest education level: Not on file   Occupational History    Not on file   Tobacco Use    Smoking status: Never    Smokeless tobacco: Never   Substance and Sexual Activity    Alcohol use: Never    Drug use: Never    Sexual activity: Not Currently   Other Topics Concern Not on file   Social History Narrative    Not on file     Social Determinants of Health     Financial Resource Strain: Not on file   Food Insecurity: Not on file   Transportation Needs: Not on file   Physical Activity: Not on file   Stress: Not on file   Social Connections: Not on file   Intimate Partner Violence: Not on file   Housing Stability: Not on file     No family history on file. Allergies   Allergen Reactions    Penicillins     Sulfa Antibiotics     Penicillin G Rash         Review of Systems   Unable to perform ROS: Dementia     Objective:   VS: See [address] Reviewed. Physical Exam  Vitals (See VS monthly chart) and nursing note reviewed. Constitutional:       General: She is not in acute distress. Appearance: Normal appearance. She is obese. She is not ill-appearing. HENT:      Head: Normocephalic and atraumatic. Mouth/Throat:      Mouth: Mucous membranes are moist.      Pharynx: Oropharynx is clear. Eyes:      Conjunctiva/sclera: Conjunctivae normal.   Cardiovascular:      Rate and Rhythm: Normal rate and regular rhythm. Pulmonary:      Effort: Pulmonary effort is normal. No respiratory distress. Breath sounds: Normal breath sounds. No stridor. No rhonchi. Abdominal:      General: Bowel sounds are normal. There is distension. Palpations: Abdomen is soft. Tenderness: There is no abdominal tenderness. There is no guarding. Genitourinary:     Comments: Deferred  Musculoskeletal:      Cervical back: Normal range of motion and neck supple. No rigidity. Right lower leg: Right lower leg edema: mild. Left lower leg: Left lower leg edema: mild. Skin:     General: Skin is warm and dry. Coloration: Skin is pale (chronic). Skin is not jaundiced. Comments: Small rash without blisters or tracking   Neurological:      Mental Status: She is alert. Mental status is at baseline. She is disoriented. Motor: Weakness present.       Comments: Confused; fragmented speech   Psychiatric:         Mood and Affect: Mood normal.         Behavior: Behavior normal.      Comments: Oriented to self only. Difficult to redirect from attempted [facility] removal.       Assessment:       Diagnosis Orders   1. Uncontrolled type 2 diabetes mellitus with hyperglycemia ([address]              Plan: Will discontinue freestyle yesi. Continue Accu-Cheks prior to meals before any mealtime insulin dosing. See parameters. No follow-ups on file. Joanna Andersen        Please note orders entered on site at facility after discussion with appropriate facility nursing/therapy/ / nutritional staff. Current longstanding medical problems and acute medical issues addressed with staff. Available data and data elements in on site paper chart reviewed and analyzed. Current external consultant notes reviewed in on site chart. Ordered laboratory testing and imaging will be reviewed when available. This patient's need for psychiatric medication has been reviewed. Will consider further adjustment and possible further evaluations by mental health services. Side effects, adverse effects of the medication prescribed today, as well as treatment plan and result expectations have been discussed withthe patient who expresses understanding and desires to proceed.

## 2022-09-27 ENCOUNTER — OFFICE VISIT (OUTPATIENT)
Dept: GERIATRIC MEDICINE | Age: 77
End: 2022-09-27
Payer: MEDICARE

## 2022-09-27 DIAGNOSIS — J06.9 VIRAL URI: Primary | ICD-10-CM

## 2022-09-27 DIAGNOSIS — J34.89 RHINORRHEA: ICD-10-CM

## 2022-09-27 PROCEDURE — 1123F ACP DISCUSS/DSCN MKR DOCD: CPT | Performed by: PHYSICIAN ASSISTANT

## 2022-09-27 NOTE — PROGRESS NOTES
Subjective:      Patient ID: Domonique Moncada is a pleasant 67 y.o. female who presents today for:  No chief complaint on file. [address] [facility]    Patient seen today for concern per nurse of increased rhinorrhea and URI symptoms. Patient on memory care unit. She is eating lunch today. She appears to be increasingly lethargic, dour expression. She is friendly overall however and cooperative during exam.  Lung sounds are clear to auscultation bilaterally throughout, however poor command following. No obvious rhinorrhea noted today. Unable to assess patient's oral mucosa due to poor command following. Appear to be clear with no exudate/erythema to oropharynx. No apparent difficulty swallowing food or fluids. Good appetite during patient's visit. We will monitor patient by assessing COVID-19 POCT and flu test as well. We will treat symptoms with Tylenol and congestion medication if worsens. For now watchful waiting will occur. Monitor blood sugars for any acute changes during likely viral infection.       Patient Active Problem List   Diagnosis    Early onset Alzheimer's dementia without behavioral disturbance (HCC)    Memory loss    Syncope and collapse    Altered mental status, unspecified    Arteriosclerosis of coronary artery    Hematuria, unspecified    Postablative hypothyroidism    Personal history of urinary (tract) infections    Chronic rhinitis    Depressive disorder    Diabetes mellitus ([address]    Hypertension    Hyperlipidemia    Mild cognitive disorder    Non-smoker    Hypothyroidism    Hypernatremia    DKA, type 2, not at goal [facility])    Recurrent UTI (urinary tract infection)    Diarrhea    Hypokalemia    Recurrent major depressive episodes ([address]    Uncontrolled type 2 diabetes mellitus ([address]    History of breast cancer in adulthood     Past Medical History:   Diagnosis Date    Chronic rhinitis     Depression     Diabetes mellitus ([address]     Hyperlipidemia     Hypertension Type 2 diabetes mellitus without complication (HCC)      No past surgical history on file. Social History     Socioeconomic History    Marital status:      Spouse name: Not on file    Number of children: Not on file    Years of education: Not on file    Highest education level: Not on file   Occupational History    Not on file   Tobacco Use    Smoking status: Never    Smokeless tobacco: Never   Substance and Sexual Activity    Alcohol use: Never    Drug use: Never    Sexual activity: Not Currently   Other Topics Concern    Not on file   Social History Narrative    Not on file     Social Determinants of Health     Financial Resource Strain: Not on file   Food Insecurity: Not on file   Transportation Needs: Not on file   Physical Activity: Not on file   Stress: Not on file   Social Connections: Not on file   Intimate Partner Violence: Not on file   Housing Stability: Not on file     No family history on file. Allergies   Allergen Reactions    Penicillins     Sulfa Antibiotics     Penicillin G Rash         Review of Systems   Unable to perform ROS: Dementia     Objective:   VS: See [address]. Reviewed. Physical Exam  Constitutional:       General: She is not in acute distress. Appearance: She is well-developed and normal weight. She is ill-appearing. She is not diaphoretic. Comments: Lethargic/tired appearing   HENT:      Head: Normocephalic and atraumatic. Nose: Nose normal. No congestion or rhinorrhea. Mouth/Throat:      Mouth: Mucous membranes are moist.      Pharynx: Oropharynx is clear. No oropharyngeal exudate or posterior oropharyngeal erythema. Comments: Limited exam d/t poor command following  Eyes:      General: No scleral icterus. Right eye: No discharge. Left eye: No discharge. Conjunctiva/sclera: Conjunctivae normal.      Pupils: Pupils are equal, round, and reactive to light. Neck:      Vascular: No JVD. Trachea: No tracheal deviation. adverse effects of the medication prescribed today, as well as treatment plan and result expectations have been discussed withthe patient who expresses understanding and desires to proceed.

## 2022-09-28 LAB
INFLUENZA A BY PCR: NEGATIVE
INFLUENZA B BY PCR: NEGATIVE

## 2022-10-07 LAB — HBA1C MFR BLD: 8.3 % (ref 4.8–5.9)

## 2022-10-21 ENCOUNTER — HOSPITAL ENCOUNTER (INPATIENT)
Age: 77
LOS: 3 days | Discharge: HOME | DRG: 689 | End: 2022-10-24
Attending: STUDENT IN AN ORGANIZED HEALTH CARE EDUCATION/TRAINING PROGRAM | Admitting: INTERNAL MEDICINE
Payer: MEDICARE

## 2022-10-21 ENCOUNTER — APPOINTMENT (OUTPATIENT)
Dept: CT IMAGING | Age: 77
DRG: 689 | End: 2022-10-21
Payer: MEDICARE

## 2022-10-21 ENCOUNTER — APPOINTMENT (OUTPATIENT)
Dept: GENERAL RADIOLOGY | Age: 77
DRG: 689 | End: 2022-10-21
Payer: MEDICARE

## 2022-10-21 DIAGNOSIS — R41.82 ALTERED MENTAL STATUS, UNSPECIFIED ALTERED MENTAL STATUS TYPE: Primary | ICD-10-CM

## 2022-10-21 DIAGNOSIS — N30.00 ACUTE CYSTITIS WITHOUT HEMATURIA: ICD-10-CM

## 2022-10-21 LAB
ABO/RH: NORMAL
ACETAMINOPHEN LEVEL: <5 UG/ML (ref 10–30)
ALBUMIN SERPL-MCNC: 4 G/DL (ref 3.5–4.6)
ALP BLD-CCNC: 102 U/L (ref 40–130)
ALT SERPL-CCNC: 8 U/L (ref 0–33)
AMMONIA: 15 UMOL/L (ref 11–51)
AMPHETAMINE SCREEN, URINE: NORMAL
ANION GAP SERPL CALCULATED.3IONS-SCNC: 12 MEQ/L (ref 9–15)
ANTIBODY SCREEN: NORMAL
APTT: 28.2 SEC (ref 24.4–36.8)
AST SERPL-CCNC: 9 U/L (ref 0–35)
BACTERIA: ABNORMAL /HPF
BARBITURATE SCREEN URINE: NORMAL
BASOPHILS ABSOLUTE: 0.1 K/UL (ref 0–0.2)
BASOPHILS RELATIVE PERCENT: 0.4 %
BENZODIAZEPINE SCREEN, URINE: NORMAL
BILIRUB SERPL-MCNC: 1 MG/DL (ref 0.2–0.7)
BILIRUBIN URINE: NEGATIVE
BLOOD, URINE: ABNORMAL
BUN BLDV-MCNC: 9 MG/DL (ref 8–23)
CALCIUM SERPL-MCNC: 8.8 MG/DL (ref 8.5–9.9)
CANNABINOID SCREEN URINE: NORMAL
CARBOXYHEMOGLOBIN: 5.5 % (ref 0–4)
CHLORIDE BLD-SCNC: 104 MEQ/L (ref 95–107)
CLARITY: ABNORMAL
CO2: 23 MEQ/L (ref 20–31)
COCAINE METABOLITE SCREEN URINE: NORMAL
COLOR: YELLOW
CREAT SERPL-MCNC: 0.72 MG/DL (ref 0.5–0.9)
EOSINOPHILS ABSOLUTE: 0 K/UL (ref 0–0.7)
EOSINOPHILS RELATIVE PERCENT: 0.2 %
EPITHELIAL CELLS, UA: ABNORMAL /HPF (ref 0–5)
ETHANOL PERCENT: NORMAL G/DL
ETHANOL: <10 MG/DL (ref 0–0.08)
FENTANYL SCREEN, URINE: NORMAL
GFR SERPL CREATININE-BSD FRML MDRD: >60 ML/MIN/{1.73_M2}
GLOBULIN: 3.6 G/DL (ref 2.3–3.5)
GLUCOSE BLD-MCNC: 217 MG/DL (ref 70–99)
GLUCOSE BLD-MCNC: 266 MG/DL (ref 70–99)
GLUCOSE URINE: 100 MG/DL
HCT VFR BLD CALC: 39.8 % (ref 37–47)
HEMOGLOBIN: 13.1 G/DL (ref 12–16)
HYALINE CASTS: ABNORMAL /HPF (ref 0–5)
INR BLD: 1.1
KETONES, URINE: 40 MG/DL
LEUKOCYTE ESTERASE, URINE: ABNORMAL
LYMPHOCYTES ABSOLUTE: 1.2 K/UL (ref 1–4.8)
LYMPHOCYTES RELATIVE PERCENT: 7.9 %
Lab: NORMAL
MAGNESIUM: 1.9 MG/DL (ref 1.7–2.4)
MCH RBC QN AUTO: 31.1 PG (ref 27–31.3)
MCHC RBC AUTO-ENTMCNC: 32.9 % (ref 33–37)
MCV RBC AUTO: 94.6 FL (ref 79.4–94.8)
METHADONE SCREEN, URINE: NORMAL
MONOCYTES ABSOLUTE: 1.1 K/UL (ref 0.2–0.8)
MONOCYTES RELATIVE PERCENT: 7.5 %
NEUTROPHILS ABSOLUTE: 12.7 K/UL (ref 1.4–6.5)
NEUTROPHILS RELATIVE PERCENT: 84 %
NITRITE, URINE: POSITIVE
OPIATE SCREEN URINE: NORMAL
OXYCODONE URINE: NORMAL
PDW BLD-RTO: 13.5 % (ref 11.5–14.5)
PERFORMED ON: ABNORMAL
PH UA: 5.5 (ref 5–9)
PHENCYCLIDINE SCREEN URINE: NORMAL
PLATELET # BLD: 244 K/UL (ref 130–400)
POTASSIUM SERPL-SCNC: 4.2 MEQ/L (ref 3.4–4.9)
PROCALCITONIN: 0.04 NG/ML (ref 0–0.15)
PROPOXYPHENE SCREEN: NORMAL
PROTEIN UA: ABNORMAL MG/DL
PROTHROMBIN TIME: 14.1 SEC (ref 12.3–14.9)
RBC # BLD: 4.21 M/UL (ref 4.2–5.4)
RBC UA: ABNORMAL /HPF (ref 0–5)
SALICYLATE, SERUM: <0.3 MG/DL (ref 15–30)
SODIUM BLD-SCNC: 139 MEQ/L (ref 135–144)
SPECIFIC GRAVITY UA: 1.01 (ref 1–1.03)
TOTAL CK: 30 U/L (ref 0–170)
TOTAL PROTEIN: 7.6 G/DL (ref 6.3–8)
TROPONIN: <0.01 NG/ML (ref 0–0.01)
URINE REFLEX TO CULTURE: YES
UROBILINOGEN, URINE: 0.2 E.U./DL
WBC # BLD: 15.1 K/UL (ref 4.8–10.8)
WBC UA: >100 /HPF (ref 0–5)

## 2022-10-21 PROCEDURE — 1210000000 HC MED SURG R&B

## 2022-10-21 PROCEDURE — 80143 DRUG ASSAY ACETAMINOPHEN: CPT

## 2022-10-21 PROCEDURE — 84484 ASSAY OF TROPONIN QUANT: CPT

## 2022-10-21 PROCEDURE — 85025 COMPLETE CBC W/AUTO DIFF WBC: CPT

## 2022-10-21 PROCEDURE — 87077 CULTURE AEROBIC IDENTIFY: CPT

## 2022-10-21 PROCEDURE — 70450 CT HEAD/BRAIN W/O DYE: CPT

## 2022-10-21 PROCEDURE — 87186 SC STD MICRODIL/AGAR DIL: CPT

## 2022-10-21 PROCEDURE — 80053 COMPREHEN METABOLIC PANEL: CPT

## 2022-10-21 PROCEDURE — 6360000002 HC RX W HCPCS: Performed by: EMERGENCY MEDICINE

## 2022-10-21 PROCEDURE — 36415 COLL VENOUS BLD VENIPUNCTURE: CPT

## 2022-10-21 PROCEDURE — 82550 ASSAY OF CK (CPK): CPT

## 2022-10-21 PROCEDURE — 81001 URINALYSIS AUTO W/SCOPE: CPT

## 2022-10-21 PROCEDURE — 85610 PROTHROMBIN TIME: CPT

## 2022-10-21 PROCEDURE — 82077 ASSAY SPEC XCP UR&BREATH IA: CPT

## 2022-10-21 PROCEDURE — 80179 DRUG ASSAY SALICYLATE: CPT

## 2022-10-21 PROCEDURE — 82375 ASSAY CARBOXYHB QUANT: CPT

## 2022-10-21 PROCEDURE — 99285 EMERGENCY DEPT VISIT HI MDM: CPT

## 2022-10-21 PROCEDURE — 83735 ASSAY OF MAGNESIUM: CPT

## 2022-10-21 PROCEDURE — 86901 BLOOD TYPING SEROLOGIC RH(D): CPT

## 2022-10-21 PROCEDURE — 87086 URINE CULTURE/COLONY COUNT: CPT

## 2022-10-21 PROCEDURE — 84145 PROCALCITONIN (PCT): CPT

## 2022-10-21 PROCEDURE — 2580000003 HC RX 258: Performed by: EMERGENCY MEDICINE

## 2022-10-21 PROCEDURE — 85730 THROMBOPLASTIN TIME PARTIAL: CPT

## 2022-10-21 PROCEDURE — 86850 RBC ANTIBODY SCREEN: CPT

## 2022-10-21 PROCEDURE — 86900 BLOOD TYPING SEROLOGIC ABO: CPT

## 2022-10-21 PROCEDURE — 93005 ELECTROCARDIOGRAM TRACING: CPT | Performed by: STUDENT IN AN ORGANIZED HEALTH CARE EDUCATION/TRAINING PROGRAM

## 2022-10-21 PROCEDURE — 71045 X-RAY EXAM CHEST 1 VIEW: CPT

## 2022-10-21 PROCEDURE — 80307 DRUG TEST PRSMV CHEM ANLYZR: CPT

## 2022-10-21 PROCEDURE — 51798 US URINE CAPACITY MEASURE: CPT

## 2022-10-21 PROCEDURE — 0202U NFCT DS 22 TRGT SARS-COV-2: CPT

## 2022-10-21 PROCEDURE — 2580000003 HC RX 258: Performed by: NURSE PRACTITIONER

## 2022-10-21 PROCEDURE — 82140 ASSAY OF AMMONIA: CPT

## 2022-10-21 RX ORDER — ACETAMINOPHEN 325 MG/1
650 TABLET ORAL EVERY 6 HOURS PRN
Status: DISCONTINUED | OUTPATIENT
Start: 2022-10-21 | End: 2022-10-24 | Stop reason: HOSPADM

## 2022-10-21 RX ORDER — DEXTROSE MONOHYDRATE 100 MG/ML
INJECTION, SOLUTION INTRAVENOUS CONTINUOUS PRN
Status: DISCONTINUED | OUTPATIENT
Start: 2022-10-21 | End: 2022-10-24 | Stop reason: HOSPADM

## 2022-10-21 RX ORDER — ONDANSETRON 2 MG/ML
4 INJECTION INTRAMUSCULAR; INTRAVENOUS EVERY 6 HOURS PRN
Status: DISCONTINUED | OUTPATIENT
Start: 2022-10-21 | End: 2022-10-24 | Stop reason: HOSPADM

## 2022-10-21 RX ORDER — INSULIN LISPRO 100 [IU]/ML
0-4 INJECTION, SOLUTION INTRAVENOUS; SUBCUTANEOUS NIGHTLY
Status: DISCONTINUED | OUTPATIENT
Start: 2022-10-22 | End: 2022-10-24 | Stop reason: HOSPADM

## 2022-10-21 RX ORDER — ONDANSETRON 4 MG/1
4 TABLET, ORALLY DISINTEGRATING ORAL EVERY 8 HOURS PRN
Status: DISCONTINUED | OUTPATIENT
Start: 2022-10-21 | End: 2022-10-24 | Stop reason: HOSPADM

## 2022-10-21 RX ORDER — ACETAMINOPHEN 650 MG/1
650 SUPPOSITORY RECTAL EVERY 6 HOURS PRN
Status: DISCONTINUED | OUTPATIENT
Start: 2022-10-21 | End: 2022-10-24 | Stop reason: HOSPADM

## 2022-10-21 RX ORDER — POLYETHYLENE GLYCOL 3350 17 G/17G
17 POWDER, FOR SOLUTION ORAL DAILY PRN
Status: DISCONTINUED | OUTPATIENT
Start: 2022-10-21 | End: 2022-10-24 | Stop reason: HOSPADM

## 2022-10-21 RX ORDER — SODIUM CHLORIDE 0.9 % (FLUSH) 0.9 %
5-40 SYRINGE (ML) INJECTION PRN
Status: DISCONTINUED | OUTPATIENT
Start: 2022-10-21 | End: 2022-10-24 | Stop reason: HOSPADM

## 2022-10-21 RX ORDER — SODIUM CHLORIDE 0.9 % (FLUSH) 0.9 %
5-40 SYRINGE (ML) INJECTION EVERY 12 HOURS SCHEDULED
Status: DISCONTINUED | OUTPATIENT
Start: 2022-10-22 | End: 2022-10-24 | Stop reason: HOSPADM

## 2022-10-21 RX ORDER — SODIUM CHLORIDE 9 MG/ML
INJECTION, SOLUTION INTRAVENOUS PRN
Status: DISCONTINUED | OUTPATIENT
Start: 2022-10-21 | End: 2022-10-24 | Stop reason: HOSPADM

## 2022-10-21 RX ORDER — ENOXAPARIN SODIUM 100 MG/ML
40 INJECTION SUBCUTANEOUS DAILY
Status: DISCONTINUED | OUTPATIENT
Start: 2022-10-22 | End: 2022-10-24 | Stop reason: HOSPADM

## 2022-10-21 RX ORDER — INSULIN LISPRO 100 [IU]/ML
0-16 INJECTION, SOLUTION INTRAVENOUS; SUBCUTANEOUS
Status: DISCONTINUED | OUTPATIENT
Start: 2022-10-22 | End: 2022-10-24 | Stop reason: HOSPADM

## 2022-10-21 RX ORDER — SODIUM CHLORIDE 9 MG/ML
INJECTION, SOLUTION INTRAVENOUS CONTINUOUS
Status: DISCONTINUED | OUTPATIENT
Start: 2022-10-21 | End: 2022-10-24

## 2022-10-21 RX ADMIN — SODIUM CHLORIDE 900 ML: 9 INJECTION, SOLUTION INTRAVENOUS at 23:22

## 2022-10-21 RX ADMIN — CEFTRIAXONE SODIUM 1000 MG: 1 INJECTION, POWDER, FOR SOLUTION INTRAMUSCULAR; INTRAVENOUS at 20:15

## 2022-10-21 ASSESSMENT — ENCOUNTER SYMPTOMS
DIARRHEA: 0
COUGH: 0
VOMITING: 0
ABDOMINAL DISTENTION: 0

## 2022-10-21 NOTE — ED PROVIDER NOTES
[facility] ED  eMERGENCY dEPARTMENT eNCOUnter      Pt Name: Gudelia Sow  MRN: [id number]  [place] 1945  Date of evaluation: 10/21/2022  Provider: Trenton Mederos MD    CHIEF COMPLAINT       Chief Complaint   Patient presents with    Altered Mental Status     Pt to ED due to altered mental status per hortensia serrano. Pt is not at baseline mental status. HISTORY OF PRESENT ILLNESS   (Location/Symptom, Timing/Onset,Context/Setting, Quality, Duration, Modifying Factors, Severity)  Note limiting factors. Gudelia Sow is a 67 y.o. female who presents to the emergency department with complaint of altered mental status per the nursing home. Patient is usually able to walk and she can usually attempt to communicate. The nurse called the nursing home and stated that the patient is unable to walk today. Is having difficulty ambulating and they are worried that she would fall. This is different than the patient's baseline. On exam the patient was able to state her name when asked but she could not walk when attempting to ambulate her. No reports of fever, vomiting or diarrhea. Nursing home reports sometime yesterday the patient was normal.    Has severe dementia and further history is not able to be obtained. The history is provided by the nursing home and the EMS personnel. NursingNotes were reviewed. REVIEW OF SYSTEMS    (2-9 systems for level 4, 10 or more for level 5)     Review of Systems   Unable to perform ROS: Dementia   Constitutional:  Positive for activity change and appetite change. Negative for fever. Respiratory:  Negative for cough. Gastrointestinal:  Negative for abdominal distention, diarrhea and vomiting. Except as noted above the remainder of the review of systems was reviewed and negative.        PAST MEDICAL HISTORY     Past Medical History:   Diagnosis Date    Chronic rhinitis     Depression     Diabetes mellitus ([facility] [address]     Hyperlipidemia Hypertension     Type 2 diabetes mellitus without complication (HCC)          SURGICALHISTORY     No past surgical history on file. CURRENT MEDICATIONS       Previous Medications    ATORVASTATIN (LIPITOR) 40 MG TABLET    Take 1 tablet by mouth nightly    CALAMINE-ZINC OXIDE EX    Apply topically three times daily Indications: Skin Irritation And as needed     FLUOXETINE (PROZAC) 40 MG CAPSULE    Take 40 mg by mouth daily Indications: Depression     GLUCAGON (GVOKE HYPOPEN 2-PACK) 1 MG/0.2ML SOAJ    Inject 1 mg into the skin every 15 minutes as needed (Glucose less than 70, patient unable to take oral glucose, or if she is symptomatic)    GLUCOSE (GLUTOSE) 40 % GEL    Take 37.5 mLs by mouth as needed (Glucose less than 70)    INSULIN GLARGINE (LANTUS) 100 UNIT/ML INJECTION VIAL    Inject 17 Units into the skin nightly Indications: Diabetes    INSULIN LISPRO (HUMALOG) 100 UNIT/ML INJECTION CARTRIDGE    Inject 3 times daily post meals only. 151-200= 5 units, 201-250= 6 units, 251-300= 7 units, 301-350= 8 units, >400= 10 units and call MD/PA    INSULIN SYRINGE-NEEDLE U-100 30G X 1/2\" 1 ML MISC    1 each by Does not apply route 4 times daily (before meals and nightly)    LEVOTHYROXINE (SYNTHROID) 50 MCG TABLET    Take 50 mcg by mouth Daily Indications: Underactive Thyroid     LINAGLIPTIN (TRADJENTA) 5 MG TABLET    Take 5 mg by mouth daily Indications: Diabetes    MEMANTINE (NAMENDA) 10 MG TABLET    Take 1 tablet by mouth 2 times daily    ONE TOUCH ULTRA TEST STRIP        POTASSIUM CHLORIDE (KLOR-CON M) 10 MEQ EXTENDED RELEASE TABLET    Take 10 mEq by mouth daily Indications: Nutritional Support     TELMISARTAN (MICARDIS) 80 MG TABLET    Take 1 tablet by mouth daily    VITAMIN B-12 (CYANOCOBALAMIN) 500 MCG TABLET    Take 500 mcg by mouth daily Indications: Nutritional Support        ALLERGIES     Penicillins, Sulfa antibiotics, and Penicillin g    FAMILY HISTORY     No family history on file.        SOCIAL HISTORY Social History     Socioeconomic History    Marital status:    Tobacco Use    Smoking status: Never    Smokeless tobacco: Never   Substance and Sexual Activity    Alcohol use: Never    Drug use: Never    Sexual activity: Not Currently       SCREENINGS    [place] Coma Scale  Eye Opening: Spontaneous  Best Verbal Response: Oriented  Best Motor Response: Obeys commands  [place]ee Coma Scale Score: 15 @FLOW([id number])@      PHYSICAL EXAM    (up to 7 for level 4, 8 or more for level 5)     ED Triage Vitals [10/21/22 1634]   BP Temp Temp Source Heart Rate Resp SpO2 Height Weight   (!) 141/49 98.5 °F (36.9 °C) Temporal 86 18 92 % -- --       Physical Exam  Vitals and nursing note reviewed. Constitutional:       General: She is awake. She is not in acute distress. Appearance: Normal appearance. She is well-developed and normal weight. She is not ill-appearing, toxic-appearing or diaphoretic. Comments: No photophobia. No phonophobia. HENT:      Head: Normocephalic and atraumatic. No Thakur's sign. Right Ear: External ear normal.      Left Ear: External ear normal.      Nose: Nose normal. No congestion or rhinorrhea. Mouth/Throat:      Mouth: Mucous membranes are moist.      Pharynx: Oropharynx is clear. No oropharyngeal exudate or posterior oropharyngeal erythema. Eyes:      General: No scleral icterus. Right eye: No foreign body or discharge. Left eye: No discharge. Extraocular Movements: Extraocular movements intact. Conjunctiva/sclera: Conjunctivae normal.      Left eye: No exudate. Pupils: Pupils are equal, round, and reactive to light. Neck:      Vascular: No JVD. Trachea: No tracheal deviation. Comments: No meningismus. Cardiovascular:      Rate and Rhythm: Normal rate and regular rhythm. Pulses: Normal pulses. Heart sounds: Normal heart sounds. Heart sounds not distant. No murmur heard. No friction rub. No gallop.    Pulmonary: Effort: Pulmonary effort is normal. No respiratory distress. Breath sounds: Normal breath sounds. No stridor. No wheezing, rhonchi or rales. Chest:      Chest wall: No tenderness. Abdominal:      General: Abdomen is flat. Bowel sounds are normal. There is no distension. Palpations: Abdomen is soft. There is no mass. Tenderness: There is no abdominal tenderness. There is no right CVA tenderness, left CVA tenderness, guarding or rebound. Hernia: No hernia is present. Musculoskeletal:         General: No swelling, tenderness, deformity or signs of injury. Normal range of motion. Cervical back: Normal range of motion and neck supple. No rigidity. Lymphadenopathy:      Head:      Right side of head: No submental adenopathy. Left side of head: No submental adenopathy. Skin:     General: Skin is warm and dry. Capillary Refill: Capillary refill takes less than 2 seconds. Coloration: Skin is not jaundiced or pale. Findings: No bruising, erythema, lesion or rash. Neurological:      General: No focal deficit present. Cranial Nerves: No cranial nerve deficit. Sensory: No sensory deficit. Motor: No weakness. Coordination: Coordination normal.      Deep Tendon Reflexes: Reflexes are normal and symmetric. Psychiatric:         Mood and Affect: Mood normal.         Behavior: Behavior normal. Behavior is cooperative. Thought Content: Thought content normal.         Judgment: Judgment normal.       DIAGNOSTIC RESULTS     EKG: All EKG's are interpreted by the Emergency Department Physician who either signs or Co-signsthis chart in the absence of a cardiologist.    EKG: Sinus rhythm at 84 bpm.  Left axis. PVC present. Poor R wave transition the precordial lead. QT interval is 360 ms.     RADIOLOGY:   Non-plain filmimages such as CT, Ultrasound and MRI are read by the radiologist. Plain radiographic images are visualized and preliminarily interpreted by the emergency physician with the below findings:      Interpretation per the Radiologist below, if available at the time ofthis note:    XR CHEST PORTABLE   Final Result   Poor inspiratory effort limits evaluation, cannot rule out correlate for   bronchitis or early left lower lobe infiltrate/atelectatic changes.          CT HEAD WO CONTRAST    (Results Pending)         ED BEDSIDE ULTRASOUND:   Performed by ED Physician - none    LABS:  Labs Reviewed   CARBOXYHEMOGLOBIN - Abnormal; Notable for the following components:       Result Value    Carboxyhemoglobin 5.5 (*)     All other components within normal limits   CBC WITH AUTO DIFFERENTIAL - Abnormal; Notable for the following components:    WBC 15.1 (*)     MCHC 32.9 (*)     Neutrophils Absolute 12.7 (*)     Monocytes Absolute 1.1 (*)     All other components within normal limits   COMPREHENSIVE METABOLIC PANEL - Abnormal; Notable for the following components:    Glucose 217 (*)     Total Bilirubin 1.0 (*)     Globulin 3.6 (*)     All other components within normal limits   URINALYSIS WITH REFLEX TO CULTURE - Abnormal; Notable for the following components:    Clarity, UA CLOUDY (*)     Glucose, Ur 100 (*)     Ketones, Urine 40 (*)     Blood, Urine TRACE (*)     Protein, UA TRACE (*)     Nitrite, Urine POSITIVE (*)     Leukocyte Esterase, Urine LARGE (*)     All other components within normal limits   SALICYLATE LEVEL - Abnormal; Notable for the following components:    Salicylate, Serum <9.3 (*)     All other components within normal limits   ACETAMINOPHEN LEVEL - Abnormal; Notable for the following components:    Acetaminophen Level <5 (*)     All other components within normal limits   MICROSCOPIC URINALYSIS - Abnormal; Notable for the following components:    Bacteria, UA MANY (*)     WBC, UA >100 (*)     All other components within normal limits   CULTURE, URINE   AMMONIA   ETHANOL   APTT   CK   MAGNESIUM   PROTIME-INR   TROPONIN   URINE DRUG SCREEN   PROCALCITONIN   TYPE AND SCREEN       All other labs were within normal range or not returned as of this dictation. EMERGENCY DEPARTMENT COURSE and DIFFERENTIAL DIAGNOSIS/MDM:   Vitals:    Vitals:    10/21/22 1634   BP: (!) 141/49   Pulse: 86   Resp: 18   Temp: 98.5 °F (36.9 °C)   TempSrc: Temporal   SpO2: 92%       procalcitonin added due to chest x-ray showing concern for pneumonia. MDM    Scan of the head is pending. Patient is unable to ambulate. Dr. Claudine Casillas is to follow-up on the labs to see if this patient has pneumonia or not and she will end up needing to be admitted into the hospital.  Alessandra states reports that the patient started able to ambulate and significant decrease in her functional status. Signed out to Dr. Claudine Casillas at 7 PM.    Is evidence of UTI. Elevated white blood count. Based on her weakness and altered mental status she will be admitted for IV antibiotics  CONSULTS:  None    PROCEDURES:  Unless otherwise noted below, none     Procedures    FINAL IMPRESSION      1. Altered mental status, unspecified altered mental status type    2. Acute cystitis without hematuria          DISPOSITION/PLAN   DISPOSITION Decision To Admit 10/21/2022 07:56:37 PM      PATIENT REFERRED TO:  No follow-up provider specified.     DISCHARGE MEDICATIONS:  New Prescriptions    No medications on file          (Please note that portions of this note were completed with a voice recognition program.  Efforts were made to edit the dictations but occasionally words are mis-transcribed.)    Glenis Polo MD (electronically signed)  Attending Emergency Physician          Glenis Polo MD  10/21/22 1353

## 2022-10-21 NOTE — ED NOTES
Pt bladder scanned and straight cath'd by Farzana Mar RN. Pt scanned for 7ml in bladder but 30ml was obtained during straight cath.       Juan Pablo Aquino  10/21/22 1932

## 2022-10-22 LAB
ADENOVIRUS BY PCR: NOT DETECTED
ALBUMIN SERPL-MCNC: 3.5 G/DL (ref 3.5–4.6)
ALP BLD-CCNC: 96 U/L (ref 40–130)
ALT SERPL-CCNC: 7 U/L (ref 0–33)
ANION GAP SERPL CALCULATED.3IONS-SCNC: 9 MEQ/L (ref 9–15)
AST SERPL-CCNC: 7 U/L (ref 0–35)
BASOPHILS ABSOLUTE: 0.1 K/UL (ref 0–0.2)
BASOPHILS RELATIVE PERCENT: 0.4 %
BILIRUB SERPL-MCNC: 1.1 MG/DL (ref 0.2–0.7)
BORDETELLA PARAPERTUSSIS BY PCR: NOT DETECTED
BORDETELLA PERTUSSIS BY PCR: NOT DETECTED
BUN BLDV-MCNC: 13 MG/DL (ref 8–23)
CALCIUM SERPL-MCNC: 8.2 MG/DL (ref 8.5–9.9)
CHLAMYDOPHILIA PNEUMONIAE BY PCR: NOT DETECTED
CHLORIDE BLD-SCNC: 107 MEQ/L (ref 95–107)
CO2: 23 MEQ/L (ref 20–31)
CORONAVIRUS 229E BY PCR: NOT DETECTED
CORONAVIRUS HKU1 BY PCR: NOT DETECTED
CORONAVIRUS NL63 BY PCR: NOT DETECTED
CORONAVIRUS OC43 BY PCR: NOT DETECTED
CREAT SERPL-MCNC: 0.85 MG/DL (ref 0.5–0.9)
EOSINOPHILS ABSOLUTE: 0 K/UL (ref 0–0.7)
EOSINOPHILS RELATIVE PERCENT: 0.2 %
GFR SERPL CREATININE-BSD FRML MDRD: >60 ML/MIN/{1.73_M2}
GLOBULIN: 3 G/DL (ref 2.3–3.5)
GLUCOSE BLD-MCNC: 144 MG/DL (ref 70–99)
GLUCOSE BLD-MCNC: 172 MG/DL (ref 70–99)
GLUCOSE BLD-MCNC: 182 MG/DL (ref 70–99)
GLUCOSE BLD-MCNC: 228 MG/DL (ref 70–99)
GLUCOSE BLD-MCNC: 252 MG/DL (ref 70–99)
HCT VFR BLD CALC: 36.7 % (ref 37–47)
HEMOGLOBIN: 11.9 G/DL (ref 12–16)
HUMAN METAPNEUMOVIRUS BY PCR: NOT DETECTED
HUMAN RHINOVIRUS/ENTEROVIRUS BY PCR: NOT DETECTED
INFLUENZA A BY PCR: NOT DETECTED
INFLUENZA B BY PCR: NOT DETECTED
LYMPHOCYTES ABSOLUTE: 2 K/UL (ref 1–4.8)
LYMPHOCYTES RELATIVE PERCENT: 12.8 %
MAGNESIUM: 1.8 MG/DL (ref 1.7–2.4)
MCH RBC QN AUTO: 30.8 PG (ref 27–31.3)
MCHC RBC AUTO-ENTMCNC: 32.5 % (ref 33–37)
MCV RBC AUTO: 94.7 FL (ref 79.4–94.8)
MONOCYTES ABSOLUTE: 1.3 K/UL (ref 0.2–0.8)
MONOCYTES RELATIVE PERCENT: 8.5 %
MYCOPLASMA PNEUMONIAE BY PCR: NOT DETECTED
NEUTROPHILS ABSOLUTE: 12 K/UL (ref 1.4–6.5)
NEUTROPHILS RELATIVE PERCENT: 78.1 %
PARAINFLUENZA VIRUS 1 BY PCR: NOT DETECTED
PARAINFLUENZA VIRUS 2 BY PCR: NOT DETECTED
PARAINFLUENZA VIRUS 3 BY PCR: NOT DETECTED
PARAINFLUENZA VIRUS 4 BY PCR: NOT DETECTED
PDW BLD-RTO: 13.3 % (ref 11.5–14.5)
PERFORMED ON: ABNORMAL
PLATELET # BLD: 213 K/UL (ref 130–400)
POTASSIUM SERPL-SCNC: 4.2 MEQ/L (ref 3.4–4.9)
RBC # BLD: 3.87 M/UL (ref 4.2–5.4)
RESPIRATORY SYNCYTIAL VIRUS BY PCR: NOT DETECTED
SARS-COV-2, PCR: NOT DETECTED
SODIUM BLD-SCNC: 139 MEQ/L (ref 135–144)
TOTAL PROTEIN: 6.5 G/DL (ref 6.3–8)
TSH REFLEX: 1.62 UIU/ML (ref 0.44–3.86)
WBC # BLD: 15.4 K/UL (ref 4.8–10.8)

## 2022-10-22 PROCEDURE — 6360000002 HC RX W HCPCS: Performed by: NURSE PRACTITIONER

## 2022-10-22 PROCEDURE — 36415 COLL VENOUS BLD VENIPUNCTURE: CPT

## 2022-10-22 PROCEDURE — 1210000000 HC MED SURG R&B

## 2022-10-22 PROCEDURE — 84443 ASSAY THYROID STIM HORMONE: CPT

## 2022-10-22 PROCEDURE — 80053 COMPREHEN METABOLIC PANEL: CPT

## 2022-10-22 PROCEDURE — 97162 PT EVAL MOD COMPLEX 30 MIN: CPT

## 2022-10-22 PROCEDURE — 85025 COMPLETE CBC W/AUTO DIFF WBC: CPT

## 2022-10-22 PROCEDURE — 6370000000 HC RX 637 (ALT 250 FOR IP): Performed by: NURSE PRACTITIONER

## 2022-10-22 PROCEDURE — 83735 ASSAY OF MAGNESIUM: CPT

## 2022-10-22 PROCEDURE — 6370000000 HC RX 637 (ALT 250 FOR IP): Performed by: INTERNAL MEDICINE

## 2022-10-22 PROCEDURE — 2580000003 HC RX 258: Performed by: NURSE PRACTITIONER

## 2022-10-22 RX ORDER — INSULIN GLARGINE 100 [IU]/ML
14 INJECTION, SOLUTION SUBCUTANEOUS NIGHTLY
Status: DISCONTINUED | OUTPATIENT
Start: 2022-10-22 | End: 2022-10-24 | Stop reason: HOSPADM

## 2022-10-22 RX ORDER — CHOLECALCIFEROL (VITAMIN D3) 125 MCG
500 CAPSULE ORAL DAILY
Status: DISCONTINUED | OUTPATIENT
Start: 2022-10-22 | End: 2022-10-24 | Stop reason: HOSPADM

## 2022-10-22 RX ORDER — FLUOXETINE HYDROCHLORIDE 20 MG/1
40 CAPSULE ORAL DAILY
Status: DISCONTINUED | OUTPATIENT
Start: 2022-10-22 | End: 2022-10-24 | Stop reason: HOSPADM

## 2022-10-22 RX ORDER — ATORVASTATIN CALCIUM 10 MG/1
10 TABLET, FILM COATED ORAL NIGHTLY
Status: DISCONTINUED | OUTPATIENT
Start: 2022-10-22 | End: 2022-10-24 | Stop reason: HOSPADM

## 2022-10-22 RX ORDER — LEVOTHYROXINE SODIUM 0.05 MG/1
50 TABLET ORAL DAILY
Status: DISCONTINUED | OUTPATIENT
Start: 2022-10-22 | End: 2022-10-24 | Stop reason: HOSPADM

## 2022-10-22 RX ORDER — MEMANTINE HYDROCHLORIDE 10 MG/1
10 TABLET ORAL 2 TIMES DAILY
Status: DISCONTINUED | OUTPATIENT
Start: 2022-10-22 | End: 2022-10-24 | Stop reason: HOSPADM

## 2022-10-22 RX ADMIN — LEVOTHYROXINE SODIUM 50 MCG: 0.05 TABLET ORAL at 09:14

## 2022-10-22 RX ADMIN — CEFTRIAXONE SODIUM 1000 MG: 1 INJECTION, POWDER, FOR SOLUTION INTRAMUSCULAR; INTRAVENOUS at 22:16

## 2022-10-22 RX ADMIN — INSULIN GLARGINE 14 UNITS: 100 INJECTION, SOLUTION SUBCUTANEOUS at 22:05

## 2022-10-22 RX ADMIN — SODIUM CHLORIDE, PRESERVATIVE FREE 10 ML: 5 INJECTION INTRAVENOUS at 22:12

## 2022-10-22 RX ADMIN — ENOXAPARIN SODIUM 40 MG: 100 INJECTION SUBCUTANEOUS at 09:07

## 2022-10-22 RX ADMIN — CYANOCOBALAMIN TAB 500 MCG 500 MCG: 500 TAB at 09:06

## 2022-10-22 RX ADMIN — MEMANTINE HYDROCHLORIDE 10 MG: 10 TABLET ORAL at 22:04

## 2022-10-22 RX ADMIN — ATORVASTATIN CALCIUM 10 MG: 10 TABLET, FILM COATED ORAL at 22:04

## 2022-10-22 RX ADMIN — INSULIN LISPRO 4 UNITS: 100 INJECTION, SOLUTION INTRAVENOUS; SUBCUTANEOUS at 09:15

## 2022-10-22 RX ADMIN — FLUOXETINE HYDROCHLORIDE 40 MG: 20 CAPSULE ORAL at 09:06

## 2022-10-22 RX ADMIN — MEMANTINE HYDROCHLORIDE 10 MG: 10 TABLET ORAL at 09:07

## 2022-10-22 ASSESSMENT — LIFESTYLE VARIABLES: HOW OFTEN DO YOU HAVE A DRINK CONTAINING ALCOHOL: NEVER

## 2022-10-22 NOTE — PROGRESS NOTES
Physician Progress Note      Austyn Elizabeth  CSN #:                  [id number]  :                       1945  ADMIT DATE:       10/21/2022 4:38 PM  [address] [place] DATE:  [place]  PROVIDER #:        Ward Kaufman DO          QUERY TEXT:    Pt admitted with UTI. Pt noted to have AMS. If possible, please document in   the progress notes and discharge summary if you are evaluating and / or   treating any of the following: The medical record reflects the following:  Risk Factors: UTI, dementia  Clinical Indicators: H&P: \"Patient is usually able to walk and she can usually   attempt to communicate. Subjective increased confusion and difficulty   ambulating\"; Nursing Flowsheet:  GCS 10 initially improved to 12; Alert   initially decreased to responds to voice; Oriented to person only, Disoriented   to place, time & situation; unable to follow command, incomprehensible   speech, nonverbal.\"  WBC 15.1-15.4; UA:  yellow, cloudy, positive nitrite,   large leuks, WBC>100, many bacteria; UC pending  Treatment: IV Rocephin, Neuro checks, labs and monitoring    Thank you,  [employer]   Clinical Documentation Improvement Specialist  W: [phone]  Options provided:  -- AMS due to worsening dementia  -- Metabolic encephalopathy  -- Other - I will add my own diagnosis  -- Disagree - Not applicable / Not valid  -- Disagree - Clinically unable to determine / Unknown  -- Refer to Clinical Documentation Reviewer    PROVIDER RESPONSE TEXT:    This patient has metabolic encephalopathy.     Query created by: Alcon Christensen on 10/22/2022 10:53 AM      Electronically signed by:  Ward Kaufman DO 10/22/2022 11:14 AM

## 2022-10-22 NOTE — PROGRESS NOTES
Physical Therapy Med Surg Initial Assessment  Facility/Department: [facility]  Room: [place] [id number]-       NAME: Randi Schwartz  : 1945 (67 y.o.)  MRN: [id number]  CODE STATUS: Full Code    Date of Service: 10/22/2022    Patient Diagnosis(es): Acute cystitis without hematuria [N30.00]  Altered mental status, unspecified altered mental status type [R41.82]  AMS (altered mental status) [R41.82]   Chief Complaint   Patient presents with    Altered Mental Status     Pt to ED due to altered mental status per [facility]. Pt is not at baseline mental status. Patient Active Problem List    Diagnosis Date Noted    AMS (altered mental status) 10/21/2022    History of breast cancer in adulthood 2022    Diarrhea 2021    Hypokalemia 2021    Recurrent major depressive episodes ([address] 2021    Uncontrolled type 2 diabetes mellitus 2021    Recurrent UTI (urinary tract infection) 2018    DKA, type 2, not at goal [facility]) 2021    Hypernatremia 2021    Hypothyroidism 2021    Chronic rhinitis 02/10/2021    Depressive disorder 02/10/2021    Hyperlipidemia 02/10/2021    Mild cognitive disorder 02/10/2021    Non-smoker 02/10/2021    Early onset Alzheimer's dementia without behavioral disturbance ([address] 02/10/2020    Memory loss 02/10/2020    Syncope and collapse 02/10/2020    Arteriosclerosis of coronary artery 2019    Postablative hypothyroidism 2019    Diabetes mellitus ([address] 2019    Hypertension 2019    Altered mental status, unspecified 2018    Hematuria, unspecified 2018    Personal history of urinary (tract) infections 2018        Past Medical History:   Diagnosis Date    Chronic rhinitis     Depression     Diabetes mellitus ([address]     Hyperlipidemia     Hypertension     Type 2 diabetes mellitus without complication ([address]      History reviewed. No pertinent surgical history.     SUBJECTIVE:  Patient agreeable to PT evaluation. Pain   0/10 (none noted by therapist)    Post Treatment Pain Screenin/10 (none noted by therapist)    Prior Level of Function:  Social/Functional History  Lives With: Other (comment) (SNF)  Type of Home: Facility (SNF)  Additional Comments: Patient is a poor historian. Unable to obtain information from patient. All information listed above was taken from care coodinator's note. OBJECTIVE:        Cognition:  Overall Orientation Status: Impaired  Orientation Level: Disoriented to person, Disoriented to place, Disoriented to time, Disoriented to situation, Disoriented X4    Observation/Palpation  Observation: IV, bed alarm intact, RN at bedside with meds, telemonitor    ROM:  RLE AROM: WFL  RLE General AROM: difficulty with command following; AAROM  LLE AROM : WFL  LLE General AROM: difficulty with command following; AAROM    Strength:  Strength Other  Other: grossly 2+/3- out of 5; unable to accurately assess secondary to patient's inability to follow commands    Bed mobility  Rolling to Left: Dependent/Total  Rolling to Right: Dependent/Total  Bed Mobility Comments: inability to follow commands; partial rolling completed with patient not assisting therapist    Transfers  Comment: not safe/appropriate to assess this date    Ambulation  Comments: not safe/appropriate to assess this date    Activity Tolerance  Activity Tolerance Comments: Evaluation limited secondary to decreased command following. ASSESSMENT:   Body Structures, Functions, Activity Limitations Requiring Skilled Therapeutic Intervention: Decreased functional mobility ; Decreased ADL status; Decreased strength;Decreased endurance;Decreased balance  Decision Making: Medium Complexity  History: medium  Exam: medium  Clinical Presentation: medium    Treatment Diagnosis: decreased functional mobility, decreased ADL status, decreased strength, decreased endurance, decreased balance  Therapy Prognosis: Fair    DISCHARGE RECOMMENDATIONS:  Discharge Recommendations: Continue to assess pending progress    Assessment: Patient is a 67year old female who presents to hospital with UTI who is currently functioning below reported baseline given by  to care coordinator. Patient has decreased ability to follow commands, therefore, evaluation limited this date. Patient would continue to benefit from acute care PT services while in the hospital in order to work towards improving patient's ability to perform functional mobility tasks as well as improve patient's QOL. Requires PT Follow-Up: Yes      PLAN OF CARE:  Physcial Therapy Plan  General Plan: 5-7 times per week  Current Treatment Recommendations: Strengthening, Functional mobility training, Balance training, Transfer training, Endurance training, Gait training, Stair training, Neuromuscular re-education, Home exercise program, Safety education & training, Patient/Caregiver education & training, Equipment evaluation, education, & procurement, Positioning  Safety Devices  Type of Devices: All fall risk precautions in place, Bed alarm in place, Call light within reach, Left in bed, Nurse notified    Goals:  Long Term Goals  Long Term Goal 1: The patient will perform bed mobility tasks with moderate assistance of 2 persons in order to get in/out of bed. Long Term Goal 2: The patient will perform transfers with moderate assistance of 2 persons with LRD in order to get to/from surfaces. Long Term Goal 3: The patient will ambulate 10' x 2 with LRD with moderate assistance of 2 persons in order to get to/from restroom.     [facility] (6 CLICK) BASIC MOBILITY  AM-PAC Inpatient Mobility Raw Score : 6     Therapy Time:   Individual   Time In 0917   Time Out 0929   Minutes 12   Timed Code Treatment Minutes: 0 Minutes       Tammie Fuchs PT, 10/22/22 at 11:55 AM         Definitions for assistance levels  Independent = pt does not require any physical supervision or assistance from another person for activity completion. Device may be needed.   Stand by assistance = pt requires verbal cues or instructions from another person, close to but not touching, to perform the activity  Minimal assistance= pt performs 75% or more of the activity; assistance is required to complete the activity  Moderate assistance= pt performs 50% of the activity; assistance is required to complete the activity  Maximal assistance = pt performs 25% of the activity; assistance is required to complete the activity  Dependent = pt requires total physical assistance to accomplish the task

## 2022-10-22 NOTE — PROGRESS NOTES
Physician Progress Note    10/22/2022   3:28 PM    Name:  Magdy Pederson  MRN:    [id number]      Day: 1     Admit Date: 10/21/2022  4:38 PM  PCP: Octavia Rios MD    Code Status:  Full Code    Subjective:     She denies complaints. Unclear if she knows why she is in the hospital.  She is able to tell me her name and have some simple conversation but cannot tell me where she is or the month/year.     Current Facility-Administered Medications   Medication Dose Route Frequency Provider Last Rate Last Admin    atorvastatin (LIPITOR) tablet 10 mg  10 mg Oral Nightly Mollie Sorrel, DO        FLUoxetine (PROZAC) capsule 40 mg  40 mg Oral Daily Mollie Sorrel, DO   40 mg at 10/22/22 2082    levothyroxine (SYNTHROID) tablet 50 mcg  50 mcg Oral Daily Mollie Sorrel, DO   50 mcg at 10/22/22 0914    memantine (NAMENDA) tablet 10 mg  10 mg Oral BID Mollie Sorrel, DO   10 mg at 10/22/22 3123    vitamin B-12 (CYANOCOBALAMIN) tablet 500 mcg  500 mcg Oral Daily Mollie Sorrel, DO   500 mcg at 10/22/22 3761    insulin glargine (LANTUS) injection vial 14 Units  14 Units SubCUTAneous Nightly Steve ODESSA Chiu, DO        sodium chloride flush 0.9 % injection 5-40 mL  5-40 mL IntraVENous 2 times per day Nicoletto Bolzan-Roche, APRN - CNP        sodium chloride flush 0.9 % injection 5-40 mL  5-40 mL IntraVENous PRN Nicoletto Bolzan-Roche, APRN - CNP        0.9 % sodium chloride infusion   IntraVENous PRN Nicoletto Bolzan-Roche, APRN - CNP        enoxaparin (LOVENOX) injection 40 mg  40 mg SubCUTAneous Daily Nicoletto Bolzan-Roche, APRN - CNP   40 mg at 10/22/22 0907    ondansetron (ZOFRAN-ODT) disintegrating tablet 4 mg  4 mg Oral Q8H PRN Nicoletto Bolzan-Roche, APRN - CNP        Or    ondansetron (ZOFRAN) injection 4 mg  4 mg IntraVENous Q6H PRN Nicoletto Bolzan-Roche, APRN - CNP        polyethylene glycol (GLYCOLAX) packet 17 g  17 g Oral Daily PRN Nicoletto Bolzan-Roche, APRN - CNP        acetaminophen (TYLENOL) tablet 650 mg 650 mg Oral Q6H PRN Nicoletto Bolzan-Roche, APRN - CNP        Or    acetaminophen (TYLENOL) suppository 650 mg  650 mg Rectal Q6H PRN Nicoletto Bolzan-Roche, APRN - CNP        cefTRIAXone (ROCEPHIN) 1,000 mg in dextrose 5 % 50 mL IVPB mini-bag  1,000 mg IntraVENous Q24H Nicoletto Bolzan-Roche, APRN - CNP        0.9 % sodium chloride infusion   IntraVENous Continuous Nicoletto Bolzan-Roche, APRN - CNP 75 mL/hr at 10/21/22 2322 900 mL at 10/21/22 2322    glucose chewable tablet 16 g  4 tablet Oral PRN Nicoletto Bolzan-Roche, APRN - CNP        dextrose bolus 10% 125 mL  125 mL IntraVENous PRN Nicoletto Bolzan-Roche, APRN - CNP        Or    dextrose bolus 10% 250 mL  250 mL IntraVENous PRN Nicoletto Bolzan-Roche, APRN - CNP        glucagon (rDNA) injection 1 mg  1 mg SubCUTAneous PRN Nicoletto Bolzan-Roche, APRN - CNP        dextrose 10 % infusion   IntraVENous Continuous PRN Nicoletto Bolzan-Roche, APRN - CNP        insulin lispro (HUMALOG) injection vial 0-16 Units  0-16 Units SubCUTAneous TID WC Nicoletto Bolzan-Roche, APRN - CNP   4 Units at 10/22/22 0915    insulin lispro (HUMALOG) injection vial 0-4 Units  0-4 Units SubCUTAneous Nightly Nicoletto Bolzan-Roche, APRN - CNP           Physical Examination:      Vitals:  BP (!) 116/50   Pulse 85   Temp 99 °F (37.2 °C)   Resp 16   Ht 5' 4\" (1.626 m)   Wt 150 lb (68 kg)   SpO2 97%   BMI 25.75 kg/m²   Temp (24hrs), Av.6 °F (37 °C), Min:98.2 °F (36.8 °C), Max:99 °F (37.2 °C)      General appearance: Elderly and chronically ill-appearing. Able answer simple questions and follow commands but only oriented to self. Lungs: clear to auscultation bilaterally, normal effort  Heart: regular rate and rhythm, no murmur  Abdomen: soft, nontender, nondistended, bowel sounds present, no masses  Extremities: no edema, redness, tenderness in the calves.  Cap refill <2s  Skin: no gross lesions, rashes    Data:     Labs:  Recent Labs     10/21/22  1722 10/22/22  0502   WBC 15.1* 15.4*   HGB 13.1 11.9*    213     Recent Labs     10/21/22  1720 10/22/22  0502    139   K 4.2 4.2    107   CO2 23 23   BUN 9 13   CREATININE 0.72 0.85   GLUCOSE 217* 252*     Recent Labs     10/21/22  1720 10/22/22  0502   AST 9 7   ALT 8 7   BILITOT 1.0* 1.1*   ALKPHOS 102 96       Assessment and Plan:        1. Metabolic, infectious encephalopathy secondary to UTI in setting of baseline dementia  -Ceftriaxone. Follow culture data  -Supportive care    Type 2 diabetes hyperglycemia: Add Lantus  Hyperlipidemia  Hypothyroidism    Diet: ADULT DIET;  Regular; 4 carb choices (60 gm/meal)  Ppx: lovenox  Full Code    >35 minutes in total care time    Electronically signed by Beata Sow DO on 10/22/2022 at 3:28 PM

## 2022-10-22 NOTE — PROGRESS NOTES
Shift assessment complete. Meds as ordered. Patient care provided at this time. Bed in lowest position. Call light within reach. Will continue to monitor.

## 2022-10-22 NOTE — H&P
[place] [address] MEDICINE    HISTORY AND PHYSICAL EXAM    PATIENT NAME:  Becca Grant    MRN:  [id number]  SERVICE DATE:  10/21/2022   SERVICE TIME:  10:09 PM    Primary Care Physician: Chrissy Harrison MD         SUBJECTIVE  CHIEF COMPLAINT:  Altered Mental Status      HPI: Patient being admitted for UTI. Patient is a pleasantly confused alert and oriented to self patient with dementia. Patient is a 65-year-old  female. Patient's  is at the bedside to help provide current as well as past medical history. Patient is a resident of [facility] and the staff states that patient has been less talkative than normal and has had difficulty ambulating.  reports that she gets like that when she has UTIs. Otherwise, patient denies any complaints of chest pain, shortness of breath, abdominal pain, dysuria, nausea, vomiting.  also states that she has not complained of anything to him and he is unaware of any fevers that she has had. He reports that she is at her baseline mentally. Patient's past medical history along with dementia includes DM 2, hypothyroidism, HLD, and HTN. PAST MEDICAL HISTORY:    Past Medical History:   Diagnosis Date    Chronic rhinitis     Depression     Diabetes mellitus ([facility] [address]     Hyperlipidemia     Hypertension     Type 2 diabetes mellitus without complication ([facility] [address]      PAST SURGICAL HISTORY:  No past surgical history on file. FAMILY HISTORY:  No family history on file.   SOCIAL HISTORY:    Social History     Socioeconomic History    Marital status:      Spouse name: Not on file    Number of children: Not on file    Years of education: Not on file    Highest education level: Not on file   Occupational History    Not on file   Tobacco Use    Smoking status: Never    Smokeless tobacco: Never   Substance and Sexual Activity    Alcohol use: Never    Drug use: Never    Sexual activity: Not Currently   Other Topics Concern    Not on file   Social History Narrative    Not on file     Social Determinants of Health     Financial Resource Strain: Not on file   Food Insecurity: Not on file   Transportation Needs: Not on file   Physical Activity: Not on file   Stress: Not on file   Social Connections: Not on file   Intimate Partner Violence: Not on file   Housing Stability: Not on file     MEDICATIONS:   Prior to Admission medications    Medication Sig Start Date End Date Taking? Authorizing Provider   telmisartan (MICARDIS) 80 MG tablet Take 1 tablet by mouth daily 6/1/22 9/29/22  ANAND Monterroso   insulin glargine (LANTUS) 100 UNIT/ML injection vial Inject 17 Units into the skin nightly Indications: Diabetes 4/11/22 5/11/22  ANAND Monterroso   insulin lispro (HUMALOG) 100 UNIT/ML injection cartridge Inject 3 times daily post meals only.  151-200= 5 units, 201-250= 6 units, 251-300= 7 units, 301-350= 8 units, >400= 10 units and call MD/PA 4/11/22   ANAND Monterroso   linagliptin (TRADJENTA) 5 MG tablet Take 5 mg by mouth daily Indications: Diabetes    Historical Provider, MD   glucose (GLUTOSE) 40 % GEL Take 37.5 mLs by mouth as needed (Glucose less than 70) 11/14/21   ANAND Cannon   Glucagon (GVOKE HYPOPEN 2-PACK) 1 MG/0.2ML SOAJ Inject 1 mg into the skin every 15 minutes as needed (Glucose less than 70, patient unable to take oral glucose, or if she is symptomatic) 11/14/21   ANAND Cannon   Insulin Syringe-Needle U-100 30G X 1/2\" 1 ML MISC 1 each by Does not apply route 4 times daily (before meals and nightly) 11/14/21   ANAND Cannon   atorvastatin (LIPITOR) 40 MG tablet Take 1 tablet by mouth nightly  Patient taking differently: Take 10 mg by mouth nightly Indications: High Amount of Fats in the Blood  11/14/21   Guillermo Wells,    potassium chloride (KLOR-CON M) 10 MEQ extended release tablet Take 10 mEq by mouth daily Indications: Nutritional Support     Historical Provider, MD   vitamin B-12 (CYANOCOBALAMIN) 500 MCG tablet Take 500 mcg by mouth daily Indications: Nutritional Support     Historical Provider, MD   [facility] OXIDE EX Apply topically three times daily Indications: Skin Irritation And as needed     Historical Provider, MD   memantine (NAMENDA) 10 MG tablet Take 1 tablet by mouth 2 times daily  Patient taking differently: Take 10 mg by mouth 2 times daily Indications: Decline in Cognition due to a Brain Disease  11/16/20   Petty Marie MD   FLUoxetine (PROZAC) 40 MG capsule Take 40 mg by mouth daily Indications: Depression  6/21/18   Historical Provider, MD   Velazquez Miller TEST strip  1/29/20   Historical Provider, MD   levothyroxine (SYNTHROID) 50 MCG tablet Take 50 mcg by mouth Daily Indications: Underactive Thyroid  3/29/18   Historical Provider, MD       ALLERGIES: Penicillins, Sulfa antibiotics, and Penicillin g    REVIEW OF SYSTEM:   Review of Systems   Unable to perform ROS: Dementia       OBJECTIVE  PHYSICAL EXAM: BP (!) 113/56   Pulse 91   Temp 98.5 °F (36.9 °C) (Temporal)   Resp 18   SpO2 98%     Physical Exam  Vitals and nursing note reviewed. Constitutional:       General: She is not in acute distress. Appearance: She is well-developed. She is not ill-appearing or toxic-appearing. HENT:      Head: Normocephalic and atraumatic. Nose: Nose normal.      Mouth/Throat:      Mouth: Mucous membranes are moist.   Eyes:      Pupils: Pupils are equal, round, and reactive to light. Cardiovascular:      Rate and Rhythm: Normal rate and regular rhythm. Pulses: Normal pulses. Heart sounds: Normal heart sounds. Pulmonary:      Effort: Pulmonary effort is normal. No respiratory distress. Breath sounds: Normal breath sounds. No wheezing or rales. Abdominal:      General: Bowel sounds are normal. There is no distension. Palpations: Abdomen is soft. There is no mass. Tenderness: There is no abdominal tenderness. There is no guarding or rebound. Hernia: No hernia is present.    Musculoskeletal: General: Normal range of motion. Cervical back: Normal range of motion. Skin:     General: Skin is warm and dry. Capillary Refill: Capillary refill takes less than 2 seconds. Neurological:      Mental Status: She is alert. Mental status is at baseline. She is confused. Psychiatric:         Mood and Affect: Mood normal.       DATA:     Diagnostic tests reviewed for today's visit:    Most recent labs and imaging results reviewed.      LABS:    Recent Results (from the past 24 hour(s))   Ammonia    Collection Time: 10/21/22  5:18 PM   Result Value Ref Range    Ammonia 15 11 - 51 umol/L   Ethanol    Collection Time: 10/21/22  5:19 PM   Result Value Ref Range    Ethanol Lvl <10 mg/dL    Ethanol percent Not indicated G/dL   CARBOXYHEMOGLOBIN    Collection Time: 10/21/22  5:19 PM   Result Value Ref Range    Carboxyhemoglobin 5.5 (H) 0.0 - 4.0 %   APTT    Collection Time: 10/21/22  5:19 PM   Result Value Ref Range    aPTT 28.2 24.4 - 36.8 sec   Protime-INR    Collection Time: 10/21/22  5:19 PM   Result Value Ref Range    Protime 14.1 12.3 - 14.9 sec    INR 1.1    CK    Collection Time: 10/21/22  5:20 PM   Result Value Ref Range    Total CK 30 0 - 170 U/L   Comprehensive Metabolic Panel    Collection Time: 10/21/22  5:20 PM   Result Value Ref Range    Sodium 139 135 - 144 mEq/L    Potassium 4.2 3.4 - 4.9 mEq/L    Chloride 104 95 - 107 mEq/L    CO2 23 20 - 31 mEq/L    Anion Gap 12 9 - 15 mEq/L    Glucose 217 (H) 70 - 99 mg/dL    BUN 9 8 - 23 mg/dL    Creatinine 0.72 0.50 - 0.90 mg/dL    Est, Glom Filt Rate >60.0 >60    Calcium 8.8 8.5 - 9.9 mg/dL    Total Protein 7.6 6.3 - 8.0 g/dL    Albumin 4.0 3.5 - 4.6 g/dL    Total Bilirubin 1.0 (H) 0.2 - 0.7 mg/dL    Alkaline Phosphatase 102 40 - 130 U/L    ALT 8 0 - 33 U/L    AST 9 0 - 35 U/L    Globulin 3.6 (H) 2.3 - 3.5 g/dL   Magnesium    Collection Time: 10/21/22  5:20 PM   Result Value Ref Range    Magnesium 1.9 1.7 - 2.4 mg/dL   Troponin    Collection Time: 10/21/22  5:20 PM   Result Value Ref Range    Troponin <0.010 0.000 - 6.665 ng/mL   Salicylate    Collection Time: 10/21/22  5:20 PM   Result Value Ref Range    Salicylate, Serum <9.1 (L) 15.0 - 30.0 mg/dL   Acetaminophen Level    Collection Time: 10/21/22  5:20 PM   Result Value Ref Range    Acetaminophen Level <5 (L) 10 - 30 ug/mL   CBC with Auto Differential    Collection Time: 10/21/22  5:22 PM   Result Value Ref Range    WBC 15.1 (H) 4.8 - 10.8 K/uL    RBC 4.21 4.20 - 5.40 M/uL    Hemoglobin 13.1 12.0 - 16.0 g/dL    Hematocrit 39.8 37.0 - 47.0 %    MCV 94.6 79.4 - 94.8 fL    MCH 31.1 27.0 - 31.3 pg    MCHC 32.9 (L) 33.0 - 37.0 %    RDW 13.5 11.5 - 14.5 %    Platelets 596 979 - 541 K/uL    Neutrophils % 84.0 %    Lymphocytes % 7.9 %    Monocytes % 7.5 %    Eosinophils % 0.2 %    Basophils % 0.4 %    Neutrophils Absolute 12.7 (H) 1.4 - 6.5 K/uL    Lymphocytes Absolute 1.2 1.0 - 4.8 K/uL    Monocytes Absolute 1.1 (H) 0.2 - 0.8 K/uL    Eosinophils Absolute 0.0 0.0 - 0.7 K/uL    Basophils Absolute 0.1 0.0 - 0.2 K/uL   TYPE AND SCREEN    Collection Time: 10/21/22  5:22 PM   Result Value Ref Range    ABO/Rh A NEG     Antibody Screen NEG    EKG 12 Lead    Collection Time: 10/21/22  5:24 PM   Result Value Ref Range    Ventricular Rate 84 BPM    Atrial Rate 84 BPM    P-R Interval 196 ms    QRS Duration 84 ms    Q-T Interval 360 ms    QTc Calculation (Bazett) 425 ms    P Axis 35 degrees    R Axis -36 degrees    T Axis 76 degrees   Urinalysis with Reflex to Culture    Collection Time: 10/21/22  6:49 PM    Specimen: Urine, clean catch   Result Value Ref Range    Color, UA Yellow Straw/Yellow    Clarity, UA CLOUDY (A) Clear    Glucose, Ur 100 (A) Negative mg/dL    Bilirubin Urine Negative Negative    Ketones, Urine 40 (A) Negative mg/dL    Specific Gravity, UA 1.015 1.005 - 1.030    Blood, Urine TRACE (A) Negative    pH, UA 5.5 5.0 - 9.0    Protein, UA TRACE (A) Negative mg/dL    Urobilinogen, Urine 0.2 <2.0 E.U./dL Nitrite, Urine POSITIVE (A) Negative    Leukocyte Esterase, Urine LARGE (A) Negative    Urine Reflex to Culture Yes    Urine Drug Screen    Collection Time: 10/21/22  6:49 PM   Result Value Ref Range    Amphetamine Screen, Urine Neg Negative <1000 ng/mL    Barbiturate Screen, Ur Neg Negative < 200 ng/mL    Benzodiazepine Screen, Urine Neg Negative < 200 ng/mL    Cannabinoid Scrn, Ur Neg Negative < 50 ng/mL    Cocaine Metabolite Screen, Urine Neg Negative < 300 ng/mL    Opiate Scrn, Ur Neg Negative < 300 ng/mL    PCP Screen, Urine Neg Negative < 25 ng/mL    Methadone Screen, Urine Neg Negative <300 ng/mL    Propoxyphene Scrn, Ur Neg Negative <300 ng/mL    Oxycodone Urine Neg Negative <100 ng/mL    FENTANYL SCREEN, URINE Neg Negative < 50 ng/mL    Drug Screen Comment: see below    Microscopic Urinalysis    Collection Time: 10/21/22  6:49 PM   Result Value Ref Range    Bacteria, UA MANY (A) Negative /HPF    Hyaline Casts, UA 0-1 0 - 5 /HPF    WBC, UA >100 (H) 0 - 5 /HPF    RBC, UA 0-2 0 - 5 /HPF    Epithelial Cells, UA 0-2 0 - 5 /HPF   Procalcitonin    Collection Time: 10/21/22  7:35 PM   Result Value Ref Range    Procalcitonin 0.04 0.00 - 0.15 ng/mL       IMAGING:   CT HEAD WO CONTRAST    Result Date: 10/21/2022  1. No acute intracranial pathology seen. 2.  Moderate degree of involutional changes and senescent changes. Intracranial atherosclerotic disease. 3.  Fluid levels in the sphenoid sinuses and partial opacification of the ethmoid air cells. XR CHEST PORTABLE    Result Date: 10/21/2022  Poor inspiratory effort limits evaluation, cannot rule out correlate for bronchitis or early left lower lobe infiltrate/atelectatic changes. VTE Prophylaxis: low molecular weight heparin -  start     ASSESSMENT AND PLAN    Principal Problem:  UTI: UA positive for greater than 100 WBC and nitrites. Subjective increased confusion and difficulty ambulating. Serum WBC 15.1. Patient afebrile.   Patient given Rocephin IV in ED.  Urine culture sent. Blood culture sent. We will continue IV Rocephin and follow urine cultures and blood cultures. Gentle IVF. We will follow CBC daily. Active problem  DM2: Insulin-dependent. We will monitor and cover with SSI before meals and at bedtime. We will resume any long-acting insulin. Dementia: Patient on home meds to control. Will resume home meds. We will get PT OT.  HLD: PT on home meds to control. Will resume home meds, as tolerated. Hypothyroidism: PT on home meds to control. Will resume home meds, as tolerated.     Plan of care discussed with: patient and spouse    SIGNATURE: Dennie Sings, APRN - CNP  DATE: October 21, 2022  TIME: 10:09 PM     Kurtis Porter MD - supervising physician

## 2022-10-22 NOTE — PLAN OF CARE
Therapy evaluation completed. Please see daily notes and/or progress notes for details related to planned treatment interventions, goals and functional performance.      Electronically signed by Nely Medina PT on 10/22/2022 at 11:52 AM

## 2022-10-23 LAB
ALBUMIN SERPL-MCNC: 2.8 G/DL (ref 3.5–4.6)
ALP BLD-CCNC: 83 U/L (ref 40–130)
ALT SERPL-CCNC: 6 U/L (ref 0–33)
ANION GAP SERPL CALCULATED.3IONS-SCNC: 11 MEQ/L (ref 9–15)
AST SERPL-CCNC: 10 U/L (ref 0–35)
BASOPHILS ABSOLUTE: 0.1 K/UL (ref 0–0.2)
BASOPHILS RELATIVE PERCENT: 0.5 %
BILIRUB SERPL-MCNC: 0.5 MG/DL (ref 0.2–0.7)
BUN BLDV-MCNC: 11 MG/DL (ref 8–23)
CALCIUM SERPL-MCNC: 7.9 MG/DL (ref 8.5–9.9)
CHLORIDE BLD-SCNC: 108 MEQ/L (ref 95–107)
CO2: 21 MEQ/L (ref 20–31)
CREAT SERPL-MCNC: 0.59 MG/DL (ref 0.5–0.9)
EOSINOPHILS ABSOLUTE: 0.1 K/UL (ref 0–0.7)
EOSINOPHILS RELATIVE PERCENT: 1.2 %
GFR SERPL CREATININE-BSD FRML MDRD: >60 ML/MIN/{1.73_M2}
GLOBULIN: 3.4 G/DL (ref 2.3–3.5)
GLUCOSE BLD-MCNC: 141 MG/DL (ref 70–99)
GLUCOSE BLD-MCNC: 155 MG/DL (ref 70–99)
GLUCOSE BLD-MCNC: 204 MG/DL (ref 70–99)
GLUCOSE BLD-MCNC: 310 MG/DL (ref 70–99)
GLUCOSE BLD-MCNC: 90 MG/DL (ref 70–99)
HCT VFR BLD CALC: 33.8 % (ref 37–47)
HEMOGLOBIN: 11.1 G/DL (ref 12–16)
LYMPHOCYTES ABSOLUTE: 1.7 K/UL (ref 1–4.8)
LYMPHOCYTES RELATIVE PERCENT: 14.1 %
MAGNESIUM: 2.1 MG/DL (ref 1.7–2.4)
MCH RBC QN AUTO: 31 PG (ref 27–31.3)
MCHC RBC AUTO-ENTMCNC: 32.7 % (ref 33–37)
MCV RBC AUTO: 94.7 FL (ref 79.4–94.8)
MONOCYTES ABSOLUTE: 0.8 K/UL (ref 0.2–0.8)
MONOCYTES RELATIVE PERCENT: 6.6 %
NEUTROPHILS ABSOLUTE: 9.1 K/UL (ref 1.4–6.5)
NEUTROPHILS RELATIVE PERCENT: 77.6 %
ORGANISM: ABNORMAL
PDW BLD-RTO: 13.3 % (ref 11.5–14.5)
PERFORMED ON: ABNORMAL
PERFORMED ON: NORMAL
PLATELET # BLD: 203 K/UL (ref 130–400)
POTASSIUM SERPL-SCNC: 3.9 MEQ/L (ref 3.4–4.9)
RBC # BLD: 3.57 M/UL (ref 4.2–5.4)
SODIUM BLD-SCNC: 140 MEQ/L (ref 135–144)
TOTAL PROTEIN: 6.2 G/DL (ref 6.3–8)
URINE CULTURE, ROUTINE: ABNORMAL
URINE CULTURE, ROUTINE: ABNORMAL
WBC # BLD: 11.7 K/UL (ref 4.8–10.8)

## 2022-10-23 PROCEDURE — 6370000000 HC RX 637 (ALT 250 FOR IP): Performed by: NURSE PRACTITIONER

## 2022-10-23 PROCEDURE — 6360000002 HC RX W HCPCS: Performed by: NURSE PRACTITIONER

## 2022-10-23 PROCEDURE — 1210000000 HC MED SURG R&B

## 2022-10-23 PROCEDURE — 2580000003 HC RX 258: Performed by: NURSE PRACTITIONER

## 2022-10-23 PROCEDURE — 6370000000 HC RX 637 (ALT 250 FOR IP): Performed by: INTERNAL MEDICINE

## 2022-10-23 PROCEDURE — 85025 COMPLETE CBC W/AUTO DIFF WBC: CPT

## 2022-10-23 PROCEDURE — 83735 ASSAY OF MAGNESIUM: CPT

## 2022-10-23 PROCEDURE — 36415 COLL VENOUS BLD VENIPUNCTURE: CPT

## 2022-10-23 PROCEDURE — 80053 COMPREHEN METABOLIC PANEL: CPT

## 2022-10-23 RX ORDER — BENZONATATE 100 MG/1
100 CAPSULE ORAL 3 TIMES DAILY PRN
Status: DISCONTINUED | OUTPATIENT
Start: 2022-10-23 | End: 2022-10-24 | Stop reason: HOSPADM

## 2022-10-23 RX ORDER — GUAIFENESIN/DEXTROMETHORPHAN 100-10MG/5
5 SYRUP ORAL EVERY 4 HOURS PRN
Status: DISCONTINUED | OUTPATIENT
Start: 2022-10-23 | End: 2022-10-24 | Stop reason: HOSPADM

## 2022-10-23 RX ADMIN — LEVOTHYROXINE SODIUM 50 MCG: 0.05 TABLET ORAL at 06:31

## 2022-10-23 RX ADMIN — CYANOCOBALAMIN TAB 500 MCG 500 MCG: 500 TAB at 09:25

## 2022-10-23 RX ADMIN — ATORVASTATIN CALCIUM 10 MG: 10 TABLET, FILM COATED ORAL at 20:50

## 2022-10-23 RX ADMIN — MEMANTINE HYDROCHLORIDE 10 MG: 10 TABLET ORAL at 20:50

## 2022-10-23 RX ADMIN — INSULIN GLARGINE 14 UNITS: 100 INJECTION, SOLUTION SUBCUTANEOUS at 20:50

## 2022-10-23 RX ADMIN — FLUOXETINE HYDROCHLORIDE 40 MG: 20 CAPSULE ORAL at 09:25

## 2022-10-23 RX ADMIN — MEMANTINE HYDROCHLORIDE 10 MG: 10 TABLET ORAL at 09:25

## 2022-10-23 RX ADMIN — ENOXAPARIN SODIUM 40 MG: 100 INJECTION SUBCUTANEOUS at 09:25

## 2022-10-23 RX ADMIN — CEFTRIAXONE SODIUM 1000 MG: 1 INJECTION, POWDER, FOR SOLUTION INTRAMUSCULAR; INTRAVENOUS at 21:05

## 2022-10-23 RX ADMIN — INSULIN LISPRO 12 UNITS: 100 INJECTION, SOLUTION INTRAVENOUS; SUBCUTANEOUS at 12:00

## 2022-10-23 RX ADMIN — ACETAMINOPHEN 650 MG: 325 TABLET ORAL at 20:50

## 2022-10-23 NOTE — PROGRESS NOTES
Shift assessment complete. Meds as ordered. Avasys continues at bedside. Patient more alert and talkative. Pt care provided. Bed in lowest position. Call light within reach. Will continue to monitor.

## 2022-10-23 NOTE — CARE COORDINATION
[facility] [place] Case Management Initial Discharge Assessment    Met  and pt to discuss discharge plan. PCP: Kwaku Cortes MD                                Date of Last Visit:  AT [address] [place] [address] AND HAS BEEN THERE 1.5 YEARS    [facility] Patient: No        [facility] Notified: no    If no PCP, list provided? N/A     said that her bed is on hold at this time and will be able to go back.

## 2022-10-23 NOTE — PROGRESS NOTES
Physician Progress Note    10/23/2022   11:09 AM    Name:  Estevan Arnett  MRN:    [id number]      Day: 2     Admit Date: 10/21/2022  4:38 PM  PCP: Alma John MD    Code Status:  Full Code    Subjective:     No complaints.     Current Facility-Administered Medications   Medication Dose Route Frequency Provider Last Rate Last Admin    atorvastatin (LIPITOR) tablet 10 mg  10 mg Oral Nightly Glorious Nations, DO   10 mg at 10/22/22 2204    FLUoxetine (PROZAC) capsule 40 mg  40 mg Oral Daily Glorious Nations, DO   40 mg at 10/23/22 9085    levothyroxine (SYNTHROID) tablet 50 mcg  50 mcg Oral Daily Glorious Nations, DO   50 mcg at 10/23/22 0631    memantine (NAMENDA) tablet 10 mg  10 mg Oral BID Glorious Nations, DO   10 mg at 10/23/22 7384    vitamin B-12 (CYANOCOBALAMIN) tablet 500 mcg  500 mcg Oral Daily Glorious Nations, DO   500 mcg at 10/23/22 0176    insulin glargine (LANTUS) injection vial 14 Units  14 Units SubCUTAneous Nightly Glorious Nations, DO   14 Units at 10/22/22 2205    sodium chloride flush 0.9 % injection 5-40 mL  5-40 mL IntraVENous 2 times per day Nicoletto Bolzan-Roche, APRN - CNP   10 mL at 10/22/22 2212    sodium chloride flush 0.9 % injection 5-40 mL  5-40 mL IntraVENous PRN Nicoletto Bolzan-Roche, APRN - CNP        0.9 % sodium chloride infusion   IntraVENous PRN Nicoletto Bolzan-Roche, APRN - CNP        enoxaparin (LOVENOX) injection 40 mg  40 mg SubCUTAneous Daily Nicoletto Bolzan-Roche, APRN - CNP   40 mg at 10/23/22 0925    ondansetron (ZOFRAN-ODT) disintegrating tablet 4 mg  4 mg Oral Q8H PRN Nicoletto Bolzan-Roche, APRN - CNP        Or    ondansetron (ZOFRAN) injection 4 mg  4 mg IntraVENous Q6H PRN Nicoletto Bolzan-Roche, APRN - CNP        polyethylene glycol (GLYCOLAX) packet 17 g  17 g Oral Daily PRN Nicoletto Bolzan-Roche, APRN - CNP        acetaminophen (TYLENOL) tablet 650 mg  650 mg Oral Q6H PRN LANA Ramirez - CNP        Or    acetaminophen (TYLENOL) suppository 650 mg  650 mg Rectal Q6H PRN Nicoletto Bolzan-Roche, APRN - CNP        cefTRIAXone (ROCEPHIN) 1,000 mg in dextrose 5 % 50 mL IVPB mini-bag  1,000 mg IntraVENous Q24H Nicoletto Bolzan-Roche, APRN - CNP   Stopped at 10/22/22 2251    0.9 % sodium chloride infusion   IntraVENous Continuous Nicoletto Bolzan-Roche, APRN - CNP 75 mL/hr at 10/21/22 2322 900 mL at 10/21/22 2322    glucose chewable tablet 16 g  4 tablet Oral PRN Nicoletto Normazan-Roche, APRN - CNP        dextrose bolus 10% 125 mL  125 mL IntraVENous PRN Nicoletto Bolzan-Roche, APRN - CNP        Or    dextrose bolus 10% 250 mL  250 mL IntraVENous PRN Nicoletto Bolzan-Roche, APRN - CNP        glucagon (rDNA) injection 1 mg  1 mg SubCUTAneous PRN Nicoletto Bolzan-Roche, APRN - CNP        dextrose 10 % infusion   IntraVENous Continuous PRN Nicoreneo Normazan-Roche, APRN - CNP        insulin lispro (HUMALOG) injection vial 0-16 Units  0-16 Units SubCUTAneous TID WC Nicoletto Bolzan-Roche, APRN - CNP   4 Units at 10/22/22 0915    insulin lispro (HUMALOG) injection vial 0-4 Units  0-4 Units SubCUTAneous Nightly Nicoletto Bolzan-Roche, APRN - CNP           Physical Examination:      Vitals:  BP (!) 158/113   Pulse 73   Temp 99.3 °F (37.4 °C)   Resp 17   Ht 5' 4\" (1.626 m)   Wt 150 lb (68 kg)   SpO2 98%   BMI 25.75 kg/m²   Temp (24hrs), Av.3 °F (37.4 °C), Min:99.3 °F (37.4 °C), Max:99.3 °F (37.4 °C)      General appearance: Elderly and chronically ill-appearing. Able answer simple questions and follow commands but only oriented to self. Lungs: clear to auscultation bilaterally, normal effort  Heart: regular rate and rhythm, no murmur  Abdomen: soft, nontender, nondistended, bowel sounds present, no masses  Extremities: no edema, redness, tenderness in the calves.  Cap refill <2s  Skin: no gross lesions, rashes    Data:     Labs:  Recent Labs     10/22/22  0502 10/23/22  0541   WBC 15.4* 11.7*   HGB 11.9* 11.1*    203     Recent Labs     10/22/22  0502 10/23/22  0541    140   K 4.2 3.9    108*   CO2 23 21   BUN 13 11   CREATININE 0.85 0.59   GLUCOSE 252* 141*     Recent Labs     10/22/22  0502 10/23/22  0541   AST 7 10   ALT 7 6   BILITOT 1.1* 0.5   ALKPHOS 96 83       Assessment and Plan:        1. Metabolic, infectious encephalopathy secondary to GNR UTI in setting of baseline dementia  -Ceftriaxone. Follow culture data  -Supportive care    2. URI: Supportive care. Cough suppressant prn    Type 2 diabetes hyperglycemia: Added Lantus. Glycemic control improved  Hyperlipidemia  Hypothyroidism    Diet: ADULT DIET;  Regular; 4 carb choices (60 gm/meal)  Ppx: lovenox  Full Code    Tentative plan for return to nursing home tomorrow 10/24    >35 minutes in total care time    Electronically signed by Steve Sommer DO on 10/23/2022 at 11:09 AM

## 2022-10-23 NOTE — PROGRESS NOTES
10/23/22    From: Delaney Dwyer    Admit: INABILITY TO WALK AS PER HER BASELINE    PMH: DEMENTIA    Anticipated Discharge Disposition:RETURN TO LTC    Patient Mobility or PT/OT ordered:PT = 6 ON 10/22, WAITING ON OT    Consults: NONE    Clinical: WBC 11.9  POSITIVE UA WITH REFLEX TO CULTURE IN [facility]  IV ROCEPHIN  URINE CX ____________      Barriers to Discharge:WEAKNESS    Assessments: NH

## 2022-10-24 VITALS
RESPIRATION RATE: 18 BRPM | OXYGEN SATURATION: 96 % | HEIGHT: 64 IN | BODY MASS INDEX: 25.61 KG/M2 | DIASTOLIC BLOOD PRESSURE: 59 MMHG | TEMPERATURE: 98.2 F | WEIGHT: 150 LBS | SYSTOLIC BLOOD PRESSURE: 135 MMHG | HEART RATE: 66 BPM

## 2022-10-24 LAB
ALBUMIN SERPL-MCNC: 2.7 G/DL (ref 3.5–4.6)
ALP BLD-CCNC: 113 U/L (ref 40–130)
ALT SERPL-CCNC: 6 U/L (ref 0–33)
ANION GAP SERPL CALCULATED.3IONS-SCNC: 12 MEQ/L (ref 9–15)
AST SERPL-CCNC: 12 U/L (ref 0–35)
BASOPHILS ABSOLUTE: 0 K/UL (ref 0–0.2)
BASOPHILS RELATIVE PERCENT: 0.3 %
BILIRUB SERPL-MCNC: 0.3 MG/DL (ref 0.2–0.7)
BUN BLDV-MCNC: 9 MG/DL (ref 8–23)
CALCIUM SERPL-MCNC: 8 MG/DL (ref 8.5–9.9)
CHLORIDE BLD-SCNC: 111 MEQ/L (ref 95–107)
CO2: 18 MEQ/L (ref 20–31)
CREAT SERPL-MCNC: 0.48 MG/DL (ref 0.5–0.9)
EKG ATRIAL RATE: 84 BPM
EKG P AXIS: 35 DEGREES
EKG P-R INTERVAL: 196 MS
EKG Q-T INTERVAL: 360 MS
EKG QRS DURATION: 84 MS
EKG QTC CALCULATION (BAZETT): 425 MS
EKG R AXIS: -36 DEGREES
EKG T AXIS: 76 DEGREES
EKG VENTRICULAR RATE: 84 BPM
EOSINOPHILS ABSOLUTE: 0.2 K/UL (ref 0–0.7)
EOSINOPHILS RELATIVE PERCENT: 1.8 %
GFR SERPL CREATININE-BSD FRML MDRD: >60 ML/MIN/{1.73_M2}
GLOBULIN: 3.4 G/DL (ref 2.3–3.5)
GLUCOSE BLD-MCNC: 192 MG/DL (ref 70–99)
GLUCOSE BLD-MCNC: 213 MG/DL (ref 70–99)
GLUCOSE BLD-MCNC: 232 MG/DL (ref 70–99)
HCT VFR BLD CALC: 34.5 % (ref 37–47)
HEMOGLOBIN: 11.1 G/DL (ref 12–16)
LYMPHOCYTES ABSOLUTE: 1.6 K/UL (ref 1–4.8)
LYMPHOCYTES RELATIVE PERCENT: 15.6 %
MAGNESIUM: 2 MG/DL (ref 1.7–2.4)
MCH RBC QN AUTO: 30.8 PG (ref 27–31.3)
MCHC RBC AUTO-ENTMCNC: 32.2 % (ref 33–37)
MCV RBC AUTO: 95.6 FL (ref 79.4–94.8)
MONOCYTES ABSOLUTE: 0.7 K/UL (ref 0.2–0.8)
MONOCYTES RELATIVE PERCENT: 6.4 %
NEUTROPHILS ABSOLUTE: 7.9 K/UL (ref 1.4–6.5)
NEUTROPHILS RELATIVE PERCENT: 75.9 %
PDW BLD-RTO: 13.5 % (ref 11.5–14.5)
PERFORMED ON: ABNORMAL
PERFORMED ON: ABNORMAL
PLATELET # BLD: 235 K/UL (ref 130–400)
PLATELET SLIDE REVIEW: NORMAL
POTASSIUM SERPL-SCNC: 4.1 MEQ/L (ref 3.4–4.9)
RBC # BLD: 3.6 M/UL (ref 4.2–5.4)
RBC # BLD: NORMAL 10*6/UL
SARS-COV-2, NAAT: NOT DETECTED
SODIUM BLD-SCNC: 141 MEQ/L (ref 135–144)
TOTAL PROTEIN: 6.1 G/DL (ref 6.3–8)
WBC # BLD: 10.4 K/UL (ref 4.8–10.8)

## 2022-10-24 PROCEDURE — 85025 COMPLETE CBC W/AUTO DIFF WBC: CPT

## 2022-10-24 PROCEDURE — 36415 COLL VENOUS BLD VENIPUNCTURE: CPT

## 2022-10-24 PROCEDURE — 80053 COMPREHEN METABOLIC PANEL: CPT

## 2022-10-24 PROCEDURE — 97166 OT EVAL MOD COMPLEX 45 MIN: CPT

## 2022-10-24 PROCEDURE — 6370000000 HC RX 637 (ALT 250 FOR IP): Performed by: NURSE PRACTITIONER

## 2022-10-24 PROCEDURE — 87635 SARS-COV-2 COVID-19 AMP PRB: CPT

## 2022-10-24 PROCEDURE — 2580000003 HC RX 258: Performed by: NURSE PRACTITIONER

## 2022-10-24 PROCEDURE — 6360000002 HC RX W HCPCS: Performed by: NURSE PRACTITIONER

## 2022-10-24 PROCEDURE — 83735 ASSAY OF MAGNESIUM: CPT

## 2022-10-24 PROCEDURE — 6370000000 HC RX 637 (ALT 250 FOR IP): Performed by: INTERNAL MEDICINE

## 2022-10-24 RX ORDER — CEPHALEXIN 250 MG/1
250 CAPSULE ORAL EVERY 6 HOURS SCHEDULED
Status: DISCONTINUED | OUTPATIENT
Start: 2022-10-24 | End: 2022-10-24

## 2022-10-24 RX ORDER — CEPHALEXIN 500 MG/1
500 CAPSULE ORAL EVERY 8 HOURS SCHEDULED
Qty: 11 CAPSULE | Refills: 0 | DISCHARGE
Start: 2022-10-24 | End: 2022-10-24 | Stop reason: SDUPTHER

## 2022-10-24 RX ORDER — CEPHALEXIN 500 MG/1
500 CAPSULE ORAL EVERY 8 HOURS SCHEDULED
Qty: 11 CAPSULE | Refills: 0 | Status: SHIPPED | OUTPATIENT
Start: 2022-10-24 | End: 2022-10-28

## 2022-10-24 RX ORDER — CEPHALEXIN 500 MG/1
500 CAPSULE ORAL EVERY 8 HOURS SCHEDULED
Status: DISCONTINUED | OUTPATIENT
Start: 2022-10-24 | End: 2022-10-24 | Stop reason: HOSPADM

## 2022-10-24 RX ADMIN — ENOXAPARIN SODIUM 40 MG: 100 INJECTION SUBCUTANEOUS at 10:09

## 2022-10-24 RX ADMIN — FLUOXETINE HYDROCHLORIDE 40 MG: 20 CAPSULE ORAL at 10:08

## 2022-10-24 RX ADMIN — MEMANTINE HYDROCHLORIDE 10 MG: 10 TABLET ORAL at 10:08

## 2022-10-24 RX ADMIN — SODIUM CHLORIDE, PRESERVATIVE FREE 10 ML: 5 INJECTION INTRAVENOUS at 10:09

## 2022-10-24 RX ADMIN — SODIUM CHLORIDE: 9 INJECTION, SOLUTION INTRAVENOUS at 05:41

## 2022-10-24 RX ADMIN — LEVOTHYROXINE SODIUM 50 MCG: 0.05 TABLET ORAL at 05:43

## 2022-10-24 RX ADMIN — CYANOCOBALAMIN TAB 500 MCG 500 MCG: 500 TAB at 10:08

## 2022-10-24 RX ADMIN — INSULIN LISPRO 4 UNITS: 100 INJECTION, SOLUTION INTRAVENOUS; SUBCUTANEOUS at 12:55

## 2022-10-24 RX ADMIN — CEPHALEXIN 500 MG: 500 CAPSULE ORAL at 13:55

## 2022-10-24 NOTE — DISCHARGE INSTR - COC
Continuity of Care Form    Patient Name: Morenita Reagan   :  1945  MRN:  [id number]    Admit date:  10/21/2022  Discharge date:  10/24/2022    Code Status Order: Full Code   Advance Directives:     Admitting Physician: Candida Duran MD  PCP: Flaquito Leonard MD    Discharging Nurse: [facility] Unit/Room#: T458/M449-84  Discharging Unit Phone Number: [phone]    Emergency Contact:   Extended Emergency Contact Information  Primary Emergency Contact: Gabbie Sheridan  Home Phone: [phone]  Relation: Spouse   needed? No  Secondary Emergency ContactScotty Miller  [employer] Phone: [phone]  Relation: Child    Past Surgical History:  History reviewed. No pertinent surgical history. Immunization History: There is no immunization history on file for this patient. 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 A    Diabetes mellitus ([address] E11.9    Hypertension I10    Hyperlipidemia E78.5    Mild cognitive disorder F09    Non-smoker Z78.9    Hypothyroidism E03.9    Hypernatremia E87.0    DKA, type 2, not at goal [facility]) E11.10    Recurrent UTI (urinary tract infection) N39.0    Diarrhea R19.7    Hypokalemia E87.6    Recurrent major depressive episodes (HCC) F33.9    Uncontrolled type 2 diabetes mellitus [id number]    History of breast cancer in adulthood Z85.3    AMS (altered mental status) R41.82       Isolation/Infection:   Isolation            No Isolation          Patient Infection Status       Infection Onset Added Last Indicated Last Indicated By Review Planned Expiration Resolved Resolved By    None active    Resolved    COVID-19 (Rule Out) 10/21/22 10/21/22 10/21/22 Respiratory Panel, Molecular, with COVID-19 (Restricted: peds pts or suitable admitted adults) (Ordered)   10/22/22 Rule-Out Test Resulted    COVID-19 (Rule Out) 09/18/21 09/18/21 09/18/21 COVID-19, Rapid (Ordered)   09/18/21 Rule-Out Test Resulted            Nurse Assessment:  Last Vital Signs: BP (!) 133/52   Pulse 74   Temp 99 °F (37.2 °C) (Oral)   Resp 20   Ht 5' 4\" (1.626 m)   Wt 150 lb (68 kg)   SpO2 97%   BMI 25.75 kg/m²     Last documented pain score (0-10 scale):    Last Weight:   Wt Readings from Last 1 Encounters:   10/21/22 150 lb (68 kg)     Mental Status:  disoriented    IV Access:  - None    Nursing Mobility/ADLs:  Walking   Dependent  Transfer  Dependent  Bathing  Dependent  Dressing  Dependent  Toileting  Dependent  Feeding  Assisted  Med Admin  Assisted  Med Delivery   whole    Wound Care Documentation and Therapy:  Wound 12/22/21 Face Right;Upper (Active)   Number of days: 305        Elimination:  Continence: Bowel: Yes  Bladder: Yes  Urinary Catheter: None   Colostomy/Ileostomy/Ileal Conduit: No       Date of Last BM: 10/24/2022    Intake/Output Summary (Last 24 hours) at 10/24/2022 0851  Last data filed at 10/24/2022 0542  Gross per 24 hour   Intake 990 ml   Output --   Net 990 ml     I/O last 3 completed shifts: In: 1000 [P.O.:240; I.V.:760]  Out: -     Safety Concerns: At Risk for Falls    Impairments/Disabilities:      Hx of Dementia    Nutrition Therapy:  Current Nutrition Therapy:   - Oral Diet:  General    Routes of Feeding: Oral  Liquids: No Restrictions  Daily Fluid Restriction: no  Last Modified Barium Swallow with Video (Video Swallowing Test): not done    Treatments at the Time of Hospital Discharge:   Respiratory Treatments: N/A  Oxygen Therapy:  is not on home oxygen therapy.   Ventilator:    - No ventilator support    Rehab Therapies: Physical Therapy and Occupational Therapy  Weight Bearing Status/Restrictions: No weight bearing restrictions  Other Medical Equipment (for information only, NOT a DME order):  None  Other Treatments: N/A    Patient's personal belongings (please select all that are sent with patient):  Clothes/Shoes    RN SIGNATURE:  Electronically signed by Richa Bacon RN on 10/24/22 at 10:45 AM EDT    CASE MANAGEMENT/SOCIAL WORK SECTION    Inpatient Status Date: ***    Readmission Risk Assessment Score:  Readmission Risk              Risk of Unplanned Readmission:  18           Discharging to Facility/ Agency   Name: [employer]  Address:  [facility]:[phone]  Fax:      / signature: Electronically signed by Lourdes Christianson RN on 10/24/22 at 10:05 AM EDT    PHYSICIAN SECTION    Prognosis: {Prognosis:0034620558}    Condition at Discharge: [address] Patient Condition:[id number]}    Rehab Potential (if transferring to Rehab): {Prognosis:9913422580}    Recommended Labs or Other Treatments After Discharge: ***    Physician Certification: I certify the above information and transfer of Lenny Muñiz  is necessary for the continuing treatment of the diagnosis listed and that she requires {Admit to Appropriate Level of Care:25092} for {GREATER/LESS:728255544} 30 days.      Update Admission H&P: {CHP DME Changes in IQJ:628443732}    PHYSICIAN SIGNATURE:  Electronically signed by Perry Capps DO on 10/24/22 at 8:51 AM EDT

## 2022-10-24 NOTE — CARE COORDINATION
DC ORDER NOTED. [facility], NOTIFIED OF PLAN TO DC TODAY. ALSO SPOKE WITH SPOUSE TO NOTIFY  OF PLAN FOR DC.  WILL TRANSPORT WHEN DC PAPERWORK COMPLETED.  NURSE AWARE OF PLAN

## 2022-10-24 NOTE — PROGRESS NOTES
Physician Progress Note      PATIENTRogers Slice  CSN #:                  [id number]  :                       1945  ADMIT DATE:       10/21/2022 4:38 PM  [address] [place] DATE:  Jose Cyessywilbur Benton  PROVIDER #:        Domonique Coronado DO          QUERY TEXT:    Pt admitted with \"metabolic, infectious encephalopathy secondary to GNR UTI. Noted: WBC 15.1. If possible, please document in the progress notes and   discharge summary if you are evaluating and /or treating any of the following: The medical record reflects the following:  Risk Factors: age 67  HTN  dementia  DM2  Clinical Indicators: Abnormal  Urine Culture, Routine Escherichia coli?>[id number]   CFU/ML  Metabolic, infectious encephalopathy secondary to GNR UTI in setting of   baseline dementia   URI: Supportive care  Treatment: UA/UC  IV  IVF  [facility]  [phone]  Options provided:  -- Sepsis, present on admission  -- Localized infection of  UTI, without Sepsis  -- Other - I will add my own diagnosis  -- Disagree - Not applicable / Not valid  -- Disagree - Clinically unable to determine / Unknown  -- Refer to Clinical Documentation Reviewer    PROVIDER RESPONSE TEXT:    This patient has a localized infection of UTI without Sepsis.     Query created by: Rafat Bynum on 10/24/2022 7:51 AM      Electronically signed by:  Domonique Coronado DO 10/24/2022 8:46 AM

## 2022-10-24 NOTE — PROGRESS NOTES
1410: Report called to Kevin Buchanan RN over at [facility]. Kevin Buchanan states that they will need an actual printed script for the Cephalexin 500mg prescription. Dr. Scott Hilario made aware. Script is being printed. Pt's  is on the unit ready to transport pt to the care facility himself. Admission documents, AVS, and printed script for Cephalexin provided to . 1417: Transport put in for patient at this time.     Electronically signed by Leticia Loza RN on 10/24/22 at 2:16 PM EDT

## 2022-10-24 NOTE — PROGRESS NOTES
1020: Assessment complete. VSS on RA. Pt A+Ox1, oriented to self only with Hx of dementia. Morning med pass complete-pt takes pills whole, individually with water as she tends to \"chew\" on the pills. Avasys in place for safety. Rapid Covid swab obtained at this time. Safety maintained, call light within reach. No complaints. 1140: Phone Call made out to pt's  \"Mario\" who will be picking up patient and transporting her to [facility].  Mario states that he will be here to pick pt up after lunch around 1:00-1:30pm.     Electronically signed by Leticia Loza RN on 10/24/22 at 11:44 AM EDT

## 2022-10-24 NOTE — DISCHARGE SUMMARY
[facility] Medicine Discharge Summary    Steve Forrester  :  1945  MRN:  [id number]    Admit date:  10/21/2022    Discharge date:  10/24/2022    Admitting Physician: Day Mallory MD  Primary Care Physician:  Kt Colby MD    Discharge Diagnoses:    Principal Problem:    AMS (altered mental status)  Resolved Problems:    * No resolved hospital problems. *    Chief Complaint   Patient presents with    Altered Mental Status     Pt to ED due to altered mental status per [facility]. Pt is not at baseline mental status. Condition: improved   Activity: no strenuous activity   Diet: diabetic  Disposition: Nursing home  Functional Status: ambulatory with assistance    Significant Findings:     Ucx: E coli    Hospital Course:   62-year-old female with dementia, type 2 diabetes was admitted for metabolic, infectious encephalopathy secondary to E. coli UTI in the setting of baseline dementia. Her mental status returned to baseline with antibiotics. She will complete a course of antibiotics at discharge. She did have some congestion and cough throughout the hospitalization-this is felt to be viral upper respiratory tract infection which should subside with supportive care (decongestants, cough suppressants as needed). Her  confirmed that she was acting at baseline prior to discharge back to her nursing home. Exam on Discharge:   BP (!) 135/59   Pulse 66   Temp 98.2 °F (36.8 °C) (Oral)   Resp 18   Ht 5' 4\" (1.626 m)   Wt 150 lb (68 kg)   SpO2 96%   BMI 25.75 kg/m²   General appearance: Elderly and chronically ill-appearing. Able answer simple questions and follow commands but only oriented to self. Lungs: clear to auscultation bilaterally, normal effort  Heart: regular rate and rhythm, no murmur  Abdomen: soft, nontender, nondistended, bowel sounds present, no masses  Extremities: no edema, redness, tenderness in the calves.  Cap refill <2s  Skin: no gross lesions, rashes    Discharge Medication List:     Medication List        START taking these medications      cephALEXin 500 MG capsule  Commonly known as: KEFLEX  Take 1 capsule by mouth every 8 hours for 11 doses            CONTINUE taking these medications      CALAMINE-ZINC OXIDE EX     FLUoxetine 40 MG capsule  Commonly known as: PROzac     glucose 40 % Gel  Commonly known as: GLUTOSE  Take 37.5 mLs by mouth as needed (Glucose less than 70)     Gvoke HypoPen 2-Pack 1 MG/0.2ML Soaj  Generic drug: Glucagon  Inject 1 mg into the skin every 15 minutes as needed (Glucose less than 70, patient unable to take oral glucose, or if she is symptomatic)     insulin glargine 100 UNIT/ML injection vial  Commonly known as: LANTUS  Inject 17 Units into the skin nightly Indications: Diabetes     insulin lispro 100 UNIT/ML injection cartridge  Commonly known as: HumaLOG  Inject 3 times daily post meals only.  151-200= 5 units, 201-250= 6 units, 251-300= 7 units, 301-350= 8 units, >400= 10 units and call MD/PA     Insulin Syringe-Needle U-100 30G X 1/2\" 1 ML Misc  1 each by Does not apply route 4 times daily (before meals and nightly)     levothyroxine 50 MCG tablet  Commonly known as: SYNTHROID     linagliptin 5 MG tablet  Commonly known as: TRADJENTA     memantine 10 MG tablet  Commonly known as: NAMENDA  Take 1 tablet by mouth 2 times daily     ONE TOUCH ULTRA TEST strip  Generic drug: blood glucose test strips     potassium chloride 10 MEQ extended release tablet  Commonly known as: KLOR-CON M     telmisartan 80 MG tablet  Commonly known as: MICARDIS  Take 1 tablet by mouth daily     vitamin B-12 500 MCG tablet  Commonly known as: CYANOCOBALAMIN            ASK your doctor about these medications      atorvastatin 40 MG tablet  Commonly known as: LIPITOR  Take 1 tablet by mouth nightly               Where to Get Your Medications        You can get these medications from any pharmacy    Bring a paper prescription for each of these medications  cephALEXin 500 MG capsule         DC time 37 minutes    Signed:  Marilu Watkins DO  10/24/2022, 4:05 PM

## 2022-10-24 NOTE — PROGRESS NOTES
[facility] OCCUPATIONAL THERAPY EVALUATION - ACUTE     NAME: Tori Atkins  : 1945 (67 y.o.)  MRN: [id number]  CODE STATUS: Full Code  [facility]    Date of Service: 10/24/2022    Patient Diagnosis(es): Acute cystitis without hematuria [N30.00]  Altered mental status, unspecified altered mental status type [R41.82]  AMS (altered mental status) [R41.82]   Patient Active Problem List    Diagnosis Date Noted    AMS (altered mental status) 10/21/2022    History of breast cancer in adulthood 2022    Diarrhea 2021    Hypokalemia 2021    Recurrent major depressive episodes [address] [address] 2021    Uncontrolled type 2 diabetes mellitus 2021    Recurrent UTI (urinary tract infection) 2018    DKA, type 2, not at goal [facility]) 2021    Hypernatremia 2021    Hypothyroidism 2021    Chronic rhinitis 02/10/2021    Depressive disorder 02/10/2021    Hyperlipidemia 02/10/2021    Mild cognitive disorder 02/10/2021    Non-smoker 02/10/2021    Early onset Alzheimer's dementia without behavioral disturbance ([address] 02/10/2020    Memory loss 02/10/2020    Syncope and collapse 02/10/2020    Arteriosclerosis of coronary artery 2019    Postablative hypothyroidism 2019    Diabetes mellitus ([address] 2019    Hypertension 2019    Altered mental status, unspecified 2018    Hematuria, unspecified 2018    Personal history of urinary (tract) infections 2018        Past Medical History:   Diagnosis Date    Chronic rhinitis     Depression     Diabetes mellitus ([address]     Hyperlipidemia     Hypertension     Type 2 diabetes mellitus without complication ([address]      History reviewed. No pertinent surgical history. Restrictions  Restrictions/Precautions: Fall Risk              Safety Devices: Safety Devices  Type of Devices: All fall risk precautions in place;Call light within reach; Left in bed;Telesitter in use     Patient's date of birth confirmed: Pt verbally unable, verified via wrist bandPt able to state name only. General:       Subjective:Pt pleasant and cooperative. Pt speech at times is tangential and nonsensical.          Pain at start of treatment: No    Pain at end of treatment: No    Location:   Nursing notified: Not Applicable  RN:   Intervention: Repositioned    Prior Level of Function:  Social/Functional History  Lives With: Alone  Type of Home: Facility  Home Layout: One level  Additional Comments: Pt unable to provide at this time secondary to cognition    OBJECTIVE:     Orientation Status:  Orientation  Orientation Level: Oriented to person (name only)    Observation:  Observation/Palpation  Posture: Fair  Observation: retrograde lean noted in seated and standing positions    Cognition Status:  Cognition  Cognition Comment: inconsistently follows one step commands with increased time and repetition. Verbal cues to remain on task. Perception Status:  Perception  Overall Perceptual Status: WFL    Vision and Hearing Status:  Hearing  Hearing: Within functional limits   Vision - Basic Assessment  Prior Vision: No visual deficits  Visual History: No significant visual history  Patient Visual Report: No visual complaint reported. Visual Field Cut: No  Oculo Motor Control: WNL    GROSS ASSESSMENT AROM/PROM:  AROM: Within functional limits       ROM:   LUE AROM (degrees)  LUE AROM : WFL  Left Hand AROM (degrees)  Left Hand AROM: WFL  RUE AROM (degrees)  RUE AROM : WFL  Right Hand AROM (degrees)  Right Hand AROM: WFL    UE STRENGTH:  Strength: Within functional limits (4-/5 bilateral UE's)    UE COORDINATION:  Coordination:  (impaired fmc secondary to cognition)    UE TONE:  Tone: Normal    UE SENSATION:  Sensation: Intact    Hand Dominance:  Hand Dominance  Hand Dominance: Right    ADL Status:  ADL  Feeding: Setup;Verbal cueing  Grooming: Minimal assistance;Verbal cueing; Increased time to complete  UE Bathing:  Moderate assistance;Verbal cueing; Increased time to complete  LE Bathing: Moderate assistance;Verbal cueing; Increased time to complete  UE Dressing: Minimal assistance;Verbal cueing; Increased time to complete  LE Dressing: Verbal cueing; Increased time to complete;Minimal assistance  Toileting: Unable to assess(comment)    Functional Mobility:    Transfers  Sit to stand: Minimal assistance  Stand to sit: Contact guard assistance    Bed Mobility  Bed mobility  Supine to Sit: Maximum assistance  Sit to Supine: Minimal assistance  Scooting: Maximal assistance    Seated and Standing Balance:  Balance  Sitting:  (SBA)  Standing:  (CGA/Min A)    Functional Endurance:  Activity Tolerance  Activity Tolerance: Treatment limited secondary to decreased cognition;Treatment limited secondary to medical complications (free text); Patient limited by fatigue    D/C Recommendations:  OT D/C RECOMMENDATIONS  REQUIRES OT FOLLOW-UP: Yes    Equipment Recommendations:       OT Education:        OT Follow Up:    OT D/C RECOMMENDATIONS  REQUIRES OT FOLLOW-UP: Yes       Assessment/Discharge Disposition:     Performance deficits / Impairments: Decreased functional mobility , Decreased safe awareness, Decreased balance, Decreased ADL status, Decreased cognition, Decreased posture, Decreased endurance, Decreased strength, Decreased fine motor control  Prognosis: Fair  Discharge Recommendations: Continue to assess pending progress  Decision Making: Medium Complexity  History: 4 complexities  Exam: 9 deficits  Assistance / Modification: Min/mod A    AMPAC (Six Click) Self care Score    How much help for putting on and taking off regular lower body clothing?: A Little  How much help for Bathing?: A Little  How much help for Toileting?: A Lot  How much help for putting on and taking off regular upper body clothing?: A Little  How much help for taking care of personal grooming?: A Little  How much help for eating meals?: A Little  AM-PAC Inpatient Daily Activity Raw Score: 16  AM-[facility] Inpatient ADL T-Scale Score : 37.26  ADL Inpatient CMS 0-100% Score: 50.11    Therapy key for assistance levels -   Independent/Mod I = Pt. is able to perform task with no assistance but may require a device   Stand by assistance = Pt. does not perform task at an independent level but does not need physical assistance, requires verbal cues  Minimal, Moderate, Maximal Assistance = Pt. requires physical assistance (25%, 50%, 75% assist from helper) for task but is able to actively participate in task   Dependent = Pt. requires total assistance with task and is not able to actively participate with task completion     Plan:  Occupational Therapy Plan  Times Per Week: 1-4x/wk  Current Treatment Recommendations: Strengthening, Balance training, Functional mobility training, Neuromuscular re-education, Endurance training, Cognitive reorientation, Cognitive/Perceptual training, Self-Care / ADL    Goals:   Patient will:    - Be SBA in UB ADLs   - Be SBA in LB ADLs  - Be Supervision in ADL transfers without LOB  - Be Min A in toileting tasks  - Sequence self-care tasks with occasional verbal cues    Patient Goal: Patient goals : Not stated at this time     Discussed and agreed upon: No Comments: Cognition at this time      Therapy Time:   Individual   Time In 0825   Time Out 0838   Minutes 13          Eval: 13 minutes     Electronically signed by:    MIGUELANGEL Nava,   58/91/3331, 9:26 AM Electronically signed by MIGUELANGEL Nava on 32/43/64 at 9:27 AM EDT

## 2022-10-25 ENCOUNTER — OFFICE VISIT (OUTPATIENT)
Dept: GERIATRIC MEDICINE | Age: 77
End: 2022-10-25
Payer: MEDICARE

## 2022-10-25 DIAGNOSIS — J06.9 VIRAL URI: ICD-10-CM

## 2022-10-25 DIAGNOSIS — R41.82 ALTERED MENTAL STATUS, UNSPECIFIED ALTERED MENTAL STATUS TYPE: ICD-10-CM

## 2022-10-25 DIAGNOSIS — N39.0 RECURRENT UTI: Primary | ICD-10-CM

## 2022-10-25 DIAGNOSIS — R62.7 FAILURE TO THRIVE IN ADULT: ICD-10-CM

## 2022-10-25 PROCEDURE — 1123F ACP DISCUSS/DSCN MKR DOCD: CPT | Performed by: PHYSICIAN ASSISTANT

## 2022-10-25 NOTE — PROGRESS NOTES
Physical Therapy  Facility/Department: [facility] MED SURG V047/O470-50  Physical Therapy Discharge      NAME: Woo Covert    : 1945 (67 y.o.)  MRN: [id number]    Account: [de-identified]  Gender: female      Patient has been discharged from acute care hospital. DC patient from current PT program.      Electronically signed by Zeus Holcomb PT on 10/25/22 at 1:06 PM EDT

## 2022-11-05 NOTE — PROGRESS NOTES
Subjective:      Patient ID: Shawn Butcher is a pleasant 65 y.o. female who presents today for:  No chief complaint on file. [facility]    Patient seen today for quarterly visit to follow-up on history of UTI, AMS, failure to thrive and ?viral URI. No new evidence of urinary tract infection. Patient has ongoing progressive dementia, no evidence of dysuria or suprapubic tenderness. No foul-smelling urine per nursing staff or aids. Patient is eating and drinking well. Weight has been stable. See chart for details. Patient is a mild upper respiratory congestion, no evident rhinorrhea or sinus congestion/tenderness. Overall patient seems to be stable with mild URI. Vital signs stable, see ZACHERY. Patient Active Problem List   Diagnosis    Early onset Alzheimer's dementia without behavioral disturbance (HCC)    Memory loss    Syncope and collapse    Altered mental status, unspecified    Arteriosclerosis of coronary artery    Hematuria, unspecified    Postablative hypothyroidism    Personal history of urinary (tract) infections    Chronic rhinitis    Depressive disorder    Diabetes mellitus ([facility] [address]    Hypertension    Hyperlipidemia    Mild cognitive disorder    Non-smoker    Hypothyroidism    Hypernatremia    DKA, type 2, not at goal [facility])    Recurrent UTI (urinary tract infection)    Diarrhea    Hypokalemia    Recurrent major depressive episodes ([address]    Uncontrolled type 2 diabetes mellitus    History of breast cancer in adulthood    AMS (altered mental status)     Past Medical History:   Diagnosis Date    Chronic rhinitis     Depression     Diabetes mellitus ([facility] [address]     Hyperlipidemia     Hypertension     Type 2 diabetes mellitus without complication ([facility] [address]      No past surgical history on file.   Social History     Socioeconomic History    Marital status:      Spouse name: Not on file    Number of children: Not on file    Years of education: Not on file    Highest education level: Not on file   Occupational History    Not on file   Tobacco Use    Smoking status: Never    Smokeless tobacco: Never   Substance and Sexual Activity    Alcohol use: Never    Drug use: Never    Sexual activity: Not Currently   Other Topics Concern    Not on file   Social History Narrative    Not on file     Social Determinants of Health     Financial Resource Strain: Not on file   Food Insecurity: Not on file   Transportation Needs: Not on file   Physical Activity: Not on file   Stress: Not on file   Social Connections: Not on file   Intimate Partner Violence: Not on file   Housing Stability: Not on file     No family history on file. Allergies   Allergen Reactions    Penicillins     Sulfa Antibiotics     Penicillin G Rash         Review of Systems   Unable to perform ROS: Dementia     Objective:   VS: See [address] Reviewed. Physical Exam  Constitutional:       General: She is not in acute distress. Appearance: She is well-developed and normal weight. She is ill-appearing. She is not diaphoretic. Comments: Lethargic/tired appearing   HENT:      Head: Normocephalic and atraumatic. Nose: Nose normal. No congestion or rhinorrhea. Mouth/Throat:      Mouth: Mucous membranes are moist.      Pharynx: Oropharynx is clear. No oropharyngeal exudate or posterior oropharyngeal erythema. Comments: Limited exam d/t poor command following  Eyes:      General: No scleral icterus. Right eye: No discharge. Left eye: No discharge. Conjunctiva/sclera: Conjunctivae normal.      Pupils: Pupils are equal, round, and reactive to light. Neck:      Vascular: No JVD. Trachea: No tracheal deviation. Cardiovascular:      Rate and Rhythm: Normal rate. Heart sounds: Normal heart sounds. Pulmonary:      Effort: Pulmonary effort is normal. No respiratory distress. Breath sounds: Normal breath sounds. No stridor. No wheezing or rhonchi.    Abdominal:      General: Bowel sounds are normal. There is no distension. Palpations: Abdomen is soft. Tenderness: There is no abdominal tenderness. Musculoskeletal:         General: No tenderness or deformity. Normal range of motion. Cervical back: Normal range of motion and neck supple. Lymphadenopathy:      Cervical: No cervical adenopathy. Skin:     General: Skin is warm and dry. Neurological:      Mental Status: She is alert. Mental status is at baseline. She is disoriented. Motor: Weakness present. Psychiatric:         Mood and Affect: Mood normal.         Behavior: Behavior normal. Behavior is cooperative. Assessment:       Diagnosis Orders   1. Recurrent UTI        2. Altered mental status, unspecified altered mental status type        3. Failure to thrive in adult        4. Viral URI              Plan:      No orders of the defined types were placed in this encounter. No orders of the defined types were placed in this encounter. No new changes at this time. Continue monitoring weights and vital signs periodically. Overall patient appears to be clinically stable. Monitor upper respiratory symptoms. Patient is eating and drinking well without issue. No new evidence of aspiration. No new evidence of UTI symptoms. No follow-ups on file. [place], [place]        Please note orders entered on site at facility after discussion with appropriate facility nursing/therapy/ / nutritional staff. Current longstanding medical problems and acute medical issues addressed with staff. Available data and data elements in on site paper chart reviewed and analyzed. Current external consultant notes reviewed in on site chart. Ordered laboratory testing and imaging will be reviewed when available. This patient's need for psychiatric medication has been reviewed. Will consider further adjustment and possible further evaluations by mental health services.     Side effects, adverse effects of the medication prescribed today, as well as treatment plan and result expectations have been discussed withthe patient who expresses understanding and desires to proceed.

## 2022-11-15 ENCOUNTER — OFFICE VISIT (OUTPATIENT)
Dept: GERIATRIC MEDICINE | Age: 77
End: 2022-11-15
Payer: MEDICARE

## 2022-11-15 DIAGNOSIS — R11.2 NAUSEA, VOMITING AND DIARRHEA: Primary | ICD-10-CM

## 2022-11-15 DIAGNOSIS — R19.7 NAUSEA, VOMITING AND DIARRHEA: Primary | ICD-10-CM

## 2022-11-15 DIAGNOSIS — A08.4 GASTROENTERITIS AND COLITIS, VIRAL: ICD-10-CM

## 2022-11-15 PROCEDURE — 1123F ACP DISCUSS/DSCN MKR DOCD: CPT | Performed by: PHYSICIAN ASSISTANT

## 2022-11-16 LAB
ANION GAP SERPL CALCULATED.3IONS-SCNC: 13 MEQ/L (ref 9–15)
BUN BLDV-MCNC: 33 MG/DL (ref 8–23)
CALCIUM SERPL-MCNC: 8.6 MG/DL (ref 8.5–9.9)
CHLORIDE BLD-SCNC: 107 MEQ/L (ref 95–107)
CO2: 19 MEQ/L (ref 20–31)
CREAT SERPL-MCNC: 1.02 MG/DL (ref 0.5–0.9)
GFR SERPL CREATININE-BSD FRML MDRD: 56.3 ML/MIN/{1.73_M2}
GLUCOSE BLD-MCNC: 210 MG/DL (ref 70–99)
POTASSIUM SERPL-SCNC: 4.3 MEQ/L (ref 3.4–4.9)
SODIUM BLD-SCNC: 139 MEQ/L (ref 135–144)

## 2022-11-22 LAB — HBA1C MFR BLD: 8 % (ref 4.8–5.9)

## 2022-12-04 NOTE — PROGRESS NOTES
Subjective:      Patient ID: Lenny Muñiz is a pleasant 65 y.o. female who presents today for:  No chief complaint on file. [address] [facility]    Patient seen today for nausea vomiting diarrhea over the past 3 days. No clear etiology at this time. There has been a series of patients throughout memory care unit who have had similar symptoms. Likely GI virus,? Norovirus. Today we will order flu, COVID, and norovirus assessments. BMP as well. Patient is up-to-date on flu shot and COVID-19 vaccinations. We will add Zofran ODT 4 mg p.o. every 6 hours as needed x3 days. Patient's vital signs today 144/73, 85 bpm, 18 RR, 90.5 °F, 96% SPO2 on room air. She seems to be improving. Her intake is still poor, but is improving. We will continue monitoring. We will consider additional p.o. fluid emphasis as well as possible transfer to SNF for short duration stay if patient's condition worsens. Will monitor closely.       Patient Active Problem List   Diagnosis    Early onset Alzheimer's dementia without behavioral disturbance (HCC)    Memory loss    Syncope and collapse    Altered mental status, unspecified    Arteriosclerosis of coronary artery    Hematuria, unspecified    Postablative hypothyroidism    Personal history of urinary (tract) infections    Chronic rhinitis    Depressive disorder    Diabetes mellitus ([address]    Hypertension    Hyperlipidemia    Mild cognitive disorder    Non-smoker    Hypothyroidism    Hypernatremia    DKA, type 2, not at goal [facility])    Recurrent UTI (urinary tract infection)    Diarrhea    Hypokalemia    Recurrent major depressive episodes ([address]    Uncontrolled type 2 diabetes mellitus    History of breast cancer in adulthood    AMS (altered mental status)     Past Medical History:   Diagnosis Date    Chronic rhinitis     Depression     Diabetes mellitus ([address]     Hyperlipidemia     Hypertension     Type 2 diabetes mellitus without complication ([address]      No past surgical history on file. Social History     Socioeconomic History    Marital status:      Spouse name: Not on file    Number of children: Not on file    Years of education: Not on file    Highest education level: Not on file   Occupational History    Not on file   Tobacco Use    Smoking status: Never    Smokeless tobacco: Never   Substance and Sexual Activity    Alcohol use: Never    Drug use: Never    Sexual activity: Not Currently   Other Topics Concern    Not on file   Social History Narrative    Not on file     Social Determinants of Health     Financial Resource Strain: Not on file   Food Insecurity: Not on file   Transportation Needs: Not on file   Physical Activity: Not on file   Stress: Not on file   Social Connections: Not on file   Intimate Partner Violence: Not on file   Housing Stability: Not on file     No family history on file. Allergies   Allergen Reactions    Penicillins     Sulfa Antibiotics     Penicillin G Rash         Review of Systems   Unable to perform ROS: Dementia     Objective:   VS: See [facility]. Reviewed. Physical Exam  Vitals reviewed. Constitutional:       General: She is not in acute distress. Appearance: She is well-developed and normal weight. She is ill-appearing. She is not diaphoretic. Comments: Fairly lethargic, more talkative than past 2 days per nurse. HENT:      Head: Normocephalic and atraumatic. Nose: Nose normal. No congestion or rhinorrhea. Mouth/Throat:      Mouth: Mucous membranes are moist.      Pharynx: Oropharynx is clear. No oropharyngeal exudate or posterior oropharyngeal erythema. Comments: Limited exam d/t poor command following  Eyes:      General: No scleral icterus. Right eye: No discharge. Left eye: No discharge. Conjunctiva/sclera: Conjunctivae normal.      Pupils: Pupils are equal, round, and reactive to light. Neck:      Vascular: No JVD. Trachea: No tracheal deviation.    Cardiovascular: further evaluations by mental health services. Side effects, adverse effects of the medication prescribed today, as well as treatment plan and result expectations have been discussed withthe patient who expresses understanding and desires to proceed.

## 2023-01-10 ENCOUNTER — OFFICE VISIT (OUTPATIENT)
Dept: GERIATRIC MEDICINE | Age: 78
End: 2023-01-10
Payer: MEDICARE

## 2023-01-10 DIAGNOSIS — F02.80 EARLY ONSET ALZHEIMER'S DEMENTIA WITHOUT BEHAVIORAL DISTURBANCE (HCC): ICD-10-CM

## 2023-01-10 DIAGNOSIS — G30.0 EARLY ONSET ALZHEIMER'S DEMENTIA WITHOUT BEHAVIORAL DISTURBANCE (HCC): ICD-10-CM

## 2023-01-10 DIAGNOSIS — U07.1 COVID-19 VIRUS INFECTION: Primary | ICD-10-CM

## 2023-01-10 PROCEDURE — 1123F ACP DISCUSS/DSCN MKR DOCD: CPT | Performed by: PHYSICIAN ASSISTANT

## 2023-01-10 PROCEDURE — 99348 HOME/RES VST EST LOW MDM 30: CPT | Performed by: PHYSICIAN ASSISTANT

## 2023-01-11 LAB
ANION GAP SERPL CALCULATED.3IONS-SCNC: 12 MEQ/L (ref 9–15)
BASOPHILS ABSOLUTE: 0 K/UL (ref 0–0.2)
BASOPHILS RELATIVE PERCENT: 0.7 %
BUN BLDV-MCNC: 10 MG/DL (ref 8–23)
CALCIUM SERPL-MCNC: 8.3 MG/DL (ref 8.5–9.9)
CHLORIDE BLD-SCNC: 107 MEQ/L (ref 95–107)
CO2: 24 MEQ/L (ref 20–31)
CREAT SERPL-MCNC: 0.57 MG/DL (ref 0.5–0.9)
EOSINOPHILS ABSOLUTE: 0.2 K/UL (ref 0–0.7)
EOSINOPHILS RELATIVE PERCENT: 3.5 %
GFR SERPL CREATININE-BSD FRML MDRD: >60 ML/MIN/{1.73_M2}
GLUCOSE BLD-MCNC: 105 MG/DL (ref 70–99)
HCT VFR BLD CALC: 38.4 % (ref 37–47)
HEMOGLOBIN: 12.9 G/DL (ref 12–16)
LYMPHOCYTES ABSOLUTE: 1.7 K/UL (ref 1–4.8)
LYMPHOCYTES RELATIVE PERCENT: 27.1 %
MCH RBC QN AUTO: 31.1 PG (ref 27–31.3)
MCHC RBC AUTO-ENTMCNC: 33.6 % (ref 33–37)
MCV RBC AUTO: 92.7 FL (ref 79.4–94.8)
MONOCYTES ABSOLUTE: 0.5 K/UL (ref 0.2–0.8)
MONOCYTES RELATIVE PERCENT: 8.7 %
NEUTROPHILS ABSOLUTE: 3.8 K/UL (ref 1.4–6.5)
NEUTROPHILS RELATIVE PERCENT: 60 %
PDW BLD-RTO: 14.4 % (ref 11.5–14.5)
PLATELET # BLD: 196 K/UL (ref 130–400)
POTASSIUM SERPL-SCNC: 3.9 MEQ/L (ref 3.4–4.9)
RBC # BLD: 4.14 M/UL (ref 4.2–5.4)
SODIUM BLD-SCNC: 143 MEQ/L (ref 135–144)
WBC # BLD: 6.3 K/UL (ref 4.8–10.8)

## 2023-01-13 LAB
ANION GAP SERPL CALCULATED.3IONS-SCNC: 13 MEQ/L (ref 9–15)
BASOPHILS ABSOLUTE: 0 K/UL (ref 0–0.2)
BASOPHILS RELATIVE PERCENT: 0.4 %
BUN BLDV-MCNC: 14 MG/DL (ref 8–23)
CALCIUM SERPL-MCNC: 8.6 MG/DL (ref 8.5–9.9)
CHLORIDE BLD-SCNC: 108 MEQ/L (ref 95–107)
CO2: 22 MEQ/L (ref 20–31)
CREAT SERPL-MCNC: 0.87 MG/DL (ref 0.5–0.9)
EOSINOPHILS ABSOLUTE: 0.1 K/UL (ref 0–0.7)
EOSINOPHILS RELATIVE PERCENT: 1.6 %
GFR SERPL CREATININE-BSD FRML MDRD: >60 ML/MIN/{1.73_M2}
GLUCOSE BLD-MCNC: 173 MG/DL (ref 70–99)
HCT VFR BLD CALC: 39.4 % (ref 37–47)
HEMOGLOBIN: 13.3 G/DL (ref 12–16)
LYMPHOCYTES ABSOLUTE: 2.1 K/UL (ref 1–4.8)
LYMPHOCYTES RELATIVE PERCENT: 22.7 %
MCH RBC QN AUTO: 31.3 PG (ref 27–31.3)
MCHC RBC AUTO-ENTMCNC: 33.8 % (ref 33–37)
MCV RBC AUTO: 92.7 FL (ref 79.4–94.8)
MONOCYTES ABSOLUTE: 0.6 K/UL (ref 0.2–0.8)
MONOCYTES RELATIVE PERCENT: 6.4 %
NEUTROPHILS ABSOLUTE: 6.3 K/UL (ref 1.4–6.5)
NEUTROPHILS RELATIVE PERCENT: 68.9 %
PDW BLD-RTO: 14.4 % (ref 11.5–14.5)
PLATELET # BLD: 216 K/UL (ref 130–400)
POTASSIUM SERPL-SCNC: 4.3 MEQ/L (ref 3.4–4.9)
RBC # BLD: 4.25 M/UL (ref 4.2–5.4)
SODIUM BLD-SCNC: 143 MEQ/L (ref 135–144)
WBC # BLD: 9.1 K/UL (ref 4.8–10.8)

## 2023-02-01 NOTE — PROGRESS NOTES
Subjective:      Patient ID: Regla Starkey is a pleasant 77 y.o. female who presents today for:  No chief complaint on file.      [facility]    Patient is seen today due to recent COVID-19 infection.  Patient is overall showing mild signs of lethargy,, and URI symptoms.  No cardiorespiratory distress.  Pleasant cooperative overall.  Patient continues to eat and drink as normal.  No new NVD, wheezing, SOB, POI, chest tenderness/CP, or increased cough.  Patient is vaccinated for COVID-19.  She continues to be alert and oriented x1 and is pleasant and cooperative overall, albeit confused as baseline finding.  We will continue current order set and follow-up as needed.      Patient Active Problem List   Diagnosis    Early onset Alzheimer's dementia without behavioral disturbance (HCC)    Memory loss    Syncope and collapse    Altered mental status, unspecified    Arteriosclerosis of coronary artery    Hematuria, unspecified    Postablative hypothyroidism    Personal history of urinary (tract) infections    Chronic rhinitis    Depressive disorder    Diabetes mellitus (HCC)    Hypertension    Hyperlipidemia    Mild cognitive disorder    Non-smoker    Hypothyroidism    Hypernatremia    DKA, type 2, not at goal (HCC)    Recurrent UTI (urinary tract infection)    Diarrhea    Hypokalemia    Recurrent major depressive episodes (HCC)    Uncontrolled type 2 diabetes mellitus    History of breast cancer in adulthood    AMS (altered mental status)     Past Medical History:   Diagnosis Date    Chronic rhinitis     Depression     Diabetes mellitus (HCC)     Hyperlipidemia     Hypertension     Type 2 diabetes mellitus without complication (HCC)      No past surgical history on file.  Social History     Socioeconomic History    Marital status:      Spouse name: Not on file    Number of children: Not on file    Years of education: Not on file    Highest education level: Not on file   Occupational History     Not on file   Tobacco Use    Smoking status: Never    Smokeless tobacco: Never   Substance and Sexual Activity    Alcohol use: Never    Drug use: Never    Sexual activity: Not Currently   Other Topics Concern    Not on file   Social History Narrative    Not on file     Social Determinants of Health     Financial Resource Strain: Not on file   Food Insecurity: Not on file   Transportation Needs: Not on file   Physical Activity: Not on file   Stress: Not on file   Social Connections: Not on file   Intimate Partner Violence: Not on file   Housing Stability: Not on file     No family history on file. Allergies   Allergen Reactions    Penicillins     Sulfa Antibiotics     Penicillin G Rash           Review of Systems   Unable to perform ROS: Dementia   All other systems reviewed and are negative. Objective:   VS: See [address] Reviewed. Physical Exam  Vitals reviewed. Constitutional:       General: She is not in acute distress. Appearance: Normal appearance. She is well-developed and normal weight. She is not ill-appearing or diaphoretic. Comments: Fairly lethargic, more talkative than past 2 days per nurse. HENT:      Head: Normocephalic and atraumatic. Nose: Nose normal. No congestion or rhinorrhea. Mouth/Throat:      Mouth: Mucous membranes are moist.      Pharynx: Oropharynx is clear. No oropharyngeal exudate or posterior oropharyngeal erythema. Eyes:      Extraocular Movements: Extraocular movements intact. Conjunctiva/sclera: Conjunctivae normal.      Pupils: Pupils are equal, round, and reactive to light. Neck:      Vascular: No JVD. Trachea: No tracheal deviation. Cardiovascular:      Rate and Rhythm: Normal rate and regular rhythm. Heart sounds: Normal heart sounds. Pulmonary:      Effort: Pulmonary effort is normal. No respiratory distress. Breath sounds: Normal breath sounds. No wheezing or rhonchi.    Abdominal:      General: Bowel sounds are normal. Palpations: Abdomen is soft. Musculoskeletal:         General: No tenderness or deformity. Normal range of motion. Cervical back: Normal range of motion and neck supple. Skin:     General: Skin is warm and dry. Neurological:      Mental Status: She is alert. Mental status is at baseline. She is disoriented. Motor: Weakness present. Psychiatric:         Mood and Affect: Mood normal.         Behavior: Behavior normal. Behavior is cooperative. Assessment:       Diagnosis Orders   1. COVID-19 virus infection        2. Early onset Alzheimer's dementia without behavioral disturbance ([facility] [address]              Plan:          No change to current orders. We will continue monitoring vital signs and labs as indicated. Will discontinue labs as patient continues to be stable. Patient remain on quarantine until further indicated per CDC guidelines. No follow-ups on file. [facility]      Please note orders entered on site at facility after discussion with appropriate facility nursing/therapy/ / nutritional staff. Current longstanding medical problems and acute medical issues addressed with staff. Available data and data elements in on site paper chart reviewed and analyzed. Current external consultant notes reviewed in on site chart. Ordered laboratory testing and imaging will be reviewed when available. This patient's need for psychiatric medication has been reviewed. Will consider further adjustment and possible further evaluations by mental health services. Side effects, adverse effects of the medication prescribed today, as well as treatment plan and result expectations have been discussed withthe patient who expresses understanding and desires to proceed.     I spent a total of 25 minutes on the date of service which included preparing to see the patient, face-to-face patient care, performing a medically appropriate examination, completing clinical documentation, and on counseling/ eductaing the patient and the family. Please note Nuance Dragon PowerMic III software used for dictation of note,  which may contain minor errors due to ambient noise and indiscriminate speech pickup.

## 2023-03-29 ENCOUNTER — OFFICE VISIT (OUTPATIENT)
Dept: GERIATRIC MEDICINE | Age: 78
End: 2023-03-29
Payer: MEDICARE

## 2023-03-29 DIAGNOSIS — Z87.440 PERSONAL HISTORY OF URINARY (TRACT) INFECTIONS: ICD-10-CM

## 2023-03-29 DIAGNOSIS — F02.80 EARLY ONSET ALZHEIMER'S DEMENTIA WITHOUT BEHAVIORAL DISTURBANCE (HCC): ICD-10-CM

## 2023-03-29 DIAGNOSIS — R19.7 DIARRHEA, UNSPECIFIED TYPE: ICD-10-CM

## 2023-03-29 DIAGNOSIS — I10 HYPERTENSION, UNSPECIFIED TYPE: Primary | ICD-10-CM

## 2023-03-29 DIAGNOSIS — F09 MILD COGNITIVE DISORDER: ICD-10-CM

## 2023-03-29 DIAGNOSIS — E11.40 TYPE 2 DIABETES MELLITUS WITH DIABETIC NEUROPATHY, WITH LONG-TERM CURRENT USE OF INSULIN (HCC): ICD-10-CM

## 2023-03-29 DIAGNOSIS — G30.0 EARLY ONSET ALZHEIMER'S DEMENTIA WITHOUT BEHAVIORAL DISTURBANCE (HCC): ICD-10-CM

## 2023-03-29 DIAGNOSIS — E03.9 HYPOTHYROIDISM, UNSPECIFIED TYPE: ICD-10-CM

## 2023-03-29 DIAGNOSIS — Z79.4 TYPE 2 DIABETES MELLITUS WITH DIABETIC NEUROPATHY, WITH LONG-TERM CURRENT USE OF INSULIN (HCC): ICD-10-CM

## 2023-03-29 DIAGNOSIS — F32.A DEPRESSIVE DISORDER: ICD-10-CM

## 2023-03-29 PROCEDURE — 99349 HOME/RES VST EST MOD MDM 40: CPT | Performed by: PHYSICIAN ASSISTANT

## 2023-03-29 PROCEDURE — 1123F ACP DISCUSS/DSCN MKR DOCD: CPT | Performed by: PHYSICIAN ASSISTANT

## 2023-04-25 PROBLEM — I25.10 CORONARY ARTERY DISEASE INVOLVING NATIVE CORONARY ARTERY OF NATIVE HEART WITHOUT ANGINA PECTORIS: Status: ACTIVE | Noted: 2019-01-08

## 2023-04-25 PROBLEM — I12.9 CHRONIC KIDNEY DISEASE DUE TO HYPERTENSION: Status: ACTIVE | Noted: 2023-04-25

## 2023-04-25 NOTE — PROGRESS NOTES
Subjective:      Patient ID: Filemon Cavanaugh is a pleasant 65 y.o. female who presents today for:  No chief complaint on file. [address] [facility]  Patient seen today for annual exam for history of chronic medical conditions including history of hypothyroidism, early onset Alzheimer's dementia without behavior disturbance, CAD, history of recurrent UTI, hypothyroidism, DM type II, hypertension, hyperlipidemia, history of depression, and history of COVID-19. Patient did have episode of COVID-19 infection back in January. She made good recovery overall, minimal symptoms. No recurrent symptoms or sequelae at this time. No new behavioral disturbance. Patient does have advanced memory loss, she is pleasant cooperative however. She is able to, ADLs with max assist.  Annual labs to be drawn in May to assess. Vital signs been stable. No evident discomfort or pain complaints. Patient's blood glucose levels have been highly variable. Her intake is difficult to track as she is resistant to medications, often very noncompliant in the past.Patient on combination of Toujeo 32 units at bedtime, Tradjenta 5 mg every morning, glimepiride 4 mg p.o. every morning and 3 mg p.o. nightly, as well as NovoLog sliding scale. We give sliding scale only after meals due to high variability of patient intake. Patient hemoglobin A1c has trended down in the past 2 years with more intensive monitoring/therapy. Went from 10.9% November 2021, down to 8.0% on 11/21/2022 year later. Attending to avoid excessive treatments due to high risk of hypoglycemia. Patient shows no new evidence of acute depression or anxiety. We will continue patient on current care plan for the time being. Psych 362 intervene if indicated. Patient maintains full CODE STATUS. Patient showing lack of pneumococcal vaccine as well as shingles vaccine. We will offer in the coming weeks.   We will check record to see if any evidence of

## 2023-05-05 LAB
ALBUMIN SERPL-MCNC: 3.1 G/DL (ref 3.5–4.6)
ALP SERPL-CCNC: 83 U/L (ref 40–130)
ALT SERPL-CCNC: 9 U/L (ref 0–33)
ANION GAP SERPL CALCULATED.3IONS-SCNC: 13 MEQ/L (ref 9–15)
AST SERPL-CCNC: 16 U/L (ref 0–35)
BASOPHILS # BLD: 0.1 K/UL (ref 0–0.2)
BASOPHILS NFR BLD: 1.2 %
BILIRUB SERPL-MCNC: 0.3 MG/DL (ref 0.2–0.7)
BUN SERPL-MCNC: 10 MG/DL (ref 8–23)
CALCIUM SERPL-MCNC: 8.4 MG/DL (ref 8.5–9.9)
CHLORIDE SERPL-SCNC: 105 MEQ/L (ref 95–107)
CHOLEST SERPL-MCNC: 134 MG/DL (ref 0–199)
CO2 SERPL-SCNC: 21 MEQ/L (ref 20–31)
CREAT SERPL-MCNC: 0.62 MG/DL (ref 0.5–0.9)
EOSINOPHIL # BLD: 0.1 K/UL (ref 0–0.7)
EOSINOPHIL NFR BLD: 1.4 %
ERYTHROCYTE [DISTWIDTH] IN BLOOD BY AUTOMATED COUNT: 13.9 % (ref 11.5–14.5)
GLOBULIN SER CALC-MCNC: 3.4 G/DL (ref 2.3–3.5)
GLUCOSE SERPL-MCNC: 220 MG/DL (ref 70–99)
HCT VFR BLD AUTO: 37.5 % (ref 37–47)
HDLC SERPL-MCNC: 26 MG/DL (ref 40–59)
HGB BLD-MCNC: 12.6 G/DL (ref 12–16)
LDLC SERPL CALC-MCNC: 68 MG/DL (ref 0–129)
LYMPHOCYTES # BLD: 1.7 K/UL (ref 1–4.8)
LYMPHOCYTES NFR BLD: 20.8 %
MCH RBC QN AUTO: 31.6 PG (ref 27–31.3)
MCHC RBC AUTO-ENTMCNC: 33.7 % (ref 33–37)
MCV RBC AUTO: 93.9 FL (ref 79.4–94.8)
MONOCYTES # BLD: 0.6 K/UL (ref 0.2–0.8)
MONOCYTES NFR BLD: 7.4 %
NEUTROPHILS # BLD: 5.6 K/UL (ref 1.4–6.5)
NEUTS SEG NFR BLD: 69.2 %
PLATELET # BLD AUTO: 312 K/UL (ref 130–400)
POTASSIUM SERPL-SCNC: 4.5 MEQ/L (ref 3.4–4.9)
PROT SERPL-MCNC: 6.5 G/DL (ref 6.3–8)
RBC # BLD AUTO: 3.99 M/UL (ref 4.2–5.4)
SODIUM SERPL-SCNC: 139 MEQ/L (ref 135–144)
TRIGL SERPL-MCNC: 201 MG/DL (ref 0–150)
TSH SERPL-MCNC: 1.33 UIU/ML (ref 0.44–3.86)
WBC # BLD AUTO: 8.1 K/UL (ref 4.8–10.8)

## 2023-05-24 LAB — HBA1C MFR BLD: 9 % (ref 4.8–5.9)

## 2023-06-09 ENCOUNTER — OFFICE VISIT (OUTPATIENT)
Dept: GERIATRIC MEDICINE | Age: 78
End: 2023-06-09
Payer: MEDICARE

## 2023-06-09 DIAGNOSIS — Z79.4 TYPE 2 DIABETES MELLITUS WITH DIABETIC NEUROPATHY, WITH LONG-TERM CURRENT USE OF INSULIN (HCC): Primary | ICD-10-CM

## 2023-06-09 DIAGNOSIS — E11.40 TYPE 2 DIABETES MELLITUS WITH DIABETIC NEUROPATHY, WITH LONG-TERM CURRENT USE OF INSULIN (HCC): Primary | ICD-10-CM

## 2023-06-09 PROCEDURE — 3052F HG A1C>EQUAL 8.0%<EQUAL 9.0%: CPT | Performed by: PHYSICIAN ASSISTANT

## 2023-06-09 PROCEDURE — 99347 HOME/RES VST EST SF MDM 20: CPT | Performed by: PHYSICIAN ASSISTANT

## 2023-06-09 PROCEDURE — 1123F ACP DISCUSS/DSCN MKR DOCD: CPT | Performed by: PHYSICIAN ASSISTANT

## 2023-06-21 NOTE — PROGRESS NOTES
Subjective:      Patient ID: Barbara Hammer is a pleasant 65 y.o. female who presents today for:  No chief complaint on file. [address] [facility] [address]    Pt seen today to review BG and HgbA1c results. Patient A1c today 9.0%. Currently on sliding scale aspart insulin andAs well as low-dose glimepiride at meals and Toujeo 34 units nightly. Additionally patient on Tradjenta 5 mg p.o. every morning. Patient is on memory care unit. Unable to assess response to ROS. Plan on adding additional Toujeo to 38 units SQ nightly. Follow-up with A1c in the next 3 months. Continue carbohydrate controlled diet as best as possible. Patient Active Problem List   Diagnosis    Early onset Alzheimer's dementia without behavioral disturbance (HCC)    Memory loss    Syncope and collapse    Altered mental status, unspecified    Arteriosclerosis of coronary artery    Hematuria, unspecified    Postablative hypothyroidism    Personal history of urinary (tract) infections    Chronic rhinitis    Depressive disorder    Diabetes mellitus ([address]    Hypertension    Hyperlipidemia    Mild cognitive disorder    Non-smoker    Hypothyroidism    Hypernatremia    DKA, type 2, not at goal [facility])    Recurrent UTI (urinary tract infection)    Diarrhea    Hypokalemia    Recurrent major depressive episodes ([address]    Uncontrolled type 2 diabetes mellitus    History of breast cancer in adulthood    AMS (altered mental status)    Chronic kidney disease due to hypertension    Coronary artery disease involving native coronary artery of native heart without angina pectoris     Past Medical History:   Diagnosis Date    Chronic rhinitis     Depression     Diabetes mellitus ([address]     Hyperlipidemia     Hypertension     Type 2 diabetes mellitus without complication ([address]      No past surgical history on file.   Social History     Socioeconomic History    Marital status:      Spouse name: Not on file    Number of children: Not on file    Years

## 2023-09-12 ENCOUNTER — OFFICE VISIT (OUTPATIENT)
Dept: GERIATRIC MEDICINE | Age: 78
End: 2023-09-12
Payer: MEDICARE

## 2023-09-12 DIAGNOSIS — Z79.4 TYPE 2 DIABETES MELLITUS WITH HYPERGLYCEMIA, WITH LONG-TERM CURRENT USE OF INSULIN (HCC): Primary | ICD-10-CM

## 2023-09-12 DIAGNOSIS — E11.65 TYPE 2 DIABETES MELLITUS WITH HYPERGLYCEMIA, WITH LONG-TERM CURRENT USE OF INSULIN (HCC): Primary | ICD-10-CM

## 2023-09-12 PROCEDURE — 1123F ACP DISCUSS/DSCN MKR DOCD: CPT | Performed by: PHYSICIAN ASSISTANT

## 2023-09-12 PROCEDURE — 99348 HOME/RES VST EST LOW MDM 30: CPT | Performed by: PHYSICIAN ASSISTANT

## 2023-09-12 PROCEDURE — 3046F HEMOGLOBIN A1C LEVEL >9.0%: CPT | Performed by: PHYSICIAN ASSISTANT

## 2023-09-13 LAB — HBA1C MFR BLD: 9.6 % (ref 4.8–5.9)

## 2023-09-19 LAB — BACTERIA UR CULT: NORMAL

## 2023-09-27 PROBLEM — E11.9 DM2 (DIABETES MELLITUS, TYPE 2) (HCC): Status: ACTIVE | Noted: 2021-02-16

## 2023-09-27 NOTE — PROGRESS NOTES
Marital status:      Spouse name: Not on file    Number of children: Not on file    Years of education: Not on file    Highest education level: Not on file   Occupational History    Not on file   Tobacco Use    Smoking status: Never    Smokeless tobacco: Never   Substance and Sexual Activity    Alcohol use: Never    Drug use: Never    Sexual activity: Not Currently   Other Topics Concern    Not on file   Social History Narrative    Not on file     Social Determinants of Health     Financial Resource Strain: Not on file   Food Insecurity: Not on file   Transportation Needs: Not on file   Physical Activity: Not on file   Stress: Not on file   Social Connections: Not on file   Intimate Partner Violence: Not on file   Housing Stability: Not on file     No family history on file. Allergies   Allergen Reactions    Penicillins     Sulfa Antibiotics     Penicillin G Rash           Review of Systems   Unable to perform ROS: Dementia   All other systems reviewed and are negative. Objective:   VS: See [facility]. Reviewed. Physical Exam  Vitals reviewed. Constitutional:       General: She is not in acute distress. Appearance: Normal appearance. She is well-developed and normal weight. She is not ill-appearing or diaphoretic. Comments: A&O x 1/3   HENT:      Head: Normocephalic and atraumatic. Nose: Nose normal. No congestion or rhinorrhea. Mouth/Throat:      Mouth: Mucous membranes are moist.      Pharynx: Oropharynx is clear. No posterior oropharyngeal erythema. Neck:      Vascular: No JVD. Trachea: No tracheal deviation. Cardiovascular:      Rate and Rhythm: Normal rate. Pulses: Normal pulses. Pulmonary:      Effort: Pulmonary effort is normal. No respiratory distress. Breath sounds: Normal breath sounds. No wheezing or rhonchi. Abdominal:      Tenderness: There is no guarding. Musculoskeletal:         General: No tenderness or deformity. Normal range of motion.

## 2023-10-02 LAB — HBA1C MFR BLD: 8.9 % (ref 4.8–5.9)

## 2023-10-24 ENCOUNTER — OFFICE VISIT (OUTPATIENT)
Dept: GERIATRIC MEDICINE | Age: 78
End: 2023-10-24

## 2023-10-24 DIAGNOSIS — E11.10 DKA, TYPE 2, NOT AT GOAL (HCC): Primary | ICD-10-CM

## 2023-10-31 ENCOUNTER — OFFICE VISIT (OUTPATIENT)
Dept: GERIATRIC MEDICINE | Age: 78
End: 2023-10-31

## 2023-10-31 DIAGNOSIS — E11.65 UNCONTROLLED TYPE 2 DIABETES MELLITUS WITH HYPERGLYCEMIA (HCC): ICD-10-CM

## 2023-10-31 DIAGNOSIS — Z79.4 TYPE 2 DIABETES MELLITUS WITH HYPERGLYCEMIA, WITH LONG-TERM CURRENT USE OF INSULIN (HCC): Primary | ICD-10-CM

## 2023-10-31 DIAGNOSIS — E11.65 TYPE 2 DIABETES MELLITUS WITH HYPERGLYCEMIA, WITH LONG-TERM CURRENT USE OF INSULIN (HCC): Primary | ICD-10-CM

## 2023-11-07 ENCOUNTER — OFFICE VISIT (OUTPATIENT)
Dept: GERIATRIC MEDICINE | Age: 78
End: 2023-11-07

## 2023-11-07 DIAGNOSIS — Z79.4 TYPE 2 DIABETES MELLITUS WITH HYPERGLYCEMIA, WITH LONG-TERM CURRENT USE OF INSULIN (HCC): Primary | ICD-10-CM

## 2023-11-07 DIAGNOSIS — E11.65 TYPE 2 DIABETES MELLITUS WITH HYPERGLYCEMIA, WITH LONG-TERM CURRENT USE OF INSULIN (HCC): Primary | ICD-10-CM

## 2023-11-14 ENCOUNTER — OFFICE VISIT (OUTPATIENT)
Dept: GERIATRIC MEDICINE | Age: 78
End: 2023-11-14
Payer: MEDICARE

## 2023-11-14 DIAGNOSIS — N30.00 ACUTE CYSTITIS WITHOUT HEMATURIA: Primary | ICD-10-CM

## 2023-11-14 PROCEDURE — 1123F ACP DISCUSS/DSCN MKR DOCD: CPT | Performed by: PHYSICIAN ASSISTANT

## 2023-11-14 PROCEDURE — 99348 HOME/RES VST EST LOW MDM 30: CPT | Performed by: PHYSICIAN ASSISTANT

## 2023-11-15 LAB
BACTERIA URNS QL MICRO: ABNORMAL /HPF
BILIRUB UR QL STRIP: NEGATIVE
CLARITY UR: ABNORMAL
COLOR UR: YELLOW
EPI CELLS #/AREA URNS AUTO: ABNORMAL /HPF (ref 0–5)
GLUCOSE UR STRIP-MCNC: >=1000 MG/DL
HGB UR QL STRIP: NEGATIVE
HYALINE CASTS #/AREA URNS AUTO: ABNORMAL /HPF (ref 0–5)
KETONES UR STRIP-MCNC: ABNORMAL MG/DL
LEUKOCYTE ESTERASE UR QL STRIP: ABNORMAL
NITRITE UR QL STRIP: NEGATIVE
PH UR STRIP: 5.5 [PH] (ref 5–9)
PROT UR STRIP-MCNC: 30 MG/DL
RBC #/AREA URNS HPF: ABNORMAL /HPF (ref 0–2)
SP GR UR STRIP: 1.02 (ref 1–1.03)
UROBILINOGEN UR STRIP-ACNC: 0.2 E.U./DL
WBC #/AREA URNS AUTO: ABNORMAL /HPF (ref 0–5)

## 2023-11-17 LAB
BACTERIA UR CULT: ABNORMAL
BACTERIA UR CULT: ABNORMAL
ORGANISM: ABNORMAL

## 2023-11-25 PROBLEM — F03.90 DEMENTIA (HCC): Status: ACTIVE | Noted: 2020-02-10

## 2023-11-25 NOTE — PROGRESS NOTES
Subjective:      Patient ID: Casey House is a pleasant 77 y.o. female who presents today for:  No chief complaint on file. [address]    Patient seen today for DM type II med review. Patient  and I discussed today about the exorbitant cost of Valeriano Salts for her. He would like to try an alternative. Will attempt to utilize Trulicity and see what coverage we can expect from this. Will continue other insulins/meds at this time without need for further change. Continue Accu-Cheks and monitor with new addition of Trulicity and DC of Jardiance. Patient Active Problem List   Diagnosis    Dementia ([address])    Memory loss    Syncope and collapse    Altered mental status, unspecified    Arteriosclerosis of coronary artery    Hematuria, unspecified    Postablative hypothyroidism    Personal history of urinary (tract) infections    Chronic rhinitis    Depressive disorder    Diabetes mellitus ([address])    Primary hypertension    Hyperlipidemia    Mild cognitive disorder    Non-smoker    Hypothyroidism    Hypernatremia    DKA, type 2, not at goal [facility])    Recurrent UTI (urinary tract infection)    Diarrhea    Hypokalemia    Recurrent major depressive episodes (HCC)    DM2 (diabetes mellitus, type 2) ([address])    History of breast cancer in adulthood    AMS (altered mental status)    Chronic kidney disease due to hypertension    Coronary artery disease involving native coronary artery of native heart without angina pectoris     Past Medical History:   Diagnosis Date    Chronic rhinitis     Depression     Diabetes mellitus ([address])     Hyperlipidemia     Hypertension     Type 2 diabetes mellitus without complication ([address])      No past surgical history on file.   Social History     Socioeconomic History    Marital status:      Spouse name: Not on file    Number of children: Not on file    Years of education: Not on file    Highest education level: Not on file   Occupational History    Not on file

## 2023-12-01 NOTE — PROGRESS NOTES
Subjective:      Patient ID: Xavier Siddiqi is a pleasant 77 y.o. female who presents today for:  No chief complaint on file. [address]    Here today to assess patient after newly starting Trulicity. Patient had new orders after DC of Tradjenta earlier this past week. Per nurse patient has not had any new issues with new treatment. A1c ordered for 8 weeks from now. Will follow-up with new A1c and adjust dosage as needed. Will titrate up to 1.5 mg subcu q. weekly. Patient Active Problem List   Diagnosis    Dementia ([address])    Memory loss    Syncope and collapse    Altered mental status, unspecified    Arteriosclerosis of coronary artery    Hematuria, unspecified    Postablative hypothyroidism    Personal history of urinary (tract) infections    Chronic rhinitis    Depressive disorder    Diabetes mellitus ([address])    Primary hypertension    Hyperlipidemia    Mild cognitive disorder    Non-smoker    Hypothyroidism    Hypernatremia    DKA, type 2, not at goal [facility])    Recurrent UTI (urinary tract infection)    Diarrhea    Hypokalemia    Recurrent major depressive episodes (HCC)    DM2 (diabetes mellitus, type 2) ([address])    History of breast cancer in adulthood    AMS (altered mental status)    Chronic kidney disease due to hypertension    Coronary artery disease involving native coronary artery of native heart without angina pectoris     Past Medical History:   Diagnosis Date    Chronic rhinitis     Depression     Diabetes mellitus ([address])     Hyperlipidemia     Hypertension     Type 2 diabetes mellitus without complication ([address])      No past surgical history on file.   Social History     Socioeconomic History    Marital status:      Spouse name: Not on file    Number of children: Not on file    Years of education: Not on file    Highest education level: Not on file   Occupational History    Not on file   Tobacco Use    Smoking status: Never    Smokeless tobacco: Never   Substance and

## 2023-12-08 ENCOUNTER — OFFICE VISIT (OUTPATIENT)
Dept: GERIATRIC MEDICINE | Age: 78
End: 2023-12-08
Payer: MEDICARE

## 2023-12-08 DIAGNOSIS — E11.65 TYPE 2 DIABETES MELLITUS WITH HYPERGLYCEMIA, WITH LONG-TERM CURRENT USE OF INSULIN (HCC): ICD-10-CM

## 2023-12-08 DIAGNOSIS — Z79.899 ENCOUNTER FOR MEDICATION REVIEW: Primary | ICD-10-CM

## 2023-12-08 DIAGNOSIS — Z79.4 TYPE 2 DIABETES MELLITUS WITH HYPERGLYCEMIA, WITH LONG-TERM CURRENT USE OF INSULIN (HCC): ICD-10-CM

## 2023-12-08 PROCEDURE — 1123F ACP DISCUSS/DSCN MKR DOCD: CPT | Performed by: PHYSICIAN ASSISTANT

## 2023-12-08 PROCEDURE — 99347 HOME/RES VST EST SF MDM 20: CPT | Performed by: PHYSICIAN ASSISTANT

## 2023-12-08 PROCEDURE — 3052F HG A1C>EQUAL 8.0%<EQUAL 9.0%: CPT | Performed by: PHYSICIAN ASSISTANT

## 2023-12-08 NOTE — PROGRESS NOTES
Assessment:       Diagnosis Orders   1. Type 2 diabetes mellitus with hyperglycemia, with long-term current use of insulin ([address])              Plan:      No orders of the defined types were placed in this encounter. No orders of the defined types were placed in this encounter. Awaiting wife arrival of Trulicity. In the meantime will increase Toujeo to 40 units subcu nightly. Maintain insulin sliding scale. Encouraged low sugar/sweets diet. No follow-ups on file. Malina Gotti    Electronically signed by: ANAND Gotti on 12/7/2023    Please note orders entered on site at facility after discussion with appropriate facility nursing/therapy/ / nutritional staff. Current longstanding medical problems and acute medical issues addressed with staff. Available data and data elements in on site paper chart reviewed and analyzed. Current external consultant notes reviewed in on site chart. Ordered laboratory testing and imaging will be reviewed when available. Side effects, adverse effects of the medication prescribed today, as well as treatment plan and result expectations have been discussed withthe patient who expresses understanding and desires to proceed. I spent a total of 25 minutes on the date of service which included preparing to see the patient, face-to-face patient care, performing a medically appropriate examination, completing clinical documentation, and on counseling/ eductaing the patient and the family. Please note Nuance Dragon PowerMic III software used for dictation of note,  which may contain minor errors due to ambient noise and indiscriminate speech pickup.

## 2023-12-13 NOTE — PROGRESS NOTES
Subjective:      Patient ID: Iqra Nagy is a pleasant 77 y.o. female who presents today for:  No chief complaint on file. [address]    Patient seen today for recent hyperglycemia noted, AMS, as well as recurrent UTI. Patient has had similar symptoms in a bit of mild combativeness/agitation today. She is in memory care unit for dementia. Does have history of recurrent UTI intermittently throughout the year, however no recent urinary findings over the past three quarters. Plan on performing UA with reflex to culture. As well as a BMP. Patient BG has fairly large fluctuations, since pinning down specific diet and eating pattern is very difficult for her. Freq carb intake. Attempting to slowly increase Toujeo to cover basal for tighter range of blood glucose. Will make slow dose adjustments upward over next several weeks. For now, assess for infection and treat as indicated. Does have some evident discomfrot on lower abdomen palpation.        Patient Active Problem List   Diagnosis    Dementia ([address])    Memory loss    Syncope and collapse    Altered mental status, unspecified    Arteriosclerosis of coronary artery    Hematuria, unspecified    Postablative hypothyroidism    Personal history of urinary (tract) infections    Chronic rhinitis    Depressive disorder    Diabetes mellitus ([address])    Primary hypertension    Hyperlipidemia    Mild cognitive disorder    Non-smoker    Hypothyroidism    Hypernatremia    DKA, type 2, not at goal [facility])    Recurrent UTI (urinary tract infection)    Diarrhea    Hypokalemia    Recurrent major depressive episodes (HCC)    DM2 (diabetes mellitus, type 2) ([address])    History of breast cancer in adulthood    AMS (altered mental status)    Chronic kidney disease due to hypertension    Coronary artery disease involving native coronary artery of native heart without angina pectoris     Past Medical History:   Diagnosis Date    Chronic rhinitis     Depression

## 2023-12-19 ENCOUNTER — OFFICE VISIT (OUTPATIENT)
Dept: GERIATRIC MEDICINE | Age: 78
End: 2023-12-19
Payer: MEDICARE

## 2023-12-19 DIAGNOSIS — E11.65 TYPE 2 DIABETES MELLITUS WITH HYPERGLYCEMIA, WITH LONG-TERM CURRENT USE OF INSULIN (HCC): Primary | ICD-10-CM

## 2023-12-19 DIAGNOSIS — Z79.4 TYPE 2 DIABETES MELLITUS WITH HYPERGLYCEMIA, WITH LONG-TERM CURRENT USE OF INSULIN (HCC): Primary | ICD-10-CM

## 2023-12-19 PROCEDURE — 1123F ACP DISCUSS/DSCN MKR DOCD: CPT | Performed by: PHYSICIAN ASSISTANT

## 2023-12-19 PROCEDURE — 3052F HG A1C>EQUAL 8.0%<EQUAL 9.0%: CPT | Performed by: PHYSICIAN ASSISTANT

## 2023-12-19 PROCEDURE — 99348 HOME/RES VST EST LOW MDM 30: CPT | Performed by: PHYSICIAN ASSISTANT

## 2023-12-26 LAB — HBA1C MFR BLD: 8.9 % (ref 4.8–5.9)

## 2024-01-01 LAB — HBA1C MFR BLD: 8.6 % (ref 4.8–5.9)

## 2024-01-04 ENCOUNTER — OFFICE VISIT (OUTPATIENT)
Dept: GERIATRIC MEDICINE | Age: 79
End: 2024-01-04
Payer: MEDICARE

## 2024-01-04 DIAGNOSIS — E11.65 TYPE 2 DIABETES MELLITUS WITH HYPERGLYCEMIA, WITH LONG-TERM CURRENT USE OF INSULIN (HCC): Primary | ICD-10-CM

## 2024-01-04 DIAGNOSIS — Z79.4 TYPE 2 DIABETES MELLITUS WITH HYPERGLYCEMIA, WITH LONG-TERM CURRENT USE OF INSULIN (HCC): Primary | ICD-10-CM

## 2024-01-04 PROCEDURE — 99348 HOME/RES VST EST LOW MDM 30: CPT | Performed by: PHYSICIAN ASSISTANT

## 2024-01-04 PROCEDURE — 3052F HG A1C>EQUAL 8.0%<EQUAL 9.0%: CPT | Performed by: PHYSICIAN ASSISTANT

## 2024-01-04 PROCEDURE — 1123F ACP DISCUSS/DSCN MKR DOCD: CPT | Performed by: PHYSICIAN ASSISTANT

## 2024-01-08 NOTE — PROGRESS NOTES
Subjective:      Patient ID: Regla Starkey is a pleasant 78 y.o. female who presents today for:  No chief complaint on file.      [facility]    Seen today for medication review. She is currently receiving novolog for s/s coverage, effective. Will be switching to humalog d/t insurance coverage. Will begin tomorrow and reassess efficacy via accuchecks. Explained to nurse 1:1 dosing exchange. No change sto be made otherwise.       Patient Active Problem List   Diagnosis    Dementia (HCC)    Memory loss    Syncope and collapse    Altered mental status, unspecified    Arteriosclerosis of coronary artery    Hematuria, unspecified    Postablative hypothyroidism    Personal history of urinary (tract) infections    Chronic rhinitis    Depressive disorder    Diabetes mellitus (HCC)    Primary hypertension    Hyperlipidemia    Mild cognitive disorder    Non-smoker    Hypothyroidism    Hypernatremia    DKA, type 2, not at goal (HCC)    Recurrent UTI (urinary tract infection)    Diarrhea    Hypokalemia    Recurrent major depressive episodes (HCC)    DM2 (diabetes mellitus, type 2) (HCC)    History of breast cancer in adulthood    AMS (altered mental status)    Chronic kidney disease due to hypertension    Coronary artery disease involving native coronary artery of native heart without angina pectoris     Past Medical History:   Diagnosis Date    Chronic rhinitis     Depression     Diabetes mellitus (HCC)     Hyperlipidemia     Hypertension     Type 2 diabetes mellitus without complication (HCC)      No past surgical history on file.  Social History     Socioeconomic History    Marital status:      Spouse name: Not on file    Number of children: Not on file    Years of education: Not on file    Highest education level: Not on file   Occupational History    Not on file   Tobacco Use    Smoking status: Never    Smokeless tobacco: Never   Substance and Sexual Activity    Alcohol use: Never    Drug use:

## 2024-01-19 NOTE — PROGRESS NOTES
Alcohol use: Never    Drug use: Never    Sexual activity: Not Currently   Other Topics Concern    Not on file   Social History Narrative    Not on file     Social Determinants of Health     Financial Resource Strain: Not on file   Food Insecurity: Not on file   Transportation Needs: Not on file   Physical Activity: Not on file   Stress: Not on file   Social Connections: Not on file   Intimate Partner Violence: Not on file   Housing Stability: Not on file     No family history on file.  Allergies   Allergen Reactions    Penicillins     Sulfa Antibiotics     Penicillin G Rash           Review of Systems   Unable to perform ROS: Dementia   All other systems reviewed and are negative.      Objective:   VS: See PCC. Reviewed.    Physical Exam  Vitals reviewed.   Constitutional:       General: She is not in acute distress.     Appearance: Normal appearance. She is well-developed and normal weight. She is not ill-appearing.      Comments: A&O x 1/3   HENT:      Head: Normocephalic and atraumatic.      Nose: Nose normal.      Mouth/Throat:      Mouth: Mucous membranes are moist.      Pharynx: Oropharynx is clear.   Neck:      Vascular: No JVD.      Trachea: No tracheal deviation.   Cardiovascular:      Rate and Rhythm: Normal rate.      Pulses: Normal pulses.   Pulmonary:      Effort: Pulmonary effort is normal. No respiratory distress.      Breath sounds: Normal breath sounds. No wheezing or rhonchi.   Abdominal:      Tenderness: There is no guarding.   Musculoskeletal:         General: No tenderness or deformity. Normal range of motion.   Skin:     General: Skin is warm and dry.      Findings: No bruising.   Neurological:      Mental Status: She is alert. Mental status is at baseline. She is disoriented.      Motor: Weakness present.   Psychiatric:         Mood and Affect: Mood normal.         Behavior: Behavior is cooperative.         Assessment:       Diagnosis Orders   1. Type 2 diabetes mellitus with hyperglycemia,

## 2024-01-25 ENCOUNTER — OFFICE VISIT (OUTPATIENT)
Dept: GERIATRIC MEDICINE | Age: 79
End: 2024-01-25
Payer: MEDICARE

## 2024-01-25 DIAGNOSIS — N39.0 RECURRENT UTI (URINARY TRACT INFECTION): ICD-10-CM

## 2024-01-25 DIAGNOSIS — G30.1 SEVERE LATE ONSET ALZHEIMER'S DEMENTIA WITHOUT BEHAVIORAL DISTURBANCE, PSYCHOTIC DISTURBANCE, MOOD DISTURBANCE, OR ANXIETY (HCC): ICD-10-CM

## 2024-01-25 DIAGNOSIS — R19.7 DIARRHEA, UNSPECIFIED TYPE: ICD-10-CM

## 2024-01-25 DIAGNOSIS — E78.5 HYPERLIPIDEMIA, UNSPECIFIED HYPERLIPIDEMIA TYPE: ICD-10-CM

## 2024-01-25 DIAGNOSIS — Z79.4 TYPE 2 DIABETES MELLITUS WITH HYPERGLYCEMIA, WITH LONG-TERM CURRENT USE OF INSULIN (HCC): ICD-10-CM

## 2024-01-25 DIAGNOSIS — I10 PRIMARY HYPERTENSION: Primary | ICD-10-CM

## 2024-01-25 DIAGNOSIS — I12.9 CHRONIC KIDNEY DISEASE DUE TO HYPERTENSION: ICD-10-CM

## 2024-01-25 DIAGNOSIS — F02.C0 SEVERE LATE ONSET ALZHEIMER'S DEMENTIA WITHOUT BEHAVIORAL DISTURBANCE, PSYCHOTIC DISTURBANCE, MOOD DISTURBANCE, OR ANXIETY (HCC): ICD-10-CM

## 2024-01-25 DIAGNOSIS — E11.65 TYPE 2 DIABETES MELLITUS WITH HYPERGLYCEMIA, WITH LONG-TERM CURRENT USE OF INSULIN (HCC): ICD-10-CM

## 2024-01-25 PROCEDURE — 99349 HOME/RES VST EST MOD MDM 40: CPT | Performed by: PHYSICIAN ASSISTANT

## 2024-01-25 PROCEDURE — 3052F HG A1C>EQUAL 8.0%<EQUAL 9.0%: CPT | Performed by: PHYSICIAN ASSISTANT

## 2024-01-25 PROCEDURE — 1123F ACP DISCUSS/DSCN MKR DOCD: CPT | Performed by: PHYSICIAN ASSISTANT

## 2024-01-29 NOTE — PROGRESS NOTES
Subjective:      Patient ID: Regla Starkey is a pleasant 78 y.o. female who presents today for:  No chief complaint on file.    [facility]    Following up with patient's lab results today hemoglobin A1c 8.6 percent.  Overall patient coming down from higher number this past September of 9.6%.  Will continue current care plan and monitor trends downward every 3 months.  Adjust insulin and medications as needed.  Patient is doing well on Trulicity at this point with no new sequelae or side effects noted.  Diet maintains dynamic status, continue to emphasize lower sugar foods      Patient Active Problem List   Diagnosis    Dementia (HCC)    Memory loss    Syncope and collapse    Altered mental status, unspecified    Arteriosclerosis of coronary artery    Hematuria, unspecified    Postablative hypothyroidism    Personal history of urinary (tract) infections    Chronic rhinitis    Depressive disorder    Diabetes mellitus (HCC)    Primary hypertension    Hyperlipidemia    Mild cognitive disorder    Non-smoker    Hypothyroidism    Hypernatremia    DKA, type 2, not at goal (HCC)    Recurrent UTI (urinary tract infection)    Diarrhea    Hypokalemia    Recurrent major depressive episodes (HCC)    DM2 (diabetes mellitus, type 2) (HCC)    History of breast cancer in adulthood    AMS (altered mental status)    Chronic kidney disease due to hypertension    Coronary artery disease involving native coronary artery of native heart without angina pectoris     Past Medical History:   Diagnosis Date    Chronic rhinitis     Depression     Diabetes mellitus (HCC)     Hyperlipidemia     Hypertension     Type 2 diabetes mellitus without complication (HCC)      No past surgical history on file.  Social History     Socioeconomic History    Marital status:      Spouse name: Not on file    Number of children: Not on file    Years of education: Not on file    Highest education level: Not on file   Occupational

## 2024-02-05 ENCOUNTER — OFFICE VISIT (OUTPATIENT)
Dept: GERIATRIC MEDICINE | Age: 79
End: 2024-02-05
Payer: MEDICARE

## 2024-02-05 DIAGNOSIS — I12.9 CHRONIC KIDNEY DISEASE DUE TO HYPERTENSION: ICD-10-CM

## 2024-02-05 DIAGNOSIS — E89.0 POSTABLATIVE HYPOTHYROIDISM: ICD-10-CM

## 2024-02-05 DIAGNOSIS — G30.1 SEVERE LATE ONSET ALZHEIMER'S DEMENTIA WITHOUT BEHAVIORAL DISTURBANCE, PSYCHOTIC DISTURBANCE, MOOD DISTURBANCE, OR ANXIETY (HCC): ICD-10-CM

## 2024-02-05 DIAGNOSIS — I25.10 CORONARY ARTERY DISEASE INVOLVING NATIVE CORONARY ARTERY OF NATIVE HEART WITHOUT ANGINA PECTORIS: ICD-10-CM

## 2024-02-05 DIAGNOSIS — Z79.4 TYPE 2 DIABETES MELLITUS WITH HYPERGLYCEMIA, WITH LONG-TERM CURRENT USE OF INSULIN (HCC): ICD-10-CM

## 2024-02-05 DIAGNOSIS — R19.7 DIARRHEA, UNSPECIFIED TYPE: ICD-10-CM

## 2024-02-05 DIAGNOSIS — I10 PRIMARY HYPERTENSION: Primary | ICD-10-CM

## 2024-02-05 DIAGNOSIS — E11.65 TYPE 2 DIABETES MELLITUS WITH HYPERGLYCEMIA, WITH LONG-TERM CURRENT USE OF INSULIN (HCC): ICD-10-CM

## 2024-02-05 DIAGNOSIS — F02.C0 SEVERE LATE ONSET ALZHEIMER'S DEMENTIA WITHOUT BEHAVIORAL DISTURBANCE, PSYCHOTIC DISTURBANCE, MOOD DISTURBANCE, OR ANXIETY (HCC): ICD-10-CM

## 2024-02-05 DIAGNOSIS — N39.0 RECURRENT UTI (URINARY TRACT INFECTION): ICD-10-CM

## 2024-02-05 PROCEDURE — 3052F HG A1C>EQUAL 8.0%<EQUAL 9.0%: CPT | Performed by: PHYSICIAN ASSISTANT

## 2024-02-05 PROCEDURE — 1123F ACP DISCUSS/DSCN MKR DOCD: CPT | Performed by: PHYSICIAN ASSISTANT

## 2024-02-05 PROCEDURE — 99350 HOME/RES VST EST HIGH MDM 60: CPT | Performed by: PHYSICIAN ASSISTANT

## 2024-02-21 ENCOUNTER — OFFICE VISIT (OUTPATIENT)
Dept: GERIATRIC MEDICINE | Age: 79
End: 2024-02-21
Payer: MEDICARE

## 2024-02-21 DIAGNOSIS — E11.10 DKA, TYPE 2, NOT AT GOAL (HCC): Primary | ICD-10-CM

## 2024-02-21 PROCEDURE — 3044F HG A1C LEVEL LT 7.0%: CPT | Performed by: PHYSICIAN ASSISTANT

## 2024-02-21 PROCEDURE — 1123F ACP DISCUSS/DSCN MKR DOCD: CPT | Performed by: PHYSICIAN ASSISTANT

## 2024-02-21 PROCEDURE — 99348 HOME/RES VST EST LOW MDM 30: CPT | Performed by: PHYSICIAN ASSISTANT

## 2024-02-22 NOTE — PROGRESS NOTES
Subjective:      Patient ID: Regla Starkey is a pleasant 78 y.o. female who presents today for:  No chief complaint on file.      [facility]    Patient seen for annual exam.  She resides in memory care unit.  Unable to provide any history.  Primary history taken from nursing staff.  No acute changes noted per staff.  She continues to get along well with patients and staff.  Friendly cooperative, very confused.  Her blood sugars remain very dynamic, on combination basal insulin, sliding scale, as well as Trulicity.  Has been more effective with addition of Trulicity.  Patient has been having increased loose stools, utilizing Metamucil after today's visit.  Patient has no new stepwise decline in cognition.  Vital signs are stable.  Patient is up-to-date on immunizations.  Continues good dietary intake.  Weight is stable.  Will continue monitoring month-to-month.  Follow-up with annual labs when available.      Patient Active Problem List   Diagnosis    Dementia (HCC)    Memory loss    Syncope and collapse    Altered mental status, unspecified    Arteriosclerosis of coronary artery    Hematuria, unspecified    Postablative hypothyroidism    Personal history of urinary (tract) infections    Chronic rhinitis    Depressive disorder    Diabetes mellitus (HCC)    Primary hypertension    Hyperlipidemia    Mild cognitive disorder    Non-smoker    Hypothyroidism    Hypernatremia    DKA, type 2, not at goal (HCC)    Recurrent UTI (urinary tract infection)    Diarrhea    Hypokalemia    Recurrent major depressive episodes (HCC)    DM2 (diabetes mellitus, type 2) (HCC)    History of breast cancer in adulthood    AMS (altered mental status)    Chronic kidney disease due to hypertension    Coronary artery disease involving native coronary artery of native heart without angina pectoris     Past Medical History:   Diagnosis Date    Chronic rhinitis     Depression     Diabetes mellitus (HCC)     Hyperlipidemia

## 2024-02-24 NOTE — PROGRESS NOTES
mg p.o. daily as needed for first episode of loose stool (give 1 mg for subsequent loose stool max 2 caps daily),      Review of Systems   Unable to perform ROS: Dementia       Objective:   VS: See PCC. Reviewed.    Physical Exam  Vitals reviewed.   Constitutional:       General: She is not in acute distress.     Appearance: Normal appearance. She is well-developed and normal weight. She is not ill-appearing.      Comments: A&O x 1/3   HENT:      Head: Normocephalic and atraumatic.      Nose: Nose normal.      Mouth/Throat:      Mouth: Mucous membranes are moist.      Pharynx: Oropharynx is clear.   Eyes:      Conjunctiva/sclera: Conjunctivae normal.   Neck:      Vascular: No JVD.      Trachea: No tracheal deviation.   Cardiovascular:      Rate and Rhythm: Normal rate and regular rhythm.      Pulses: Normal pulses.   Pulmonary:      Effort: Pulmonary effort is normal. No respiratory distress.      Breath sounds: Normal breath sounds. No wheezing or rhonchi.   Abdominal:      Tenderness: There is no guarding.   Musculoskeletal:         General: No tenderness or deformity. Normal range of motion.   Skin:     General: Skin is warm and dry.      Findings: No bruising.   Neurological:      Mental Status: She is alert. Mental status is at baseline. She is disoriented.      Motor: Weakness present.   Psychiatric:         Mood and Affect: Mood normal.         Behavior: Behavior is cooperative.         Assessment:       Diagnosis Orders   1. Primary hypertension        2. Coronary artery disease involving native coronary artery of native heart without angina pectoris        3. Postablative hypothyroidism        4. Type 2 diabetes mellitus with hyperglycemia, with long-term current use of insulin ([facility])        5. Severe late onset Alzheimer's dementia without behavioral disturbance, psychotic disturbance, mood disturbance, or anxiety ([facility])        6. Recurrent UTI (urinary tract infection)        7. Chronic kidney disease due

## 2024-03-01 ENCOUNTER — OFFICE VISIT (OUTPATIENT)
Dept: GERIATRIC MEDICINE | Age: 79
End: 2024-03-01
Payer: MEDICARE

## 2024-03-01 DIAGNOSIS — N93.9 ABNORMAL VAGINAL BLEEDING: Primary | ICD-10-CM

## 2024-03-01 DIAGNOSIS — F02.C0 SEVERE LATE ONSET ALZHEIMER'S DEMENTIA WITHOUT BEHAVIORAL DISTURBANCE, PSYCHOTIC DISTURBANCE, MOOD DISTURBANCE, OR ANXIETY (HCC): ICD-10-CM

## 2024-03-01 DIAGNOSIS — G30.1 SEVERE LATE ONSET ALZHEIMER'S DEMENTIA WITHOUT BEHAVIORAL DISTURBANCE, PSYCHOTIC DISTURBANCE, MOOD DISTURBANCE, OR ANXIETY (HCC): ICD-10-CM

## 2024-03-01 DIAGNOSIS — N39.0 RECURRENT UTI (URINARY TRACT INFECTION): ICD-10-CM

## 2024-03-01 DIAGNOSIS — I12.9 CHRONIC KIDNEY DISEASE DUE TO HYPERTENSION: ICD-10-CM

## 2024-03-01 PROCEDURE — 99349 HOME/RES VST EST MOD MDM 40: CPT | Performed by: PHYSICIAN ASSISTANT

## 2024-03-01 PROCEDURE — 1123F ACP DISCUSS/DSCN MKR DOCD: CPT | Performed by: PHYSICIAN ASSISTANT

## 2024-03-01 NOTE — PROGRESS NOTES
Subjective:      Patient ID: Regla Starkey is a pleasant 78 y.o. female who presents today for:  No chief complaint on file.      [facility]    Seen for follow-up for recent history of?  Vaginal bleeding.  No new bleeding today.  No new noted in brief today as well.  Patient appears to be at baseline.  Very confused, albeit stable.  Cooperative.  Seated upright at lunch appears to have no new change in behaviors.  No new abdominal tenderness.  Normal bowel movements per aide.  Have family contacted to discuss possible GYN visit to monitor for abnormal postmenopausal vaginal bleeding.  Awaiting CBC results. VSS.      Patient Active Problem List   Diagnosis    Dementia (HCC)    Memory loss    Syncope and collapse    Altered mental status, unspecified    Arteriosclerosis of coronary artery    Hematuria, unspecified    Postablative hypothyroidism    Personal history of urinary (tract) infections    Chronic rhinitis    Depressive disorder    Diabetes mellitus (HCC)    Primary hypertension    Hyperlipidemia    Mild cognitive disorder    Non-smoker    Hypothyroidism    Hypernatremia    DKA, type 2, not at goal (HCC)    Recurrent UTI (urinary tract infection)    Diarrhea    Hypokalemia    Recurrent major depressive episodes (HCC)    DM2 (diabetes mellitus, type 2) (HCC)    History of breast cancer in adulthood    AMS (altered mental status)    Chronic kidney disease due to hypertension    Coronary artery disease involving native coronary artery of native heart without angina pectoris     Past Medical History:   Diagnosis Date    Chronic rhinitis     Depression     Diabetes mellitus (HCC)     Hyperlipidemia     Hypertension     Type 2 diabetes mellitus without complication (HCC)      No past surgical history on file.  Social History     Socioeconomic History    Marital status:      Spouse name: Not on file    Number of children: Not on file    Years of education: Not on file    Highest education

## 2024-03-04 LAB
ANION GAP SERPL CALCULATED.3IONS-SCNC: 16 MEQ/L (ref 9–15)
BASOPHILS ABSOLUTE: ABNORMAL
BASOPHILS RELATIVE PERCENT: ABNORMAL
BUN BLDV-MCNC: 20 MG/DL
BUN SERPL-MCNC: 20 MG/DL (ref 8–23)
CALCIUM SERPL-MCNC: 9.7 MG/DL
CALCIUM SERPL-MCNC: 9.7 MG/DL (ref 8.5–9.9)
CHLORIDE BLD-SCNC: 114 MMOL/L
CHLORIDE SERPL-SCNC: 114 MEQ/L (ref 95–107)
CO2 SERPL-SCNC: 25 MEQ/L (ref 20–31)
CO2: 25 MMOL/L
CREAT SERPL-MCNC: 0.76 MG/DL
CREAT SERPL-MCNC: 0.76 MG/DL (ref 0.5–0.9)
EGFR: >60
EOSINOPHILS ABSOLUTE: ABNORMAL
EOSINOPHILS RELATIVE PERCENT: ABNORMAL
ERYTHROCYTE [DISTWIDTH] IN BLOOD BY AUTOMATED COUNT: 13.4 % (ref 11.5–14.5)
ESTIMATED AVERAGE GLUCOSE: NORMAL
GLUCOSE BLD-MCNC: 297 MG/DL
GLUCOSE SERPL-MCNC: 297 MG/DL (ref 70–99)
HBA1C MFR BLD: 6.7 %
HCT VFR BLD AUTO: 48.2 % (ref 37–47)
HCT VFR BLD CALC: 48.2 % (ref 36–46)
HEMOGLOBIN: 15 G/DL (ref 12–16)
HGB BLD-MCNC: 15 G/DL (ref 12–16)
LYMPHOCYTES ABSOLUTE: ABNORMAL
LYMPHOCYTES RELATIVE PERCENT: ABNORMAL
MCH RBC QN AUTO: 30.8 PG
MCH RBC QN AUTO: 30.8 PG (ref 27–31.3)
MCHC RBC AUTO-ENTMCNC: 31.1 % (ref 33–37)
MCHC RBC AUTO-ENTMCNC: 31.1 G/DL
MCV RBC AUTO: 99 FL
MCV RBC AUTO: 99 FL (ref 79.4–94.8)
MONOCYTES ABSOLUTE: ABNORMAL
MONOCYTES RELATIVE PERCENT: ABNORMAL
NEUTROPHILS ABSOLUTE: ABNORMAL
NEUTROPHILS RELATIVE PERCENT: ABNORMAL
PLATELET # BLD AUTO: 280 K/UL (ref 130–400)
PLATELET # BLD: 280 K/ΜL
PMV BLD AUTO: ABNORMAL FL
POTASSIUM SERPL-SCNC: 3.6 MEQ/L (ref 3.4–4.9)
POTASSIUM SERPL-SCNC: 3.6 MMOL/L
RBC # BLD AUTO: 4.87 M/UL (ref 4.2–5.4)
RBC # BLD: 4.87 10^6/ΜL
SODIUM BLD-SCNC: 155 MMOL/L
SODIUM SERPL-SCNC: 155 MEQ/L (ref 135–144)
WBC # BLD AUTO: 13.2 K/UL (ref 4.8–10.8)
WBC # BLD: 13.2 10^3/ML

## 2024-03-06 LAB
ANION GAP SERPL CALCULATED.3IONS-SCNC: 14 MEQ/L (ref 9–15)
BUN SERPL-MCNC: 29 MG/DL (ref 8–23)
CALCIUM SERPL-MCNC: 9.5 MG/DL (ref 8.5–9.9)
CHLORIDE SERPL-SCNC: 117 MEQ/L (ref 95–107)
CO2 SERPL-SCNC: 26 MEQ/L (ref 20–31)
CREAT SERPL-MCNC: 0.75 MG/DL (ref 0.5–0.9)
GLUCOSE SERPL-MCNC: 256 MG/DL (ref 70–99)
POTASSIUM SERPL-SCNC: 3.7 MEQ/L (ref 3.4–4.9)
SODIUM SERPL-SCNC: 157 MEQ/L (ref 135–144)

## 2024-03-06 NOTE — PROGRESS NOTES
Assessment:       Diagnosis Orders   1. DKA, type 2, not at goal (HCC)              Plan:      No orders of the defined types were placed in this encounter.    No orders of the defined types were placed in this encounter.      Cont use of intensive s/s humalog while awaiting Trulicity. If longer than 10 days until Trulicity refilled, we trial a different GLP-1 if covered by insurance.    No follow-ups on file.      ANAND Day    Electronically signed by: ANAND Day on 3/6/2024    Please note orders entered on site at facility after discussion with appropriate facility nursing/therapy/ / nutritional staff. Current longstanding medical problems and acute medical issues addressed with staff. Available data and data elements in on site paper chart reviewed and analyzed.  Current external consultant notes reviewed in on site chart. Ordered laboratory testing and imaging will be reviewed when available.     Side effects, adverse effects of the medication prescribed today, as well as treatment plan and result expectations have been discussed withthe patient who expresses understanding and desires to proceed.    I spent a total of 30 minutes on the date of service which included preparing to see the patient, face-to-face patient care, performing a medically appropriate examination, completing clinical documentation, and on counseling/ eductaing the patient and the family.      Please note Nuance Dragon PowerMic III software used for dictation of note,  which may contain minor errors due to ambient noise and indiscriminate speech pickup.

## 2024-03-12 ENCOUNTER — OFFICE VISIT (OUTPATIENT)
Dept: GERIATRIC MEDICINE | Age: 79
End: 2024-03-12

## 2024-03-12 DIAGNOSIS — Z79.4 TYPE 2 DIABETES MELLITUS WITH HYPERGLYCEMIA, WITH LONG-TERM CURRENT USE OF INSULIN (HCC): Primary | ICD-10-CM

## 2024-03-12 DIAGNOSIS — E11.65 TYPE 2 DIABETES MELLITUS WITH HYPERGLYCEMIA, WITH LONG-TERM CURRENT USE OF INSULIN (HCC): Primary | ICD-10-CM

## 2024-03-12 DIAGNOSIS — R73.9 ACUTE HYPERGLYCEMIA: ICD-10-CM

## 2024-03-19 ENCOUNTER — OFFICE VISIT (OUTPATIENT)
Dept: GERIATRIC MEDICINE | Age: 79
End: 2024-03-19
Payer: MEDICARE

## 2024-03-19 DIAGNOSIS — G30.1 SEVERE LATE ONSET ALZHEIMER'S DEMENTIA WITHOUT BEHAVIORAL DISTURBANCE, PSYCHOTIC DISTURBANCE, MOOD DISTURBANCE, OR ANXIETY (HCC): ICD-10-CM

## 2024-03-19 DIAGNOSIS — E11.10 DKA, TYPE 2, NOT AT GOAL (HCC): Primary | ICD-10-CM

## 2024-03-19 DIAGNOSIS — F02.C0 SEVERE LATE ONSET ALZHEIMER'S DEMENTIA WITHOUT BEHAVIORAL DISTURBANCE, PSYCHOTIC DISTURBANCE, MOOD DISTURBANCE, OR ANXIETY (HCC): ICD-10-CM

## 2024-03-19 PROCEDURE — 1123F ACP DISCUSS/DSCN MKR DOCD: CPT | Performed by: PHYSICIAN ASSISTANT

## 2024-03-19 PROCEDURE — 99348 HOME/RES VST EST LOW MDM 30: CPT | Performed by: PHYSICIAN ASSISTANT

## 2024-03-19 PROCEDURE — 3052F HG A1C>EQUAL 8.0%<EQUAL 9.0%: CPT | Performed by: PHYSICIAN ASSISTANT

## 2024-03-21 NOTE — PROGRESS NOTES
Years of education: Not on file    Highest education level: Not on file   Occupational History    Not on file   Tobacco Use    Smoking status: Never    Smokeless tobacco: Never   Substance and Sexual Activity    Alcohol use: Never    Drug use: Never    Sexual activity: Not Currently   Other Topics Concern    Not on file   Social History Narrative    Not on file     Social Determinants of Health     Financial Resource Strain: Not on file   Food Insecurity: Not on file   Transportation Needs: Not on file   Physical Activity: Not on file   Stress: Not on file   Social Connections: Not on file   Intimate Partner Violence: Not on file   Housing Stability: Not on file     No family history on file.  Allergies   Allergen Reactions    Penicillins     Sulfa Antibiotics     Penicillin G Rash           Review of Systems   Unable to perform ROS: Dementia       Objective:   VS: See PCC. Reviewed.    Physical Exam  Vitals reviewed.   Constitutional:       General: She is not in acute distress.     Appearance: Normal appearance. She is well-developed and normal weight. She is not ill-appearing.      Comments: A&O x 1/3.  Withdrawn, albeit cooperative and friendly.   HENT:      Head: Normocephalic and atraumatic.      Nose: Nose normal.      Mouth/Throat:      Mouth: Mucous membranes are moist.      Pharynx: Oropharynx is clear.   Eyes:      Conjunctiva/sclera: Conjunctivae normal.   Neck:      Vascular: No JVD.      Trachea: No tracheal deviation.   Cardiovascular:      Rate and Rhythm: Normal rate and regular rhythm.   Pulmonary:      Effort: Pulmonary effort is normal.      Breath sounds: Normal breath sounds.   Musculoskeletal:         General: No tenderness or deformity. Normal range of motion.   Skin:     General: Skin is warm and dry.      Findings: No bruising.   Neurological:      Mental Status: She is alert. Mental status is at baseline. She is disoriented.      Motor: Weakness present.   Psychiatric:         Mood and

## 2024-03-25 LAB
ANION GAP SERPL CALCULATED.3IONS-SCNC: 10 MEQ/L (ref 9–15)
BUN SERPL-MCNC: 7 MG/DL (ref 8–23)
CALCIUM SERPL-MCNC: 8.9 MG/DL (ref 8.5–9.9)
CHLORIDE SERPL-SCNC: 106 MEQ/L (ref 95–107)
CO2 SERPL-SCNC: 27 MEQ/L (ref 20–31)
CREAT SERPL-MCNC: 0.57 MG/DL (ref 0.5–0.9)
GLUCOSE SERPL-MCNC: 109 MG/DL (ref 70–99)
POTASSIUM SERPL-SCNC: 3.8 MEQ/L (ref 3.4–4.9)
SODIUM SERPL-SCNC: 143 MEQ/L (ref 135–144)

## 2024-03-27 LAB
ESTIMATED AVERAGE GLUCOSE: NORMAL
HBA1C MFR BLD: 8.3 % (ref 4.8–5.9)
HBA1C MFR BLD: NORMAL %

## 2024-04-03 NOTE — PROGRESS NOTES
Subjective:      Patient ID: Regla Starkey is a pleasant 78 y.o. female who presents today for:  Chief Complaint   Patient presents with    Follow-up       [facility]    Patient seen today for follow-up for DM type II and hyperglycemia.  Blood glucose levels are coming down into more controlled zone (i.e. 120-180s mg/DL average).  Still not entirely in range, will will attempt adjust Toujeo to 50 units daily.  Patient is maxed out on glimepiride per textbook recommendations.  Difficult to reduce carbohydrate load as patient has dementia and eating choices are sporadic/dynamic.  Will continue monitor however with increased Toujeo.  Follow-up with hemoglobin A1c in the next quarter.      Patient Active Problem List   Diagnosis    Dementia (HCC)    Memory loss    Syncope and collapse    Altered mental status, unspecified    Arteriosclerosis of coronary artery    Hematuria, unspecified    Postablative hypothyroidism    Personal history of urinary (tract) infections    Chronic rhinitis    Depressive disorder    Diabetes mellitus (HCC)    Primary hypertension    Hyperlipidemia    Mild cognitive disorder    Non-smoker    Hypothyroidism    Hypernatremia    DKA, type 2, not at goal (HCC)    Recurrent UTI (urinary tract infection)    Diarrhea    Hypokalemia    Recurrent major depressive episodes (HCC)    DM2 (diabetes mellitus, type 2) (HCC)    History of breast cancer in adulthood    AMS (altered mental status)    Chronic kidney disease due to hypertension    Coronary artery disease involving native coronary artery of native heart without angina pectoris     Past Medical History:   Diagnosis Date    Chronic rhinitis     Depression     Diabetes mellitus (HCC)     Hyperlipidemia     Hypertension     Type 2 diabetes mellitus without complication (HCC)      No past surgical history on file.  Social History     Socioeconomic History    Marital status:      Spouse name: Not on file    Number of

## 2024-04-23 ENCOUNTER — OFFICE VISIT (OUTPATIENT)
Dept: GERIATRIC MEDICINE | Age: 79
End: 2024-04-23

## 2024-04-23 DIAGNOSIS — Z78.9 DIFFICULTY REFILLING PRESCRIPTIONS: ICD-10-CM

## 2024-04-23 DIAGNOSIS — E11.65 TYPE 2 DIABETES MELLITUS WITH HYPERGLYCEMIA, WITH LONG-TERM CURRENT USE OF INSULIN (HCC): Primary | ICD-10-CM

## 2024-04-23 DIAGNOSIS — Z79.4 TYPE 2 DIABETES MELLITUS WITH HYPERGLYCEMIA, WITH LONG-TERM CURRENT USE OF INSULIN (HCC): Primary | ICD-10-CM

## 2024-05-02 NOTE — PROGRESS NOTES
Subjective:      Patient ID: Regla Starkey is a pleasant 78 y.o. female who presents today for:  Chief Complaint   Patient presents with    Follow-up     Type 2 diabetes mellitus with hyperglycemia, with long-term current use of insulin (hcc)  (primary encounter diagnosis)  Difficulty refilling prescriptions         [facility]    Patient seen today for follow-up on DM type II medications.  Currently pharmacy is out of Trulicity which patient has been doing very well on.  Attempting to date to assess availability of other GLP-1 agonist and will likely switch to this at this has been a recurrent problem with pharmacy as of late.      Patient Active Problem List   Diagnosis    Dementia (HCC)    Memory loss    Syncope and collapse    Altered mental status, unspecified    Arteriosclerosis of coronary artery    Hematuria, unspecified    Postablative hypothyroidism    Personal history of urinary (tract) infections    Chronic rhinitis    Depressive disorder    Diabetes mellitus (HCC)    Primary hypertension    Hyperlipidemia    Mild cognitive disorder    Non-smoker    Hypothyroidism    Hypernatremia    DKA, type 2, not at goal (HCC)    Recurrent UTI (urinary tract infection)    Diarrhea    Hypokalemia    Recurrent major depressive episodes (HCC)    DM2 (diabetes mellitus, type 2) (HCC)    History of breast cancer in adulthood    AMS (altered mental status)    Chronic kidney disease due to hypertension    Coronary artery disease involving native coronary artery of native heart without angina pectoris     Past Medical History:   Diagnosis Date    Chronic rhinitis     Depression     Diabetes mellitus (HCC)     Hyperlipidemia     Hypertension     Type 2 diabetes mellitus without complication (HCC)      No past surgical history on file.  Social History     Socioeconomic History    Marital status:      Spouse name: Not on file    Number of children: Not on file    Years of education: Not on file

## 2024-05-07 LAB
ESTIMATED AVERAGE GLUCOSE: 154 MG/DL
HBA1C MFR BLD: 7 % (ref 4–6)

## 2024-05-21 ENCOUNTER — OFFICE VISIT (OUTPATIENT)
Dept: GERIATRIC MEDICINE | Age: 79
End: 2024-05-21
Payer: MEDICARE

## 2024-05-21 DIAGNOSIS — G30.1 SEVERE LATE ONSET ALZHEIMER'S DEMENTIA WITHOUT BEHAVIORAL DISTURBANCE, PSYCHOTIC DISTURBANCE, MOOD DISTURBANCE, OR ANXIETY (HCC): ICD-10-CM

## 2024-05-21 DIAGNOSIS — E11.65 TYPE 2 DIABETES MELLITUS WITH HYPERGLYCEMIA, WITH LONG-TERM CURRENT USE OF INSULIN (HCC): Primary | ICD-10-CM

## 2024-05-21 DIAGNOSIS — F02.C0 SEVERE LATE ONSET ALZHEIMER'S DEMENTIA WITHOUT BEHAVIORAL DISTURBANCE, PSYCHOTIC DISTURBANCE, MOOD DISTURBANCE, OR ANXIETY (HCC): ICD-10-CM

## 2024-05-21 DIAGNOSIS — Z79.4 TYPE 2 DIABETES MELLITUS WITH HYPERGLYCEMIA, WITH LONG-TERM CURRENT USE OF INSULIN (HCC): Primary | ICD-10-CM

## 2024-05-21 PROCEDURE — 1123F ACP DISCUSS/DSCN MKR DOCD: CPT | Performed by: PHYSICIAN ASSISTANT

## 2024-05-21 PROCEDURE — 99348 HOME/RES VST EST LOW MDM 30: CPT | Performed by: PHYSICIAN ASSISTANT

## 2024-05-21 PROCEDURE — 3051F HG A1C>EQUAL 7.0%<8.0%: CPT | Performed by: PHYSICIAN ASSISTANT

## 2024-05-29 PROBLEM — F09 MILD COGNITIVE DISORDER: Status: RESOLVED | Noted: 2021-02-10 | Resolved: 2024-05-29

## 2024-05-29 NOTE — PROGRESS NOTES
Subjective:      Patient ID: Regla Starkey is a pleasant 78 y.o. female who presents today for:  Chief Complaint   Patient presents with    Diabetes       [facility]    Patient seen today for follow-up on DM type II/hyperglycemia.  Blood glucose is fairly stable at this time approximately averaging 150 to 185 mg/dL.  Skin does appear a bit dry today mouth a bit dry as well.  Will plan to emphasize fluids.  She is on memory care unit and has a difficult time with command following, however generally cooperative with instruction.  Will plan on doing a BMP as needed as well to follow renal panel.  Most recent renal panels have been quite stable. Her most recent A1C was 7.0%. Will plan to repeat in 3 months, as her BG tends to be dynamic. This is good improvement since March with A1c of 8.3%. Have been some issues getting Trulicity injection in stock d/t phamacy shortage. Will cont with trulicity as it is now in facility, but will consider change to different GLP-1 if more availability, should this re-occur.       Patient Active Problem List   Diagnosis    Dementia (HCC)    Memory loss    Syncope and collapse    Altered mental status, unspecified    Arteriosclerosis of coronary artery    Hematuria, unspecified    Postablative hypothyroidism    Personal history of urinary (tract) infections    Chronic rhinitis    Depressive disorder    Diabetes mellitus (HCC)    Primary hypertension    Hyperlipidemia    Non-smoker    Hypothyroidism    Hypernatremia    DKA, type 2, not at goal (HCC)    Recurrent UTI (urinary tract infection)    Diarrhea    Hypokalemia    Recurrent major depressive episodes (HCC)    DM2 (diabetes mellitus, type 2) (HCC)    History of breast cancer in adulthood    AMS (altered mental status)    Chronic kidney disease due to hypertension    Coronary artery disease involving native coronary artery of native heart without angina pectoris     Past Medical History:   Diagnosis Date

## 2024-06-18 ENCOUNTER — OFFICE VISIT (OUTPATIENT)
Dept: GERIATRIC MEDICINE | Age: 79
End: 2024-06-18
Payer: MEDICARE

## 2024-06-18 DIAGNOSIS — F02.C0 SEVERE LATE ONSET ALZHEIMER'S DEMENTIA WITHOUT BEHAVIORAL DISTURBANCE, PSYCHOTIC DISTURBANCE, MOOD DISTURBANCE, OR ANXIETY (HCC): ICD-10-CM

## 2024-06-18 DIAGNOSIS — G30.1 SEVERE LATE ONSET ALZHEIMER'S DEMENTIA WITHOUT BEHAVIORAL DISTURBANCE, PSYCHOTIC DISTURBANCE, MOOD DISTURBANCE, OR ANXIETY (HCC): ICD-10-CM

## 2024-06-18 DIAGNOSIS — N93.9 VAGINAL BLEEDING: Primary | ICD-10-CM

## 2024-06-18 PROCEDURE — 99348 HOME/RES VST EST LOW MDM 30: CPT | Performed by: PHYSICIAN ASSISTANT

## 2024-06-18 PROCEDURE — 1123F ACP DISCUSS/DSCN MKR DOCD: CPT | Performed by: PHYSICIAN ASSISTANT

## 2024-06-29 NOTE — PROGRESS NOTES
Cognition and Memory: Memory is impaired.         Assessment:       Diagnosis Orders   1. Vaginal bleeding        2. Severe late onset Alzheimer's dementia without behavioral disturbance, psychotic disturbance, mood disturbance, or anxiety (HCC)              Plan:      No orders of the defined types were placed in this encounter.    No orders of the defined types were placed in this encounter.      CBC with no differential on Friday.  GYN consult if POA request.  Follow-up as needed for changes.  Follow-up vital signs daily.    No follow-ups on file.      ANAND Day    Electronically signed by: ANAND Day on 6/29/2024    Please note orders entered on site at facility after discussion with appropriate facility nursing/therapy/ / nutritional staff. Current longstanding medical problems and acute medical issues addressed with staff. Available data and data elements in on site paper chart reviewed and analyzed.  Current external consultant notes reviewed in on site chart. Ordered laboratory testing and imaging will be reviewed when available.     Side effects, adverse effects of the medication prescribed today, as well as treatment plan and result expectations have been discussed withthe patient who expresses understanding and desires to proceed.    I spent a total of 30 minutes on the date of service which included preparing to see the patient, face-to-face patient care, performing a medically appropriate examination, completing clinical documentation, and on counseling/ eductaing the patient and the family.      Please note Nuance Dragon PowerMic III software used for dictation of note,  which may contain minor errors due to ambient noise and indiscriminate speech pickup.

## 2024-08-27 ENCOUNTER — OFFICE VISIT (OUTPATIENT)
Dept: GERIATRIC MEDICINE | Age: 79
End: 2024-08-27
Payer: MEDICARE

## 2024-08-27 DIAGNOSIS — K62.5 BRBPR (BRIGHT RED BLOOD PER RECTUM): ICD-10-CM

## 2024-08-27 DIAGNOSIS — K64.8 OTHER HEMORRHOIDS: Primary | ICD-10-CM

## 2024-08-27 PROCEDURE — 1123F ACP DISCUSS/DSCN MKR DOCD: CPT | Performed by: PHYSICIAN ASSISTANT

## 2024-08-27 PROCEDURE — 99348 HOME/RES VST EST LOW MDM 30: CPT | Performed by: PHYSICIAN ASSISTANT

## 2024-09-17 ENCOUNTER — OFFICE VISIT (OUTPATIENT)
Dept: GERIATRIC MEDICINE | Age: 79
End: 2024-09-17

## 2024-09-17 DIAGNOSIS — E11.65 TYPE 2 DIABETES MELLITUS WITH HYPERGLYCEMIA, WITH LONG-TERM CURRENT USE OF INSULIN (HCC): Primary | ICD-10-CM

## 2024-09-17 DIAGNOSIS — Z59.71 MEDICATION NOT COVERED BY INSURANCE: ICD-10-CM

## 2024-09-17 DIAGNOSIS — Z79.4 TYPE 2 DIABETES MELLITUS WITH HYPERGLYCEMIA, WITH LONG-TERM CURRENT USE OF INSULIN (HCC): Primary | ICD-10-CM

## 2024-10-08 RX ORDER — LANCETS 28 GAUGE
EACH MISCELLANEOUS
Qty: 1100 EACH | Refills: 5 | Status: SHIPPED | OUTPATIENT
Start: 2024-10-08 | End: 2024-10-09 | Stop reason: SDUPTHER

## 2024-10-09 RX ORDER — LANCETS 28 GAUGE
EACH MISCELLANEOUS
Qty: 100 EACH | Refills: 5 | Status: SHIPPED | OUTPATIENT
Start: 2024-10-09

## 2024-10-10 NOTE — PROGRESS NOTES
Subjective:      Patient ID: Regla Starkey is a pleasant 78 y.o. female who presents today for:  Chief Complaint   Patient presents with    Other     Type 2 diabetes mellitus with hyperglycemia, with long-term current use of insulin (hcc)  (primary encounter diagnosis)  Medication not covered by insurance         [facility]  Patient today due to recent insurance change for coverage of Trulicity.  Currently this is coming off of the list as of end of December 2024.  Will plan to replace the Trulicity with a different GLP-1 agonist.  Considering by katiuska if available.  Patient BG continues to be fairly well-maintained on Trulicity, however we will attempt to change and monitor closely, December.  For the time being we will maintain Trulicity at current dose.  No new additional concerns.      Patient Active Problem List   Diagnosis    Dementia (HCC)    Memory loss    Syncope and collapse    Altered mental status, unspecified    Arteriosclerosis of coronary artery    Hematuria, unspecified    Postablative hypothyroidism    Personal history of urinary (tract) infections    Chronic rhinitis    Depressive disorder    Diabetes mellitus (HCC)    Primary hypertension    Hyperlipidemia    Non-smoker    Hypothyroidism    Hypernatremia    DKA, type 2, not at goal (HCC)    Recurrent UTI (urinary tract infection)    Diarrhea    Hypokalemia    Recurrent major depressive episodes (HCC)    DM2 (diabetes mellitus, type 2) (HCC)    History of breast cancer in adulthood    AMS (altered mental status)    Chronic kidney disease due to hypertension    Coronary artery disease involving native coronary artery of native heart without angina pectoris     Past Medical History:   Diagnosis Date    Chronic rhinitis     Depression     Diabetes mellitus (HCC)     Hyperlipidemia     Hypertension     Mild cognitive disorder 02/10/2021    Type 2 diabetes mellitus without complication (HCC)      No past surgical history on

## 2024-10-17 RX ORDER — LANCETS 28 GAUGE
EACH MISCELLANEOUS
Qty: 100 EACH | Refills: 11 | OUTPATIENT
Start: 2024-10-17

## 2024-11-18 ENCOUNTER — APPOINTMENT (OUTPATIENT)
Dept: CARDIOLOGY | Facility: HOSPITAL | Age: 79
End: 2024-11-18
Payer: MEDICARE

## 2024-11-18 ENCOUNTER — HOSPITAL ENCOUNTER (EMERGENCY)
Facility: HOSPITAL | Age: 79
Discharge: OTHER INSTITUTION | End: 2024-11-19
Attending: EMERGENCY MEDICINE
Payer: MEDICARE

## 2024-11-18 ENCOUNTER — APPOINTMENT (OUTPATIENT)
Dept: RADIOLOGY | Facility: HOSPITAL | Age: 79
End: 2024-11-18
Payer: MEDICARE

## 2024-11-18 DIAGNOSIS — S06.5XAA SUBDURAL HEMATOMA (MULTI): Primary | ICD-10-CM

## 2024-11-18 DIAGNOSIS — E87.0 HYPERNATREMIA: ICD-10-CM

## 2024-11-18 DIAGNOSIS — R29.6 UNWITNESSED FALL: ICD-10-CM

## 2024-11-18 LAB
ALBUMIN SERPL BCP-MCNC: 4 G/DL (ref 3.4–5)
ALP SERPL-CCNC: 81 U/L (ref 33–136)
ALT SERPL W P-5'-P-CCNC: 17 U/L (ref 7–45)
ANION GAP SERPL CALC-SCNC: 12 MMOL/L (ref 10–20)
APTT PPP: 31 SECONDS (ref 27–38)
AST SERPL W P-5'-P-CCNC: 13 U/L (ref 9–39)
ATRIAL RATE: 61 BPM
BASOPHILS # BLD AUTO: 0.07 X10*3/UL (ref 0–0.1)
BASOPHILS NFR BLD AUTO: 0.7 %
BILIRUB SERPL-MCNC: 0.6 MG/DL (ref 0–1.2)
BNP SERPL-MCNC: 17 PG/ML (ref 0–99)
BUN SERPL-MCNC: 17 MG/DL (ref 6–23)
CALCIUM SERPL-MCNC: 9.1 MG/DL (ref 8.6–10.3)
CARDIAC TROPONIN I PNL SERPL HS: 3 NG/L (ref 0–13)
CHLORIDE SERPL-SCNC: 110 MMOL/L (ref 98–107)
CK SERPL-CCNC: 25 U/L (ref 0–215)
CO2 SERPL-SCNC: 29 MMOL/L (ref 21–32)
CREAT SERPL-MCNC: 0.73 MG/DL (ref 0.5–1.05)
EGFRCR SERPLBLD CKD-EPI 2021: 83 ML/MIN/1.73M*2
EOSINOPHIL # BLD AUTO: 0.13 X10*3/UL (ref 0–0.4)
EOSINOPHIL NFR BLD AUTO: 1.3 %
ERYTHROCYTE [DISTWIDTH] IN BLOOD BY AUTOMATED COUNT: 13.5 % (ref 11.5–14.5)
GLUCOSE SERPL-MCNC: 160 MG/DL (ref 74–99)
HCT VFR BLD AUTO: 39 % (ref 36–46)
HGB BLD-MCNC: 13 G/DL (ref 12–16)
IMM GRANULOCYTES # BLD AUTO: 0.02 X10*3/UL (ref 0–0.5)
IMM GRANULOCYTES NFR BLD AUTO: 0.2 % (ref 0–0.9)
INR PPP: 1.1 (ref 0.9–1.1)
LACTATE SERPL-SCNC: 1.7 MMOL/L (ref 0.4–2)
LYMPHOCYTES # BLD AUTO: 2.42 X10*3/UL (ref 0.8–3)
LYMPHOCYTES NFR BLD AUTO: 24.3 %
MAGNESIUM SERPL-MCNC: 1.92 MG/DL (ref 1.6–2.4)
MCH RBC QN AUTO: 32.2 PG (ref 26–34)
MCHC RBC AUTO-ENTMCNC: 33.3 G/DL (ref 32–36)
MCV RBC AUTO: 97 FL (ref 80–100)
MONOCYTES # BLD AUTO: 0.55 X10*3/UL (ref 0.05–0.8)
MONOCYTES NFR BLD AUTO: 5.5 %
NEUTROPHILS # BLD AUTO: 6.78 X10*3/UL (ref 1.6–5.5)
NEUTROPHILS NFR BLD AUTO: 68 %
NRBC BLD-RTO: 0 /100 WBCS (ref 0–0)
P AXIS: 46 DEGREES
P OFFSET: 193 MS
P ONSET: 131 MS
PLATELET # BLD AUTO: 237 X10*3/UL (ref 150–450)
POTASSIUM SERPL-SCNC: 3.9 MMOL/L (ref 3.5–5.3)
PR INTERVAL: 194 MS
PROT SERPL-MCNC: 6.8 G/DL (ref 6.4–8.2)
PROTHROMBIN TIME: 12.3 SECONDS (ref 9.8–12.8)
Q ONSET: 228 MS
QRS COUNT: 10 BEATS
QRS DURATION: 102 MS
QT INTERVAL: 470 MS
QTC CALCULATION(BAZETT): 473 MS
QTC FREDERICIA: 472 MS
R AXIS: -17 DEGREES
RBC # BLD AUTO: 4.04 X10*6/UL (ref 4–5.2)
SODIUM SERPL-SCNC: 147 MMOL/L (ref 136–145)
T AXIS: 54 DEGREES
T OFFSET: 463 MS
VENTRICULAR RATE: 61 BPM
WBC # BLD AUTO: 10 X10*3/UL (ref 4.4–11.3)

## 2024-11-18 PROCEDURE — 93005 ELECTROCARDIOGRAM TRACING: CPT

## 2024-11-18 PROCEDURE — 72125 CT NECK SPINE W/O DYE: CPT | Performed by: RADIOLOGY

## 2024-11-18 PROCEDURE — 71045 X-RAY EXAM CHEST 1 VIEW: CPT | Mod: FOREIGN READ | Performed by: RADIOLOGY

## 2024-11-18 PROCEDURE — 85610 PROTHROMBIN TIME: CPT | Performed by: EMERGENCY MEDICINE

## 2024-11-18 PROCEDURE — 83880 ASSAY OF NATRIURETIC PEPTIDE: CPT | Performed by: EMERGENCY MEDICINE

## 2024-11-18 PROCEDURE — 82550 ASSAY OF CK (CPK): CPT | Performed by: EMERGENCY MEDICINE

## 2024-11-18 PROCEDURE — 72170 X-RAY EXAM OF PELVIS: CPT

## 2024-11-18 PROCEDURE — 71045 X-RAY EXAM CHEST 1 VIEW: CPT

## 2024-11-18 PROCEDURE — 84484 ASSAY OF TROPONIN QUANT: CPT | Performed by: EMERGENCY MEDICINE

## 2024-11-18 PROCEDURE — 83605 ASSAY OF LACTIC ACID: CPT | Performed by: EMERGENCY MEDICINE

## 2024-11-18 PROCEDURE — 85730 THROMBOPLASTIN TIME PARTIAL: CPT | Performed by: EMERGENCY MEDICINE

## 2024-11-18 PROCEDURE — 83735 ASSAY OF MAGNESIUM: CPT | Performed by: EMERGENCY MEDICINE

## 2024-11-18 PROCEDURE — 72125 CT NECK SPINE W/O DYE: CPT

## 2024-11-18 PROCEDURE — 99291 CRITICAL CARE FIRST HOUR: CPT | Performed by: EMERGENCY MEDICINE

## 2024-11-18 PROCEDURE — 72170 X-RAY EXAM OF PELVIS: CPT | Mod: FOREIGN READ | Performed by: RADIOLOGY

## 2024-11-18 PROCEDURE — 80053 COMPREHEN METABOLIC PANEL: CPT | Performed by: EMERGENCY MEDICINE

## 2024-11-18 PROCEDURE — 36415 COLL VENOUS BLD VENIPUNCTURE: CPT | Performed by: EMERGENCY MEDICINE

## 2024-11-18 PROCEDURE — 85025 COMPLETE CBC W/AUTO DIFF WBC: CPT | Performed by: EMERGENCY MEDICINE

## 2024-11-18 PROCEDURE — 70450 CT HEAD/BRAIN W/O DYE: CPT

## 2024-11-18 PROCEDURE — 70450 CT HEAD/BRAIN W/O DYE: CPT | Performed by: RADIOLOGY

## 2024-11-18 ASSESSMENT — COLUMBIA-SUICIDE SEVERITY RATING SCALE - C-SSRS
2. HAVE YOU ACTUALLY HAD ANY THOUGHTS OF KILLING YOURSELF?: NO
1. IN THE PAST MONTH, HAVE YOU WISHED YOU WERE DEAD OR WISHED YOU COULD GO TO SLEEP AND NOT WAKE UP?: NO
6. HAVE YOU EVER DONE ANYTHING, STARTED TO DO ANYTHING, OR PREPARED TO DO ANYTHING TO END YOUR LIFE?: NO

## 2024-11-18 ASSESSMENT — LIFESTYLE VARIABLES
EVER HAD A DRINK FIRST THING IN THE MORNING TO STEADY YOUR NERVES TO GET RID OF A HANGOVER: NO
HAVE YOU EVER FELT YOU SHOULD CUT DOWN ON YOUR DRINKING: NO
EVER FELT BAD OR GUILTY ABOUT YOUR DRINKING: NO
TOTAL SCORE: 0
HAVE PEOPLE ANNOYED YOU BY CRITICIZING YOUR DRINKING: NO

## 2024-11-18 ASSESSMENT — PAIN DESCRIPTION - PROGRESSION: CLINICAL_PROGRESSION: NOT CHANGED

## 2024-11-18 ASSESSMENT — PAIN - FUNCTIONAL ASSESSMENT: PAIN_FUNCTIONAL_ASSESSMENT: 0-10

## 2024-11-18 ASSESSMENT — PAIN SCALES - GENERAL: PAINLEVEL_OUTOF10: 0 - NO PAIN

## 2024-11-18 NOTE — ED PROVIDER NOTES
79-year-old female presents emergency department with report of unwitnessed fall.  Patient presents via EMS.  Per the care facility where she lives checked on this evening to take her to dinner and found her to be on the ground next to her shoe rack.  Patient reportedly is alert and oriented x 0 at baseline due to dementia.  Patient presents via EMS with c-collar in place.  She is unable to provide history.  She is not on any anticoagulants. Chart review shows significant past medical history of CAD, hypertension, chronic kidney disease, dementia, depression, hyperlipidemia, hypernatremia, hypothyroidism, cognitive impairment, hypertension, and recurrent urinary tract infections.      History provided by:  Medical records, EMS personnel and nursing home  History limited by:  Dementia         ------------------------------------------------------------------------------------------------------------------------------------------    VS: As documented in the triage note and EMR flowsheet from this visit were reviewed.    Review of Systems  Unable to obtain review of systems secondary to dementia.      [facility]  Nursing notes reviewed and confirmed by me.  Chart review performed including medications, allergies, and medical, surgical, and family history  Visit Vitals  /60 (BP Location: Left arm, Patient Position: Sitting)   Pulse 64   Temp 36.4 °C (97.5 °F) (Temporal)   Resp 17     Physical Exam  Vitals and nursing note reviewed.   Constitutional:       General: She is not in acute distress.     Appearance: Normal appearance. She is not ill-appearing.   HENT:      Head: Normocephalic and atraumatic.      Comments: No Cervantes sign or raccoon eyes.  Midface is stable.     Right Ear: External ear normal.      Left Ear: External ear normal.      Nose: Nose normal. No congestion or rhinorrhea.      Mouth/Throat:      Mouth: Mucous membranes are dry.      Pharynx: No oropharyngeal exudate or posterior oropharyngeal  erythema.   Eyes:      Extraocular Movements: Extraocular movements intact.      Conjunctiva/sclera: Conjunctivae normal.      Pupils: Pupils are equal, round, and reactive to light.   Neck:      Comments: C-collar in place   Cardiovascular:      Rate and Rhythm: Normal rate and regular rhythm.      Pulses: Normal pulses.      Heart sounds: Normal heart sounds.   Pulmonary:      Effort: Pulmonary effort is normal. No respiratory distress.      Breath sounds: Normal breath sounds. No stridor. No wheezing, rhonchi or rales.   Chest:      Chest wall: No tenderness (No chest wall tenderness, crepitance, or flail chest).   Abdominal:      General: There is no distension.      Palpations: Abdomen is soft.      Tenderness: There is no abdominal tenderness. There is no guarding or rebound.   Musculoskeletal:         General: No swelling or deformity.      Cervical back: Neck supple.      Right lower leg: No edema.      Left lower leg: No edema.      Comments: No calf tenderness   Pelvis is stable.  No midline back tenderness, step-offs, or deformities.  Patient moving all extremities without difficulty.    Skin:     General: Skin is warm and dry.      Capillary Refill: Capillary refill takes less than 2 seconds.      Coloration: Skin is not jaundiced.      Findings: No rash.   Neurological:      Mental Status: She is alert.      GCS: GCS eye subscore is 4. GCS verbal subscore is 3. GCS motor subscore is 6.      Sensory: No sensory deficit.      Motor: No weakness.      Comments: Patient is alert and oriented x 0.  Per EMS the care facility reports this is her baseline.  Will follow some commands but not all.  She is moving all extremities and I do not appreciate focal neurologic deficit.  She is not conversational.   Psychiatric:      Comments: Patient reportedly has dementia at baseline.  She is currently calm and cooperative.        Past Medical History:   Diagnosis Date    Other specified health status     Nonsmoker       No past surgical history on file.   Social History     Socioeconomic History    Marital status:       ------------------------------------------------------------------------------------------------------------------------------------------  CT head wo IV contrast   Final Result   Large left-sided subdural hematoma with mixed density of acute and   subacute components measuring a maximum of a proximally 2.6 cm in   thickness and a proximally 1.4 cm of rightward midline shift        Findings discussed with Justice Coburn of the emergency room via   telephone at 6:21 p.m. on 11/18/2024        Signed by: Daniel Santacruz 11/18/2024 6:35 PM   Dictation workstation:   [id number]      CT cervical spine wo IV contrast   Final Result   1. No acute fracture or spondylolisthesis.   2. Degenerative disc disease and spondylosis.             Signed by: Daniel Santacruz 11/18/2024 6:52 PM   Dictation workstation:   [id number]      XR pelvis 1-2 views   Final Result   No evidence of acute displaced pelvic or hip fracture.  Dedicated   follow-up CT of the pelvis and hips can be performed for more   definitive evaluation as clinically indicated.   Signed by Bello Sanchez MD      XR chest 1 view   Final Result   There is some elevation of the right hemidiaphragm and linear scarring   or discoid atelectasis at the right base but the lungs otherwise   appear clear in this view.   Signed by Reyes Pierce MD         Labs Reviewed   CBC WITH AUTO DIFFERENTIAL - Abnormal       Result Value    WBC 10.0      nRBC 0.0      RBC 4.04      Hemoglobin 13.0      Hematocrit 39.0      MCV 97      MCH 32.2      MCHC 33.3      RDW 13.5      Platelets 237      Neutrophils % 68.0      Immature Granulocytes %, Automated 0.2      Lymphocytes % 24.3      Monocytes % 5.5      Eosinophils % 1.3      Basophils % 0.7      Neutrophils Absolute 6.78 (*)     Immature Granulocytes Absolute, Automated 0.02      Lymphocytes Absolute 2.42       Monocytes Absolute 0.55      Eosinophils Absolute 0.13      Basophils Absolute 0.07     COMPREHENSIVE METABOLIC PANEL - Abnormal    Glucose 160 (*)     Sodium 147 (*)     Potassium 3.9      Chloride 110 (*)     Bicarbonate 29      Anion Gap 12      Urea Nitrogen 17      Creatinine 0.73      eGFR 83      Calcium 9.1      Albumin 4.0      Alkaline Phosphatase 81      Total Protein 6.8      AST 13      Bilirubin, Total 0.6      ALT 17     MAGNESIUM - Normal    Magnesium 1.92     TROPONIN I, HIGH SENSITIVITY - Normal    Troponin I, High Sensitivity 3      Narrative:     Less than 99th percentile of normal range cutoff-  Female and children under 18 years old <14 ng/L; Male <21 ng/L: Negative  Repeat testing should be performed if clinically indicated.     Female and children under 18 years old 14-50 ng/L; Male 21-50 ng/L:  Consistent with possible cardiac damage and possible increased clinical   risk. Serial measurements may help to assess extent of myocardial damage.     >50 ng/L: Consistent with cardiac damage, increased clinical risk and  myocardial infarction. Serial measurements may help assess extent of   myocardial damage.      NOTE: Children less than 1 year old may have higher baseline troponin   levels and results should be interpreted in conjunction with the overall   clinical context.     NOTE: Troponin I testing is performed using a different   testing methodology at [facility] than at other   [facility]. Direct result comparisons should only   be made within the same method.   LACTATE - Normal    Lactate 1.7      Narrative:     Venipuncture immediately after or during the administration of Metamizole may lead to falsely low results. Testing should be performed immediately prior to Metamizole dosing.   PROTIME-INR - Normal    Protime 12.3      INR 1.1     B-TYPE NATRIURETIC PEPTIDE - Normal    BNP 17      Narrative:        <100 pg/mL - Heart failure unlikely  100-299 pg/mL - Intermediate  probability of acute heart                  failure exacerbation. Correlate with clinical                  context and patient history.    >=300 pg/mL - Heart Failure likely. Correlate with clinical                  context and patient history.    BNP testing is performed using different testing methodology at [facility] than at other [facility] hospitals. Direct result comparisons should only be made within the same method.      CREATINE KINASE - Normal    Creatine Kinase 25     APTT - Normal    aPTT 31      Narrative:     The APTT is no longer used for monitoring Unfractionated Heparin Therapy. For monitoring Heparin Therapy, use the Heparin Assay.   URINALYSIS WITH REFLEX CULTURE AND MICROSCOPIC    Narrative:     The following orders were created for panel order Urinalysis with Reflex Culture and Microscopic.  Procedure                               Abnormality         Status                     ---------                               -----------         ------                     Urinalysis with Reflex C...[[id number]]                                                 Extra Urine Gray Tube[[id number]]                                                         Please view results for these tests on the individual orders.   URINALYSIS WITH REFLEX CULTURE AND MICROSCOPIC   EXTRA URINE GRAY TUBE   POCT FINGERSTICK GLUCOSE   POCT FINGERSTICK GLUCOSE   POCT FINGERSTICK GLUCOSE        Medical Decision Making  EKG interpreted by ED physician: Sinus rhythm with PACs rate of 61.  [place], QRS, and QTc intervals are all within normal limits.  No significant ST elevations or depressions.  Q waves present in septal leads may represent previous infarct.  Poor R wave progression.  Normal axis.  Nonspecific ST-T wave flattening throughout EKG.    I was able to speak with the patient's  over the phone Eliot Mitchell.  He reports that he was advised by the care facility the patient had an unwitnessed fall today.  He does  state that the patient has dementia at baseline that is pretty severe.  He states that she is not conversational and when she does speak it does not make any sense.  He does report that she is alert and oriented x 0 at baseline.  He is also able to confirm with me the patient's paperwork stating she is DNR [employer] arrest.  I advised him of the patient's findings of subdural hematoma and need for transfer to [facility] and he is currently agreeable with this plan.      79-year-old female presented to the emergency department from [facility] facility with report of unwitnessed fall.  History is significantly limited secondary to patient's reported dementia.  Given the patient's presenting complaints a thorough workup is obtained.  Head CT is significant for findings of large left-sided subdural hematoma with findings of acute and subacute components.  Patient does have rightward midline shift.  Cervical spine CT is negative for acute fracture or traumatic malalignment.  C-collar is removed by myself.  Chest x-ray shows no acute cardiopulmonary process such as pneumonia, pleural effusion, or pulmonary edema.  X-ray imaging of the pelvis shows no fracture or traumatic malalignment.  Patient is able to move all extremities and I do not appreciate focal deficit on my exam though exam is difficult due to the patient's dementia and inability to follow all commands.  CBC does not show significant leukocytosis or anemia.  Patient does not have findings of sepsis.  CMP shows mild hyponatremia no other significant metabolic abnormality.  BNP is within normal range I do not suspect heart failure.  Cardiac enzyme and EKG showed no acute ACS.  CK is within normal range patient does not have findings of rhabdomyolysis.  UA is pending at time of disposition.  Patient's  whom I spoke with over the phone did arrive in the emergency department during the patient's stay.  He tells me that she is currently at her mental status  baseline.  I discussed with him the patient's findings as well as need for transfer again in person and all questions were answered to the best my ability.  I discussed this case with neurosurgery on-call Dr. Urrutia who is excepted patient for ED to ED transfer.  I also discussed this case with Dr. Alcala the ED physician on-call.  Patient is pending transport at the end of my shift.  We have received multiple notifications from physicians ambulance that the patient's transport will be delayed multiple hours.  Therefore, we have elected to transfer the patient BLS to avoid further delay in the patient's care.  I feel the benefit of more timely transfer outweighs risk of the patient not being on monitor and transport.        Diagnoses as of 11/18/24 5574   Subdural hematoma (Multi)   Unwitnessed fall   Hypernatremia      1. Subdural hematoma (Multi)        2. Unwitnessed fall        3. Hypernatremia           Critical Care    Performed by: Justice Elise DO  Authorized by: Justice Elise DO    Critical care provider statement:     Critical care time (minutes):  40    Critical care time was exclusive of:  Separately billable procedures and treating other patients    Critical care was necessary to treat or prevent imminent or life-threatening deterioration of the following conditions:  CNS failure or compromise (subdural hematoma)    Critical care was time spent personally by me on the following activities:  Development of treatment plan with patient or surrogate, obtaining history from patient or surrogate, examination of patient, evaluation of patient's response to treatment, discussions with consultants, ordering and performing treatments and interventions, ordering and review of laboratory studies, ordering and review of radiographic studies, pulse oximetry, re-evaluation of patient's condition and review of old charts    Care discussed with: accepting provider at another facility         This note was dictated using  dragon software and may contain errors related to dictation interpretation errors.      Justice Elise, DO  11/18/24 6160       Justice Elise, DO  11/18/24 1020

## 2024-11-19 ENCOUNTER — APPOINTMENT (OUTPATIENT)
Dept: RADIOLOGY | Facility: HOSPITAL | Age: 79
End: 2024-11-19
Payer: MEDICARE

## 2024-11-19 ENCOUNTER — CLINICAL SUPPORT (OUTPATIENT)
Dept: EMERGENCY MEDICINE | Facility: HOSPITAL | Age: 79
End: 2024-11-19
Payer: MEDICARE

## 2024-11-19 ENCOUNTER — ANESTHESIA EVENT (OUTPATIENT)
Dept: OPERATING ROOM | Facility: HOSPITAL | Age: 79
End: 2024-11-19
Payer: MEDICARE

## 2024-11-19 ENCOUNTER — ANESTHESIA (OUTPATIENT)
Dept: OPERATING ROOM | Facility: HOSPITAL | Age: 79
End: 2024-11-19
Payer: MEDICARE

## 2024-11-19 ENCOUNTER — HOSPITAL ENCOUNTER (INPATIENT)
Facility: HOSPITAL | Age: 79
End: 2024-11-19
Attending: EMERGENCY MEDICINE | Admitting: SURGERY
Payer: MEDICARE

## 2024-11-19 VITALS
RESPIRATION RATE: 14 BRPM | WEIGHT: 132.28 LBS | HEIGHT: 66 IN | HEART RATE: 64 BPM | DIASTOLIC BLOOD PRESSURE: 55 MMHG | SYSTOLIC BLOOD PRESSURE: 115 MMHG | BODY MASS INDEX: 21.26 KG/M2 | OXYGEN SATURATION: 98 % | TEMPERATURE: 97.5 F

## 2024-11-19 DIAGNOSIS — G47.00 INSOMNIA, UNSPECIFIED TYPE: ICD-10-CM

## 2024-11-19 DIAGNOSIS — I62.9 INTRACRANIAL HEMORRHAGE (MULTI): ICD-10-CM

## 2024-11-19 DIAGNOSIS — S06.5XAA SUBDURAL HEMATOMA (MULTI): ICD-10-CM

## 2024-11-19 DIAGNOSIS — S06.5XAA SDH (SUBDURAL HEMATOMA) (MULTI): Primary | ICD-10-CM

## 2024-11-19 DIAGNOSIS — R26.89 DECREASED MOBILITY: ICD-10-CM

## 2024-11-19 DIAGNOSIS — E11.69 TYPE 2 DIABETES MELLITUS WITH OTHER SPECIFIED COMPLICATION, UNSPECIFIED WHETHER LONG TERM INSULIN USE (MULTI): ICD-10-CM

## 2024-11-19 PROBLEM — M48.00 SPINAL STENOSIS: Status: ACTIVE | Noted: 2024-11-19

## 2024-11-19 PROBLEM — E11.9 DIABETES MELLITUS, TYPE 2 (MULTI): Status: ACTIVE | Noted: 2024-11-19

## 2024-11-19 PROBLEM — G30.1 SEVERE LATE ONSET ALZHEIMER'S DEMENTIA WITHOUT BEHAVIORAL DISTURBANCE, PSYCHOTIC DISTURBANCE, MOOD DISTURBANCE, OR ANXIETY (MULTI): Status: ACTIVE | Noted: 2024-11-19

## 2024-11-19 PROBLEM — N39.0 URINARY TRACT INFECTION: Status: ACTIVE | Noted: 2024-11-19

## 2024-11-19 PROBLEM — E78.5 HYPERLIPIDEMIA: Status: ACTIVE | Noted: 2024-11-19

## 2024-11-19 PROBLEM — N18.9 CHRONIC RENAL INSUFFICIENCY: Status: ACTIVE | Noted: 2024-11-19

## 2024-11-19 PROBLEM — M54.50 CHRONIC LOW BACK PAIN: Status: ACTIVE | Noted: 2024-11-19

## 2024-11-19 PROBLEM — J30.2 SEASONAL ALLERGIES: Status: ACTIVE | Noted: 2024-11-19

## 2024-11-19 PROBLEM — M51.9 LUMBAR DISC DISEASE: Status: ACTIVE | Noted: 2024-11-19

## 2024-11-19 PROBLEM — R94.31 ABNORMAL EKG: Status: ACTIVE | Noted: 2024-11-19

## 2024-11-19 PROBLEM — F02.C0 SEVERE LATE ONSET ALZHEIMER'S DEMENTIA WITHOUT BEHAVIORAL DISTURBANCE, PSYCHOTIC DISTURBANCE, MOOD DISTURBANCE, OR ANXIETY (MULTI): Status: ACTIVE | Noted: 2024-11-19

## 2024-11-19 PROBLEM — D75.1 POLYCYTHEMIA: Status: ACTIVE | Noted: 2024-11-19

## 2024-11-19 PROBLEM — M19.90 OSTEOARTHRITIS: Status: ACTIVE | Noted: 2024-11-19

## 2024-11-19 PROBLEM — F05 POSTOPERATIVE DELIRIUM: Status: ACTIVE | Noted: 2024-11-19

## 2024-11-19 PROBLEM — I25.10 CORONARY ARTERY DISEASE INVOLVING NATIVE CORONARY ARTERY OF NATIVE HEART WITHOUT ANGINA PECTORIS: Status: ACTIVE | Noted: 2024-11-19

## 2024-11-19 PROBLEM — R09.89 SINUS SYMPTOM: Status: ACTIVE | Noted: 2024-11-19

## 2024-11-19 PROBLEM — I63.9 CVA (CEREBRAL VASCULAR ACCIDENT) (MULTI): Status: ACTIVE | Noted: 2024-11-19

## 2024-11-19 PROBLEM — Z90.710 HISTORY OF HYSTERECTOMY: Status: ACTIVE | Noted: 2024-11-19

## 2024-11-19 PROBLEM — G89.29 CHRONIC LOW BACK PAIN: Status: ACTIVE | Noted: 2024-11-19

## 2024-11-19 PROBLEM — I10 HTN (HYPERTENSION): Status: ACTIVE | Noted: 2024-11-19

## 2024-11-19 PROBLEM — E03.9 HYPOTHYROIDISM: Status: ACTIVE | Noted: 2024-11-19

## 2024-11-19 LAB
ABO GROUP (TYPE) IN BLOOD: NORMAL
ABO GROUP (TYPE) IN BLOOD: NORMAL
ALBUMIN SERPL BCP-MCNC: 3.5 G/DL (ref 3.4–5)
ALP SERPL-CCNC: 73 U/L (ref 33–136)
ALT SERPL W P-5'-P-CCNC: 10 U/L (ref 7–45)
ANION GAP SERPL CALC-SCNC: 13 MMOL/L (ref 10–20)
ANTIBODY SCREEN: NORMAL
APPEARANCE UR: CLEAR
APTT PPP: 26 SECONDS (ref 27–38)
AST SERPL W P-5'-P-CCNC: 13 U/L (ref 9–39)
ATRIAL RATE: 68 BPM
BILIRUB DIRECT SERPL-MCNC: 0.1 MG/DL (ref 0–0.3)
BILIRUB SERPL-MCNC: 0.6 MG/DL (ref 0–1.2)
BILIRUB UR STRIP.AUTO-MCNC: NEGATIVE MG/DL
BUN SERPL-MCNC: 11 MG/DL (ref 6–23)
CALCIUM SERPL-MCNC: 8 MG/DL (ref 8.6–10.6)
CHLORIDE SERPL-SCNC: 115 MMOL/L (ref 98–107)
CO2 SERPL-SCNC: 25 MMOL/L (ref 21–32)
COLOR UR: ABNORMAL
CREAT SERPL-MCNC: 0.76 MG/DL (ref 0.5–1.05)
EGFRCR SERPLBLD CKD-EPI 2021: 79 ML/MIN/1.73M*2
ERYTHROCYTE [DISTWIDTH] IN BLOOD BY AUTOMATED COUNT: 13.2 % (ref 11.5–14.5)
GLUCOSE BLD MANUAL STRIP-MCNC: 183 MG/DL (ref 74–99)
GLUCOSE BLD MANUAL STRIP-MCNC: 184 MG/DL (ref 74–99)
GLUCOSE BLD MANUAL STRIP-MCNC: 204 MG/DL (ref 74–99)
GLUCOSE BLD MANUAL STRIP-MCNC: 232 MG/DL (ref 74–99)
GLUCOSE BLD MANUAL STRIP-MCNC: 274 MG/DL (ref 74–99)
GLUCOSE BLD MANUAL STRIP-MCNC: 300 MG/DL (ref 74–99)
GLUCOSE BLD MANUAL STRIP-MCNC: 305 MG/DL (ref 74–99)
GLUCOSE SERPL-MCNC: 244 MG/DL (ref 74–99)
GLUCOSE UR STRIP.AUTO-MCNC: NORMAL MG/DL
HCT VFR BLD AUTO: 34.9 % (ref 36–46)
HGB BLD-MCNC: 12.2 G/DL (ref 12–16)
HOLD SPECIMEN: NORMAL
INR PPP: 1.1 (ref 0.9–1.1)
KETONES UR STRIP.AUTO-MCNC: ABNORMAL MG/DL
LEUKOCYTE ESTERASE UR QL STRIP.AUTO: NEGATIVE
MAGNESIUM SERPL-MCNC: 1.78 MG/DL (ref 1.6–2.4)
MCH RBC QN AUTO: 31.5 PG (ref 26–34)
MCHC RBC AUTO-ENTMCNC: 35 G/DL (ref 32–36)
MCV RBC AUTO: 90 FL (ref 80–100)
NITRITE UR QL STRIP.AUTO: NEGATIVE
NRBC BLD-RTO: 0 /100 WBCS (ref 0–0)
P AXIS: 44 DEGREES
P OFFSET: 199 MS
P ONSET: 132 MS
PH UR STRIP.AUTO: 6 [PH]
PHOSPHATE SERPL-MCNC: 3.3 MG/DL (ref 2.5–4.9)
PLATELET # BLD AUTO: 224 X10*3/UL (ref 150–450)
POTASSIUM SERPL-SCNC: 3.9 MMOL/L (ref 3.5–5.3)
PR INTERVAL: 192 MS
PROT SERPL-MCNC: 6.1 G/DL (ref 6.4–8.2)
PROT UR STRIP.AUTO-MCNC: NEGATIVE MG/DL
PROTHROMBIN TIME: 11.9 SECONDS (ref 9.8–12.8)
Q ONSET: 228 MS
QRS COUNT: 11 BEATS
QRS DURATION: 98 MS
QT INTERVAL: 444 MS
QTC CALCULATION(BAZETT): 472 MS
QTC FREDERICIA: 463 MS
R AXIS: -16 DEGREES
RBC # BLD AUTO: 3.87 X10*6/UL (ref 4–5.2)
RBC # UR STRIP.AUTO: NEGATIVE /UL
RH FACTOR (ANTIGEN D): NORMAL
RH FACTOR (ANTIGEN D): NORMAL
SODIUM SERPL-SCNC: 149 MMOL/L (ref 136–145)
SP GR UR STRIP.AUTO: 1.02
T AXIS: 73 DEGREES
T OFFSET: 450 MS
UROBILINOGEN UR STRIP.AUTO-MCNC: NORMAL MG/DL
VENTRICULAR RATE: 68 BPM
WBC # BLD AUTO: 8.7 X10*3/UL (ref 4.4–11.3)

## 2024-11-19 PROCEDURE — 2500000005 HC RX 250 GENERAL PHARMACY W/O HCPCS

## 2024-11-19 PROCEDURE — 2020000001 HC ICU ROOM DAILY

## 2024-11-19 PROCEDURE — 99291 CRITICAL CARE FIRST HOUR: CPT

## 2024-11-19 PROCEDURE — 82947 ASSAY GLUCOSE BLOOD QUANT: CPT

## 2024-11-19 PROCEDURE — 2500000002 HC RX 250 W HCPCS SELF ADMINISTERED DRUGS (ALT 637 FOR MEDICARE OP, ALT 636 FOR OP/ED)

## 2024-11-19 PROCEDURE — 3700000002 HC GENERAL ANESTHESIA TIME - EACH INCREMENTAL 1 MINUTE: Performed by: NEUROLOGICAL SURGERY

## 2024-11-19 PROCEDURE — 80053 COMPREHEN METABOLIC PANEL: CPT

## 2024-11-19 PROCEDURE — 81003 URINALYSIS AUTO W/O SCOPE: CPT | Performed by: EMERGENCY MEDICINE

## 2024-11-19 PROCEDURE — 99285 EMERGENCY DEPT VISIT HI MDM: CPT | Mod: 25

## 2024-11-19 PROCEDURE — 86923 COMPATIBILITY TEST ELECTRIC: CPT

## 2024-11-19 PROCEDURE — 85027 COMPLETE CBC AUTOMATED: CPT | Performed by: STUDENT IN AN ORGANIZED HEALTH CARE EDUCATION/TRAINING PROGRAM

## 2024-11-19 PROCEDURE — 86900 BLOOD TYPING SEROLOGIC ABO: CPT | Performed by: STUDENT IN AN ORGANIZED HEALTH CARE EDUCATION/TRAINING PROGRAM

## 2024-11-19 PROCEDURE — 72131 CT LUMBAR SPINE W/O DYE: CPT | Mod: RCN

## 2024-11-19 PROCEDURE — 36415 COLL VENOUS BLD VENIPUNCTURE: CPT | Performed by: STUDENT IN AN ORGANIZED HEALTH CARE EDUCATION/TRAINING PROGRAM

## 2024-11-19 PROCEDURE — 80076 HEPATIC FUNCTION PANEL: CPT

## 2024-11-19 PROCEDURE — 2500000004 HC RX 250 GENERAL PHARMACY W/ HCPCS (ALT 636 FOR OP/ED): Performed by: EMERGENCY MEDICINE

## 2024-11-19 PROCEDURE — 83735 ASSAY OF MAGNESIUM: CPT

## 2024-11-19 PROCEDURE — 2720000007 HC OR 272 NO HCPCS: Performed by: NEUROLOGICAL SURGERY

## 2024-11-19 PROCEDURE — 00C43ZZ EXTIRPATION OF MATTER FROM INTRACRANIAL SUBDURAL SPACE, PERCUTANEOUS APPROACH: ICD-10-PCS | Performed by: NEUROLOGICAL SURGERY

## 2024-11-19 PROCEDURE — 74177 CT ABD & PELVIS W/CONTRAST: CPT

## 2024-11-19 PROCEDURE — G0390 TRAUMA RESPONS W/HOSP CRITI: HCPCS

## 2024-11-19 PROCEDURE — 99223 1ST HOSP IP/OBS HIGH 75: CPT | Performed by: NEUROLOGICAL SURGERY

## 2024-11-19 PROCEDURE — C1763 CONN TISS, NON-HUMAN: HCPCS | Performed by: NEUROLOGICAL SURGERY

## 2024-11-19 PROCEDURE — 85610 PROTHROMBIN TIME: CPT

## 2024-11-19 PROCEDURE — 72128 CT CHEST SPINE W/O DYE: CPT | Mod: RCN

## 2024-11-19 PROCEDURE — 2780000003 HC OR 278 NO HCPCS: Performed by: NEUROLOGICAL SURGERY

## 2024-11-19 PROCEDURE — 70450 CT HEAD/BRAIN W/O DYE: CPT | Performed by: RADIOLOGY

## 2024-11-19 PROCEDURE — 61154 BURR HOLE W/EVAC&/DRG HMTMA: CPT | Performed by: NEUROLOGICAL SURGERY

## 2024-11-19 PROCEDURE — 2550000001 HC RX 255 CONTRASTS: Performed by: EMERGENCY MEDICINE

## 2024-11-19 PROCEDURE — 2500000004 HC RX 250 GENERAL PHARMACY W/ HCPCS (ALT 636 FOR OP/ED)

## 2024-11-19 PROCEDURE — 72131 CT LUMBAR SPINE W/O DYE: CPT | Mod: RCN | Performed by: RADIOLOGY

## 2024-11-19 PROCEDURE — 74177 CT ABD & PELVIS W/CONTRAST: CPT | Mod: RCN | Performed by: RADIOLOGY

## 2024-11-19 PROCEDURE — 72128 CT CHEST SPINE W/O DYE: CPT | Mod: RCN | Performed by: RADIOLOGY

## 2024-11-19 PROCEDURE — 3600000005 HC OR TIME - INITIAL BASE CHARGE - PROCEDURE LEVEL FIVE: Performed by: NEUROLOGICAL SURGERY

## 2024-11-19 PROCEDURE — 70450 CT HEAD/BRAIN W/O DYE: CPT

## 2024-11-19 PROCEDURE — 99285 EMERGENCY DEPT VISIT HI MDM: CPT | Performed by: EMERGENCY MEDICINE

## 2024-11-19 PROCEDURE — 2500000005 HC RX 250 GENERAL PHARMACY W/O HCPCS: Performed by: STUDENT IN AN ORGANIZED HEALTH CARE EDUCATION/TRAINING PROGRAM

## 2024-11-19 PROCEDURE — C1713 ANCHOR/SCREW BN/BN,TIS/BN: HCPCS | Performed by: NEUROLOGICAL SURGERY

## 2024-11-19 PROCEDURE — 3700000001 HC GENERAL ANESTHESIA TIME - INITIAL BASE CHARGE: Performed by: NEUROLOGICAL SURGERY

## 2024-11-19 PROCEDURE — 3600000010 HC OR TIME - EACH INCREMENTAL 1 MINUTE - PROCEDURE LEVEL FIVE: Performed by: NEUROLOGICAL SURGERY

## 2024-11-19 PROCEDURE — C1729 CATH, DRAINAGE: HCPCS | Performed by: NEUROLOGICAL SURGERY

## 2024-11-19 PROCEDURE — 2500000004 HC RX 250 GENERAL PHARMACY W/ HCPCS (ALT 636 FOR OP/ED): Performed by: NEUROLOGICAL SURGERY

## 2024-11-19 PROCEDURE — 93005 ELECTROCARDIOGRAM TRACING: CPT

## 2024-11-19 PROCEDURE — 84100 ASSAY OF PHOSPHORUS: CPT

## 2024-11-19 PROCEDURE — 71260 CT THORAX DX C+: CPT | Mod: RCN | Performed by: RADIOLOGY

## 2024-11-19 PROCEDURE — 99223 1ST HOSP IP/OBS HIGH 75: CPT | Performed by: SURGERY

## 2024-11-19 DEVICE — VENTRICLEAR II VENTRICULAR DRAINAGE CATHETER
Type: IMPLANTABLE DEVICE | Site: CRANIAL | Status: FUNCTIONAL
Brand: VENTRICLEAR

## 2024-11-19 DEVICE — PLATE, SHUNT, EXTENDED CUTOUT UN3: Type: IMPLANTABLE DEVICE | Site: CRANIAL | Status: FUNCTIONAL

## 2024-11-19 DEVICE — CROSS PIN, AXS SELF-TAP, 1.5 X 4MM: Type: IMPLANTABLE DEVICE | Site: CRANIAL | Status: FUNCTIONAL

## 2024-11-19 DEVICE — DURAGEN® PLUS DURAL REGENERATION MATRIX, 3 IN X 3 IN (7.5 CM X 7.5 CM)
Type: IMPLANTABLE DEVICE | Site: CRANIAL | Status: FUNCTIONAL
Brand: DURAGEN® PLUS

## 2024-11-19 DEVICE — IMPLANTABLE DEVICE: Type: IMPLANTABLE DEVICE | Site: CRANIAL | Status: FUNCTIONAL

## 2024-11-19 RX ORDER — INSULIN LISPRO 100 [IU]/ML
0-10 INJECTION, SOLUTION INTRAVENOUS; SUBCUTANEOUS
Status: DISCONTINUED | OUTPATIENT
Start: 2024-11-19 | End: 2024-11-19

## 2024-11-19 RX ORDER — GLIMEPIRIDE 1 MG/1
1 TABLET ORAL EVERY EVENING
COMMUNITY
Start: 2024-09-20 | End: 2024-12-04 | Stop reason: HOSPADM

## 2024-11-19 RX ORDER — METOCLOPRAMIDE HYDROCHLORIDE 5 MG/ML
10 INJECTION INTRAMUSCULAR; INTRAVENOUS ONCE AS NEEDED
Status: CANCELLED | OUTPATIENT
Start: 2024-11-19

## 2024-11-19 RX ORDER — INSULIN GLARGINE 300 U/ML
51 INJECTION, SOLUTION SUBCUTANEOUS NIGHTLY
COMMUNITY
Start: 2024-11-17 | End: 2024-12-04 | Stop reason: HOSPADM

## 2024-11-19 RX ORDER — TELMISARTAN 80 MG/1
1 TABLET ORAL DAILY
COMMUNITY
Start: 2018-09-11 | End: 2024-12-04 | Stop reason: HOSPADM

## 2024-11-19 RX ORDER — INSULIN LISPRO 100 [IU]/ML
0-10 INJECTION, SOLUTION INTRAVENOUS; SUBCUTANEOUS
Status: DISCONTINUED | OUTPATIENT
Start: 2024-11-19 | End: 2024-11-20

## 2024-11-19 RX ORDER — LANOLIN ALCOHOL/MO/W.PET/CERES
500 CREAM (GRAM) TOPICAL DAILY
Status: DISCONTINUED | OUTPATIENT
Start: 2024-11-19 | End: 2024-11-26

## 2024-11-19 RX ORDER — POTASSIUM CHLORIDE 750 MG/1
10 CAPSULE, EXTENDED RELEASE ORAL DAILY
COMMUNITY
Start: 2024-09-20

## 2024-11-19 RX ORDER — PHENYLEPHRINE HCL IN 0.9% NACL 0.4MG/10ML
SYRINGE (ML) INTRAVENOUS AS NEEDED
Status: DISCONTINUED | OUTPATIENT
Start: 2024-11-19 | End: 2024-11-19

## 2024-11-19 RX ORDER — NORETHINDRONE AND ETHINYL ESTRADIOL 0.5-0.035
KIT ORAL AS NEEDED
Status: DISCONTINUED | OUTPATIENT
Start: 2024-11-19 | End: 2024-11-19

## 2024-11-19 RX ORDER — CEFAZOLIN 1 G/1
INJECTION, POWDER, FOR SOLUTION INTRAVENOUS AS NEEDED
Status: DISCONTINUED | OUTPATIENT
Start: 2024-11-19 | End: 2024-11-19

## 2024-11-19 RX ORDER — ATORVASTATIN CALCIUM 10 MG/1
1 TABLET, FILM COATED ORAL EVERY EVENING
COMMUNITY
Start: 2018-03-29

## 2024-11-19 RX ORDER — FENTANYL CITRATE 50 UG/ML
INJECTION, SOLUTION INTRAMUSCULAR; INTRAVENOUS AS NEEDED
Status: DISCONTINUED | OUTPATIENT
Start: 2024-11-19 | End: 2024-11-19

## 2024-11-19 RX ORDER — ALBUTEROL SULFATE 0.83 MG/ML
2.5 SOLUTION RESPIRATORY (INHALATION) ONCE AS NEEDED
Status: CANCELLED | OUTPATIENT
Start: 2024-11-19

## 2024-11-19 RX ORDER — LABETALOL HYDROCHLORIDE 5 MG/ML
5 INJECTION, SOLUTION INTRAVENOUS EVERY 10 MIN PRN
Status: CANCELLED | OUTPATIENT
Start: 2024-11-19

## 2024-11-19 RX ORDER — OXYCODONE AND ACETAMINOPHEN 5; 325 MG/1; MG/1
1 TABLET ORAL EVERY 4 HOURS PRN
Status: CANCELLED | OUTPATIENT
Start: 2024-11-19

## 2024-11-19 RX ORDER — POLYETHYLENE GLYCOL 3350 17 G/17G
17 POWDER, FOR SOLUTION ORAL DAILY
Status: DISCONTINUED | OUTPATIENT
Start: 2024-11-19 | End: 2024-11-26

## 2024-11-19 RX ORDER — PROPOFOL 10 MG/ML
INJECTION, EMULSION INTRAVENOUS AS NEEDED
Status: DISCONTINUED | OUTPATIENT
Start: 2024-11-19 | End: 2024-11-19

## 2024-11-19 RX ORDER — LABETALOL HYDROCHLORIDE 5 MG/ML
INJECTION, SOLUTION INTRAVENOUS AS NEEDED
Status: DISCONTINUED | OUTPATIENT
Start: 2024-11-19 | End: 2024-11-19

## 2024-11-19 RX ORDER — DULAGLUTIDE 1.5 MG/.5ML
1.5 INJECTION, SOLUTION SUBCUTANEOUS WEEKLY
COMMUNITY
Start: 2024-10-29 | End: 2024-12-04 | Stop reason: HOSPADM

## 2024-11-19 RX ORDER — LOPERAMIDE HCL 2 MG
1-2 TABLET ORAL 4 TIMES DAILY PRN
COMMUNITY

## 2024-11-19 RX ORDER — VIT C/E/ZN/COPPR/LUTEIN/ZEAXAN 250MG-90MG
500 CAPSULE ORAL DAILY
COMMUNITY

## 2024-11-19 RX ORDER — LIDOCAINE HYDROCHLORIDE 10 MG/ML
0.1 INJECTION, SOLUTION INFILTRATION; PERINEURAL ONCE
Status: CANCELLED | OUTPATIENT
Start: 2024-11-19 | End: 2024-11-19

## 2024-11-19 RX ORDER — GLIMEPIRIDE 4 MG/1
4 TABLET ORAL EVERY MORNING
COMMUNITY
End: 2024-12-04 | Stop reason: HOSPADM

## 2024-11-19 RX ORDER — MENTHOL AND ZINC OXIDE .44; 20.625 G/100G; G/100G
1 OINTMENT TOPICAL 4 TIMES DAILY PRN
COMMUNITY

## 2024-11-19 RX ORDER — FLUOXETINE HYDROCHLORIDE 40 MG/1
1 CAPSULE ORAL DAILY
COMMUNITY
Start: 2018-06-21

## 2024-11-19 RX ORDER — ACETAMINOPHEN 325 MG/1
650 TABLET ORAL EVERY 4 HOURS PRN
Status: CANCELLED | OUTPATIENT
Start: 2024-11-19

## 2024-11-19 RX ORDER — FLUOXETINE HYDROCHLORIDE 20 MG/1
40 CAPSULE ORAL DAILY
Status: DISCONTINUED | OUTPATIENT
Start: 2024-11-19 | End: 2024-11-26

## 2024-11-19 RX ORDER — LIDOCAINE HYDROCHLORIDE AND EPINEPHRINE 5; 5 MG/ML; UG/ML
INJECTION, SOLUTION INFILTRATION; PERINEURAL AS NEEDED
Status: DISCONTINUED | OUTPATIENT
Start: 2024-11-19 | End: 2024-11-19 | Stop reason: HOSPADM

## 2024-11-19 RX ORDER — SODIUM CHLORIDE, SODIUM LACTATE, POTASSIUM CHLORIDE, CALCIUM CHLORIDE 600; 310; 30; 20 MG/100ML; MG/100ML; MG/100ML; MG/100ML
50 INJECTION, SOLUTION INTRAVENOUS CONTINUOUS
Status: ACTIVE | OUTPATIENT
Start: 2024-11-19 | End: 2024-11-20

## 2024-11-19 RX ORDER — GLYCOPYRROLATE 0.2 MG/ML
INJECTION INTRAMUSCULAR; INTRAVENOUS AS NEEDED
Status: DISCONTINUED | OUTPATIENT
Start: 2024-11-19 | End: 2024-11-19

## 2024-11-19 RX ORDER — CHOLECALCIFEROL (VITAMIN D3) 50 MCG
50 TABLET ORAL DAILY
COMMUNITY

## 2024-11-19 RX ORDER — ONDANSETRON HYDROCHLORIDE 2 MG/ML
INJECTION, SOLUTION INTRAVENOUS AS NEEDED
Status: DISCONTINUED | OUTPATIENT
Start: 2024-11-19 | End: 2024-11-19

## 2024-11-19 RX ORDER — DULAGLUTIDE 0.75 MG/.5ML
0.75 INJECTION, SOLUTION SUBCUTANEOUS WEEKLY
COMMUNITY
Start: 2024-10-29 | End: 2024-12-04 | Stop reason: HOSPADM

## 2024-11-19 RX ORDER — LEVOTHYROXINE SODIUM 50 UG/1
1 TABLET ORAL DAILY
COMMUNITY
Start: 2018-03-29

## 2024-11-19 RX ORDER — SODIUM CHLORIDE, SODIUM LACTATE, POTASSIUM CHLORIDE, CALCIUM CHLORIDE 600; 310; 30; 20 MG/100ML; MG/100ML; MG/100ML; MG/100ML
50 INJECTION, SOLUTION INTRAVENOUS CONTINUOUS
Status: CANCELLED | OUTPATIENT
Start: 2024-11-19 | End: 2024-11-20

## 2024-11-19 RX ORDER — HYDROMORPHONE HYDROCHLORIDE 0.2 MG/ML
0.2 INJECTION INTRAMUSCULAR; INTRAVENOUS; SUBCUTANEOUS EVERY 5 MIN PRN
Status: CANCELLED | OUTPATIENT
Start: 2024-11-19

## 2024-11-19 RX ORDER — OXYCODONE HYDROCHLORIDE 5 MG/1
10 TABLET ORAL EVERY 4 HOURS PRN
Status: CANCELLED | OUTPATIENT
Start: 2024-11-19

## 2024-11-19 RX ORDER — ROCURONIUM BROMIDE 10 MG/ML
INJECTION, SOLUTION INTRAVENOUS AS NEEDED
Status: DISCONTINUED | OUTPATIENT
Start: 2024-11-19 | End: 2024-11-19

## 2024-11-19 RX ORDER — LIDOCAINE HCL/PF 100 MG/5ML
SYRINGE (ML) INTRAVENOUS AS NEEDED
Status: DISCONTINUED | OUTPATIENT
Start: 2024-11-19 | End: 2024-11-19

## 2024-11-19 RX ORDER — LEVETIRACETAM 500 MG/1
500 TABLET ORAL 2 TIMES DAILY
Status: DISCONTINUED | OUTPATIENT
Start: 2024-11-19 | End: 2024-11-19

## 2024-11-19 RX ORDER — MEMANTINE HYDROCHLORIDE 10 MG/1
1 TABLET ORAL 2 TIMES DAILY
COMMUNITY
Start: 2018-08-21

## 2024-11-19 RX ORDER — FENTANYL CITRATE 50 UG/ML
12.5 INJECTION, SOLUTION INTRAMUSCULAR; INTRAVENOUS EVERY 5 MIN PRN
Status: CANCELLED | OUTPATIENT
Start: 2024-11-19

## 2024-11-19 RX ORDER — GLIMEPIRIDE 2 MG/1
2 TABLET ORAL EVERY EVENING
COMMUNITY
Start: 2020-12-16 | End: 2024-12-04 | Stop reason: HOSPADM

## 2024-11-19 RX ORDER — LEVOTHYROXINE SODIUM 50 UG/1
50 TABLET ORAL DAILY
Status: DISCONTINUED | OUTPATIENT
Start: 2024-11-19 | End: 2024-11-26

## 2024-11-19 RX ORDER — INSULIN LISPRO 100 [IU]/ML
INJECTION, SOLUTION INTRAVENOUS; SUBCUTANEOUS
COMMUNITY
Start: 2024-11-04 | End: 2024-12-04 | Stop reason: HOSPADM

## 2024-11-19 RX ORDER — HYDRALAZINE HYDROCHLORIDE 20 MG/ML
5 INJECTION INTRAMUSCULAR; INTRAVENOUS EVERY 10 MIN PRN
Status: CANCELLED | OUTPATIENT
Start: 2024-11-19

## 2024-11-19 RX ORDER — LEVETIRACETAM 5 MG/ML
500 INJECTION INTRAVASCULAR 2 TIMES DAILY
Status: DISCONTINUED | OUTPATIENT
Start: 2024-11-19 | End: 2024-11-21

## 2024-11-19 RX ORDER — MIDAZOLAM HYDROCHLORIDE 1 MG/ML
INJECTION INTRAMUSCULAR; INTRAVENOUS AS NEEDED
Status: DISCONTINUED | OUTPATIENT
Start: 2024-11-19 | End: 2024-11-19

## 2024-11-19 RX ORDER — DROPERIDOL 2.5 MG/ML
0.62 INJECTION, SOLUTION INTRAMUSCULAR; INTRAVENOUS ONCE AS NEEDED
Status: CANCELLED | OUTPATIENT
Start: 2024-11-19

## 2024-11-19 RX ORDER — NITROFURANTOIN MACROCRYSTALS 50 MG/1
1 CAPSULE ORAL EVERY EVENING
COMMUNITY
Start: 2024-09-20

## 2024-11-19 RX ORDER — ATORVASTATIN CALCIUM 10 MG/1
10 TABLET, FILM COATED ORAL EVERY EVENING
Status: DISCONTINUED | OUTPATIENT
Start: 2024-11-19 | End: 2024-11-26

## 2024-11-19 RX ORDER — MEMANTINE HYDROCHLORIDE 10 MG/1
10 TABLET ORAL 2 TIMES DAILY
Status: DISCONTINUED | OUTPATIENT
Start: 2024-11-19 | End: 2024-11-26

## 2024-11-19 SDOH — HEALTH STABILITY: MENTAL HEALTH: CURRENT SMOKER: 0

## 2024-11-19 ASSESSMENT — COLUMBIA-SUICIDE SEVERITY RATING SCALE - C-SSRS
2. HAVE YOU ACTUALLY HAD ANY THOUGHTS OF KILLING YOURSELF?: NO
2. HAVE YOU ACTUALLY HAD ANY THOUGHTS OF KILLING YOURSELF?: NO
1. IN THE PAST MONTH, HAVE YOU WISHED YOU WERE DEAD OR WISHED YOU COULD GO TO SLEEP AND NOT WAKE UP?: NO
6. HAVE YOU EVER DONE ANYTHING, STARTED TO DO ANYTHING, OR PREPARED TO DO ANYTHING TO END YOUR LIFE?: NO
1. IN THE PAST MONTH, HAVE YOU WISHED YOU WERE DEAD OR WISHED YOU COULD GO TO SLEEP AND NOT WAKE UP?: NO
6. HAVE YOU EVER DONE ANYTHING, STARTED TO DO ANYTHING, OR PREPARED TO DO ANYTHING TO END YOUR LIFE?: NO

## 2024-11-19 ASSESSMENT — PAIN SCALES - PAIN ASSESSMENT IN ADVANCED DEMENTIA (PAINAD)
CONSOLABILITY: NO NEED TO CONSOLE
TOTALSCORE: 0
CONSOLABILITY: NO NEED TO CONSOLE
BREATHING: NORMAL
BODYLANGUAGE: RELAXED
FACIALEXPRESSION: SMILING OR INEXPRESSIVE
FACIALEXPRESSION: SMILING OR INEXPRESSIVE
BREATHING: NORMAL

## 2024-11-19 ASSESSMENT — PAIN - FUNCTIONAL ASSESSMENT
PAIN_FUNCTIONAL_ASSESSMENT: PAINAD (PAIN ASSESSMENT IN ADVANCED DEMENTIA SCALE)
PAIN_FUNCTIONAL_ASSESSMENT: 0-10
PAIN_FUNCTIONAL_ASSESSMENT: CPOT (CRITICAL CARE PAIN OBSERVATION TOOL)

## 2024-11-19 ASSESSMENT — PAIN DESCRIPTION - PROGRESSION: CLINICAL_PROGRESSION: NOT CHANGED

## 2024-11-19 ASSESSMENT — PAIN SCALES - GENERAL: PAINLEVEL_OUTOF10: 0 - NO PAIN

## 2024-11-19 NOTE — CONSULTS
"Reason For Consult  Subdural hematoma     History Of Present Illness  Loren Mitchell is a 79 y.o. female presenting with found down at memory care facility after fall.      ROS limited due to patient's baseline mental status (Aox0 at baseline).     Per  patient is at her mental baseline.      Past Medical History  She has a past medical history of Other specified health status.    Surgical History  She has no past surgical history on file.     Social History  She has no history on file for tobacco use, alcohol use, and drug use.    Family History  No family history on file.     Allergies  Sulfa (sulfonamide antibiotics) and Penicillins    Review of Systems  ROS limited due to patient's mental status     Physical Exam  General: awake, eyes open, looks around, does not track  HEENT: atraumatic, mucus membranes moist  Neuro:  A&Ox0, mumbles  OU3R, EOMI   Face symmetric, Facial SILT   Tongue midline and symmetric  Spontaneous x4 antigravity   No clonus, neg Cade  Unable to test sensation due to patient's mental status  Cardiac: carotid pulses 2+ b/l  Respiratory: breathing comfortably on room air   Abdomen: Non tender, non distended  Skin: warn, dry        Last Recorded Vitals  Blood pressure (!) 112/93, pulse 67, temperature 36.3 °C (97.3 °F), temperature source Temporal, resp. rate 13, height 1.676 m (5' 6\"), weight 60 kg (132 lb 4.4 oz), SpO2 97%.    Relevant Results  Imaging personally reviewed and is significant for: CTH without contrast demonstrating right convexity traumatic subacute on chronic subdural hematoma. There is brain compression a midline shift     Assessment/Plan     Loren Mitchell is an 79 yr old F with h/o HTN, HLD, hypoT, CKD, dementia (Ox0 at baseline), CAD, recurrent UTIs, p/w found down after fall at memory care facility, CTH large mixed density L SDH with significant midline shift    Discussed with  at length risks and benefits of operative intervention including but not " From: Queta Aquino  To: Cristina Alfonso  Sent: 4/9/2024 12:19 PM CDT  Subject: Increasing discomfort from UTI    Dr. Alfonso, I saw you today at 9. We decided I would wait until next Monday to have a repeat urinalysis, because although they were not gone, my symptoms were improving. As the day is going on, my symptoms are getting worse with increased pressure, frequency, and burning; and I don’t think I can wait a week to see what the test shows. Is there another antibiotic I can try that will work with the type of infection the urine culture showed? I am starting to feel pretty uncomfortable and concerned that the infection will get worse by next Monday. Thank you. Dannielle Aquino    limited to risk of post operative seizures, bleeding, infection.  Discussed risk of worsening brain compression and associated symptoms without surgery.     After discussion,  would like to proceed with operative intervention and is okay with reversing DNR 24 hours perioperatively     Recommendations:  OR Tues 11/19 for L inez holes for SDH evacuation   Please ensure update pre operative labs including CBC, RFP, T+S, coags, chest xray, EKG, keep NPO, hold any anticoagulation         Jacqui Tavares MD

## 2024-11-19 NOTE — H&P
[facility]  TRAUMA SERVICE - HISTORY AND PHYSICAL / CONSULT    Patient Name: Loren Mitchell  MRN: [id number]  Admit Date: 1119  : 1945  AGE: 79 y.o.   GENDER: female  ==============================================================================  MECHANISM OF INJURY / CHIEF COMPLAINT:   fall  LOC (yes/no?): unknown  Anticoagulant / Anti-platelet Rx? (for what dx?): no  Referring Facility Name (N/A for scene EMR run):  [place]    INJURIES:   SDH    OTHER MEDICAL PROBLEMS:  Dementia, baseline A/O x0    INCIDENTAL FINDINGS:  Potential cystitis on CT    ==============================================================================  ADMISSION PLAN OF CARE:  Admit to the trauma floor for inez hole with NSGY  Consultants notified (specialty, provider name, time): NSGY; aware prior to transfer    ==============================================================================  PAST MEDICAL HISTORY:   PMH: dementia, diabetes  Past Medical History:   Diagnosis Date    Other specified health status     Nonsmoker     Last menstrual period: patient not able to respond at baseline    PSH: patient not able to respond at baseline  History reviewed. No pertinent surgical history.    SOCIAL HISTORY:    Smoking: nonsmoker   Social History     Tobacco Use   Smoking Status Unknown   Smokeless Tobacco Not on file       Alcohol: does not drink   Social History     Substance and Sexual Activity   Alcohol Use None       Drug use: none    MEDICATIONS: last med rec 2 years ago, patient unable to answer more currently    CALAMINE-ZINC OXIDE EX      FLUoxetine 40 MG capsule  Commonly known as: PROzac      glucose 40 % Gel  Commonly known as: GLUTOSE  Take 37.5 mLs by mouth as needed (Glucose less than 70)      Gvoke HypoPen 2-Pack 1 MG/0.2ML Soaj  Generic drug: Glucagon  Inject 1 mg into the skin every 15 minutes as needed (Glucose less than 70, patient unable to take oral glucose, or if she is  "symptomatic)      insulin glargine 100 UNIT/ML injection vial  Commonly known as: LANTUS  Inject 17 Units into the skin nightly Indications: Diabetes      insulin lispro 100 UNIT/ML injection cartridge  Commonly known as: HumaLOG  Inject 3 times daily post meals only. 151-200= 5 units, 201-250= 6 units, 251-300= 7 units, 301-350= 8 units, >400= 10 units and call MD/PA      Insulin Syringe-Needle U-100 30G X 1/2\" 1 ML Misc  1 each by Does not apply route 4 times daily (before meals and nightly)      levothyroxine 50 MCG tablet  Commonly known as: SYNTHROID      linagliptin 5 MG tablet  Commonly known as: TRADJENTA      memantine 10 MG tablet  Commonly known as: NAMENDA  Take 1 tablet by mouth 2 times daily      ONE TOUCH ULTRA TEST strip  Generic drug: blood glucose test strips      potassium chloride 10 MEQ extended release tablet  Commonly known as: KLOR-CON M      telmisartan 80 MG tablet  Commonly known as: MICARDIS  Take 1 tablet by mouth daily      vitamin B-12 500 MCG tablet  Commonly known as: CYANOCOBALAMIN                ASK your doctor about these medications       atorvastatin 40 MG tablet  Commonly known as: LIPITOR  Take 1 tablet by mouth nightly     Prior to Admission medications    Not on File     ALLERGIES: sulfa/PCN  Allergies   Allergen Reactions    Sulfa (Sulfonamide Antibiotics) Unknown    Penicillins Rash and Unknown       REVIEW OF SYSTEMS:  Review of Systems   Unable to perform ROS: Dementia     PHYSICAL EXAM:  PRIMARY SURVEY:  Airway  Airway is patent.     Breathing  Breathing is normal. Right breath sounds are normal. Left breath sounds are normal.     Circulation  Cardiac rhythm is regular. Rate is regular.   Pulses  Radial: 2+ on the right; 2+ on the left.  Pedal: 2+ on the right; 2+ on the left.    Disability  Margarita Coma Score  Eye:4   Verbal:1   Motor:6      11               SECONDARY SURVEY/PHYSICAL EXAM:  Physical Exam  Constitutional:       General: She is not in acute distress.     " Appearance: She is not ill-appearing.   Cardiovascular:      Rate and Rhythm: Normal rate and regular rhythm.   Abdominal:      General: Abdomen is flat. There is no distension.   Neurological:      Mental Status: Mental status is at baseline.   Psychiatric:      Comments: Nonverbal baseline, dementia       IMAGING SUMMARY:  (summary of findings, not a copy of dictation)  CT Head/Face: left sided subdural hematoma with midline shift, questionable acute vs chronic  CT C-Spine: no fx  CT Chest/Abd/Pelvis: no fx/ PTX  CXR/PXR: no actionable findings    LABS:  Results from last 7 days   Lab Units 11/18/24  1811   WBC AUTO x10*3/uL 10.0   HEMOGLOBIN g/dL 13.0   HEMATOCRIT % 39.0   PLATELETS AUTO x10*3/uL 237   NEUTROS PCT AUTO % 68.0   LYMPHS PCT AUTO % 24.3   MONOS PCT AUTO % 5.5   EOS PCT AUTO % 1.3     Results from last 7 days   Lab Units 11/18/24  1811   APTT seconds 31   INR  1.1     Results from last 7 days   Lab Units 11/18/24  1811   SODIUM mmol/L 147*   POTASSIUM mmol/L 3.9   CHLORIDE mmol/L 110*   CO2 mmol/L 29   BUN mg/dL 17   CREATININE mg/dL 0.73   CALCIUM mg/dL 9.1   PROTEIN TOTAL g/dL 6.8   BILIRUBIN TOTAL mg/dL 0.6   ALK PHOS U/L 81   ALT U/L 17   AST U/L 13   GLUCOSE mg/dL 160*     Results from last 7 days   Lab Units 11/18/24  1811   BILIRUBIN TOTAL mg/dL 0.6           I have reviewed all laboratory and imaging results ordered/pertinent for this encounter.    Important conversation: I personally spoke with the  as patient has had DNR/comfort care arrest orders prior.  Patient had spoken to neurosurgery and consented for a bur hole procedure.  I verified that he was agreeable to this, we went over instructions that he would need to be fully aware of.  Patient would need to be intubated, as well as interventions during the operation may occur such as cardiac arrest.  She also may not be able to be extubated immediately postop, and may require more time being intubated.  He was initially unaware of  what intubation was, however when I clarified that it was a breathing tube he mentioned he had discussed that with neurosurgery as well.  He verified that he was agreeable to having the surgery done for her, as well as the reversal of DNR/DNI perioperatively as well as potential need for prolonged intubation afterwards.    Manuel Cottrell,

## 2024-11-19 NOTE — ANESTHESIA PROCEDURE NOTES
Peripheral IV  Date/Time: 11/19/2024 10:58 AM      Placement  Needle size: 18 G  Laterality: left  Location: forearm  Site prep: alcohol  Technique: anatomical landmarks  Attempts: 1

## 2024-11-19 NOTE — ANESTHESIA PROCEDURE NOTES
Airway  Date/Time: 11/19/2024 11:03 AM  Urgency: elective    Airway not difficult    Staffing  Performed: CAA and MONIKA   Authorized by: Hong Bae MD    Performed by: MARLEY Franklin  Patient location during procedure: OR    Indications and Patient Condition  Indications for airway management: anesthesia and airway protection  Sedation level: deep  Preoxygenated: yes  Patient position: sniffing  MILS not maintained throughout  Mask difficulty assessment: 1 - vent by mask    Final Airway Details  Final airway type: endotracheal airway      Successful airway: ETT  Cuffed: yes   Successful intubation technique: direct laryngoscopy  Facilitating devices/methods: intubating stylet  Endotracheal tube insertion site: oral  Blade: Brent  Blade size: #3  ETT size (mm): 7.0  Cormack-Lehane Classification: grade I - full view of glottis  Placement verified by: chest auscultation and capnometry   Measured from: lips  ETT to lips (cm): 21  Number of attempts at approach: 1

## 2024-11-19 NOTE — OP NOTE
Left inez holes for subdural hematoma evacuation (L) Operative Note     Date: 2024  OR Location: [facility] OR    Name: Loren Mitchell, : 1945, Age: 79 y.o., MRN: [id number], Sex: female    Diagnosis  Pre-op Diagnosis      * Subdural hematoma (Multi) [S06.5XAA] Post-op Diagnosis     * Subdural hematoma (Multi) [S06.5XAA]     Procedures  Left inez holes for subdural hematoma evacuation  39270 - DE INEZ HOLE W/ASPIR HEMATOMA/CYST INTRACEREBRAL      Surgeons      * Oliver Beard - Primary    Resident/Fellow/Other Assistant:  Surgeons and Role:     * Hanna Ordaz MD - Resident - Assisting     * Juventino Nguyen MD - Resident - Assisting    Staff:   Circulator: Adeola Neal Person: Delphine Lyle Scrub: Analisa    Anesthesia Staff: Anesthesiologist: Armando Camilo MD; Hong Bae MD  C-AA: MARLEY Pearson; MARLEY Franklin  MONIKA: Yenifer Mota    Procedure Summary  Anesthesia: General  ASA: IV  Estimated Blood Loss: 25mL  Intra-op Medications:   Administrations occurring from 1007 to 1322 on 24:   Medication Name Total Dose   lidocaine-epinephrine (Xylocaine W/EPI) 0.5 %-1:200,000 injection 5 mL   ceFAZolin (Ancef) vial 1 g 2 g   dexAMETHasone (Decadron) 4 mg/mL 10 mg   ePHEDrine injection 30 mg   fentaNYL (Sublimaze) injection 50 mcg/mL 125 mcg   glycopyrrolate (Robinul) injection 0.1 mg   lidocaine (cardiac) injection 2% prefilled syringe 100 mg   midazolam PF (Versed) injection 1 mg/mL 2 mg   ondansetron 2 mg/mL 4 mg   oxygen (O2) therapy Cannot be calculated   phenylephrine 40 mcg/mL syringe 10 mL 560 mcg   polyethylene glycol (Glycolax, Miralax) packet 17 g Cannot be calculated   propofol (Diprivan) injection 10 mg/mL 200 mg   rocuronium (ZeMuron) 50 mg/5 mL injection 50 mg   NaCl 0.9 % bolus Cannot be calculated   sugammadex (Bridion) 200 mg/2 mL injection 200 mg              Anesthesia Record               Intraprocedure I/O Totals          Intake    NaCl 0.9 % bolus 1000.00 mL     Total Intake 1000 mL          Specimen: No specimens collected              Drains and/or Catheters: * None in log *    Tourniquet Times:         Implants:  Implants       Type Name Action Serial No.      Graft GRAFT MATRIX, DURAL, DURAGEN PLUS 3X3 - [id number] Implanted      Neuro Interventional Implant CATHETER SET, NEURO VENTRICULAR DRAINAGE W/ANTIBIOTIC IMPREGNATION - [id number] Implanted      Screw COVER, LW PROF SILVERIO HOLE, 14MM W/ - [id number] Implanted      Neuro Interventional Implant PLATE, SHUNT, EXTENDED CUTOUT UN3 - [id number] Implanted      Neuro Interventional Implant CROSS PIN, AXS SELF-TAP, 1.5 X 4MM - [id number] Implanted               Findings: subacute subdural blood products under moderate pressure    Indications: Loren Mitchell is an 79 y.o. female who is having surgery for Subdural hematoma (Multi) [S06.5XAA].     The patient was seen in the preoperative area. The risks, benefits, complications, treatment options, non-operative alternatives, expected recovery and outcomes were discussed with the patient. The possibilities of reaction to medication, pulmonary aspiration, injury to surrounding structures, bleeding, recurrent infection, the need for additional procedures, failure to diagnose a condition, and creating a complication requiring transfusion or operation were discussed with the patient. The patient concurred with the proposed plan, giving informed consent.  The site of surgery was properly noted/marked if necessary per policy. The patient has been actively warmed in preoperative area. Preoperative antibiotics have been ordered and given within 1 hours of incision. Venous thrombosis prophylaxis have been ordered including bilateral sequential compression devices    Procedure Details:   The patient was brought back from preop to OR. A safety huddle was performed and anesthesia was induced. The left scalp was cleansed, prepped and draped in usual sterile fashion. Local anesthetic  was used to infiltrate skin over incision.     Two small curvilinear incisions were made over the superior temporal line. Hemostasis was obtained.  drill was used to create two burrholes in each incision. Inez hole was further prepared with currettes and hemostasis was obtained with bone wax.    Dura was opened in both inez holes using 11 blade. Subacute subdural hematoma under moderate pressure was encountered. Inez holes were irrigated copiously with warm saline. Subdural membrane was opened with bipolar electrocautery. Burrholes were further irrigated using Bactiseal drain catheter. This was continued until saline ran clear.    A subdural drain was tunneled from posterior to anterior incision and tunneled posteriorly away from posterior incision. Duragen was placed into the inez holes. Titanium plates and self drilling screws were utilized over the inez holes. Incisions were closed with 2-0 vicryls and monocryl for skin, and subdural catheter was secured with 2-0 silk suture.    Patient was turned over to TSICU with no obvious complication; all counts correct at end of case.       Complications:  None; patient tolerated the procedure well.    Disposition: ICU - extubated and stable.  Condition: stable       Additional Details: none    Attending Attestation: I was present for the critical portions of the procedure.    Oliver Beard  Phone Number: [phone]

## 2024-11-19 NOTE — PROGRESS NOTES
[facility]  TRAUMA SERVICE - PROGRESS NOTE    Patient Name: Loren Mitchell  MRN: [id number]  Admit Date: 1119  : 1945  AGE: 79 y.o.   GENDER: female  ==============================================================================  MECHANISM OF INJURY:   80 yo F with PMH of dementia, T2DM, hypothyroidism, HTN, CKD, HLD, CAD, depression, recurrent UTIs, who presented to ED as transfer following a fall at [facility] care facility with CTH and C-spine at OSH showing large left SDH (acute and subacute components) with rightward midline shift and epidural. Patient was seen by trauma surgery and NSGY in [facility] ED. Patient was taken emergently to ED with NSGY for left SDH evacuation via x2 left Brooks Holes.     Code status DNR-CC    LOC (yes/no?): unknown, found down at facility  Anticoagulant / Anti-platelet Rx? (for what dx?): none  Referring Facility Name (N/A for scene EMR run): [facility] from [facility]    INJURIES:   Left acute on subacute SDH 26 mm with left to right midline shift up to 14mm  Left epidural hematoma    OTHER MEDICAL PROBLEMS:  Dementia, baseline AO x0  HTN  HLD  CKD  CAD  Depression  Recurrent UTIs  T2DM    INCIDENTAL FINDINGS:  cycstitis    PROCEDURES:   left inez holes x2    ==============================================================================  TODAY'S ASSESSMENT AND PLAN OF CARE:  80 yo F with PMH of dementia, T2DM, hypothyroidism, HTN, CKD, HLD, CAD, depression, recurrent UTIs, who presented to ED as transfer following a fall at [facility] care facility with CTH and C-spine at OSH showing large left SDH (acute and subacute components) with rightward midline shift and epidural. Patient was seen by trauma surgery and NSGY in [facility] ED. Patient was taken emergently to ED with NSGY for left SDH evacuation via x2 left Brooks Holes. Post-operatively, patient is in TICU, exam shows that her mentation is at her baseline and she does have brisk reflexes on right UE  and LE , consistent with Left SDH. Patient is pending repeat CTH post-operatively. She will remain in ICU for neurologic monitoring.    Admit to trauma surgery under TICU  Code status reinstated ( DNRCC)  Neuro  Subdural drain to remain to gravity  Repeat CT now (immeidatly following OR)  Start keppra 500 BID x7 days for seizure prophylaxis  Goal SBP < 160  No HOB restrictions  Okay for DVT ppx POD 2 (11/21)  CV  Tele monitoring and pulse ox while inpatient  Resp  Supp O2 as needed. Will attempt to weaned as tolerated  Encourage IS use with assistance, will plan for patient to ambulate and be UOOB to chair as tolerated  GI  NPO. Will remain NPO until repeat CTH head is complete and there is not evidence of rebleed/ new bleed  mIVF  Will order home meds as PO and administer when clinically appropriate  /FEN  Voiding spontaneously.   Will obtain UA given evidence of cystitis on CT and hx of recurrent UTIs  Daily RFP and Mag. Will replete electrolytes as needed  MSK:  UOOB   Consult to PT/OT  Heme:  Daily CBC. No current indications for transfusion.   DVT ppx: will initated chemo ppx on POD 2 ( 11/21) if patient remains clinically stable  ID: no acute issues  Endo:  Will hold home insulin and glimepiride  Will start SSI similar to home regimen  GI PPX; no indicated    Dispo: continue care in ICU    D/w attending Dr. Torres.    Vera Rayo   PGY1 General Surgery Resident  Trauma Surgery  Ext [phone]       ==============================================================================  CHIEF COMPLAINT / OVERNIGHT EVENTS:   80 yo F with PMH of dementia, T2DM, hypothyroidism, HTN, CKD, HLD, CAD, depression, recurrent UTIs, who presented to ED as transfer following a fall at memory care facility with CTH and C-spine at OSH showing large left SDH (acute and subacute components) with rightward midline shift and epidural. Patient was seen by trauma surgery and NSGY in [facility] ED. Patient was taken emergently to ED with NSGY  "for left SDH evacuation via x2 left Clayton Holes.     MEDICAL HISTORY / ROS:  Admission history and ROS reviewed. Pertinent changes as follows:  Unable to obtain from patient    PHYSICAL EXAM:  Heart Rate:  [56-70]   Temp:  [36 °C (96.8 °F)-36.4 °C (97.5 °F)]   Resp:  [12-23]   BP: ()/(31-93)   Height:  [167.6 cm (5' 6\")]   Weight:  [60 kg (132 lb 4.4 oz)]   SpO2:  [95 %-100 %]     Physical Exam  General: ill- appearing, frail,  HEENT: left head with incision and OR drain in place, no scleral icterus, PERRL, iEOM, dry moist mucous membranes  Cardiovascular: RRR, capillary refill <3sec, 2+ palpable radial, femoral , DP/PT bilaterally  Pulmonary: breathing comfortably on 2L NC  Abdomen: soft, nontender, nondistended, nonperitonitic  : normal genitalia  Skin: warm and dry. Incision on left scalp covered with OR dressing, c/d/I with serosang staining on dressing  Neuro: A/O x0 ( baseline) not very interactive. GCS 11 ( E4, V2, M5), brisk reflexes in RUE and RLE, no grasp on RUE but does move arm antigravity.   Lines: PIVs, NC  Drains: Surdural drain in left head    IMAGING SUMMARY  CTH noncon:  IMPRESSION:  Large left acute on subacute subdural hematoma is overall similar in  appearance to the prior exam, measuring up to 2.6 cm in maximum  thickness. Severe mass effect on the left cerebral hemisphere and  ventricular system, with left-to-right midline shift measuring 1.4  cm. Similar asymmetric prominence of the right lateral ventricle,  which could reflect a component of ventricular entrapment. Similar  subfalcine herniation and medialization of the left uncus.    CT C/A/P  CT C/T/L spine:  IMPRESSION:  No evidence of acute traumatic injury to the chest, abdomen, or  pelvis.  No acute fracture or traumatic subluxation identified in the thoracic  spine.  No acute fracture or traumatic subluxation identified in the lumbar  spine.  No pneumothorax, pleural effusion, or consolidation.  Hepatic steatosis.  Solid " appearing 1.3 cm exophytic mass off the left kidney, highly  suspicious for renal cell carcinoma until proven otherwise.  Urinary bladder wall thickening with small amount of intraluminal air  in urinary bladder for which correlation with urinalysis recommended  to exclude cystitis.      LABS:  Results from last 7 days   Lab Units 11/19/24  0706 11/18/24  1811   WBC AUTO x10*3/uL 8.7 10.0   HEMOGLOBIN g/dL 12.2 13.0   HEMATOCRIT % 34.9* 39.0   PLATELETS AUTO x10*3/uL 224 237   NEUTROS PCT AUTO %  --  68.0   LYMPHS PCT AUTO %  --  24.3   MONOS PCT AUTO %  --  5.5   EOS PCT AUTO %  --  1.3     Results from last 7 days   Lab Units 11/18/24  1811   APTT seconds 31   INR  1.1     Results from last 7 days   Lab Units 11/18/24  1811   SODIUM mmol/L 147*   POTASSIUM mmol/L 3.9   CHLORIDE mmol/L 110*   CO2 mmol/L 29   BUN mg/dL 17   CREATININE mg/dL 0.73   CALCIUM mg/dL 9.1   PROTEIN TOTAL g/dL 6.8   BILIRUBIN TOTAL mg/dL 0.6   ALK PHOS U/L 81   ALT U/L 17   AST U/L 13   GLUCOSE mg/dL 160*     Results from last 7 days   Lab Units 11/18/24  1811   BILIRUBIN TOTAL mg/dL 0.6           I have reviewed all medications, laboratory results, and imaging pertinent for today's encounter.

## 2024-11-19 NOTE — ANESTHESIA PREPROCEDURE EVALUATION
Patient: Loren Mitchell    Procedure Information       Date/Time: 11/19/24 1007    Procedure: Left inez holes for subdural hematoma evacuation (Left)    Location: [facility] OR 25 / [facility] OR    Surgeons: Oliver Beard MD            Relevant Problems   Anesthesia   (+) Postoperative delirium      Cardiac   (+) Abnormal EKG (Occasional PVC's with old septal MI noted)   (+) Coronary artery disease involving native coronary artery of native heart without angina pectoris (Mild (no hx, only noted on EKG), no therapy needed)   (+) HTN (hypertension)   (+) Hyperlipidemia   (-) MI (myocardial infarction) (Multi) (Family denies, seen only on EKG)      Pulmonary (within normal limits)      Neuro  Hx multiple falls/ syncope with collapse   (+) CVA (cerebral vascular accident) (Multi)   (+) Intracranial hemorrhage (Multi) (Large subdural hematoma with mass shift seen on imaging)   (+) Severe late onset Alzheimer's dementia without behavioral disturbance, psychotic disturbance, mood disturbance, or anxiety (Multi) (Pt is nonverbal, A&O x 0 at baseline)      GI (within normal limits)      /Renal   (+) Chronic renal insufficiency (Hy chronic hypernatremia/hyperchloremia; Hx hematuria)   (+) Urinary tract infection (Hx of UTI's/urosepsis requiring hospitalization several times)      Liver (within normal limits)      Endocrine   (+) Diabetes mellitus, type 2 (Multi) (Poorly-controlled DM with hx DKA in 2021;  Blood sugar in AM before surgery = 201)   (+) Hypothyroidism      Hematology   (+) Polycythemia (High H/H in the past, never requiring treatment)      Musculoskeletal   (+) Chronic low back pain   (+) Lumbar disc disease (Hx back surgery several years ago)   (+) Osteoarthritis   (+) Spinal stenosis      HEENT   (+) Seasonal allergies   (+) Sinus symptom (Chronic sinusitis/rhinitis)      ID   (+) Urinary tract infection (Hx of UTI's/urosepsis requiring hospitalization several times)      Skin (within normal  limits)      GYN   (+) History of hysterectomy       Clinical information reviewed:   Tobacco  Allergies  Meds   Med Hx  Surg Hx   Fam Hx  Soc Hx        NPO Detail:  No data recorded     Physical Exam    Airway  Mallampati: unable to assess  Comments: Pt with closed mouth, unresponsive to voice/commands, resisting manual mouth opening, unable to perform full airway exam before surgery.  Family states pt has no false teeth, remaining dentition not loose per family.   Cardiovascular - normal exam  Rhythm: regular  Rate: normal     Dental    Pulmonary - normal exam     Abdominal - normal exam  Abdomen: soft  Bowel sounds: normal     Other findings: Pt was DNR/DNI, but POA  agrees to rescind DNR for 24 hours after induction of anesthesia to allow surgery to proceed          Anesthesia Plan    History of general anesthesia?: yes  History of complications of general anesthesia?: no    ASA 4 - emergent     general   (Plan GETA/PIVx2/A-line)  The patient is not a current smoker.  Patient was not previously instructed to abstain from smoking on day of procedure.  Patient did not smoke on day of procedure.  Medical reason for not educating patient about risks of obstructive sleep apnea. (Emergency case)  intravenous induction   Postoperative administration of opioids is intended.  Anesthetic plan and risks discussed with spouse and healthcare power of .  Use of blood products discussed with spouse and healthcare power of  who consented to blood products.    Plan discussed with CAA and attending.

## 2024-11-19 NOTE — HOSPITAL COURSE
Ms. Loren Mitchell is an 78 yo female with PMH of dementia, T2DM, hypothyroidism, HTN, CKD, HLD, CAD, depression, recurrent UTIs who presented to [facility] as transfer from [facility] after a fall. Patient was found down at her facility. Unknown downtime. No Hx of blood thinner use. Patient was taken to [facility] and underwent CTH. This scan showed large left SDH measuring 2.6 cm with 1.4 cm rightward midline shift. Patient was transferred to [facility] where she underwent emergent OR with NSGY . She is now s/p x2 left inez holes of SDH evacuation and subdural drain placement. Pt rec: mod intensity continued care and OT recs 24 hr supervision. SDH drains removed 11/21 after repeat CT showed improvement of L SDH. Will need follow up at 2 weeks for a rCTH.     For acute on chronic dysphagia, failed SLP swallow evals. Per family request, dobhoff placed for nutrition/medications before SNF transfer.    Day prior to discharge, 12/3, pt due for repeat speech therapy evaluation. Family declined. Geriatrics spoke with family regarding goals of care. Family decided to change code status to DNR/DNI. Family requesting dobhoff be discontinued prior to transfer to facility, family will feed patient by mouth. Family accepting risk of aspiration.     Transportation set up for patient 12/4 afternoon. At time of discharge patient was at baseline, dobhoff was removed and medications continued to rehab facility.

## 2024-11-19 NOTE — ED PROVIDER NOTES
History of Present Illness     History provided by: Family Member and EMS  Limitations to History: Dementia  External Records Reviewed with Brief Summary:  Viewed ED presentation from today which included CT head showing large left-sided subdural hematoma acute and subacute components with 1.4 cm rightward midline shift.    HPI:  Loren Mitchell is a 79-year-old female significant past medical history of CAD, hypertension, chronic kidney disease,depression, hyperlipidemia, hypernatremia, hypothyroidism, cognitive impairment, hypertension, and recurrent urinary tract infections of dementia baseline ANO x 0 was found down at facility and presented to outside ED for evaluation.  She underwent a head CT and C-spine imaging which noted a large left-sided subdural hematoma with acute and subacute components, patient was excepted by neurosurgery on-call Dr. Urrutia.    Physical Exam   Triage vitals:  T 36.3 °C (97.3 °F)  HR 63  /53  RR 12  O2 98 % None (Room air)    General: Elderly, awake alert ANO x 0  Eyes: Gaze conjugate.  No scleral icterus or injection, pupils equal and reactive  HENT: Normo-cephalic, atraumatic. No stridor.   CV: Regular rate, regular rhythm. No MRG. Cap refill less than 2 seconds, radial pulse 2+ bilaterally, DP pulse 2+ bilaterally  Resp: Breathing non-labored, clear to auscultation bilaterally  GI: Soft, non-distended, non-tender. No rebound or guarding.  MSK/Extremities: No gross bony deformities. Moving all extremities spontaneously, not following commands.  Skin: Warm. Appropriate color  Neuro: Awake and Alert.  Oriented x 0 (baseline) face symmetric. Appropriate tone. Moving all extremities equally and spontaneously however not following commands.  GCS 12 (E4, V3, M5)    Medical Decision Making & ED Course   Medical Decision Makin y.o. female presenting to the ED from outside hospital with SDH with midline shift, patient has dementia at baseline ANO x 0, GCS 12 on arrival.  No  thinners.  Patient is spontaneously moving all extremities but does not follow commands, spoke with  regarding her CODE STATUS as she is DNR CC, he confirmed that this was accurate however was waiting to speak with neurosurgery regarding further interventions.  Trauma service recommending remainder of trauma scans including CT thoracic and lumbar spine and CT chest abdomen pelvis.  Patient cervical spine had been cleared prior to transport here.  She was placed on monitor, BP was soft so she was given IV fluid bolus with improvement in pressures.  No additional traumatic injuries noted on remainder of trauma scans, stability CT head showing stable acute on subacute SDH with component of midline shift.  Neurosurgery planning for bur hole intervention, see their note for documentation regarding conversation with  about patient's CODE STATUS.  Patient will be admitted to the trauma service pending neurosurgery OR intervention.  ----      EKG Independent Interpretation: See ED course for my independent interpretation if ECG was obtained.    Independent Result Review and Interpretation: Please see MDM and ED course for my independent interpretation of the results    Chronic conditions affecting the patient's care: Please see H&P and MDM    The patient was discussed with the following consultants/services:  Trauma, neurosurgery    Care Considerations: As document above in [facility]    ED Course:  ED Course as of 11/19/24 0517   Tue Nov 19, 2024   0203 Spoke with trauma and neurosurgery, patient is due for her stability CT head, trauma also requesting additional trauma imaging including CT thoracic and lumbar spine as well as CT chest abdomen pelvis.  Patient is not on thinners, baseline ANO x 0, was able to locate DNC paperwork in her chart. [KR]   0400 BP(!)(S): 97/40  Started IV fluid bolus with normal saline [KR]   0503 CT head wo IV contrast  Repeat CT head showing large left acute on subacute subdural  hematoma similar to prior exam with severe mass effect on the left cerebral hemisphere with left-to-right midline shift 1.4 cm, similar subfalcine herniation [KR]   0504 Remainder of imaging showing no additional acute traumatic injury.  Incidental finding of solid appearing 1.3 cm exophytic mass off the left kidney, highly suspicious for renal cell carcinoma until proven otherwise.   [KR]   0507 Neurosurgery saw patient with plan for left bur hole for SDH evacuation, see their documentation, discussed with  who serves as decision-maker,  had noted that he was agreeable with operative intervention and is okay with reversing DNR 24 hours perioperatively.  There is additional conversation that trauma service had with patient's  given that she is DNR CCA and now some discussion that patient would not want any interventions due to advanced dementia.  Final recommendations and plan pending further discussion. [KR]      ED Course User Index  [KR] Melody Shaikh DO         Diagnoses as of 11/19/24 1138   SDH (subdural hematoma) (Multi)     Disposition   As a result of their workup, the patient will require admission to the hospital.  The patient was informed of her diagnosis.  The patient was given the opportunity to ask questions and I answered them. The patient agreed to be admitted to the hospital.    Procedures   Procedures    Patient seen and discussed with attending physician    Melody Shaikh DO  Emergency Medicine     Melody Shaikh DO  Resident  11/19/24 0553

## 2024-11-19 NOTE — SIGNIFICANT EVENT
After re-evaluation of imaging and further discussion with family,  would like to move forward with operative interventions with neurosurgery. Family would also like the to rescind the DNR/DNI status in the perioperative period. Patient will be admitted to TICU post-operatively for closer neuro monitoring.    Patient discussed with attending, Dr. Dinesh Zendejas, PA-C  Trauma, Critical Care, and Acute Care Surgery  [id number]

## 2024-11-19 NOTE — ANESTHESIA POSTPROCEDURE EVALUATION
Patient: Loren Mitchell    Procedure Summary       Date: 11/19/24 Room / Location: [facility] OR 25 / [facility] OR    Anesthesia Start: 1044 Anesthesia Stop: 1314    Procedure: Left inez holes for subdural hematoma evacuation (Left) Diagnosis:       Subdural hematoma (Multi)      (Subdural hematoma (Multi) [S06.5XAA])    Surgeons: Oliver Beard MD Responsible Provider: Armando Camilo MD    Anesthesia Type: general ASA Status: 4 - Emergent            Anesthesia Type: general    Vitals Value Taken Time   /46 11/19/24 1400   Temp 36 °C (96.8 °F) 11/19/24 1319   Pulse 56 11/19/24 1400   Resp 12 11/19/24 1400   SpO2 96 % 11/19/24 1400   Vitals shown include unfiled device data.    Anesthesia Post Evaluation    Patient location during evaluation: PACU  Patient participation: complete - patient cannot participate (Dementia / SDH)  Level of consciousness: awake, sleepy but conscious and responsive to physical stimuli  Pain management: adequate  Airway patency: patent  Cardiovascular status: acceptable  Respiratory status: acceptable and face mask  Hydration status: acceptable  Postoperative Nausea and Vomiting: none        There were no known notable events for this encounter.

## 2024-11-19 NOTE — SIGNIFICANT EVENT
Pts  notes she is DNR CCA and would not want any interventions due to advanced dementia. Trauma recommends return to her facility.  Full note to follow.

## 2024-11-19 NOTE — ANESTHESIA PROCEDURE NOTES
Peripheral IV  Date/Time: 11/19/2024 11:21 AM      Placement  Needle size: 20 G  Laterality: right  Location: wrist  Site prep: alcohol  Technique: anatomical landmarks  Attempts: 1

## 2024-11-19 NOTE — PROGRESS NOTES
Pharmacy Medication History Review    Loren Mitchell is a 79 y.o. female admitted for Subdural hematoma (Multi). Pharmacy reviewed the patient's zraih-yp-tnasybvvo medications and allergies for accuracy.    The list below reflects the updated PTA list.   Prior to Admission Medications   Prescriptions Last Dose Informant   FLUoxetine (PROzac) 40 mg capsule  Other   Sig: Take 1 capsule (40 mg) by mouth once daily.   HumaLOG KwikPen Insulin 100 unit/mL injection  Other   Sig: Inject under the skin 3 times daily (morning, midday, late afternoon). 151-200 = 3 units; 201-250 = 5 units; 251-300 = 7 units; 301-350 = 8 units; 351-400 = 9 units; 401-500 = 10 units   Toujeo SoloStar U-300 Insulin 300 unit/mL (1.5 mL) injection  Other   Sig: Inject 51 Units under the skin once daily at bedtime.   Trulicity 0.75 mg/0.5 mL pen injector 10/29/2024 Other   Sig: Inject 0.75 mg under the skin 1 (one) time per week. On Tuesday. Total dose is 2.25mg once weekly   Trulicity 1.5 mg/0.5 mL pen injector injection 10/29/2024 Other   Sig: Inject 1.5 mg under the skin 1 (one) time per week. On Tuesday. Total dose is 2.25mg once weekly   atorvastatin (Lipitor) 10 mg tablet  Other   Sig: Take 1 tablet (10 mg) by mouth once daily in the evening.   cholecalciferol (Vitamin D3) 50 MCG (2000 UT) tablet  Other   Sig: Take 1 tablet (50 mcg) by mouth once daily.   cyanocobalamin (Vitamin B-12) 500 mcg tablet  Other   Sig: Take 1 tablet (500 mcg) by mouth once daily.   glimepiride (Amaryl) 1 mg tablet  Other   Sig: Take 1 tablet (1 mg) by mouth once daily in the evening. Total evening dose is 3mg   glimepiride (Amaryl) 2 mg tablet  Other   Sig: Take 1 tablet (2 mg) by mouth once daily in the evening. Total evening dose is 3mg   glimepiride (Amaryl) 4 mg tablet  Other   Sig: Take 1 tablet (4 mg) by mouth once daily in the morning.   levothyroxine (Synthroid, Levoxyl) 50 mcg tablet  Other   Sig: Take 1 tablet (50 mcg) by mouth once daily.   loperamide  (Imodium A-D) 2 mg tablet  Other   Sig: Take 0.5-1 tablets (1-2 mg) by mouth 4 times a day as needed for diarrhea.   memantine (Namenda) 10 mg tablet  Other   Sig: Take 1 tablet (10 mg) by mouth 2 times a day.   menthol-zinc oxide (Calmoseptine) 0.44-20.6 % ointment  Other   Sig: Apply 1 Application topically 4 times a day as needed for irritation.   nitrofurantoin (Macrodantin) 50 mg capsule  Other   Sig: Take 1 capsule (50 mg) by mouth once daily in the evening.   potassium chloride ER (Micro-K) 10 mEq ER capsule  Other   Sig: Take 1 capsule (10 mEq) by mouth once daily.   telmisartan (MIcarDIS) 80 mg tablet  Other   Sig: Take 1 tablet (80 mg) by mouth once daily.      Facility-Administered Medications: None        The list below reflects the updated allergy list. Please review each documented allergy for additional clarification and justification.  Allergies  Reviewed by Paola Peng PharmD on 11/19/2024        Severity Reactions Comments    Sulfa (sulfonamide Antibiotics) Not Specified Unknown     Penicillins Low Rash, Unknown             Patient declines M2B at discharge.     Sources used to complete the med history include:   [facility], current medication list     Below are additional concerns with the patient's PTA list.  Alessandra, nurse at [facility], verified patient's current diabetes medications.     Medications ADDED:  All medications  Medications CHANGED:  none  Medications REMOVED:   none    Paola Peng PharmD  Transitions of Care Pharmacist  [facility]  Please reach out via Secure Chat for questions, or if no response call [facility] or [facility]

## 2024-11-19 NOTE — ED TRIAGE NOTES
Patient transferred from [facility] for neuro consult. Patient from [facility]; found down after a fall. CT at [place] shows SDH. Patient awake, protecting own airway on arrival. NAD. Per squad, patient A+Ox 0-1. Patient not responding to verbal cues at time of arrival. VSS

## 2024-11-20 LAB
ALBUMIN SERPL BCP-MCNC: 3.5 G/DL (ref 3.4–5)
ALP SERPL-CCNC: 65 U/L (ref 33–136)
ALT SERPL W P-5'-P-CCNC: 11 U/L (ref 7–45)
ANION GAP SERPL CALC-SCNC: 14 MMOL/L (ref 10–20)
APTT PPP: 22 SECONDS (ref 27–38)
AST SERPL W P-5'-P-CCNC: 9 U/L (ref 9–39)
ATRIAL RATE: 63 BPM
BILIRUB DIRECT SERPL-MCNC: 0.1 MG/DL (ref 0–0.3)
BILIRUB SERPL-MCNC: 0.5 MG/DL (ref 0–1.2)
BUN SERPL-MCNC: 13 MG/DL (ref 6–23)
CA-I BLD-SCNC: 1.19 MMOL/L (ref 1.1–1.33)
CALCIUM SERPL-MCNC: 8.4 MG/DL (ref 8.6–10.6)
CHLORIDE SERPL-SCNC: 115 MMOL/L (ref 98–107)
CO2 SERPL-SCNC: 23 MMOL/L (ref 21–32)
CREAT SERPL-MCNC: 0.66 MG/DL (ref 0.5–1.05)
EGFRCR SERPLBLD CKD-EPI 2021: 89 ML/MIN/1.73M*2
ERYTHROCYTE [DISTWIDTH] IN BLOOD BY AUTOMATED COUNT: 13.6 % (ref 11.5–14.5)
GLUCOSE BLD MANUAL STRIP-MCNC: 150 MG/DL (ref 74–99)
GLUCOSE BLD MANUAL STRIP-MCNC: 156 MG/DL (ref 74–99)
GLUCOSE BLD MANUAL STRIP-MCNC: 157 MG/DL (ref 74–99)
GLUCOSE BLD MANUAL STRIP-MCNC: 157 MG/DL (ref 74–99)
GLUCOSE BLD MANUAL STRIP-MCNC: 171 MG/DL (ref 74–99)
GLUCOSE BLD MANUAL STRIP-MCNC: 174 MG/DL (ref 74–99)
GLUCOSE BLD MANUAL STRIP-MCNC: 222 MG/DL (ref 74–99)
GLUCOSE SERPL-MCNC: 180 MG/DL (ref 74–99)
HCT VFR BLD AUTO: 34.8 % (ref 36–46)
HGB BLD-MCNC: 11 G/DL (ref 12–16)
INR PPP: 1 (ref 0.9–1.1)
MAGNESIUM SERPL-MCNC: 1.83 MG/DL (ref 1.6–2.4)
MCH RBC QN AUTO: 31.4 PG (ref 26–34)
MCHC RBC AUTO-ENTMCNC: 31.6 G/DL (ref 32–36)
MCV RBC AUTO: 99 FL (ref 80–100)
NRBC BLD-RTO: 0 /100 WBCS (ref 0–0)
P AXIS: 21 DEGREES
P OFFSET: 170 MS
P ONSET: 117 MS
PHOSPHATE SERPL-MCNC: 2.9 MG/DL (ref 2.5–4.9)
PLATELET # BLD AUTO: 211 X10*3/UL (ref 150–450)
POTASSIUM SERPL-SCNC: 3.6 MMOL/L (ref 3.5–5.3)
PR INTERVAL: 190 MS
PROT SERPL-MCNC: 5.8 G/DL (ref 6.4–8.2)
PROTHROMBIN TIME: 11.6 SECONDS (ref 9.8–12.8)
Q ONSET: 212 MS
QRS COUNT: 10 BEATS
QRS DURATION: 100 MS
QT INTERVAL: 436 MS
QTC CALCULATION(BAZETT): 446 MS
QTC FREDERICIA: 443 MS
R AXIS: -45 DEGREES
RBC # BLD AUTO: 3.5 X10*6/UL (ref 4–5.2)
SODIUM SERPL-SCNC: 148 MMOL/L (ref 136–145)
T AXIS: -82 DEGREES
T OFFSET: 430 MS
VENTRICULAR RATE: 63 BPM
WBC # BLD AUTO: 10.9 X10*3/UL (ref 4.4–11.3)

## 2024-11-20 PROCEDURE — 2500000002 HC RX 250 W HCPCS SELF ADMINISTERED DRUGS (ALT 637 FOR MEDICARE OP, ALT 636 FOR OP/ED)

## 2024-11-20 PROCEDURE — 85610 PROTHROMBIN TIME: CPT

## 2024-11-20 PROCEDURE — 2020000001 HC ICU ROOM DAILY

## 2024-11-20 PROCEDURE — 2500000005 HC RX 250 GENERAL PHARMACY W/O HCPCS

## 2024-11-20 PROCEDURE — 2500000004 HC RX 250 GENERAL PHARMACY W/ HCPCS (ALT 636 FOR OP/ED)

## 2024-11-20 PROCEDURE — 82248 BILIRUBIN DIRECT: CPT

## 2024-11-20 PROCEDURE — 2500000001 HC RX 250 WO HCPCS SELF ADMINISTERED DRUGS (ALT 637 FOR MEDICARE OP)

## 2024-11-20 PROCEDURE — 80053 COMPREHEN METABOLIC PANEL: CPT

## 2024-11-20 PROCEDURE — 82330 ASSAY OF CALCIUM: CPT

## 2024-11-20 PROCEDURE — 2500000004 HC RX 250 GENERAL PHARMACY W/ HCPCS (ALT 636 FOR OP/ED): Performed by: EMERGENCY MEDICINE

## 2024-11-20 PROCEDURE — 99291 CRITICAL CARE FIRST HOUR: CPT

## 2024-11-20 PROCEDURE — 84100 ASSAY OF PHOSPHORUS: CPT

## 2024-11-20 PROCEDURE — 83735 ASSAY OF MAGNESIUM: CPT

## 2024-11-20 PROCEDURE — 82947 ASSAY GLUCOSE BLOOD QUANT: CPT

## 2024-11-20 PROCEDURE — 36415 COLL VENOUS BLD VENIPUNCTURE: CPT

## 2024-11-20 PROCEDURE — 85027 COMPLETE CBC AUTOMATED: CPT | Performed by: STUDENT IN AN ORGANIZED HEALTH CARE EDUCATION/TRAINING PROGRAM

## 2024-11-20 RX ORDER — MAGNESIUM SULFATE HEPTAHYDRATE 40 MG/ML
2 INJECTION, SOLUTION INTRAVENOUS ONCE
Status: COMPLETED | OUTPATIENT
Start: 2024-11-20 | End: 2024-11-20

## 2024-11-20 RX ORDER — OLANZAPINE 10 MG/2ML
2.5 INJECTION, POWDER, FOR SOLUTION INTRAMUSCULAR ONCE
Status: COMPLETED | OUTPATIENT
Start: 2024-11-21 | End: 2024-11-21

## 2024-11-20 RX ORDER — HYDROMORPHONE HYDROCHLORIDE 0.2 MG/ML
0.2 INJECTION INTRAMUSCULAR; INTRAVENOUS; SUBCUTANEOUS ONCE
Status: COMPLETED | OUTPATIENT
Start: 2024-11-20 | End: 2024-11-20

## 2024-11-20 RX ORDER — MAGNESIUM SULFATE 1 G/100ML
1 INJECTION INTRAVENOUS ONCE
Status: DISCONTINUED | OUTPATIENT
Start: 2024-11-20 | End: 2024-11-23

## 2024-11-20 RX ORDER — POTASSIUM CHLORIDE 14.9 MG/ML
20 INJECTION INTRAVENOUS ONCE
Status: COMPLETED | OUTPATIENT
Start: 2024-11-20 | End: 2024-11-20

## 2024-11-20 RX ORDER — POTASSIUM CHLORIDE 1.5 G/1.58G
40 POWDER, FOR SOLUTION ORAL ONCE
Status: DISCONTINUED | OUTPATIENT
Start: 2024-11-20 | End: 2024-11-23

## 2024-11-20 RX ORDER — INSULIN LISPRO 100 [IU]/ML
0-15 INJECTION, SOLUTION INTRAVENOUS; SUBCUTANEOUS
Status: DISCONTINUED | OUTPATIENT
Start: 2024-11-20 | End: 2024-11-23

## 2024-11-20 RX ORDER — OLANZAPINE 10 MG/2ML
2.5 INJECTION, POWDER, FOR SOLUTION INTRAMUSCULAR ONCE
Status: COMPLETED | OUTPATIENT
Start: 2024-11-20 | End: 2024-11-20

## 2024-11-20 ASSESSMENT — PAIN - FUNCTIONAL ASSESSMENT
PAIN_FUNCTIONAL_ASSESSMENT: CPOT (CRITICAL CARE PAIN OBSERVATION TOOL)

## 2024-11-20 ASSESSMENT — PAIN SCALES - PAIN ASSESSMENT IN ADVANCED DEMENTIA (PAINAD)
BREATHING: NORMAL
FACIALEXPRESSION: SMILING OR INEXPRESSIVE
CONSOLABILITY: NO NEED TO CONSOLE

## 2024-11-20 NOTE — PROGRESS NOTES
"Loren Mitchell is a 79 y.o. female on day 1 of admission presenting with Subdural hematoma (Multi).    Subjective   NAEO       Objective     Physical Exam  AOx0, mumbles  spont x4 AG    Last Recorded Vitals  Blood pressure 123/53, pulse (!) 48, temperature 35.7 °C (96.3 °F), temperature source Temporal, resp. rate 10, height 1.676 m (5' 6\"), weight 60 kg (132 lb 4.4 oz), SpO2 98%.  Intake/Output last 3 Shifts:  I/O last 3 completed shifts:  In: 2891.7 (48.2 mL/kg) [I.V.:1541.7 (25.7 mL/kg); IV Piggyback:1350]  Out: 435 (7.3 mL/kg) [Urine:350 (0.2 mL/kg/hr); Drains:35; Blood:50]  Weight: 60 kg     Relevant Results                 Results for orders placed or performed during the hospital encounter of 11/19/24 (from the past 24 hours)   Magnesium   Result Value Ref Range    Magnesium 1.78 1.60 - 2.40 mg/dL   Hepatic Function Panel   Result Value Ref Range    Albumin 3.5 3.4 - 5.0 g/dL    Bilirubin, Total 0.6 0.0 - 1.2 mg/dL    Bilirubin, Direct 0.1 0.0 - 0.3 mg/dL    Alkaline Phosphatase 73 33 - 136 U/L    ALT 10 7 - 45 U/L    AST 13 9 - 39 U/L    Total Protein 6.1 (L) 6.4 - 8.2 g/dL   Coagulation Screen   Result Value Ref Range    Protime 11.9 9.8 - 12.8 seconds    INR 1.1 0.9 - 1.1    aPTT 26 (L) 27 - 38 seconds   Phosphorus   Result Value Ref Range    Phosphorus 3.3 2.5 - 4.9 mg/dL   Basic Metabolic Panel   Result Value Ref Range    Glucose 244 (H) 74 - 99 mg/dL    Sodium 149 (H) 136 - 145 mmol/L    Potassium 3.9 3.5 - 5.3 mmol/L    Chloride 115 (H) 98 - 107 mmol/L    Bicarbonate 25 21 - 32 mmol/L    Anion Gap 13 10 - 20 mmol/L    Urea Nitrogen 11 6 - 23 mg/dL    Creatinine 0.76 0.50 - 1.05 mg/dL    eGFR 79 >60 mL/min/1.73m*2    Calcium 8.0 (L) 8.6 - 10.6 mg/dL   POCT GLUCOSE   Result Value Ref Range    POCT Glucose 232 (H) 74 - 99 mg/dL   POCT GLUCOSE   Result Value Ref Range    POCT Glucose 274 (H) 74 - 99 mg/dL   POCT GLUCOSE   Result Value Ref Range    POCT Glucose 305 (H) 74 - 99 mg/dL   POCT GLUCOSE "   Result Value Ref Range    POCT Glucose 300 (H) 74 - 99 mg/dL   POCT GLUCOSE   Result Value Ref Range    POCT Glucose 184 (H) 74 - 99 mg/dL   CBC   Result Value Ref Range    WBC 10.9 4.4 - 11.3 x10*3/uL    nRBC 0.0 0.0 - 0.0 /100 WBCs    RBC 3.50 (L) 4.00 - 5.20 x10*6/uL    Hemoglobin 11.0 (L) 12.0 - 16.0 g/dL    Hematocrit 34.8 (L) 36.0 - 46.0 %    MCV 99 80 - 100 fL    MCH 31.4 26.0 - 34.0 pg    MCHC 31.6 (L) 32.0 - 36.0 g/dL    RDW 13.6 11.5 - 14.5 %    Platelets 211 150 - 450 x10*3/uL   Magnesium   Result Value Ref Range    Magnesium 1.83 1.60 - 2.40 mg/dL   Calcium, Ionized   Result Value Ref Range    POCT Calcium, Ionized 1.19 1.1 - 1.33 mmol/L   Coagulation Screen   Result Value Ref Range    Protime 11.6 9.8 - 12.8 seconds    INR 1.0 0.9 - 1.1    aPTT 22 (L) 27 - 38 seconds   Hepatic Function Panel   Result Value Ref Range    Albumin 3.5 3.4 - 5.0 g/dL    Bilirubin, Total 0.5 0.0 - 1.2 mg/dL    Bilirubin, Direct 0.1 0.0 - 0.3 mg/dL    Alkaline Phosphatase 65 33 - 136 U/L    ALT 11 7 - 45 U/L    AST 9 9 - 39 U/L    Total Protein 5.8 (L) 6.4 - 8.2 g/dL   Basic Metabolic Panel   Result Value Ref Range    Glucose 180 (H) 74 - 99 mg/dL    Sodium 148 (H) 136 - 145 mmol/L    Potassium 3.6 3.5 - 5.3 mmol/L    Chloride 115 (H) 98 - 107 mmol/L    Bicarbonate 23 21 - 32 mmol/L    Anion Gap 14 10 - 20 mmol/L    Urea Nitrogen 13 6 - 23 mg/dL    Creatinine 0.66 0.50 - 1.05 mg/dL    eGFR 89 >60 mL/min/1.73m*2    Calcium 8.4 (L) 8.6 - 10.6 mg/dL   Phosphorus   Result Value Ref Range    Phosphorus 2.9 2.5 - 4.9 mg/dL   POCT GLUCOSE   Result Value Ref Range    POCT Glucose 157 (H) 74 - 99 mg/dL   POCT GLUCOSE   Result Value Ref Range    POCT Glucose 156 (H) 74 - 99 mg/dL                  Assessment/Plan   Assessment & Plan  Subdural hematoma (Multi)    SDH (subdural hematoma) (Multi)    Postoperative delirium    CVA (cerebral vascular accident) (Multi)    Intracranial hemorrhage (Multi)    Severe late onset Alzheimer's  dementia without behavioral disturbance, psychotic disturbance, mood disturbance, or anxiety (Multi)    Diabetes mellitus, type 2 (Multi)    HTN (hypertension)    Hyperlipidemia    Hypothyroidism    Polycythemia    Chronic renal insufficiency    Coronary artery disease involving native coronary artery of native heart without angina pectoris    Urinary tract infection    Sinus symptom    Seasonal allergies    Abnormal EKG    Osteoarthritis    Lumbar disc disease    Spinal stenosis    Chronic low back pain    History of hysterectomy    79F with h/o HTN, HLD, hypoT, CKD, dementia (Ox0 at baseline), CAD, recurrent UTIs, p/w found down after fall at [facility] care facility, CTH large mixed density L SDH w sig MLS, rCTH stable, 11/19 s/p L inez holes for SDH evac, CTH good evac    DNR/DNI  Trauma primary  SDD  SBP <160   PTOT          Didi Escalera MD

## 2024-11-20 NOTE — CARE PLAN
Problem: Skin  Goal: Decreased wound size/increased tissue granulation at next dressing change  Outcome: Progressing  Goal: Participates in plan/prevention/treatment measures  Outcome: Progressing  Goal: Prevent/manage excess moisture  Outcome: Progressing  Goal: Prevent/minimize sheer/friction injuries  Outcome: Progressing  Goal: Promote/optimize nutrition  Outcome: Progressing  Goal: Promote skin healing  Outcome: Progressing     Problem: Fall/Injury  Goal: Not fall by end of shift  Outcome: Progressing  Goal: Be free from injury by end of the shift  Outcome: Progressing  Goal: Verbalize understanding of personal risk factors for fall in the hospital  Outcome: Progressing  Goal: Verbalize understanding of risk factor reduction measures to prevent injury from fall in the home  Outcome: Progressing  Goal: Use assistive devices by end of the shift  Outcome: Progressing  Goal: Pace activities to prevent fatigue by end of the shift  Outcome: Progressing     Problem: Safety - Medical Restraint  Goal: Remains free of injury from restraints (Restraint for Interference with Medical Device)  Outcome: Progressing  Goal: Free from restraint(s) (Restraint for Interference with Medical Device)  Outcome: Progressing   The patient's goals for the shift include      The clinical goals for the shift include Patient will remain hemodynamically stable throughout this shift.

## 2024-11-20 NOTE — PROGRESS NOTES
[facility]  TRAUMA SERVICE - PROGRESS NOTE    Patient Name: Loren Mitchell  MRN: [id number]  Admit Date: 1119  : 1945  AGE: 79 y.o.   GENDER: female  ==============================================================================  MECHANISM OF INJURY:   78 yo F with PMH of dementia, T2DM, hypothyroidism, HTN, CKD, HLD, CAD, depression, recurrent UTIs, who presented to ED as transfer following a fall at [facility] with CTH and C-spine at OSH showing large left SDH (acute and subacute components) with rightward midline shift and epidural. Patient was seen by trauma surgery and NSGY in [facility] ED. Patient was taken emergently to ED with NSGY for left SDH evacuation via x2 left [place] Holes.     Code status DNR-CC    LOC (yes/no?): unknown, found down at facility  Anticoagulant / Anti-platelet Rx? (for what dx?): none  Referring Facility Name (N/A for scene EMR run): EMS from [place]    INJURIES:   Left acute on subacute SDH 26 mm with left to right midline shift up to 14mm  Left epidural hematoma    OTHER MEDICAL PROBLEMS:  Dementia, baseline AO x0  HTN  HLD  CKD  CAD  Depression  Recurrent UTIs  T2DM    INCIDENTAL FINDINGS:  cycstitis    PROCEDURES:   left inez holes x2    ==============================================================================  TODAY'S ASSESSMENT AND PLAN OF CARE:  78 yo F with PMH of dementia, T2DM, hypothyroidism, HTN, CKD, HLD, CAD, depression, recurrent UTIs, who presented to ED as transfer following a fall at [facility] found to have a large SDH with midline shift.    Plan:    -Remain in ICU while drain in the place  -Consider DVT prophylaxis 24 POD#2  -keppra per NRSGY  -PT/OT  -Regular diet  -Home meds    Dispo: continue care in ICU    Patient will be discussed with Attending Physician Dr. Dinesh Schwartz PGY4  General Surgery Service       ==============================================================================  CHIEF  "COMPLAINT / OVERNIGHT EVENTS:   78 yo F with PMH of dementia, T2DM, hypothyroidism, HTN, CKD, HLD, CAD, depression, recurrent UTIs, who presented to ED as transfer following a fall at memory care facility with CTH and C-spine at OSH showing large left SDH (acute and subacute components) with rightward midline shift and epidural. Patient was seen by trauma surgery and NSGY in [facility] ED. Patient was taken emergently to ED with NSGY for left SDH evacuation via x2 left [place] Holes.     MEDICAL HISTORY / ROS:  Admission history and ROS reviewed. Pertinent changes as follows:  Unable to obtain from patient    PHYSICAL EXAM:  Heart Rate:  [47-71]   Temp:  [35.7 °C (96.3 °F)-36.4 °C (97.5 °F)]   Resp:  [10-17]   BP: ()/(36-79)   Height:  [167.6 cm (5' 6\")]   Weight:  [60 kg (132 lb 4.4 oz)]   SpO2:  [93 %-100 %]     Physical Exam  General: ill- appearing, frail,  HEENT: left head with incision and OR drain in place, no scleral icterus, PERRL, iEOM, dry moist mucous membranes  Cardiovascular: regular rate DP/PT bilaterally  Pulmonary: breathing comfortably on 2L NC  Abdomen: soft, nontender  Skin:  Incision on left scalp covered with  dressing, drain with serosang staining on dressing  Neuro: A/O x0, brisk reflexes in RUE and RLE, no grasp on RUE but does move arm antigravity.   Lines: PIVs, NC  Drains: Surdural drain in left head    IMAGING SUMMARY  CTH noncon:  IMPRESSION:  Large left acute on subacute subdural hematoma is overall similar in  appearance to the prior exam, measuring up to 2.6 cm in maximum  thickness. Severe mass effect on the left cerebral hemisphere and  ventricular system, with left-to-right midline shift measuring 1.4  cm. Similar asymmetric prominence of the right lateral ventricle,  which could reflect a component of ventricular entrapment. Similar  subfalcine herniation and medialization of the left uncus.    CT C/A/P  CT C/T/L spine:  IMPRESSION:  No evidence of acute traumatic injury to the " chest, abdomen, or  pelvis.  No acute fracture or traumatic subluxation identified in the thoracic  spine.  No acute fracture or traumatic subluxation identified in the lumbar  spine.  No pneumothorax, pleural effusion, or consolidation.  Hepatic steatosis.  Solid appearing 1.3 cm exophytic mass off the left kidney, highly  suspicious for renal cell carcinoma until proven otherwise.  Urinary bladder wall thickening with small amount of intraluminal air  in urinary bladder for which correlation with urinalysis recommended  to exclude cystitis.      LABS:  Results from last 7 days   Lab Units 11/20/24  0124 11/19/24  0706 11/18/24  1811   WBC AUTO x10*3/uL 10.9 8.7 10.0   HEMOGLOBIN g/dL 11.0* 12.2 13.0   HEMATOCRIT % 34.8* 34.9* 39.0   PLATELETS AUTO x10*3/uL 211 224 237   NEUTROS PCT AUTO %  --   --  68.0   LYMPHS PCT AUTO %  --   --  24.3   MONOS PCT AUTO %  --   --  5.5   EOS PCT AUTO %  --   --  1.3     Results from last 7 days   Lab Units 11/20/24  0124 11/19/24  1338 11/18/24  1811   APTT seconds 22* 26* 31   INR  1.0 1.1 1.1     Results from last 7 days   Lab Units 11/20/24  0124 11/19/24  1338 11/18/24  1811   SODIUM mmol/L 148* 149* 147*   POTASSIUM mmol/L 3.6 3.9 3.9   CHLORIDE mmol/L 115* 115* 110*   CO2 mmol/L 23 25 29   BUN mg/dL 13 11 17   CREATININE mg/dL 0.66 0.76 0.73   CALCIUM mg/dL 8.4* 8.0* 9.1   PROTEIN TOTAL g/dL 5.8* 6.1* 6.8   BILIRUBIN TOTAL mg/dL 0.5 0.6 0.6   ALK PHOS U/L 65 73 81   ALT U/L 11 10 17   AST U/L 9 13 13   GLUCOSE mg/dL 180* 244* 160*     Results from last 7 days   Lab Units 11/20/24  0124 11/19/24  1338 11/18/24  1811   BILIRUBIN TOTAL mg/dL 0.5 0.6 0.6   BILIRUBIN DIRECT mg/dL 0.1 0.1  --            I have reviewed all medications, laboratory results, and imaging pertinent for today's encounter.

## 2024-11-20 NOTE — PROGRESS NOTES
Social Work Discharge Planning note:    -BENNY discussed Pt with the medical team.   -Plan per medical team: Pt is not medically ready for discharge. Pt still has a drain. ADOD may be around 4 days from now.   -Payer: [employer]  -Status: Inpatient  -Discharge disposition: TBD - BENNY will follow for PT and OT recommendations.         BENNY received a message from Grace from [facility] earlier today requesting a call. BENNY met with Pt's  and son, Eliot and Michael, to further discuss discharge planning. Eliot reported that Pt has resided in [facility] memory care unit for the past 3 years, and he gave permission for BENNY to call Grace and to fax her any requested clinicals. Eliot reported that Pt was able to feed herself and walk at baseline, but was total care for everything else due to her cognition. Eliot reported that if [facility] can manage Pt's care he would like for her to return there, and if not, Eliot was agreeable with SNF placement. A list of Skilled facilities was electronically sent to Michael via [employer].        EBNNY called Grace and she reported that they are not a SNF, but they have PT and OT on staff and can do some rehab there. Grace reported that they would need to see the PT and OT evals and discuss with their team, and they would need to physically come and see the Pt before they can determine if they can accept her back or if she will need to go to SNF first. BENNY will continue to follow for discharge planning.   -Support to Pt/family: Family reported there to be no other issues or concerns at this time.   -Potential Barriers: Pt is in restraints, and very confused at baseline.  -Anticipated Date of Discharge:  11/24/24    Moiz Montano MSW, LSW

## 2024-11-20 NOTE — PROGRESS NOTES
Communication Note    Patient Name: Loren Mitchell  MRN: [id number]  Today's Date: 11/20/2024   Room: 12/12-A    Discipline: Occupational Therapy      Missed Visit: Yes  Missed Visit Reason:  (Per medical team, defer OT at this time d/t pt with large bleed and not medically approprioate at this time.)      11/20/24 at 8:57 AM   Qiana Diaz, OT   Rehab Office: [phone]

## 2024-11-20 NOTE — PROGRESS NOTES
Spiritual Care Visit    Clinical Encounter Type  Visited With: Patient  Routine Visit: Introduction  Continue Visiting: Yes    Muslim Encounters  Muslim Needs: Prayer         Sacramental Encounters  Other Sacrament: P&B    Patient received a prayer and blessing (P&B) by Fr. Paul Hernandez,  Sikh .

## 2024-11-20 NOTE — PROGRESS NOTES
Physical Therapy                 Therapy Communication Note    Patient Name: Loren Mitchell  MRN: [id number]  Department: [facility]  Room: 12/12-A  Today's Date: 11/20/2024     Discipline: Physical Therapy    Missed Visit Reason: Missed Visit Reason:  (Per medical team, defer PT at this time.)    Missed Time: Attempt    Comment:

## 2024-11-20 NOTE — CARE PLAN
Problem: Skin  Goal: Participates in plan/prevention/treatment measures  Outcome: Progressing  Flowsheets (Taken 11/20/2024 0525)  Participates in plan/prevention/treatment measures: Elevate heels  Goal: Prevent/manage excess moisture  Outcome: Progressing  Goal: Prevent/minimize sheer/friction injuries  Outcome: Progressing  Flowsheets (Taken 11/20/2024 0525)  Prevent/minimize sheer/friction injuries:   Use pull sheet   HOB 30 degrees or less   Complete micro-shifts as needed if patient unable. Adjust patient position to relieve pressure points, not a full turn   Turn/reposition every 2 hours/use positioning/transfer devices     Problem: Fall/Injury  Goal: Not fall by end of shift  Outcome: Progressing  Goal: Be free from injury by end of the shift  Outcome: Progressing     Problem: Safety - Medical Restraint  Goal: Remains free of injury from restraints (Restraint for Interference with Medical Device)  Outcome: Progressing  Flowsheets (Taken 11/20/2024 0525)  Remains free of injury from restraints (restraint for interference with medical device):   Determine that other, less restrictive measures have been tried or would not be effective before applying the restraint   Evaluate the patient's condition at the time of restraint application   Inform patient/family regarding the reason for restraint   Every 2 hours: Monitor safety, psychosocial status, comfort, nutrition and hydration  Goal: Free from restraint(s) (Restraint for Interference with Medical Device)  Outcome: Progressing     Problem: Pain - Adult  Goal: Verbalizes/displays adequate comfort level or baseline comfort level  Outcome: Progressing     Problem: Safety - Adult  Goal: Free from fall injury  Outcome: Progressing     Problem: Pain  Goal: Free from opioid side effects throughout the shift  Outcome: Progressing     Problem: Diabetes  Goal: Maintain electrolyte levels within acceptable range throughout shift  Outcome: Progressing  Goal: No changes in  neurological exam by end of shift  Outcome: Progressing  Goal: Vital signs within normal range for age by end of shift  Outcome: Progressing

## 2024-11-20 NOTE — PROGRESS NOTES
[facility]  TRAUMA SERVICE - PROGRESS NOTE    Patient Name: Loren Mitchell  MRN: [id number]  Admit Date: 1119  : 1945  AGE: 79 y.o.   GENDER: female  ==============================================================================  MECHANISM OF INJURY:   78 yo F with PMH of dementia, T2DM, hypothyroidism, HTN, CKD, HLD, CAD, depression, recurrent UTIs, who presented to ED as transfer following a fall at [facility] care facility with CTH and C-spine at OSH showing large left SDH (acute and subacute components) with rightward midline shift and epidural. Patient was seen by trauma surgery and NSGY in [facility] ED. Patient was taken emergently to ED with NSGY for left SDH evacuation via x2 left [place] Holes.     Code status DNR-DNI    LOC (yes/no?): unknown, found down at facility  Anticoagulant / Anti-platelet Rx? (for what dx?): none  Referring Facility Name (N/A for scene EMR run): EMS from [place]    INJURIES:   Left acute on subacute SDH 26 mm with left to right midline shift up to 14mm  Left epidural hematoma    OTHER MEDICAL PROBLEMS:  Dementia, baseline AO x0  HTN  HLD  CKD  CAD  Depression  Recurrent UTIs  T2DM    INCIDENTAL FINDINGS:  cycstitis    PROCEDURES:   left inez holes x2    ==============================================================================  TODAY'S ASSESSMENT AND PLAN OF CARE:  78 yo F with PMH of dementia, T2DM, hypothyroidism, HTN, CKD, HLD, CAD, depression, recurrent UTIs, who presented to ED as transfer following a fall at [facility] care facility with CTH and C-spine at OSH showing large left SDH (acute and subacute components) with rightward midline shift and epidural. Patient was seen by trauma surgery and NSGY in [facility] ED. Patient was taken emergently to ED with NSGY for left SDH evacuation via x2 left [place]oles. Post-operatively, patient is in TICU, exam shows that her mentation is at her baseline and she does have brisk reflexes on right UE  and LE , consistent with Left SDH. Repeat CTH post-operatively stable -- without any re accumulation of blood, air filing prior SDH space, midline shift now decreased to 0.7 cm. She will remain in ICU for neurologic monitoring, additionally her SD drain remains in place.    General:  A. Code status reinstated ( DNR-DNI)  Neuro  Subdural drain to remain to gravity  continue keppra 500 BID x7 days for seizure prophylaxis  Goal SBP < 160  No HOB restrictions  Okay for DVT ppx POD 2 (11/21) if patient remains clinically stable  CV  Tele monitoring and pulse ox while inpatient  No acute issues. HR near baseline (60-70)  Resp  Supp O2 as needed. Will attempt to weaned as tolerated  Encourage IS use with assistance, will plan for patient to ambulate and be UOOB to chair as tolerated  GI  NPO. Will remain NPO until repeat CTH head is complete and there is not evidence of rebleed/ new bleed  mIVF  Will order home meds as PO and administer when clinically appropriate  /FEN  Voiding spontaneously.   Will obtain UA given evidence of cystitis on CT and hx of recurrent UTIs  Daily RFP and Mag. Will replete electrolytes as needed  MSK:  UOOB   Consult to PT/OT  Heme:  Daily CBC. No current indications for transfusion.   DVT ppx: will initated chemo ppx on POD 2 ( 11/21) if patient remains clinically stable  ID: no acute issues  Endo:  Will hold home insulin and glimepiride  Will start SSI similar to home regimen    GI PPX; no indicated  DVT ppx: will plan for starting chemo ppx 11/21 if patient remains clinically stable  Dispo: continue care in ICU    D/w attending Dr. Torres.    Vera Rayo   PGY1 General Surgery Resident  Trauma Surgery  Ext [phone]       ==============================================================================  CHIEF COMPLAINT / OVERNIGHT EVENTS:   78 yo F with PMH of dementia, T2DM, hypothyroidism, HTN, CKD, HLD, CAD, depression, recurrent UTIs, who presented to ED as transfer following a fall at [facility]  care facility with CTH and C-spine at OSH showing large left SDH (acute and subacute components) with rightward midline shift and epidural. Patient was seen by trauma surgery and NSGY in [facility] ED. Patient was taken emergently to ED with NSGY for left SDH evacuation via x2 left [place] Holes.     She is now POD 1 day . She is mentating appropriately, AO x1. She is more awake and interactive. She passed a bedside swallow eval with nursing staff and has been able to take her PO medications without issue. She remains on 2 L NC satting 98-99, does not have an O2 require preop. Her left scalp incision remains c/d/I. SDH with serosang output ( 35 ml since OR) and remains to gravity. Her reflexes on right upper and lower extremities are notably more brisk than left side. She has been voiding spontaneously.      MEDICAL HISTORY / ROS:  Admission history and ROS reviewed. Pertinent changes as follows:  Unable to obtain from patient    PHYSICAL EXAM:  Heart Rate:  [47-71]   Temp:  [35.7 °C (96.3 °F)-36.6 °C (97.9 °F)]   Resp:  [8-17]   BP: ()/(36-79)   SpO2:  [93 %-100 %]     Physical Exam  General: frail, appears stated age  HEENT: left head with incision and OR drain in place, no scleral icterus, PERRL, iEOM, dry moist mucous membranes  Cardiovascular: RRR, capillary refill <3sec, 2+ palpable radial, femoral , DP/PT bilaterally  Pulmonary: breathing comfortably on 2L NC  Abdomen: soft, nontender, nondistended, nonperitonitic  : normal genitalia  Skin: warm and dry. Incision on left scalp covered with OR dressing, c/d/I with serosang staining on dressing  Neuro: A/O x0 ( baseline) not very interactive. GCS 14 ( E4, V4, M6), brisk reflexes in RUE and RLE, no grasp on RUE but does move arm antigravity.   Lines: PIVs, NC  Drains: Surdural drain in left head    IMAGING SUMMARY  CTH noncon post-op:    IMPRESSION:  1. Interval left subdural hematoma evacuation and inez hole formation  with a subdural drain placement. There is  a persistent but decreased  size of hypodense fluid and gas containing subdural collection along  the left convexity with trace residual acute blood in the far  dependent portion.  2. Decreased degree of associated mass effect on the left hemispheric  sulci and left lateral ventricle with 0.6 cm persistent rightward  midline shift, previously 1.4 cm. No evidence of infarct.    LABS:  Results from last 7 days   Lab Units 11/20/24  0124 11/19/24  0706 11/18/24  1811   WBC AUTO x10*3/uL 10.9 8.7 10.0   HEMOGLOBIN g/dL 11.0* 12.2 13.0   HEMATOCRIT % 34.8* 34.9* 39.0   PLATELETS AUTO x10*3/uL 211 224 237   NEUTROS PCT AUTO %  --   --  68.0   LYMPHS PCT AUTO %  --   --  24.3   MONOS PCT AUTO %  --   --  5.5   EOS PCT AUTO %  --   --  1.3     Results from last 7 days   Lab Units 11/20/24  0124 11/19/24  1338 11/18/24  1811   APTT seconds 22* 26* 31   INR  1.0 1.1 1.1     Results from last 7 days   Lab Units 11/20/24  0124 11/19/24  1338 11/18/24  1811   SODIUM mmol/L 148* 149* 147*   POTASSIUM mmol/L 3.6 3.9 3.9   CHLORIDE mmol/L 115* 115* 110*   CO2 mmol/L 23 25 29   BUN mg/dL 13 11 17   CREATININE mg/dL 0.66 0.76 0.73   CALCIUM mg/dL 8.4* 8.0* 9.1   PROTEIN TOTAL g/dL 5.8* 6.1* 6.8   BILIRUBIN TOTAL mg/dL 0.5 0.6 0.6   ALK PHOS U/L 65 73 81   ALT U/L 11 10 17   AST U/L 9 13 13   GLUCOSE mg/dL 180* 244* 160*     Results from last 7 days   Lab Units 11/20/24  0124 11/19/24  1338 11/18/24  1811   BILIRUBIN TOTAL mg/dL 0.5 0.6 0.6   BILIRUBIN DIRECT mg/dL 0.1 0.1  --            I have reviewed all medications, laboratory results, and imaging pertinent for today's encounter.

## 2024-11-21 ENCOUNTER — APPOINTMENT (OUTPATIENT)
Dept: RADIOLOGY | Facility: HOSPITAL | Age: 79
End: 2024-11-21
Payer: MEDICARE

## 2024-11-21 DIAGNOSIS — S06.5XAA SDH (SUBDURAL HEMATOMA) (MULTI): Primary | ICD-10-CM

## 2024-11-21 LAB
ABO GROUP (TYPE) IN BLOOD: NORMAL
ALBUMIN SERPL BCP-MCNC: 2.6 G/DL (ref 3.4–5)
ANION GAP SERPL CALC-SCNC: 9 MMOL/L (ref 10–20)
BUN SERPL-MCNC: 11 MG/DL (ref 6–23)
CA-I BLD-SCNC: 1.14 MMOL/L (ref 1.1–1.33)
CALCIUM SERPL-MCNC: 6.9 MG/DL (ref 8.6–10.6)
CHLORIDE SERPL-SCNC: 116 MMOL/L (ref 98–107)
CO2 SERPL-SCNC: 22 MMOL/L (ref 21–32)
CREAT SERPL-MCNC: 0.51 MG/DL (ref 0.5–1.05)
EGFRCR SERPLBLD CKD-EPI 2021: >90 ML/MIN/1.73M*2
ERYTHROCYTE [DISTWIDTH] IN BLOOD BY AUTOMATED COUNT: 13.5 % (ref 11.5–14.5)
GLUCOSE BLD MANUAL STRIP-MCNC: 139 MG/DL (ref 74–99)
GLUCOSE BLD MANUAL STRIP-MCNC: 147 MG/DL (ref 74–99)
GLUCOSE BLD MANUAL STRIP-MCNC: 158 MG/DL (ref 74–99)
GLUCOSE BLD MANUAL STRIP-MCNC: 167 MG/DL (ref 74–99)
GLUCOSE SERPL-MCNC: 132 MG/DL (ref 74–99)
HCT VFR BLD AUTO: 31.6 % (ref 36–46)
HGB BLD-MCNC: 10.5 G/DL (ref 12–16)
MAGNESIUM SERPL-MCNC: 1.46 MG/DL (ref 1.6–2.4)
MCH RBC QN AUTO: 32.4 PG (ref 26–34)
MCHC RBC AUTO-ENTMCNC: 33.2 G/DL (ref 32–36)
MCV RBC AUTO: 98 FL (ref 80–100)
NRBC BLD-RTO: 0 /100 WBCS (ref 0–0)
PHOSPHATE SERPL-MCNC: 2.3 MG/DL (ref 2.5–4.9)
PLATELET # BLD AUTO: 183 X10*3/UL (ref 150–450)
POTASSIUM SERPL-SCNC: 3.3 MMOL/L (ref 3.5–5.3)
RBC # BLD AUTO: 3.24 X10*6/UL (ref 4–5.2)
RH FACTOR (ANTIGEN D): NORMAL
SODIUM SERPL-SCNC: 144 MMOL/L (ref 136–145)
WBC # BLD AUTO: 10.6 X10*3/UL (ref 4.4–11.3)

## 2024-11-21 PROCEDURE — 2500000004 HC RX 250 GENERAL PHARMACY W/ HCPCS (ALT 636 FOR OP/ED): Performed by: EMERGENCY MEDICINE

## 2024-11-21 PROCEDURE — 2500000002 HC RX 250 W HCPCS SELF ADMINISTERED DRUGS (ALT 637 FOR MEDICARE OP, ALT 636 FOR OP/ED)

## 2024-11-21 PROCEDURE — 2500000001 HC RX 250 WO HCPCS SELF ADMINISTERED DRUGS (ALT 637 FOR MEDICARE OP)

## 2024-11-21 PROCEDURE — 85027 COMPLETE CBC AUTOMATED: CPT

## 2024-11-21 PROCEDURE — 36415 COLL VENOUS BLD VENIPUNCTURE: CPT

## 2024-11-21 PROCEDURE — 82330 ASSAY OF CALCIUM: CPT

## 2024-11-21 PROCEDURE — 80069 RENAL FUNCTION PANEL: CPT

## 2024-11-21 PROCEDURE — 1100000001 HC PRIVATE ROOM DAILY

## 2024-11-21 PROCEDURE — 51701 INSERT BLADDER CATHETER: CPT

## 2024-11-21 PROCEDURE — 97166 OT EVAL MOD COMPLEX 45 MIN: CPT | Mod: GO

## 2024-11-21 PROCEDURE — 70450 CT HEAD/BRAIN W/O DYE: CPT | Performed by: RADIOLOGY

## 2024-11-21 PROCEDURE — 97162 PT EVAL MOD COMPLEX 30 MIN: CPT | Mod: GP

## 2024-11-21 PROCEDURE — 82947 ASSAY GLUCOSE BLOOD QUANT: CPT

## 2024-11-21 PROCEDURE — 2500000004 HC RX 250 GENERAL PHARMACY W/ HCPCS (ALT 636 FOR OP/ED): Mod: JW | Performed by: STUDENT IN AN ORGANIZED HEALTH CARE EDUCATION/TRAINING PROGRAM

## 2024-11-21 PROCEDURE — 83735 ASSAY OF MAGNESIUM: CPT

## 2024-11-21 PROCEDURE — 99291 CRITICAL CARE FIRST HOUR: CPT

## 2024-11-21 PROCEDURE — 2500000005 HC RX 250 GENERAL PHARMACY W/O HCPCS

## 2024-11-21 PROCEDURE — 2500000004 HC RX 250 GENERAL PHARMACY W/ HCPCS (ALT 636 FOR OP/ED)

## 2024-11-21 PROCEDURE — 70450 CT HEAD/BRAIN W/O DYE: CPT

## 2024-11-21 PROCEDURE — 86901 BLOOD TYPING SEROLOGIC RH(D): CPT

## 2024-11-21 RX ORDER — HYDROMORPHONE HYDROCHLORIDE 0.2 MG/ML
0.2 INJECTION INTRAMUSCULAR; INTRAVENOUS; SUBCUTANEOUS ONCE
Status: COMPLETED | OUTPATIENT
Start: 2024-11-21 | End: 2024-11-21

## 2024-11-21 RX ORDER — AMOXICILLIN 250 MG
1 CAPSULE ORAL NIGHTLY
Status: DISCONTINUED | OUTPATIENT
Start: 2024-11-21 | End: 2024-11-26

## 2024-11-21 RX ORDER — ACETAMINOPHEN 325 MG/1
650 TABLET ORAL EVERY 6 HOURS PRN
Status: DISCONTINUED | OUTPATIENT
Start: 2024-11-21 | End: 2024-11-23

## 2024-11-21 RX ORDER — OLANZAPINE 10 MG/2ML
2.5 INJECTION, POWDER, FOR SOLUTION INTRAMUSCULAR ONCE
Status: COMPLETED | OUTPATIENT
Start: 2024-11-21 | End: 2024-11-21

## 2024-11-21 RX ORDER — LEVETIRACETAM 500 MG/1
500 TABLET ORAL 2 TIMES DAILY
Status: DISCONTINUED | OUTPATIENT
Start: 2024-11-19 | End: 2024-11-23

## 2024-11-21 RX ORDER — HYDROMORPHONE HYDROCHLORIDE 0.2 MG/ML
0.2 INJECTION INTRAMUSCULAR; INTRAVENOUS; SUBCUTANEOUS
Status: DISCONTINUED | OUTPATIENT
Start: 2024-11-21 | End: 2024-11-23

## 2024-11-21 RX ORDER — NITROFURANTOIN 25 MG/5ML
50 SUSPENSION ORAL DAILY
Status: DISCONTINUED | OUTPATIENT
Start: 2024-11-21 | End: 2024-11-22

## 2024-11-21 RX ORDER — ENOXAPARIN SODIUM 100 MG/ML
30 INJECTION SUBCUTANEOUS 2 TIMES DAILY
Status: DISCONTINUED | OUTPATIENT
Start: 2024-11-21 | End: 2024-12-04 | Stop reason: HOSPADM

## 2024-11-21 RX ORDER — MAGNESIUM SULFATE HEPTAHYDRATE 40 MG/ML
2 INJECTION, SOLUTION INTRAVENOUS ONCE
Status: COMPLETED | OUTPATIENT
Start: 2024-11-21 | End: 2024-11-21

## 2024-11-21 ASSESSMENT — PAIN SCALES - GENERAL
PAINLEVEL_OUTOF10: 0 - NO PAIN
PAINLEVEL_OUTOF10: 0 - NO PAIN

## 2024-11-21 ASSESSMENT — COGNITIVE AND FUNCTIONAL STATUS - GENERAL
DAILY ACTIVITIY SCORE: 6
WALKING IN HOSPITAL ROOM: TOTAL
EATING MEALS: TOTAL
STANDING UP FROM CHAIR USING ARMS: TOTAL
MOVING FROM LYING ON BACK TO SITTING ON SIDE OF FLAT BED WITH BEDRAILS: TOTAL
DRESSING REGULAR LOWER BODY CLOTHING: TOTAL
CLIMB 3 TO 5 STEPS WITH RAILING: TOTAL
DRESSING REGULAR UPPER BODY CLOTHING: TOTAL
TURNING FROM BACK TO SIDE WHILE IN FLAT BAD: TOTAL
PERSONAL GROOMING: TOTAL
HELP NEEDED FOR BATHING: TOTAL
MOBILITY SCORE: 6
MOVING TO AND FROM BED TO CHAIR: TOTAL
TOILETING: TOTAL

## 2024-11-21 ASSESSMENT — PAIN SCALES - PAIN ASSESSMENT IN ADVANCED DEMENTIA (PAINAD)
TOTALSCORE: 0
BREATHING: NORMAL
FACIALEXPRESSION: SMILING OR INEXPRESSIVE
BODYLANGUAGE: RELAXED
NEGVOCALIZATION: OCCASIONAL MOAN/GROAN, LOW SPEECH, NEGATIVE/DISAPPROVING QUALITY
CONSOLABILITY: NO NEED TO CONSOLE
BREATHING: NORMAL
TOTALSCORE: 5
BODYLANGUAGE: RIGID, FISTS CLENCHED, KNEES UP, PUSHING/PULLING AWAY, STRIKES OUT
TOTALSCORE: 0
BREATHING: NORMAL
TOTALSCORE: 0
BODYLANGUAGE: RELAXED
CONSOLABILITY: UNABLE TO CONSOLE, DISTRACT OR REASSURE
FACIALEXPRESSION: SMILING OR INEXPRESSIVE
BREATHING: NORMAL
BODYLANGUAGE: RELAXED

## 2024-11-21 ASSESSMENT — ACTIVITIES OF DAILY LIVING (ADL)
ADL_ASSISTANCE: NEEDS ASSISTANCE
BATHING_ASSISTANCE: TOTAL

## 2024-11-21 ASSESSMENT — PAIN - FUNCTIONAL ASSESSMENT
PAIN_FUNCTIONAL_ASSESSMENT: CPOT (CRITICAL CARE PAIN OBSERVATION TOOL)
PAIN_FUNCTIONAL_ASSESSMENT: PAINAD (PAIN ASSESSMENT IN ADVANCED DEMENTIA SCALE)
PAIN_FUNCTIONAL_ASSESSMENT: CPOT (CRITICAL CARE PAIN OBSERVATION TOOL)

## 2024-11-21 NOTE — PROGRESS NOTES
Occupational Therapy    Evaluation    Patient Name: Loren Mitchell  MRN: [id number]  Department: [facility]  Room: 12/12-A  Today's Date: 11/21/2024  Time Calculation  Start Time: 1318  Stop Time: 1332  Time Calculation (min): 14 min        Assessment:  OT Assessment: Pt now s/p fall with SDH and evac. presents with deficits in balance and cognition however, baseline cognitive deficits limit pt's rehab potential. Poor command follow this date limits benefit/carryover of skilled services. will place on OT trial 1x/wk in acute setting and monitor progression towards goals.  Prognosis:  (Guarded)  Barriers to Discharge: None  Evaluation/Treatment Tolerance:  (Limited by cognition)  Medical Staff Made Aware: Yes  End of Session Communication: Bedside nurse  End of Session Patient Position: Bed, 3 rail up, Alarm off, not on at start of session, Alarm off, caregiver present (STACIA soft wrist restraints secured)  OT Assessment Results: Decreased ADL status, Decreased safe judgment during ADL, Decreased cognition, Decreased endurance, Decreased fine motor control, Decreased functional mobility, Decreased gross motor control, Decreased IADLs, Decreased trunk control for functional activities  Prognosis:  (Guarded)  Barriers to Discharge: None  Evaluation/Treatment Tolerance:  (Limited by cognition)  Medical Staff Made Aware: Yes  Plan:  Treatment Interventions: ADL retraining, Functional transfer training, Patient/family training, Equipment evaluation/education, Neuromuscular reeducation, Continued evaluation  OT Frequency: 1 time per week  OT Discharge Recommendations: 24 hr supervision due to cognition (Recommend OT screen for any potential skilled needs following return to facility)  OT Recommended Transfer Status: Dependent  OT - OK to Discharge: Yes  Treatment Interventions: ADL retraining, Functional transfer training, Patient/family training, Equipment evaluation/education, Neuromuscular reeducation, Continued  evaluation    Subjective   Current Problem:  1. SDH (subdural hematoma) (Multi)        2. Subdural hematoma (Multi)  Case Request Operating Room: Left inez holes for subdural hematoma evacuation    Case Request Operating Room: Left inez holes for subdural hematoma evacuation        General:  General  Reason for Referral: Pt presents after being found down after fall at memory care facility. Found to have large mixed density L SDH w sig MLS. 11/19 s/p L inez holes for SDH evac.  Past Medical History Relevant to Rehab: HTN, HLD, hypoT, CKD, dementia (Ox0 at baseline), CAD, recurrent UTIs  Missed Visit: No  Family/Caregiver Present: Yes  Caregiver Feedback:  and son  Prior to Session Communication: Bedside nurse  Patient Position Received: Bed, 3 rail up, Alarm off, not on at start of session (STACIA soft wrist restraints)  General Comment: Pt upright in bed. Keeps eyes closed throughout. (-) command follow. frequent spontanous B UE movements  Precautions:  Medical Precautions: Fall precautions  Precautions Comment: SBP < 160    Vital Sign (Past 2hrs)        Date/Time Vitals Session Patient Position Pulse Resp SpO2 BP MAP (mmHg)    11/21/24 1318 During OT  --  54  12  94 %  111/62  77     11/21/24 1400 --  --  61  13  98 %  103/40  58                         Pain:  Pain Assessment  Pain Assessment: CPOT (Critical Care Pain Observation Tool) (0 per CPOT)    Objective   Cognition:  Overall Cognitive Status: Impaired, Impaired at baseline  Arousal/Alertness: Generalized responses  Orientation Level: Unable to assess  Following Commands:  ((-) command follow despite max cues)  Cognition Comments: unintelligible speech  Insight: No response  Impulsive: Moderately  Processing Speed: Delayed           Home Living:  Type of Home: Memory Care unit  Home Adaptive Equipment: None  Home Layout: One level  Prior Function:  Level of [place]: Needs assistance with ADLs, Needs assistance with homemaking  Receives Help From:   "(facility staff)  ADL Assistance: Needs assistance  Ambulatory Assistance:  (Independent to SBA)  Prior Function Comments: History obtained from spouse 2/2 cognitive/communication deficits. Per - pt requires assistance for dressing, bathing, grooming at baseline. Prompting for toileting vs incontinence. Grossly supervision for self feeding 2/2 pt \"playing\" with food, attempting to eat non food items etc.    ADL:  Eating Assistance: Total  Grooming Assistance: Total  Grooming Deficit: Wash/dry face  Bathing Assistance: Total  UE Dressing Assistance: Total  LE Dressing Assistance: Total  Toileting Assistance with Device: Total  Toileting Deficit: Perineal hygiene (bed level)  Activity Tolerance:  Endurance: Tolerates less than 10 min exercise, no significant change in vital signs  Early Mobility/Exercise Safety Screen: Proceed with mobilization - No exclusion criteria met  Bed Mobility/Transfers: Bed Mobility  Bed Mobility: Yes  Bed Mobility 1  Bed Mobility 1: Forward lean, Long sit  Level of Assistance 1: Dependent  Bed Mobility Comments 1: HOB elevated     Vision:Vision - Complex Assessment  Vision Comments: Requires further assessment; requires passive manual eye opening this date. max cues however (-) fixation, (-) tracking  Sensation:  Sensation Comment: +w/d to noxious stim x4    Perception:  Inattention/Neglect: Cues to attend right visual field, Cues to attend left visual field, Cues to attend to right side of body, Cues to attend to left side of body  Initiation: Hand over hand to initiate tasks  Motor Planning: Cues to use objects appropriately  Coordination:  Movements are Fluid and Coordinated: No     Extremities: RUE   RUE : Exceptions to WFL  RUE Strength  RUE Overall Strength: Due to cognitive deficits (appears >/= 3+/5 based on spontaneous active movements and resistance to therapist) and LUE   LUE: Exceptions to WFL  LUE Strength  LUE Overall Strength: Due to cognitive deficits (appears >/= " 3+/5 based on spontaneous active movements and resistance to therapist)      Outcome Measures:[facility] Daily Activity  Putting on and taking off regular lower body clothing: Total  Bathing (including washing, rinsing, drying): Total  Putting on and taking off regular upper body clothing: Total  Toileting, which includes using toilet, bedpan or urinal: Total  Taking care of personal grooming such as brushing teeth: Total  Eating Meals: Total  Daily Activity - Total Score: 6        Education Documentation  Body Mechanics, taught by Paola Jones OT at 11/21/2024  3:14 PM.  Learner: Patient  Readiness: Acceptance  Method: Explanation  Response: No Evidence of Learning    ADL Training, taught by Paola Jones OT at 11/21/2024  3:14 PM.  Learner: Patient  Readiness: Acceptance  Method: Explanation  Response: No Evidence of Learning    Education Comments  No comments found.        OP EDUCATION:       Goals:  Encounter Problems       Encounter Problems (Active)       ADLs       Patient with complete upper body dressing with moderate assist level of assistance       Start:  11/21/24    Expected End:  12/05/24            Patient will feed self with supervision level of assistance.       Start:  11/21/24    Expected End:  12/05/24            Patient will complete daily grooming tasks with moderate level of assistance.       Start:  11/21/24    Expected End:  12/05/24               TRANSFERS       Patient will perform bed mobility moderate assist level of assistance in order to improve safety and independence with mobility       Start:  11/21/24    Expected End:  12/05/24            Patient will complete functional transfers with moderate assist level of assistance.       Start:  11/21/24    Expected End:  12/05/24                    MATTHEW CASTELLANOS/BUBBA (PRN)

## 2024-11-21 NOTE — PROGRESS NOTES
Order placed for repeat head CT, to reassess SDH s/p left inez holes for evac with Dr. Beard on 11/19/24, to be completed prior to 2 week outpatient follow up visit with neurosurgery.

## 2024-11-21 NOTE — PROGRESS NOTES
[facility]  TRAUMA SERVICE - PROGRESS NOTE    Patient Name: Loren Mitchell  MRN: [id number]  Admit Date: 1119  : 1945  AGE: 79 y.o.   GENDER: female  ==============================================================================  MECHANISM OF INJURY:   78 yo F with PMH of dementia, T2DM, hypothyroidism, HTN, CKD, HLD, CAD, depression, recurrent UTIs, who presented to ED as transfer following a fall at [facility] care facility with CTH and C-spine at OSH showing large left SDH (acute and subacute components) with rightward midline shift and epidural. Patient was seen by trauma surgery and NSGY in [facility] ED. Patient was taken emergently to ED with NSGY for left SDH evacuation via x2 left [place] Holes.     Code status DNR-DNI    LOC (yes/no?): unknown, found down at facility  Anticoagulant / Anti-platelet Rx? (for what dx?): none  Referring Facility Name (N/A for scene EMR run): EMS from [place]    INJURIES:   Left acute on subacute SDH 26 mm with left to right midline shift up to 14mm  Left epidural hematoma    OTHER MEDICAL PROBLEMS:  Dementia, baseline AO x0  HTN  HLD  CKD  CAD  Depression  Recurrent UTIs  T2DM    INCIDENTAL FINDINGS:  cycstitis    PROCEDURES:   left inez holes x2    ==============================================================================  TODAY'S ASSESSMENT AND PLAN OF CARE:  78 yo F with PMH of dementia, T2DM, hypothyroidism, HTN, CKD, HLD, CAD, depression, recurrent UTIs, who presented to ED as transfer following a fall at [facility] care facility with CTH and C-spine at OSH showing large left SDH (acute and subacute components) with rightward midline shift and epidural. Patient was seen by trauma surgery and NSGY in [facility] ED. Patient was taken emergently to ED with NSGY for left SDH evacuation via x2 left [place]. Post-operatively, patient is in TICU, exam shows that her mentation is at her baseline and she does have brisk reflexes on right UE  and LE , consistent with Left SDH. Repeat CTH post-operatively stable -- without any re accumulation of blood, air filing prior SDH space, midline shift now decreased to 0.7 cm. Second repeat CTH 11/21 showed stable findings (mild improvement in midline shift to 0.6cm). NSGY plans for SD drain removal 11/21 and patient is stable and appropriate for initiation of DVT ppx. She is appropriate for transfer to [facility]. Given patient may need assistance with using call light when needed, will DC restraints and order for sitter for assistance.    General:  A. Code status reinstated ( DNR-DNI)  B. Will DC restraints at this time but will have a sitter at bedside given patient ready for transfer to the floor but unsure if she would be able to use call light independently if needed  Neuro  Subdural drain removed by NSGY 11/21 following stable CTH 11/21 AM  continue keppra 500 BID x7 days for seizure prophylaxis  Goal SBP < 160  No HOB restrictions  Okay for DVT ppx POD 2 (11/21)  because patient has remained clinically stable  CV  No acute issues. HR near baseline (60-70)  Resp  Encourage IS use with assistance, will plan for patient to ambulate and be UOOB to chair as tolerated, supp O2 as needed  GI  Regular diet as tolerated  Continue home meds PO ( holding glimepiride, Trulicity, telmisartan, loperamide)  /FEN  Will straight cath x1 this AM given patient retaining 650 ml seen on bladder scan  Daily RFP and Mag. Will replete electrolytes as needed  UA in process -- patient with hx of recurrent UTIs and acute retaining  MSK:  UOOB   PT/OT  Heme:  Daily CBC. No current indications for transfusion.   DVT ppx: started today (lovenox 30 BID)  ID: no acute issues  Endo:  Will hold home insulin and glimepiride  Continue SSI similar to home regimen    GI PPX; no indicated  DVT ppx: starting lovenox 30 BID 11/21   Dispo: transfer to [facility]    D/w attending Dr. Torres.    Vera Rayo   PGY1 General Surgery Resident  Trauma Surgery  Ext  [phone]       ==============================================================================  CHIEF COMPLAINT / OVERNIGHT EVENTS:   78 yo F with PMH of dementia, T2DM, hypothyroidism, HTN, CKD, HLD, CAD, depression, recurrent UTIs, who presented to ED as transfer following a fall at [facility] care [facility] with CTH and C-spine at OSH showing large left SDH (acute and subacute components) with rightward midline shift and epidural. Patient was seen by trauma surgery and NSGY in [facility] ED. Patient was taken emergently to ED with NSGY for left SDH evacuation via x2 left [place] Holes.     She is now POD 2 days . She is mentating appropriately, AO x1. She is more awake and interactive. She passed a bedside swallow eval with nursing staff and has been able to take her PO medications without issue. She remains on 2 L NC satting 98-99, does not have an O2 require preop. Her left scalp incision remains c/d/I. SDH with serosang output ( 35 ml since OR) and remains to gravity. Her reflexes on right upper and lower extremities are notably more brisk than left side. She has been voiding spontaneously.      MEDICAL HISTORY / ROS:  Admission history and ROS reviewed. Pertinent changes as follows:  Unable to obtain from patient    PHYSICAL EXAM:  Heart Rate:  [47-64]   Temp:  [35.2 °C (95.4 °F)-36.7 °C (98.1 °F)]   Resp:  [6-17]   BP: ()/()   SpO2:  [90 %-100 %]     Physical Exam  General: frail, appears stated age  HEENT: left head with incision c/d/i and SD drain now removed, no scleral icterus, PERRL, iEOM, dry moist mucous membranes  Cardiovascular: RRR, capillary refill <3sec, 2+ palpable radial, femoral , DP/PT bilaterally  Pulmonary: breathing comfortably on room air  Abdomen: soft, nontender, nondistended, nonperitonitic  : normal genitalia  Skin: warm and dry. Incision on left scalp OR dressing removed and  c/d/I. SD drain now removed  Neuro: A/O x0 ( baseline) ,brisk reflexes in RUE and RLE, no grasp on RUE but does  move arm antigravity.   Lines: PIVs      IMAGING SUMMARY  CT noncon 11/21:  IMPRESSION:  1.  Postsurgical changes of subdural hematoma evacuation with  indwelling subdural drainage catheter.  2. Subdural fluid along the left cerebral convexity has at most  mildly decreased in size, however air within this fluid collection  has significantly decreased and there is resulting decreased  surrounding mass effect including decreased rightward midline shift.    LABS:  Results from last 7 days   Lab Units 11/21/24  0353 11/20/24  0124 11/19/24  0706 11/18/24  1811   WBC AUTO x10*3/uL 10.6 10.9 8.7 10.0   HEMOGLOBIN g/dL 10.5* 11.0* 12.2 13.0   HEMATOCRIT % 31.6* 34.8* 34.9* 39.0   PLATELETS AUTO x10*3/uL 183 211 224 237   NEUTROS PCT AUTO %  --   --   --  68.0   LYMPHS PCT AUTO %  --   --   --  24.3   MONOS PCT AUTO %  --   --   --  5.5   EOS PCT AUTO %  --   --   --  1.3     Results from last 7 days   Lab Units 11/20/24  0124 11/19/24  1338 11/18/24  1811   APTT seconds 22* 26* 31   INR  1.0 1.1 1.1     Results from last 7 days   Lab Units 11/21/24  0353 11/20/24  0124 11/19/24  1338 11/18/24  1811   SODIUM mmol/L 144 148* 149* 147*   POTASSIUM mmol/L 3.3* 3.6 3.9 3.9   CHLORIDE mmol/L 116* 115* 115* 110*   CO2 mmol/L 22 23 25 29   BUN mg/dL 11 13 11 17   CREATININE mg/dL 0.51 0.66 0.76 0.73   CALCIUM mg/dL 6.9* 8.4* 8.0* 9.1   PROTEIN TOTAL g/dL  --  5.8* 6.1* 6.8   BILIRUBIN TOTAL mg/dL  --  0.5 0.6 0.6   ALK PHOS U/L  --  65 73 81   ALT U/L  --  11 10 17   AST U/L  --  9 13 13   GLUCOSE mg/dL 132* 180* 244* 160*     Results from last 7 days   Lab Units 11/20/24  0124 11/19/24  1338 11/18/24  1811   BILIRUBIN TOTAL mg/dL 0.5 0.6 0.6   BILIRUBIN DIRECT mg/dL 0.1 0.1  --            I have reviewed all medications, laboratory results, and imaging pertinent for today's encounter.

## 2024-11-21 NOTE — CARE PLAN
Problem: Fall/Injury  Goal: Not fall by end of shift  Outcome: Progressing     Problem: Safety - Medical Restraint  Goal: Remains free of injury from restraints (Restraint for Interference with Medical Device)  Outcome: Progressing

## 2024-11-21 NOTE — PROGRESS NOTES
Physical Therapy    Physical Therapy Evaluation    Patient Name: Loren Mitchell  MRN: [id number]  Today's Date: 11/21/2024   Room: 12/12  Time Calculation  Start Time: 1024  Stop Time: 1039  Time Calculation (min): 15 min    Assessment/Plan   PT Assessment  PT Assessment Results: Decreased strength, Decreased endurance, Impaired balance, Decreased mobility, Decreased coordination, Decreased cognition  Rehab Prognosis: Fair  Barriers to Discharge: Medical acuity  Evaluation/Treatment Tolerance: Patient limited by fatigue  End of Session Communication: Bedside nurse  Assessment Comment: Pt to benefit from ongoing PT services to address the above limitations and to prepare pt for timely return to prior level of function  End of Session Patient Position: Bed, 3 rail up, Alarm off, not on at start of session (BUE restraints)  IP OR SWING BED PT PLAN  Inpatient or Swing Bed: Inpatient  PT Plan  Treatment/Interventions: Bed mobility, Transfer training, Gait training, Balance training, Neuromuscular re-education, Strengthening, Endurance training, Range of motion, Therapeutic exercise, Therapeutic activity, Home exercise program, Wheelchair management, Positioning  PT Plan: Ongoing PT  PT Frequency: 3 times per week  PT Discharge Recommendations: Moderate intensity level of continued care  Equipment Recommended upon Discharge:  (TBD)  PT Recommended Transfer Status: Total assist  PT - OK to Discharge: Yes (When medically ready)      Subjective   General Visit Information:  Reason for Referral: Pt presents after being found down after fall at memory care facility. Found to have large mixed density L SDH w sig MLS. 11/19 s/p L inez holes for SDH evac. SDD d/c'd 11/21.  Past Medical History Relevant to Rehab: HTN, HLD, hypoT, CKD, dementia (Ox0 at baseline), CAD, recurrent UTIs  Co-Treatment:  (Rehab aide assisted with session)  Prior to Session Communication: Bedside nurse  Patient Position Received: Bed, 3 rail up, Alarm  "off, not on at start of session (BUE restraints in place)  Family/Caregiver Present: No  Caregiver Feedback: n/a     Home Living:  Home Living  Type of Home:  (Per EMR: Pt admitted from memory care unit.)    Prior Level of Function:  Prior Function Per Pt/Caregiver Report  Prior Function Comments: Per EMR/: \"Pt was able to feed herself and walk, but was totally dependent with all other ADL's due to advanced dementia.\"    Precautions:  Precautions  Hearing/Visual Limitations: L gaze preference, able to cross midline to R with extensive stim and cues. Eyes closed throughout session, requires passive assist to open.  Medical Precautions: Fall precautions  Precautions Comment: SBP <160    Vital Signs:  Vital Signs (Past 2hrs)        Date/Time Vitals Session Patient Position Pulse Resp SpO2 BP MAP (mmHg)    11/21/24 0900 --  --  60  11  100 %  90/52  65     11/21/24 1000 --  --  48  10  99 %  121/51  73     11/21/24 1024 Pre PT  Lying  51  --  96 %  121/51  75     11/21/24 1034 During PT  Sitting  56  --  96 %  131/119  126     11/21/24 1039 Post PT  Lying  48  --  99 %  131/60  82                     Objective   Lines/Tubes/Drains: tele, IV        Continuous Medications/Drips: N/A       Oxygen: room air     Pain:  Pain Assessment  Pain Assessment: CPOT (Critical Care Pain Observation Tool) (0/8 at rest, mildly restless with mobility)    Cognition:  Cognition  Overall Cognitive Status: Impaired  Arousal/Alertness: Delayed responses to stimuli  Orientation Level: Unable to assess  Following Commands:  (Pt does not follow commands)  Cognition Comments: Vocalizes and attempts to speak frequently, but speech is unintelligible. No attempts to follow commands or communicate via gestures/nodding.  Insight: No response  Impulsive: Mildly (Very restless with stimulation.)  Processing Speed: Delayed      Extremity/Trunk Assessments:  Strength:       RUE   RUE :  (>/= 3+/5 based on observation)    LUE   LUE:  (>/= 3+/5 " based on observation)    RLE   RLE :  (>/= 3/5 based on observation)    LLE   LLE :  (>/= 3/5 based on observation)    General Assessments:         Activity Tolerance  Endurance: Endurance does not limit participation in activity  Early Mobility/Exercise Safety Screen: Proceed with mobilization - No exclusion criteria met  Sensation  Sensation Comment: WD x4 to nox stim, unable to assess sensation or strength formally 2/2 cognition.  Coordination  Movements are Fluid and Coordinated: No  Static Sitting Balance  Static Sitting-Balance Support: Bilateral upper extremity supported, Feet unsupported  Static Sitting-Level of Assistance: Dependent  Static Sitting-Comment/Number of Minutes: 5 min  Dynamic Sitting Balance  Dynamic Sitting-Balance Support: Bilateral upper extremity supported, Feet unsupported  Dynamic Sitting-Level of Assistance: Dependent  Dynamic Sitting-Balance: Lateral lean  Dynamic Sitting-Comments: L and R  Static Standing Balance  Static Standing-Comment/Number of Minutes: Deferred  Dynamic Standing Balance  Dynamic Standing-Comments: Deferred    Functional Assessments:  Bed Mobility  Bed Mobility: Yes  Bed Mobility 1  Bed Mobility 1: Supine to sitting, Sitting to supine  Level of Assistance 1: Dependent, +2  Bed Mobility Comments 1: HOB elevated, used draw sheet  Bed Mobility 2  Bed Mobility  2: Scooting (boosting)  Level of Assistance 2: Dependent, +2  Bed Mobility Comments 2: HOB flat, used draw sheet    Transfers  Transfer: No    Ambulation/Gait Training  Ambulation/Gait Training Performed: No    Stairs  Stairs: No         Outcome Measures:  [facility] Basic Mobility  Turning from your back to your side while in a flat bed without using bedrails: Total  Moving from lying on your back to sitting on the side of a flat bed without using bedrails: Total  Moving to and from bed to chair (including a wheelchair): Total  Standing up from a chair using your arms (e.g. wheelchair or bedside chair): Total  To  walk in hospital room: Total  Climbing 3-5 steps with railing: Total  Basic Mobility - Total Score: 6                   FSS-ICU  Ambulation: Unable to attempt due to weakness  Rolling: Total assistance (performs 25% or requires another person)  Sitting: Total assistance (performs 25% or requires another person)  Transfer Sit-to-Stand: Unable to perform  Transfer Supine-to-Sit: Total assistance (performs 25% or requires another person)  Total Score: 3    ICU Mobility Screen  Early Mobility/Exercise Safety Screen: Proceed with mobilization - No exclusion criteria met  ICU Mobility Scale: Sitting over edge of bed                        Encounter Problems       Encounter Problems (Active)       PT Problem       Patient will perform bed mobility with </= MOD A x1 to reduce risk of developing decubitus ulcers.        Start:  11/21/24    Expected End:  12/05/24            Patient will perform sit to stand and stand to sit transfers with </= MAX A x1 and LRD to increase functional strength.        Start:  11/21/24    Expected End:  12/05/24            Patient will ambulate at least 15 ft. with </= MAX A x1 and LRD to improve tolerance of household distances.        Start:  11/21/24    Expected End:  12/05/24            Patient will participate in therapeutic exercise program with VSS and cues as needed.          Start:  11/21/24    Expected End:  12/05/24            Patient will score at least 42/56 on the Function in Sitting Test to improve sitting balance required for functional tasks.         Start:  11/21/24    Expected End:  12/05/24               Pain - Adult              Education Documentation  Precautions, taught by Danyell Orellana PT at 11/21/2024 10:56 AM.  Learner: Patient  Readiness: Nonacceptance  Method: Explanation  Response: No Evidence of Learning    Body Mechanics, taught by Danyell Orellana PT at 11/21/2024 10:56 AM.  Learner: Patient  Readiness: Nonacceptance  Method: Explanation  Response: No  Evidence of Learning    Mobility Training, taught by Danyell Orellana PT at 11/21/2024 10:56 AM.  Learner: Patient  Readiness: Nonacceptance  Method: Explanation  Response: No Evidence of Learning    Education Comments  No comments found.            11/21/24 at 10:57 AM   Danyell Orellana PT   Rehab Office: [phone]

## 2024-11-21 NOTE — PROGRESS NOTES
[facility]  TRAUMA SERVICE - PROGRESS NOTE    Patient Name: Loren Mitchell  MRN: [id number]  Admit Date: 1119  : 1945  AGE: 79 y.o.   GENDER: female  ==============================================================================  MECHANISM OF INJURY:   78 yo F with PMH of dementia, T2DM, hypothyroidism, HTN, CKD, HLD, CAD, depression, recurrent UTIs, who presented to ED as transfer following a fall at [facility] with CTH and C-spine at OSH showing large left SDH (acute and subacute components) with rightward midline shift and epidural. Patient was seen by trauma surgery and NSGY in [facility] ED. Patient was taken emergently to ED with NSGY for left SDH evacuation via x2 left [place] Holes.     Code status DNR-CC    LOC (yes/no?): unknown, found down at facility  Anticoagulant / Anti-platelet Rx? (for what dx?): none  Referring Facility Name (N/A for scene EMR run): EMS from [place]    INJURIES:   Left acute on subacute SDH 26 mm with left to right midline shift up to 14mm  Left epidural hematoma    OTHER MEDICAL PROBLEMS:  Dementia, baseline AO x0  HTN  HLD  CKD  CAD  Depression  Recurrent UTIs  T2DM    INCIDENTAL FINDINGS:  cycstitis    PROCEDURES:   left inez holes x2    ==============================================================================  TODAY'S ASSESSMENT AND PLAN OF CARE:  78 yo F with PMH of dementia, T2DM, hypothyroidism, HTN, CKD, HLD, CAD, depression, recurrent UTIs, who presented to ED as transfer following a fall at [facility] found to have a large SDH with midline shift. Patient HD stable in the ICU still requiring Zyprexa overnight. chemo sedation for agitation    Plan:  -Neurosurgery drain removed this morning  -Recommend DVT prophylaxis 24 POD#2  -keppra 500mg BID for 7 days.  -PT/OT  -Regular diet, having BMs  -Home meds  -If agitation improves may be appropriate for floor    Dispo: continue care in ICU for now    Patient will be  discussed with Attending Physician Dr. Dinesh Schwartz PGY4  General Surgery Service       ==============================================================================  CHIEF COMPLAINT / OVERNIGHT EVENTS:   80 yo F with PMH of dementia, T2DM, hypothyroidism, HTN, CKD, HLD, CAD, depression, recurrent UTIs, who presented to ED as transfer following a fall at [facility] with CTH and C-spine at OSH showing large left SDH (acute and subacute components) with rightward midline shift and epidural. Patient was seen by trauma surgery and NSGY in [facility] ED. Patient was taken emergently to ED with NSGY for left SDH evacuation via x2 left [place] Holes.     MEDICAL HISTORY / ROS:  Admission history and ROS reviewed. Pertinent changes as follows:  Unable to obtain from patient    PHYSICAL EXAM:  Heart Rate:  [47-64]   Temp:  [35.2 °C (95.4 °F)-36.7 °C (98.1 °F)]   Resp:  [6-17]   BP: ()/()   SpO2:  [90 %-100 %]     Physical Exam  General: ill- appearing, frail,  HEENT: left head with incision and OR drain in place, no scleral icterus, PERRL, iEOM, dry moist mucous membranes  Cardiovascular: regular rate DP/PT bilaterally  Pulmonary: breathing comfortably on 2L NC  Abdomen: soft, nontender  Skin:  Incision on left scalp covered with  dressing,   Neuro: A/O x0, is moving all extremities  Lines: PIVs, NC    IMAGING SUMMARY  CTH noncon:  IMPRESSION:  Large left acute on subacute subdural hematoma is overall similar in  appearance to the prior exam, measuring up to 2.6 cm in maximum  thickness. Severe mass effect on the left cerebral hemisphere and  ventricular system, with left-to-right midline shift measuring 1.4  cm. Similar asymmetric prominence of the right lateral ventricle,  which could reflect a component of ventricular entrapment. Similar  subfalcine herniation and medialization of the left uncus.    CT C/A/P  CT C/T/L spine:  IMPRESSION:  No evidence of acute traumatic injury to the chest, abdomen,  or  pelvis.  No acute fracture or traumatic subluxation identified in the thoracic  spine.  No acute fracture or traumatic subluxation identified in the lumbar  spine.  No pneumothorax, pleural effusion, or consolidation.  Hepatic steatosis.  Solid appearing 1.3 cm exophytic mass off the left kidney, highly  suspicious for renal cell carcinoma until proven otherwise.  Urinary bladder wall thickening with small amount of intraluminal air  in urinary bladder for which correlation with urinalysis recommended  to exclude cystitis.      LABS:  Results from last 7 days   Lab Units 11/21/24  0353 11/20/24  0124 11/19/24  0706 11/18/24  1811   WBC AUTO x10*3/uL 10.6 10.9 8.7 10.0   HEMOGLOBIN g/dL 10.5* 11.0* 12.2 13.0   HEMATOCRIT % 31.6* 34.8* 34.9* 39.0   PLATELETS AUTO x10*3/uL 183 211 224 237   NEUTROS PCT AUTO %  --   --   --  68.0   LYMPHS PCT AUTO %  --   --   --  24.3   MONOS PCT AUTO %  --   --   --  5.5   EOS PCT AUTO %  --   --   --  1.3     Results from last 7 days   Lab Units 11/20/24  0124 11/19/24  1338 11/18/24  1811   APTT seconds 22* 26* 31   INR  1.0 1.1 1.1     Results from last 7 days   Lab Units 11/21/24  0353 11/20/24  0124 11/19/24  1338 11/18/24  1811   SODIUM mmol/L 144 148* 149* 147*   POTASSIUM mmol/L 3.3* 3.6 3.9 3.9   CHLORIDE mmol/L 116* 115* 115* 110*   CO2 mmol/L 22 23 25 29   BUN mg/dL 11 13 11 17   CREATININE mg/dL 0.51 0.66 0.76 0.73   CALCIUM mg/dL 6.9* 8.4* 8.0* 9.1   PROTEIN TOTAL g/dL  --  5.8* 6.1* 6.8   BILIRUBIN TOTAL mg/dL  --  0.5 0.6 0.6   ALK PHOS U/L  --  65 73 81   ALT U/L  --  11 10 17   AST U/L  --  9 13 13   GLUCOSE mg/dL 132* 180* 244* 160*     Results from last 7 days   Lab Units 11/20/24  0124 11/19/24  1338 11/18/24  1811   BILIRUBIN TOTAL mg/dL 0.5 0.6 0.6   BILIRUBIN DIRECT mg/dL 0.1 0.1  --            I have reviewed all medications, laboratory results, and imaging pertinent for today's encounter.

## 2024-11-21 NOTE — PROGRESS NOTES
"Loren Mitchell is a 79 y.o. female on day 2 of admission presenting with Subdural hematoma (Multi).    Subjective   Patient agitated overnight requiring zyprexa     Objective     Physical Exam  AOx0, mumbles  spont x4 AG  Incisions c/d/I  Drain in place  In restraints    Last Recorded Vitals  Blood pressure 114/59, pulse 54, temperature 35.2 °C (95.4 °F), temperature source Temporal, resp. rate 11, height 1.676 m (5' 6\"), weight 60 kg (132 lb 4.4 oz), SpO2 90%.  Intake/Output last 3 Shifts:  I/O last 3 completed shifts:  In: 4435 (73.9 mL/kg) [P.O.:460; I.V.:2425 (40.4 mL/kg); IV Piggyback:1550]  Out: 1282 (21.4 mL/kg) [Urine:1175 (0.5 mL/kg/hr); Drains:57; Blood:50]  Weight: 60 kg     Relevant Results    Results for orders placed or performed during the hospital encounter of 11/19/24 (from the past 24 hours)   POCT GLUCOSE   Result Value Ref Range    POCT Glucose 156 (H) 74 - 99 mg/dL   POCT GLUCOSE   Result Value Ref Range    POCT Glucose 150 (H) 74 - 99 mg/dL   POCT GLUCOSE   Result Value Ref Range    POCT Glucose 174 (H) 74 - 99 mg/dL   POCT GLUCOSE   Result Value Ref Range    POCT Glucose 222 (H) 74 - 99 mg/dL   POCT GLUCOSE   Result Value Ref Range    POCT Glucose 171 (H) 74 - 99 mg/dL   POCT GLUCOSE   Result Value Ref Range    POCT Glucose 157 (H) 74 - 99 mg/dL   POCT GLUCOSE   Result Value Ref Range    POCT Glucose 139 (H) 74 - 99 mg/dL   Calcium, Ionized   Result Value Ref Range    POCT Calcium, Ionized 1.14 1.1 - 1.33 mmol/L   CBC   Result Value Ref Range    WBC 10.6 4.4 - 11.3 x10*3/uL    nRBC 0.0 0.0 - 0.0 /100 WBCs    RBC 3.24 (L) 4.00 - 5.20 x10*6/uL    Hemoglobin 10.5 (L) 12.0 - 16.0 g/dL    Hematocrit 31.6 (L) 36.0 - 46.0 %    MCV 98 80 - 100 fL    MCH 32.4 26.0 - 34.0 pg    MCHC 33.2 32.0 - 36.0 g/dL    RDW 13.5 11.5 - 14.5 %    Platelets 183 150 - 450 x10*3/uL                  Assessment/Plan   Assessment & Plan  Subdural hematoma (Multi)    SDH (subdural hematoma) (Multi)    Postoperative " delirium    CVA (cerebral vascular accident) (Multi)    Intracranial hemorrhage (Multi)    Severe late onset Alzheimer's dementia without behavioral disturbance, psychotic disturbance, mood disturbance, or anxiety (Multi)    Diabetes mellitus, type 2 (Multi)    HTN (hypertension)    Hyperlipidemia    Hypothyroidism    Polycythemia    Chronic renal insufficiency    Coronary artery disease involving native coronary artery of native heart without angina pectoris    Urinary tract infection    Sinus symptom    Seasonal allergies    Abnormal EKG    Osteoarthritis    Lumbar disc disease    Spinal stenosis    Chronic low back pain    History of hysterectomy    79F with h/o HTN, HLD, hypoT, CKD, dementia (Ox0 at baseline), CAD, recurrent UTIs, p/w found down after fall at [facility] facility, CTH large mixed density L SDH w sig MLS, rCTH stable, 11/19 s/p L inez holes for SDH evac, CTH good evac    DNR/DNI  Trauma primary  Maintain SDD, monitor output, dc pending CT head  CT head today to assess SDH/pneumo   Con't keppra ppx x7d  Goal SBP <160  PTOT  Ok for dvt ppx, SCDs      Juventino Nguyen MD

## 2024-11-21 NOTE — CARE PLAN
Problem: Skin  Goal: Prevent/minimize sheer/friction injuries  Outcome: Progressing  Flowsheets (Taken 11/21/2024 0647)  Prevent/minimize sheer/friction injuries:   Use pull sheet   HOB 30 degrees or less   Turn/reposition every 2 hours/use positioning/transfer devices  Goal: Promote/optimize nutrition  Outcome: Progressing  Flowsheets (Taken 11/21/2024 0647)  Promote/optimize nutrition:   Assist with feeding   Monitor/record intake including meals  Goal: Promote skin healing  Outcome: Progressing  Flowsheets (Taken 11/21/2024 0647)  Promote skin healing: Turn/reposition every 2 hours/use positioning/transfer devices     Problem: Safety - Medical Restraint  Goal: Remains free of injury from restraints (Restraint for Interference with Medical Device)  Outcome: Progressing  Goal: Free from restraint(s) (Restraint for Interference with Medical Device)  Outcome: Progressing     Problem: Safety - Adult  Goal: Free from fall injury  Outcome: Progressing     Problem: Pain  Goal: Turns in bed with improved pain control throughout the shift  Outcome: Progressing     Problem: Diabetes  Goal: No changes in neurological exam by end of shift  Outcome: Progressing  Goal: Vital signs within normal range for age by end of shift  Outcome: Progressing

## 2024-11-21 NOTE — CARE PLAN
The clinical goals for the shift include Patient remains safe through end of shift.      Problem: Skin  Goal: Decreased wound size/increased tissue granulation at next dressing change  Outcome: Progressing  Goal: Participates in plan/prevention/treatment measures  Outcome: Progressing  Goal: Prevent/manage excess moisture  Outcome: Progressing  Goal: Prevent/minimize sheer/friction injuries  Outcome: Progressing  Goal: Promote/optimize nutrition  Outcome: Progressing  Goal: Promote skin healing  Outcome: Progressing     Problem: Fall/Injury  Goal: Not fall by end of shift  Outcome: Progressing  Goal: Be free from injury by end of the shift  Outcome: Progressing  Goal: Verbalize understanding of personal risk factors for fall in the hospital  Outcome: Progressing  Goal: Verbalize understanding of risk factor reduction measures to prevent injury from fall in the home  Outcome: Progressing  Goal: Use assistive devices by end of the shift  Outcome: Progressing  Goal: Pace activities to prevent fatigue by end of the shift  Outcome: Progressing     Problem: Safety - Medical Restraint  Goal: Remains free of injury from restraints (Restraint for Interference with Medical Device)  Outcome: Progressing  Goal: Free from restraint(s) (Restraint for Interference with Medical Device)  Outcome: Progressing     Problem: Pain - Adult  Goal: Verbalizes/displays adequate comfort level or baseline comfort level  Outcome: Progressing     Problem: Safety - Adult  Goal: Free from fall injury  Outcome: Progressing     Problem: Discharge Planning  Goal: Discharge to home or other facility with appropriate resources  Outcome: Progressing     Problem: Chronic Conditions and Co-morbidities  Goal: Patient's chronic conditions and co-morbidity symptoms are monitored and maintained or improved  Outcome: Progressing     Problem: Pain  Goal: Takes deep breaths with improved pain control throughout the shift  Outcome: Progressing  Goal: Turns in  bed with improved pain control throughout the shift  Outcome: Progressing  Goal: Walks with improved pain control throughout the shift  Outcome: Progressing  Goal: Performs ADL's with improved pain control throughout shift  Outcome: Progressing  Goal: Participates in PT with improved pain control throughout the shift  Outcome: Progressing  Goal: Free from opioid side effects throughout the shift  Outcome: Progressing  Goal: Free from acute confusion related to pain meds throughout the shift  Outcome: Progressing     Problem: Diabetes  Goal: Achieve decreasing blood glucose levels by end of shift  Outcome: Progressing  Goal: Increase stability of blood glucose readings by end of shift  Outcome: Progressing  Goal: Maintain electrolyte levels within acceptable range throughout shift  Outcome: Progressing  Goal: Maintain glucose levels >70mg/dl to <250mg/dl throughout shift  Outcome: Progressing  Goal: No changes in neurological exam by end of shift  Outcome: Progressing  Goal: Vital signs within normal range for age by end of shift  Outcome: Progressing

## 2024-11-21 NOTE — SIGNIFICANT EVENT
Patient with large mixed density left sided subdural hematoma, status post left inez holes for epidural evacuation. Repeat imaging showed improvement of hematoma. Subdural drain was removed 11/21. No additional neurosurgical intervention or imaging needed at this time. Prophylactic anticoagulation allowed on post-op day 2. Patient needs follow up at 2 weeks with repeat CTH, which have been arranged. Thank you for allowing us to participate in the care of this patient. Will sign off at this time. Please page [phone] with any questions or concerns.    Oliver Dhillon MD  PGY-1 Neurosurgery  9:13 AM

## 2024-11-22 ENCOUNTER — APPOINTMENT (OUTPATIENT)
Dept: RADIOLOGY | Facility: HOSPITAL | Age: 79
End: 2024-11-22
Payer: MEDICARE

## 2024-11-22 LAB
ALBUMIN SERPL BCP-MCNC: 3.1 G/DL (ref 3.4–5)
ANION GAP SERPL CALC-SCNC: 9 MMOL/L (ref 10–20)
BUN SERPL-MCNC: 10 MG/DL (ref 6–23)
CALCIUM SERPL-MCNC: 8 MG/DL (ref 8.6–10.6)
CHLORIDE SERPL-SCNC: 110 MMOL/L (ref 98–107)
CO2 SERPL-SCNC: 27 MMOL/L (ref 21–32)
CREAT SERPL-MCNC: 0.59 MG/DL (ref 0.5–1.05)
EGFRCR SERPLBLD CKD-EPI 2021: >90 ML/MIN/1.73M*2
GLUCOSE BLD MANUAL STRIP-MCNC: 160 MG/DL (ref 74–99)
GLUCOSE BLD MANUAL STRIP-MCNC: 164 MG/DL (ref 74–99)
GLUCOSE BLD MANUAL STRIP-MCNC: 174 MG/DL (ref 74–99)
GLUCOSE BLD MANUAL STRIP-MCNC: 174 MG/DL (ref 74–99)
GLUCOSE BLD MANUAL STRIP-MCNC: 270 MG/DL (ref 74–99)
GLUCOSE SERPL-MCNC: 164 MG/DL (ref 74–99)
MAGNESIUM SERPL-MCNC: 1.78 MG/DL (ref 1.6–2.4)
PHOSPHATE SERPL-MCNC: 3.7 MG/DL (ref 2.5–4.9)
POTASSIUM SERPL-SCNC: 3.4 MMOL/L (ref 3.5–5.3)
SODIUM SERPL-SCNC: 143 MMOL/L (ref 136–145)

## 2024-11-22 PROCEDURE — 80069 RENAL FUNCTION PANEL: CPT

## 2024-11-22 PROCEDURE — 2500000002 HC RX 250 W HCPCS SELF ADMINISTERED DRUGS (ALT 637 FOR MEDICARE OP, ALT 636 FOR OP/ED)

## 2024-11-22 PROCEDURE — 36415 COLL VENOUS BLD VENIPUNCTURE: CPT

## 2024-11-22 PROCEDURE — 99232 SBSQ HOSP IP/OBS MODERATE 35: CPT | Performed by: PHYSICIAN ASSISTANT

## 2024-11-22 PROCEDURE — 2500000001 HC RX 250 WO HCPCS SELF ADMINISTERED DRUGS (ALT 637 FOR MEDICARE OP)

## 2024-11-22 PROCEDURE — 83735 ASSAY OF MAGNESIUM: CPT

## 2024-11-22 PROCEDURE — 82947 ASSAY GLUCOSE BLOOD QUANT: CPT

## 2024-11-22 PROCEDURE — 2500000004 HC RX 250 GENERAL PHARMACY W/ HCPCS (ALT 636 FOR OP/ED): Performed by: PHYSICIAN ASSISTANT

## 2024-11-22 PROCEDURE — 2500000004 HC RX 250 GENERAL PHARMACY W/ HCPCS (ALT 636 FOR OP/ED)

## 2024-11-22 PROCEDURE — 2500000004 HC RX 250 GENERAL PHARMACY W/ HCPCS (ALT 636 FOR OP/ED): Performed by: STUDENT IN AN ORGANIZED HEALTH CARE EDUCATION/TRAINING PROGRAM

## 2024-11-22 PROCEDURE — 70450 CT HEAD/BRAIN W/O DYE: CPT | Performed by: RADIOLOGY

## 2024-11-22 PROCEDURE — 70450 CT HEAD/BRAIN W/O DYE: CPT

## 2024-11-22 PROCEDURE — 1100000001 HC PRIVATE ROOM DAILY

## 2024-11-22 RX ORDER — POTASSIUM CHLORIDE 14.9 MG/ML
20 INJECTION INTRAVENOUS
Status: DISPENSED | OUTPATIENT
Start: 2024-11-22 | End: 2024-11-22

## 2024-11-22 RX ORDER — LEVETIRACETAM 5 MG/ML
500 INJECTION INTRAVASCULAR ONCE
Status: COMPLETED | OUTPATIENT
Start: 2024-11-22 | End: 2024-11-22

## 2024-11-22 ASSESSMENT — PAIN - FUNCTIONAL ASSESSMENT
PAIN_FUNCTIONAL_ASSESSMENT: WONG-BAKER FACES

## 2024-11-22 ASSESSMENT — COGNITIVE AND FUNCTIONAL STATUS - GENERAL
MOBILITY SCORE: 24
DAILY ACTIVITIY SCORE: 24

## 2024-11-22 ASSESSMENT — PAIN SCALES - WONG BAKER
WONGBAKER_NUMERICALRESPONSE: NO HURT

## 2024-11-22 ASSESSMENT — PAIN SCALES - GENERAL: PAINLEVEL_OUTOF10: 0 - NO PAIN

## 2024-11-22 NOTE — PROGRESS NOTES
Social Work Discharge Planning note:    BENNY faxed the PT/OT evaluations to [facility] from [facility] unit yesterday. BENNY just received a call from [facility] reporting that Pt  will need to go SNF before she can return to them. Pt is now on Anya 8, so BENNY will update the TCC and BENNY who will be following for discharge planning.      Moiz Montano, MSW, LSW

## 2024-11-22 NOTE — PROGRESS NOTES
[facility]  TRAUMA SERVICE - PROGRESS NOTE    Patient Name: Loren Mitchell  MRN: [id number]  Admit Date: 1119  : 1945  AGE: 79 y.o.   GENDER: female  ==============================================================================  MECHANISM OF INJURY:   79 YOF s/p fall     LOC (yes/no?): unknown  Anticoagulant / Anti-platelet Rx? (for what dx?): no  Referring Facility Name (N/A for scene EMR run): [facility]    INJURIES:   SDH    OTHER MEDICAL PROBLEMS:  Dementia (A&Ox0)  HTN  HLD  CKD  CAD  Depression  Recurrent UTIs  DM2    INCIDENTAL FINDINGS:  Cystitis    PROCEDURES:  : L inez hole x2 with SD evacuation    ==============================================================================  TODAY'S ASSESSMENT AND PLAN OF CARE:  ## SDH  - NSGY s/o        -> dvt ppx POD2       -> fu in 2 weeks with [facility] (arranged)  - q4h neuro checks       -> Poor exam this morning (since drain removal per family) -> significant improvement later in afternoon       -> Will obtain [facility] this afternoon; stable  - Continue Keppra 500mg x7d (end pm)  - Pain control with PRN Tylenol and Dilaudid  - PT/OT    ## Recurrent UTI  - Initially started on Nitrofurantoin empirically (stop  as negative UA)  - Voiding without indwelling Zee catheter    ## PMHx  - Continue home statin, Prozac, Synthroid, Memantine    ## FEN/GI  - Reg diet as tolerated; somnolent today       -> Family would be okay with DHT for short term if needed  - BR  - Monitor electrolytes and replete as indicated    ## PPX  - SCDs  - LVX; okay to continue with stable CTH    Social: Discussed patient's current status with family at bedside (son and ). They report that her mentation is worse today than yesterday during the day/morning. Patient recently had SDD removed. Will plan for STAT repeat CTH at this time. Family updated and in agreement with plan. They also confirmed the current code status of DNR/DNI.  They are okay with ongoing interventions at this time including feeding tube placement if needed short term.  Plan discussed with attending, Dr. Stuart.     Dispo: continue care on RNF.    Total face to face time spent with patient/family of 20 minutes, with >50% of the time spent discussing plan of care/management, counseling/educating on disease processes, explaining results of diagnostic testing.    Patient discussed with attending, Dr. Sade Zendejas, PADEVIN  Trauma, Critical Care, and Acute Care Surgery  [id number]     ==============================================================================  CHIEF COMPLAINT / OVERNIGHT EVENTS:   Patient unable to voice concerns.    MEDICAL HISTORY / ROS:  Admission history and ROS reviewed. Pertinent changes as follows:    PHYSICAL EXAM:  Heart Rate:  [51-72]   Temp:  [36.3 °C (97.4 °F)-36.7 °C (98.1 °F)]   Resp:  [9-18]   BP: ()/(40-87)   SpO2:  [92 %-100 %]     Physical Exam:  General: frail, appears stated age  HEENT: left head with incision c/d/i and SD drain now removed, no scleral icterus, PERRL, iEOM, dry moist mucous membranes  Cardiovascular: RRR, capillary refill <3sec, 2+ palpable radial, femoral , DP/PT bilaterally  Pulmonary: breathing comfortably on room air  Abdomen: soft, nondistended, nonperitonitic  Skin: warm and dry. Incision on left scalp OR dressing removed and  c/d/I. SD drain now removed  Neuro: A/O x0 ( baseline), GCS 8 (E1, V2, M5) in AM, improved to GCS 12 (E4, V3, M5)  Lines: PIVs    IMAGING SUMMARY:  (summary of new imaging findings, not a copy of dictation)  Repeat CTH stable today    LABS:  Results from last 7 days   Lab Units 11/21/24  0353 11/20/24  0124 11/19/24  0706 11/18/24  1811   WBC AUTO x10*3/uL 10.6 10.9 8.7 10.0   HEMOGLOBIN g/dL 10.5* 11.0* 12.2 13.0   HEMATOCRIT % 31.6* 34.8* 34.9* 39.0   PLATELETS AUTO x10*3/uL 183 211 224 237   NEUTROS PCT AUTO %  --   --   --  68.0   LYMPHS PCT AUTO %  --   --   --  24.3   MONOS PCT  AUTO %  --   --   --  5.5   EOS PCT AUTO %  --   --   --  1.3     Results from last 7 days   Lab Units 11/20/24  0124 11/19/24  1338 11/18/24  1811   APTT seconds 22* 26* 31   INR  1.0 1.1 1.1     Results from last 7 days   Lab Units 11/22/24  0738 11/21/24  0353 11/20/24  0124 11/19/24  1338 11/18/24  1811   SODIUM mmol/L 143 144 148* 149* 147*   POTASSIUM mmol/L 3.4* 3.3* 3.6 3.9 3.9   CHLORIDE mmol/L 110* 116* 115* 115* 110*   CO2 mmol/L 27 22 23 25 29   BUN mg/dL 10 11 13 11 17   CREATININE mg/dL 0.59 0.51 0.66 0.76 0.73   CALCIUM mg/dL 8.0* 6.9* 8.4* 8.0* 9.1   PROTEIN TOTAL g/dL  --   --  5.8* 6.1* 6.8   BILIRUBIN TOTAL mg/dL  --   --  0.5 0.6 0.6   ALK PHOS U/L  --   --  65 73 81   ALT U/L  --   --  11 10 17   AST U/L  --   --  9 13 13   GLUCOSE mg/dL 164* 132* 180* 244* 160*     Results from last 7 days   Lab Units 11/20/24  0124 11/19/24  1338 11/18/24  1811   BILIRUBIN TOTAL mg/dL 0.5 0.6 0.6   BILIRUBIN DIRECT mg/dL 0.1 0.1  --            I have reviewed all medications, laboratory results, and imaging pertinent for today's encounter.

## 2024-11-22 NOTE — CARE PLAN
The patient's goals for the shift include      The clinical goals for the shift include Pt will remain safe and comfortable throughout the shift

## 2024-11-23 ENCOUNTER — APPOINTMENT (OUTPATIENT)
Dept: RADIOLOGY | Facility: HOSPITAL | Age: 79
End: 2024-11-23
Payer: MEDICARE

## 2024-11-23 LAB
ALBUMIN SERPL BCP-MCNC: 3.1 G/DL (ref 3.4–5)
ANION GAP SERPL CALC-SCNC: 11 MMOL/L (ref 10–20)
APPEARANCE UR: CLEAR
BASOPHILS # BLD AUTO: 0.04 X10*3/UL (ref 0–0.1)
BASOPHILS NFR BLD AUTO: 0.4 %
BILIRUB UR STRIP.AUTO-MCNC: NEGATIVE MG/DL
BLOOD EXPIRATION DATE: NORMAL
BLOOD EXPIRATION DATE: NORMAL
BUN SERPL-MCNC: 12 MG/DL (ref 6–23)
CALCIUM SERPL-MCNC: 8.3 MG/DL (ref 8.6–10.6)
CHLORIDE SERPL-SCNC: 110 MMOL/L (ref 98–107)
CO2 SERPL-SCNC: 24 MMOL/L (ref 21–32)
COLOR UR: YELLOW
CREAT SERPL-MCNC: 0.64 MG/DL (ref 0.5–1.05)
DISPENSE STATUS: NORMAL
DISPENSE STATUS: NORMAL
EGFRCR SERPLBLD CKD-EPI 2021: 89 ML/MIN/1.73M*2
EOSINOPHIL # BLD AUTO: 0.04 X10*3/UL (ref 0–0.4)
EOSINOPHIL NFR BLD AUTO: 0.4 %
ERYTHROCYTE [DISTWIDTH] IN BLOOD BY AUTOMATED COUNT: 13.1 % (ref 11.5–14.5)
EST. AVERAGE GLUCOSE BLD GHB EST-MCNC: 166 MG/DL
GLUCOSE BLD MANUAL STRIP-MCNC: 176 MG/DL (ref 74–99)
GLUCOSE BLD MANUAL STRIP-MCNC: 208 MG/DL (ref 74–99)
GLUCOSE BLD MANUAL STRIP-MCNC: 242 MG/DL (ref 74–99)
GLUCOSE SERPL-MCNC: 259 MG/DL (ref 74–99)
GLUCOSE UR STRIP.AUTO-MCNC: ABNORMAL MG/DL
HBA1C MFR BLD: 7.4 %
HCT VFR BLD AUTO: 32.8 % (ref 36–46)
HGB BLD-MCNC: 11.2 G/DL (ref 12–16)
HOLD SPECIMEN: NORMAL
IMM GRANULOCYTES # BLD AUTO: 0.04 X10*3/UL (ref 0–0.5)
IMM GRANULOCYTES NFR BLD AUTO: 0.4 % (ref 0–0.9)
KETONES UR STRIP.AUTO-MCNC: ABNORMAL MG/DL
LEUKOCYTE ESTERASE UR QL STRIP.AUTO: ABNORMAL
LYMPHOCYTES # BLD AUTO: 1.85 X10*3/UL (ref 0.8–3)
LYMPHOCYTES NFR BLD AUTO: 19.7 %
MAGNESIUM SERPL-MCNC: 1.66 MG/DL (ref 1.6–2.4)
MCH RBC QN AUTO: 31.9 PG (ref 26–34)
MCHC RBC AUTO-ENTMCNC: 34.1 G/DL (ref 32–36)
MCV RBC AUTO: 93 FL (ref 80–100)
MONOCYTES # BLD AUTO: 0.75 X10*3/UL (ref 0.05–0.8)
MONOCYTES NFR BLD AUTO: 8 %
MUCOUS THREADS #/AREA URNS AUTO: NORMAL /LPF
NEUTROPHILS # BLD AUTO: 6.66 X10*3/UL (ref 1.6–5.5)
NEUTROPHILS NFR BLD AUTO: 71.1 %
NITRITE UR QL STRIP.AUTO: NEGATIVE
NRBC BLD-RTO: 0 /100 WBCS (ref 0–0)
PH UR STRIP.AUTO: 7.5 [PH]
PHOSPHATE SERPL-MCNC: 3.2 MG/DL (ref 2.5–4.9)
PLATELET # BLD AUTO: 181 X10*3/UL (ref 150–450)
POTASSIUM SERPL-SCNC: 3.4 MMOL/L (ref 3.5–5.3)
PRODUCT BLOOD TYPE: 600
PRODUCT BLOOD TYPE: 600
PRODUCT CODE: NORMAL
PRODUCT CODE: NORMAL
PROT UR STRIP.AUTO-MCNC: NEGATIVE MG/DL
RBC # BLD AUTO: 3.51 X10*6/UL (ref 4–5.2)
RBC # UR STRIP.AUTO: NEGATIVE /UL
RBC #/AREA URNS AUTO: NORMAL /HPF
SODIUM SERPL-SCNC: 142 MMOL/L (ref 136–145)
SP GR UR STRIP.AUTO: 1.01
SQUAMOUS #/AREA URNS AUTO: NORMAL /HPF
UNIT ABO: NORMAL
UNIT ABO: NORMAL
UNIT NUMBER: NORMAL
UNIT NUMBER: NORMAL
UNIT RH: NORMAL
UNIT RH: NORMAL
UNIT VOLUME: 350
UNIT VOLUME: 350
UROBILINOGEN UR STRIP.AUTO-MCNC: NORMAL MG/DL
WBC # BLD AUTO: 9.4 X10*3/UL (ref 4.4–11.3)
WBC #/AREA URNS AUTO: NORMAL /HPF
XM INTEP: NORMAL
XM INTEP: NORMAL

## 2024-11-23 PROCEDURE — 80069 RENAL FUNCTION PANEL: CPT | Performed by: STUDENT IN AN ORGANIZED HEALTH CARE EDUCATION/TRAINING PROGRAM

## 2024-11-23 PROCEDURE — 2500000004 HC RX 250 GENERAL PHARMACY W/ HCPCS (ALT 636 FOR OP/ED): Mod: JZ | Performed by: STUDENT IN AN ORGANIZED HEALTH CARE EDUCATION/TRAINING PROGRAM

## 2024-11-23 PROCEDURE — 87086 URINE CULTURE/COLONY COUNT: CPT | Performed by: STUDENT IN AN ORGANIZED HEALTH CARE EDUCATION/TRAINING PROGRAM

## 2024-11-23 PROCEDURE — 36415 COLL VENOUS BLD VENIPUNCTURE: CPT | Performed by: PHYSICIAN ASSISTANT

## 2024-11-23 PROCEDURE — 2500000002 HC RX 250 W HCPCS SELF ADMINISTERED DRUGS (ALT 637 FOR MEDICARE OP, ALT 636 FOR OP/ED): Performed by: PHYSICIAN ASSISTANT

## 2024-11-23 PROCEDURE — 2500000004 HC RX 250 GENERAL PHARMACY W/ HCPCS (ALT 636 FOR OP/ED): Performed by: PHYSICIAN ASSISTANT

## 2024-11-23 PROCEDURE — 83036 HEMOGLOBIN GLYCOSYLATED A1C: CPT | Performed by: PHYSICIAN ASSISTANT

## 2024-11-23 PROCEDURE — 85025 COMPLETE CBC W/AUTO DIFF WBC: CPT | Performed by: STUDENT IN AN ORGANIZED HEALTH CARE EDUCATION/TRAINING PROGRAM

## 2024-11-23 PROCEDURE — 83735 ASSAY OF MAGNESIUM: CPT | Performed by: STUDENT IN AN ORGANIZED HEALTH CARE EDUCATION/TRAINING PROGRAM

## 2024-11-23 PROCEDURE — 2500000001 HC RX 250 WO HCPCS SELF ADMINISTERED DRUGS (ALT 637 FOR MEDICARE OP): Performed by: PHYSICIAN ASSISTANT

## 2024-11-23 PROCEDURE — 82947 ASSAY GLUCOSE BLOOD QUANT: CPT

## 2024-11-23 PROCEDURE — 71045 X-RAY EXAM CHEST 1 VIEW: CPT | Performed by: RADIOLOGY

## 2024-11-23 PROCEDURE — 81001 URINALYSIS AUTO W/SCOPE: CPT | Performed by: STUDENT IN AN ORGANIZED HEALTH CARE EDUCATION/TRAINING PROGRAM

## 2024-11-23 PROCEDURE — 71045 X-RAY EXAM CHEST 1 VIEW: CPT

## 2024-11-23 PROCEDURE — 99231 SBSQ HOSP IP/OBS SF/LOW 25: CPT | Performed by: PHYSICIAN ASSISTANT

## 2024-11-23 PROCEDURE — 92610 EVALUATE SWALLOWING FUNCTION: CPT | Mod: GN

## 2024-11-23 PROCEDURE — 2500000002 HC RX 250 W HCPCS SELF ADMINISTERED DRUGS (ALT 637 FOR MEDICARE OP, ALT 636 FOR OP/ED)

## 2024-11-23 PROCEDURE — 1100000001 HC PRIVATE ROOM DAILY

## 2024-11-23 RX ORDER — INSULIN LISPRO 100 [IU]/ML
0-15 INJECTION, SOLUTION INTRAVENOUS; SUBCUTANEOUS
Status: DISCONTINUED | OUTPATIENT
Start: 2024-11-24 | End: 2024-11-26

## 2024-11-23 RX ORDER — ACETAMINOPHEN 10 MG/ML
1000 INJECTION, SOLUTION INTRAVENOUS ONCE
Status: COMPLETED | OUTPATIENT
Start: 2024-11-23 | End: 2024-11-23

## 2024-11-23 RX ORDER — POTASSIUM CHLORIDE 14.9 MG/ML
20 INJECTION INTRAVENOUS
Status: COMPLETED | OUTPATIENT
Start: 2024-11-23 | End: 2024-11-23

## 2024-11-23 RX ORDER — LEVETIRACETAM 5 MG/ML
500 INJECTION INTRAVASCULAR EVERY 12 HOURS
Status: COMPLETED | OUTPATIENT
Start: 2024-11-23 | End: 2024-11-25

## 2024-11-23 RX ORDER — ACETAMINOPHEN 160 MG/5ML
650 SOLUTION ORAL EVERY 6 HOURS PRN
Status: DISCONTINUED | OUTPATIENT
Start: 2024-11-23 | End: 2024-11-23

## 2024-11-23 RX ORDER — INSULIN LISPRO 100 [IU]/ML
0-15 INJECTION, SOLUTION INTRAVENOUS; SUBCUTANEOUS EVERY 4 HOURS
Status: DISCONTINUED | OUTPATIENT
Start: 2024-11-23 | End: 2024-11-23

## 2024-11-23 RX ORDER — MAGNESIUM SULFATE HEPTAHYDRATE 40 MG/ML
4 INJECTION, SOLUTION INTRAVENOUS ONCE
Status: COMPLETED | OUTPATIENT
Start: 2024-11-23 | End: 2024-11-23

## 2024-11-23 RX ORDER — SODIUM CHLORIDE 9 MG/ML
75 INJECTION, SOLUTION INTRAVENOUS CONTINUOUS
Status: DISCONTINUED | OUTPATIENT
Start: 2024-11-23 | End: 2024-11-23

## 2024-11-23 ASSESSMENT — COGNITIVE AND FUNCTIONAL STATUS - GENERAL
DAILY ACTIVITIY SCORE: 6
PERSONAL GROOMING: TOTAL
DRESSING REGULAR LOWER BODY CLOTHING: TOTAL
MOBILITY SCORE: 6
DRESSING REGULAR UPPER BODY CLOTHING: TOTAL
TOILETING: A LITTLE
TURNING FROM BACK TO SIDE WHILE IN FLAT BAD: TOTAL
CLIMB 3 TO 5 STEPS WITH RAILING: TOTAL
EATING MEALS: TOTAL
WALKING IN HOSPITAL ROOM: TOTAL
STANDING UP FROM CHAIR USING ARMS: TOTAL
TOILETING: TOTAL
MOBILITY SCORE: 24
MOVING FROM LYING ON BACK TO SITTING ON SIDE OF FLAT BED WITH BEDRAILS: TOTAL
TOILETING: A LITTLE
DAILY ACTIVITIY SCORE: 23
MOVING TO AND FROM BED TO CHAIR: TOTAL
DAILY ACTIVITIY SCORE: 23
MOBILITY SCORE: 24
HELP NEEDED FOR BATHING: TOTAL

## 2024-11-23 ASSESSMENT — PAIN SCALES - WONG BAKER
WONGBAKER_NUMERICALRESPONSE: NO HURT
WONGBAKER_NUMERICALRESPONSE: HURTS LITTLE BIT
WONGBAKER_NUMERICALRESPONSE: HURTS EVEN MORE

## 2024-11-23 ASSESSMENT — PAIN - FUNCTIONAL ASSESSMENT
PAIN_FUNCTIONAL_ASSESSMENT: WONG-BAKER FACES
PAIN_FUNCTIONAL_ASSESSMENT: WONG-BAKER FACES

## 2024-11-23 NOTE — CARE PLAN
The patient's goals for the shift include      The clinical goals for the shift include Pt will remain safe and comfortable throughout the shift      Problem: Skin  Goal: Decreased wound size/increased tissue granulation at next dressing change  Outcome: Progressing  Flowsheets (Taken 11/22/2024 2256)  Decreased wound size/increased tissue granulation at next dressing change:   Promote sleep for wound healing   Protective dressings over bony prominences  Goal: Participates in plan/prevention/treatment measures  Outcome: Progressing  Flowsheets (Taken 11/22/2024 2256)  Participates in plan/prevention/treatment measures: Elevate heels  Goal: Prevent/manage excess moisture  Outcome: Progressing  Flowsheets (Taken 11/22/2024 2256)  Prevent/manage excess moisture:   Monitor for/manage infection if present   Follow provider orders for dressing changes  Goal: Prevent/minimize sheer/friction injuries  Outcome: Progressing  Flowsheets (Taken 11/22/2024 2256)  Prevent/minimize sheer/friction injuries:   Utilize specialty bed per algorithm   HOB 30 degrees or less   Turn/reposition every 2 hours/use positioning/transfer devices  Goal: Promote/optimize nutrition  Outcome: Progressing  Flowsheets (Taken 11/22/2024 2256)  Promote/optimize nutrition:   Offer water/supplements/favorite foods   Consume > 50% meals/supplements   Monitor/record intake including meals  Goal: Promote skin healing  Outcome: Progressing  Flowsheets (Taken 11/22/2024 2256)  Promote skin healing: Turn/reposition every 2 hours/use positioning/transfer devices     Problem: Fall/Injury  Goal: Not fall by end of shift  Outcome: Progressing  Goal: Be free from injury by end of the shift  Outcome: Progressing  Goal: Verbalize understanding of personal risk factors for fall in the hospital  Outcome: Progressing  Goal: Verbalize understanding of risk factor reduction measures to prevent injury from fall in the home  Outcome: Progressing  Goal: Use assistive devices  by end of the shift  Outcome: Progressing  Goal: Pace activities to prevent fatigue by end of the shift  Outcome: Progressing     Problem: Safety - Medical Restraint  Goal: Remains free of injury from restraints (Restraint for Interference with Medical Device)  Outcome: Progressing  Goal: Free from restraint(s) (Restraint for Interference with Medical Device)  Outcome: Progressing     Problem: Pain - Adult  Goal: Verbalizes/displays adequate comfort level or baseline comfort level  Outcome: Progressing     Problem: Safety - Adult  Goal: Free from fall injury  Outcome: Progressing     Problem: Discharge Planning  Goal: Discharge to home or other facility with appropriate resources  Outcome: Progressing     Problem: Chronic Conditions and Co-morbidities  Goal: Patient's chronic conditions and co-morbidity symptoms are monitored and maintained or improved  Outcome: Progressing     Problem: Pain  Goal: Takes deep breaths with improved pain control throughout the shift  Outcome: Progressing  Goal: Turns in bed with improved pain control throughout the shift  Outcome: Progressing  Goal: Walks with improved pain control throughout the shift  Outcome: Progressing  Goal: Performs ADL's with improved pain control throughout shift  Outcome: Progressing  Goal: Participates in PT with improved pain control throughout the shift  Outcome: Progressing  Goal: Free from opioid side effects throughout the shift  Outcome: Progressing  Goal: Free from acute confusion related to pain meds throughout the shift  Outcome: Progressing     Problem: Diabetes  Goal: Achieve decreasing blood glucose levels by end of shift  Outcome: Progressing  Goal: Increase stability of blood glucose readings by end of shift  Outcome: Progressing  Goal: Maintain electrolyte levels within acceptable range throughout shift  Outcome: Progressing  Goal: Maintain glucose levels >70mg/dl to <250mg/dl throughout shift  Outcome: Progressing  Goal: No changes in  neurological exam by end of shift  Outcome: Progressing  Goal: Vital signs within normal range for age by end of shift  Outcome: Progressing

## 2024-11-23 NOTE — PROGRESS NOTES
Speech-Language Pathology  Adult Inpatient Clinical Bedside Swallow Evaluation    Patient Name: Loren Mitchell  MRN: [id number]  Today's Date: 11/23/2024   Start Time: 1130  Stop Time: 1150  Time Calculation (min): 20    History of Present Illness:   79-year-old female presents emergency department with report of unwitnessed fall. Patient presents via EMS. Per the care facility where she lives checked on this evening to take her to dinner and found her to be on the ground next to her shoe rack. Patient reportedly is alert and oriented x 0 at baseline due to dementia. Patient presents via EMS with c-collar in place. She is unable to provide history. She is not on any anticoagulants. Chart review shows significant past medical history of CAD, hypertension, chronic kidney disease, dementia, depression, hyperlipidemia, hypernatremia, hypothyroidism, cognitive impairment, hypertension, and recurrent urinary tract infections.     Assessment:   Clinical bedside swallow evaluation completed. Pt awake/alert and upright in bed for session. A&O to self only. Frequent tangential/unintelligible speech and inconsistently able to follow commands. Oral mechanism examination limited though unremarkable. Unable to produce volitional cough prior to administration of PO trials.     Trials of tsp sips water x3, single/consecutive sips water via straw, 3 oz protocol via straw, purees, regular solids, crushed solids, and additional ~7 oz water were given. Adequate oral acceptance of all trials. Prolonged mastication of small bites regular solids + incomplete oral clearance and subsequent throat clearing. Improved oral management of crushed solids in purees. No overt clinical s/s aspiration w/ 3 oz protocol via straw or prior/subsequent trials; no coughing/choking or changes in respiratory status/vocal quality.     Recommend Minced/Moist (Level 5) Solids & Thin Liquids. SLP to continue to follow to ensure diet tolerance as pt  appropriate/schedule permits. Discussed case w/ PA who reported pt w/ difficulty swallowing oral medication. Recommend medication crushed in purees, if possible. SLP to continue to follow to ensure diet tolerance as pt appropriate/schedule permits. If pt presents with any changes to mental, medical, or respiratory status, please make NPO and alert SLP. PA aware.      Recommendations:  Minced/Moist (Level 5) Solids & Thin Liquids  Upright for all PO intake  Remain upright for 20-30 min after eating  Small bites/sips  Straws ok  Slow rate of consumption  Medication crushed in purees  1:1 feeding assistance  SLP to continue to follow to ensure diet tolerance as pt appropriate/schedule permits  If pt presents with any changes to mental, medical, or respiratory status, please make NPO and alert SLP    Goal:   Pt will tolerate least restrictive diet with no overt clinical s/s aspiration 100% of the time.   Start Date: 11/23/2024  End Date: 12/23/2024  Status: Goal Initiated this date       Plan:  SLP Services Indicated: Yes  Frequency: 1x per week  Discussed POC with patient  SLP - OK to Discharge    Pain:   0-10  0 = No pain.     Inpatient Education:  Extensive education provided to patient regarding current swallow function, recommendations/results, and POC.      Consultations/Referrals/Coordination of Services:   N/A

## 2024-11-23 NOTE — PROGRESS NOTES
[facility]  TRAUMA SERVICE - PROGRESS NOTE    Patient Name: Loren Mitchell  MRN: [id number]  Admit Date: 1119  : 1945  AGE: 79 y.o.   GENDER: female  ==============================================================================  MECHANISM OF INJURY:   79 YOF s/p fall     LOC (yes/no?): unknown  Anticoagulant / Anti-platelet Rx? (for what dx?): no  Referring Facility Name (N/A for scene EMR run): [facility]    Injuries/problems:  - Large L hematoma with midline shift  - dysphagia acute on chronic  - Hx A/O x0-1 at baseline end stage dementia, HTN, recurrent UTIs, CAD, HLD, depression, type II DM (insulin dependent)    INCIDENTAL FINDINGS:  none    PROCEDURES:  : L inez hole x2 with SD evacuation    ==============================================================================  TODAY'S ASSESSMENT AND PLAN OF CARE:  ## SDH  - NSGY s/o        -> okay chemoppx       -> fu in 2 weeks with [facility] (arranged)  - q4h neuro checks  - cth stable  for worsened mentation  - Continue Keppra 500mg x7d (end pm). Iv for now until proves taking PO effectively  - Pain control with PRN Tylenol  - PT/OT  - no sitter need    ## Recurrent UTI  - Initially started on Nitrofurantoin empirically (stop  as negative UA). New ua neg  o/n, pending cx for 38.1 temp  - no leukocytosis  - Voiding without indwelling Zee catheter  - cxr no infiltrate, but atelectasis. Have RT see BID for ezpap since cannot participate in self hygiene with IS    ## PMHx  - Continue home statin, Prozac, Synthroid, Memantine  - holding home 10 meqK, telmisartan for now    ## FEN/GI: dysphagia, dm. A1C 7.4 this admit  - SLP rec minced/moist/thin liquids, family amenable to short term dobhoff if warranted  - andres counts  - recent BM  - mild hypok/mag replete, recheck tomorrow. Creat nml  - with meals #3 lispro. Holding home lantus, trulicity, glimepiride with poor intake    ## PPX  - SCDs  - LVX;  okay to continue with stable CTH    Social: dnr/dni on code status discussion    Dispo: continue care on RNF, SNF early this coming week    Total face to face time spent with patient/family of 20 minutes, with >50% of the time spent discussing plan of care/management, counseling/educating on disease processes, explaining results of diagnostic testing.    Patient discussed with attending, Dr. Sade Gilbert, PANeydaC  Trauma, Critical Care, and Acute Care Surgery  [id number]     ==============================================================================  CHIEF COMPLAINT / OVERNIGHT EVENTS:   Patient unable to voice concerns.    MEDICAL HISTORY / ROS:  Admission history and ROS reviewed. Pertinent changes as follows:    PHYSICAL EXAM:  Heart Rate:  [56-79]   Temp:  [36 °C (96.8 °F)-38.1 °C (100.5 °F)]   Resp:  [17-18]   BP: (107-186)/(62-74)   SpO2:  [92 %-95 %]     Physical Exam:  General: frail, appears stated age  HEENT: left head with incision c/d/i and SD drain now removed, no scleral icterus, PERRL, iEOM, dry moist mucous membranes  Cardiovascular: RRR, capillary refill <3sec, 2+ palpable radial, femoral , DP/PT bilaterally  Pulmonary: breathing comfortably on room air  Abdomen: soft, nondistended, nonperitonitic  Skin: warm and dry. Incision on left scalp OR dressing removed and  c/d/I. SD drain now removed  Neuro: A/O x0 ( baseline), GCS 8 (E1, V2, M5) in AM, improved to GCS 12 (E4, V3, M5)  Lines: PIVs    IMAGING SUMMARY:  (summary of new imaging findings, not a copy of dictation)  No new imaging    LABS:  Results from last 7 days   Lab Units 11/23/24  0735 11/21/24  0353 11/20/24  0124 11/19/24  0706 11/18/24  1811   WBC AUTO x10*3/uL 9.4 10.6 10.9   < > 10.0   HEMOGLOBIN g/dL 11.2* 10.5* 11.0*   < > 13.0   HEMATOCRIT % 32.8* 31.6* 34.8*   < > 39.0   PLATELETS AUTO x10*3/uL 181 183 211   < > 237   NEUTROS PCT AUTO % 71.1  --   --   --  68.0   LYMPHS PCT AUTO % 19.7  --   --   --  24.3   MONOS PCT AUTO %  8.0  --   --   --  5.5   EOS PCT AUTO % 0.4  --   --   --  1.3    < > = values in this interval not displayed.     Results from last 7 days   Lab Units 11/20/24  0124 11/19/24  1338 11/18/24  1811   APTT seconds 22* 26* 31   INR  1.0 1.1 1.1     Results from last 7 days   Lab Units 11/23/24  0735 11/22/24  0738 11/21/24  0353 11/20/24  0124 11/19/24  1338 11/18/24  1811   SODIUM mmol/L 142 143 144 148* 149* 147*   POTASSIUM mmol/L 3.4* 3.4* 3.3* 3.6 3.9 3.9   CHLORIDE mmol/L 110* 110* 116* 115* 115* 110*   CO2 mmol/L 24 27 22 23 25 29   BUN mg/dL 12 10 11 13 11 17   CREATININE mg/dL 0.64 0.59 0.51 0.66 0.76 0.73   CALCIUM mg/dL 8.3* 8.0* 6.9* 8.4* 8.0* 9.1   PROTEIN TOTAL g/dL  --   --   --  5.8* 6.1* 6.8   BILIRUBIN TOTAL mg/dL  --   --   --  0.5 0.6 0.6   ALK PHOS U/L  --   --   --  65 73 81   ALT U/L  --   --   --  11 10 17   AST U/L  --   --   --  9 13 13   GLUCOSE mg/dL 259* 164* 132* 180* 244* 160*     Results from last 7 days   Lab Units 11/20/24  0124 11/19/24  1338 11/18/24  1811   BILIRUBIN TOTAL mg/dL 0.5 0.6 0.6   BILIRUBIN DIRECT mg/dL 0.1 0.1  --            I have reviewed all medications, laboratory results, and imaging pertinent for today's encounter.

## 2024-11-24 VITALS
HEIGHT: 66 IN | HEART RATE: 71 BPM | WEIGHT: 132.28 LBS | OXYGEN SATURATION: 95 % | BODY MASS INDEX: 21.26 KG/M2 | RESPIRATION RATE: 15 BRPM | TEMPERATURE: 97.3 F | DIASTOLIC BLOOD PRESSURE: 75 MMHG | SYSTOLIC BLOOD PRESSURE: 132 MMHG

## 2024-11-24 LAB
ALBUMIN SERPL BCP-MCNC: 3.2 G/DL (ref 3.4–5)
ANION GAP SERPL CALC-SCNC: 12 MMOL/L (ref 10–20)
BACTERIA UR CULT: NORMAL
BUN SERPL-MCNC: 7 MG/DL (ref 6–23)
CALCIUM SERPL-MCNC: 8.2 MG/DL (ref 8.6–10.6)
CHLORIDE SERPL-SCNC: 110 MMOL/L (ref 98–107)
CO2 SERPL-SCNC: 22 MMOL/L (ref 21–32)
CREAT SERPL-MCNC: 0.55 MG/DL (ref 0.5–1.05)
EGFRCR SERPLBLD CKD-EPI 2021: >90 ML/MIN/1.73M*2
GLUCOSE BLD MANUAL STRIP-MCNC: 198 MG/DL (ref 74–99)
GLUCOSE BLD MANUAL STRIP-MCNC: 218 MG/DL (ref 74–99)
GLUCOSE BLD MANUAL STRIP-MCNC: 250 MG/DL (ref 74–99)
GLUCOSE SERPL-MCNC: 234 MG/DL (ref 74–99)
MAGNESIUM SERPL-MCNC: 1.89 MG/DL (ref 1.6–2.4)
PHOSPHATE SERPL-MCNC: 2.2 MG/DL (ref 2.5–4.9)
POTASSIUM SERPL-SCNC: 3.7 MMOL/L (ref 3.5–5.3)
SODIUM SERPL-SCNC: 140 MMOL/L (ref 136–145)

## 2024-11-24 PROCEDURE — 1100000001 HC PRIVATE ROOM DAILY

## 2024-11-24 PROCEDURE — 2500000001 HC RX 250 WO HCPCS SELF ADMINISTERED DRUGS (ALT 637 FOR MEDICARE OP): Performed by: PHYSICIAN ASSISTANT

## 2024-11-24 PROCEDURE — 80069 RENAL FUNCTION PANEL: CPT | Performed by: PHYSICIAN ASSISTANT

## 2024-11-24 PROCEDURE — 99232 SBSQ HOSP IP/OBS MODERATE 35: CPT

## 2024-11-24 PROCEDURE — 2500000004 HC RX 250 GENERAL PHARMACY W/ HCPCS (ALT 636 FOR OP/ED): Performed by: PHYSICIAN ASSISTANT

## 2024-11-24 PROCEDURE — 36415 COLL VENOUS BLD VENIPUNCTURE: CPT | Performed by: PHYSICIAN ASSISTANT

## 2024-11-24 PROCEDURE — 2500000001 HC RX 250 WO HCPCS SELF ADMINISTERED DRUGS (ALT 637 FOR MEDICARE OP)

## 2024-11-24 PROCEDURE — 83735 ASSAY OF MAGNESIUM: CPT | Performed by: PHYSICIAN ASSISTANT

## 2024-11-24 PROCEDURE — 82947 ASSAY GLUCOSE BLOOD QUANT: CPT

## 2024-11-24 PROCEDURE — 94640 AIRWAY INHALATION TREATMENT: CPT

## 2024-11-24 PROCEDURE — 2500000004 HC RX 250 GENERAL PHARMACY W/ HCPCS (ALT 636 FOR OP/ED)

## 2024-11-24 PROCEDURE — 2500000002 HC RX 250 W HCPCS SELF ADMINISTERED DRUGS (ALT 637 FOR MEDICARE OP, ALT 636 FOR OP/ED): Performed by: PHYSICIAN ASSISTANT

## 2024-11-24 PROCEDURE — 2500000005 HC RX 250 GENERAL PHARMACY W/O HCPCS

## 2024-11-24 ASSESSMENT — PAIN - FUNCTIONAL ASSESSMENT
PAIN_FUNCTIONAL_ASSESSMENT: WONG-BAKER FACES
PAIN_FUNCTIONAL_ASSESSMENT: WONG-BAKER FACES
PAIN_FUNCTIONAL_ASSESSMENT: PAINAD (PAIN ASSESSMENT IN ADVANCED DEMENTIA SCALE)

## 2024-11-24 ASSESSMENT — COGNITIVE AND FUNCTIONAL STATUS - GENERAL
EATING MEALS: TOTAL
TURNING FROM BACK TO SIDE WHILE IN FLAT BAD: TOTAL
MOVING TO AND FROM BED TO CHAIR: TOTAL
MOVING FROM LYING ON BACK TO SITTING ON SIDE OF FLAT BED WITH BEDRAILS: TOTAL
HELP NEEDED FOR BATHING: TOTAL
TOILETING: TOTAL
CLIMB 3 TO 5 STEPS WITH RAILING: TOTAL
PERSONAL GROOMING: TOTAL
DRESSING REGULAR UPPER BODY CLOTHING: TOTAL
MOBILITY SCORE: 6
STANDING UP FROM CHAIR USING ARMS: TOTAL
WALKING IN HOSPITAL ROOM: TOTAL
DRESSING REGULAR LOWER BODY CLOTHING: TOTAL
DAILY ACTIVITIY SCORE: 6

## 2024-11-24 ASSESSMENT — PAIN SCALES - GENERAL: PAINLEVEL_OUTOF10: 0 - NO PAIN

## 2024-11-24 ASSESSMENT — PAIN SCALES - WONG BAKER: WONGBAKER_NUMERICALRESPONSE: NO HURT

## 2024-11-24 NOTE — CARE PLAN
The patient's goals for the shift include      The clinical goals for the shift include pt will remain comfortable throughout shift      Problem: Skin  Goal: Decreased wound size/increased tissue granulation at next dressing change  Outcome: Progressing  Flowsheets (Taken 11/23/2024 2202)  Decreased wound size/increased tissue granulation at next dressing change:   Protective dressings over bony prominences   Promote sleep for wound healing  Goal: Participates in plan/prevention/treatment measures  Outcome: Progressing  Flowsheets (Taken 11/23/2024 2202)  Participates in plan/prevention/treatment measures: Elevate heels  Goal: Prevent/manage excess moisture  Outcome: Progressing  Flowsheets (Taken 11/23/2024 2202)  Prevent/manage excess moisture:   Monitor for/manage infection if present   Follow provider orders for dressing changes   Moisturize dry skin   Cleanse incontinence/protect with barrier cream  Goal: Prevent/minimize sheer/friction injuries  Outcome: Progressing  Flowsheets (Taken 11/23/2024 2202)  Prevent/minimize sheer/friction injuries:   Utilize specialty bed per algorithm   HOB 30 degrees or less   Turn/reposition every 2 hours/use positioning/transfer devices  Goal: Promote/optimize nutrition  Outcome: Progressing  Flowsheets (Taken 11/23/2024 2202)  Promote/optimize nutrition:   Offer water/supplements/favorite foods   Consume > 50% meals/supplements   Monitor/record intake including meals  Goal: Promote skin healing  Outcome: Progressing  Flowsheets (Taken 11/23/2024 2202)  Promote skin healing: Turn/reposition every 2 hours/use positioning/transfer devices     Problem: Fall/Injury  Goal: Not fall by end of shift  Outcome: Progressing  Goal: Be free from injury by end of the shift  Outcome: Progressing  Goal: Verbalize understanding of personal risk factors for fall in the hospital  Outcome: Progressing  Goal: Verbalize understanding of risk factor reduction measures to prevent injury from fall in  the home  Outcome: Progressing  Goal: Use assistive devices by end of the shift  Outcome: Progressing  Goal: Pace activities to prevent fatigue by end of the shift  Outcome: Progressing     Problem: Safety - Medical Restraint  Goal: Remains free of injury from restraints (Restraint for Interference with Medical Device)  Outcome: Progressing  Goal: Free from restraint(s) (Restraint for Interference with Medical Device)  Outcome: Progressing     Problem: Pain - Adult  Goal: Verbalizes/displays adequate comfort level or baseline comfort level  Outcome: Progressing     Problem: Safety - Adult  Goal: Free from fall injury  Outcome: Progressing     Problem: Discharge Planning  Goal: Discharge to home or other facility with appropriate resources  Outcome: Progressing     Problem: Chronic Conditions and Co-morbidities  Goal: Patient's chronic conditions and co-morbidity symptoms are monitored and maintained or improved  Outcome: Progressing     Problem: Pain  Goal: Takes deep breaths with improved pain control throughout the shift  Outcome: Progressing  Goal: Turns in bed with improved pain control throughout the shift  Outcome: Progressing  Goal: Walks with improved pain control throughout the shift  Outcome: Progressing  Goal: Performs ADL's with improved pain control throughout shift  Outcome: Progressing  Goal: Participates in PT with improved pain control throughout the shift  Outcome: Progressing  Goal: Free from opioid side effects throughout the shift  Outcome: Progressing  Goal: Free from acute confusion related to pain meds throughout the shift  Outcome: Progressing     Problem: Diabetes  Goal: Achieve decreasing blood glucose levels by end of shift  Outcome: Progressing  Goal: Increase stability of blood glucose readings by end of shift  Outcome: Progressing  Goal: Maintain electrolyte levels within acceptable range throughout shift  Outcome: Progressing  Goal: Maintain glucose levels >70mg/dl to <250mg/dl  throughout shift  Outcome: Progressing  Goal: No changes in neurological exam by end of shift  Outcome: Progressing  Goal: Vital signs within normal range for age by end of shift  Outcome: Progressing

## 2024-11-24 NOTE — PROGRESS NOTES
[facility]  TRAUMA SERVICE - PROGRESS NOTE    Patient Name: Loren Mitchell  MRN: [id number]  Admit Date: 1119  : 1945  AGE: 79 y.o.   GENDER: female  ==============================================================================  MECHANISM OF INJURY:   79 YOF s/p fall     LOC (yes/no?): unknown  Anticoagulant / Anti-platelet Rx? (for what dx?): no  Referring Facility Name (N/A for scene EMR run): [facility]    Injuries/problems:  - L SDH with midline shift  - dysphagia acute on chronic  - Hx A/O x0-1 at baseline end stage dementia, HTN, recurrent UTIs, CAD, HLD, depression, type II DM (insulin dependent)    INCIDENTAL FINDINGS:  none    PROCEDURES:  : L inez hole x2 with SD evacuation    ==============================================================================  TODAY'S ASSESSMENT AND PLAN OF CARE:  ## SDH  - NSGY s/o        -> okay chemoppx       -> fu in 2 weeks with [facility] (arranged)  - q4h neuro checks  - cth stable  for worsened mentation  - Continue Keppra 500mg x7d (end pm). Iv for now until proves taking PO effectively  - Pain control with PRN Tylenol  - PT/OT    ## Recurrent UTI  - Initially started on Nitrofurantoin empirically (stop  as negative UA). New ua neg  o/n neg, pending cx for 38.1 temp  - no leukocytosis  - cxr no infiltrate, but atelectasis. Have RT see BID for ezpap since cannot participate in self hygiene with IS    ## PMHx  - Continue home statin, Prozac, Synthroid, Memantine  - holding home 10 meqK, telmisartan for now    ## FEN/GI: dysphagia, dm. A1C 7.4 this admit  - SLP rec minced/moist/thin liquids, family amenable to short term dobhoff if warranted, patient having increased PO intake today, hold off on DHT for now  - andres counts  - recent BM  - phos replete, recheck tomorrow. Creat nml  - with meals #3 lispro. Holding home lantus, trulicity, glimepiride with poor intake    ## PPX  - SCDs  - LVX    Social:  dnr/dni on code status discussion    Dispo: continue care on RNF, SNF early this coming week    Patient discussed with attending, Dr. Sade JacomerKYUNG  Trauma, Critical Care, and Acute Care Surgery  Floor: [id number]   TSICU: [id number]   ==============================================================================  CHIEF COMPLAINT / OVERNIGHT EVENTS:   Patient unable to voice concerns.    MEDICAL HISTORY / ROS:  Admission history and ROS reviewed. Pertinent changes as follows:    PHYSICAL EXAM:  Heart Rate:  [64-74]   Temp:  [36.3 °C (97.4 °F)-37.6 °C (99.6 °F)]   Resp:  [14-18]   BP: (146-183)/(69-94)   SpO2:  [93 %-97 %]     Physical Exam:  General: frail, appears stated age  HEENT: left head with incision c/d/i and SD drain now removed, no scleral icterus, PERRL, iEOM, dry moist mucous membranes  Cardiovascular: RRR, capillary refill <3sec, 2+ palpable radial, femoral , DP/PT bilaterally  Pulmonary: breathing comfortably on room air  Abdomen: soft, nondistended, nonperitonitic  Skin: warm and dry. Incision on left scalp OR dressing removed and  c/d/I. SD drain now removed  Neuro: A/O x0 ( baseline), GCS 8 (E1, V2, M5) in AM, improved to GCS 12 (E4, V3, M5)  Lines: PIVs    IMAGING SUMMARY:  (summary of new imaging findings, not a copy of dictation)  No new imaging    LABS:  Results from last 7 days   Lab Units 11/23/24  0735 11/21/24  0353 11/20/24  0124 11/19/24  0706 11/18/24  1811   WBC AUTO x10*3/uL 9.4 10.6 10.9   < > 10.0   HEMOGLOBIN g/dL 11.2* 10.5* 11.0*   < > 13.0   HEMATOCRIT % 32.8* 31.6* 34.8*   < > 39.0   PLATELETS AUTO x10*3/uL 181 183 211   < > 237   NEUTROS PCT AUTO % 71.1  --   --   --  68.0   LYMPHS PCT AUTO % 19.7  --   --   --  24.3   MONOS PCT AUTO % 8.0  --   --   --  5.5   EOS PCT AUTO % 0.4  --   --   --  1.3    < > = values in this interval not displayed.     Results from last 7 days   Lab Units 11/20/24  0124 11/19/24  1338 11/18/24  1811   APTT seconds 22* 26* 31   INR  1.0  1.1 1.1     Results from last 7 days   Lab Units 11/24/24  0753 11/23/24  0735 11/22/24  0738 11/21/24  0353 11/20/24  0124 11/19/24  1338 11/18/24  1811   SODIUM mmol/L 140 142 143   < > 148* 149* 147*   POTASSIUM mmol/L 3.7 3.4* 3.4*   < > 3.6 3.9 3.9   CHLORIDE mmol/L 110* 110* 110*   < > 115* 115* 110*   CO2 mmol/L 22 24 27   < > 23 25 29   BUN mg/dL 7 12 10   < > 13 11 17   CREATININE mg/dL 0.55 0.64 0.59   < > 0.66 0.76 0.73   CALCIUM mg/dL 8.2* 8.3* 8.0*   < > 8.4* 8.0* 9.1   PROTEIN TOTAL g/dL  --   --   --   --  5.8* 6.1* 6.8   BILIRUBIN TOTAL mg/dL  --   --   --   --  0.5 0.6 0.6   ALK PHOS U/L  --   --   --   --  65 73 81   ALT U/L  --   --   --   --  11 10 17   AST U/L  --   --   --   --  9 13 13   GLUCOSE mg/dL 234* 259* 164*   < > 180* 244* 160*    < > = values in this interval not displayed.     Results from last 7 days   Lab Units 11/20/24  0124 11/19/24  1338 11/18/24  1811   BILIRUBIN TOTAL mg/dL 0.5 0.6 0.6   BILIRUBIN DIRECT mg/dL 0.1 0.1  --            I have reviewed all medications, laboratory results, and imaging pertinent for today's encounter.

## 2024-11-24 NOTE — PROGRESS NOTES
SW attempted to contact patient's  and son to discuss Agapito Leeates request that patient go to a SNF before returning home.  No answer.  A SNF list was sent to patient's son by previous BENNY Rodriguez and choices were identified electronically, so referrals were sent to those facilities that were selected:  [facility], [facility], [facility], [facility], [facility], and several more.  BENNY will continue to follow to assist with a  safe discharge plan.

## 2024-11-24 NOTE — CARE PLAN
Problem: Skin  Goal: Promote/optimize nutrition  Outcome: Progressing  Goal: Promote skin healing  Outcome: Progressing     Problem: Fall/Injury  Goal: Not fall by end of shift  Outcome: Progressing  Goal: Be free from injury by end of the shift  Outcome: Progressing     Problem: Safety - Medical Restraint  Goal: Remains free of injury from restraints (Restraint for Interference with Medical Device)  Outcome: Progressing  Goal: Free from restraint(s) (Restraint for Interference with Medical Device)  Outcome: Progressing     Problem: Pain - Adult  Goal: Verbalizes/displays adequate comfort level or baseline comfort level  Outcome: Progressing     Problem: Safety - Adult  Goal: Free from fall injury  Outcome: Progressing     Problem: Discharge Planning  Goal: Discharge to home or other facility with appropriate resources  Outcome: Progressing     Problem: Pain  Goal: Takes deep breaths with improved pain control throughout the shift  Outcome: Progressing  Goal: Turns in bed with improved pain control throughout the shift  Outcome: Progressing     Problem: Diabetes  Goal: Increase stability of blood glucose readings by end of shift  Outcome: Progressing  Goal: Maintain electrolyte levels within acceptable range throughout shift  Outcome: Progressing  Goal: Maintain glucose levels >70mg/dl to <250mg/dl throughout shift  Outcome: Progressing   The patient's goals for the shift include defer    The clinical goals for the shift include pt will be HDS

## 2024-11-24 NOTE — NURSING NOTE
I was not able to feed her or get her to drink water 11/23 in the am or afternoon. I tried using a straw and she was unable to decipher how to use the straw or sip the water. I tried feeding her myself and I could not get her to understand what to do with the food once I got it to her mouth. She was confused and slightly combative. I was confused as to how to get her to take her meds that she needed to take (such as her memantine, prozac, etc) She was also unable to take her pills or even with the applesauce. I did notify Fer Doherty, and Dr. Stuart. I suggested maybe a dobhoff or feeding tube. Once the order was changed to moist/wet soft her family came in and and her  was able to feed her over an hours worth of time and patience. We did not have pills to give her at that time so I was unable to try again. But she did eat about 75% of food and her output of urine was 1000cc's and she had 2 large bowel movements. I was a little concerned at the fact that she would not sit straight. She wanted to sideways and rest her head against the side rail even when sitting upright to drink or eat. she would get combative if we tried to sit her straight up.  She also seems a lot more disconnected mentally. She has been sleeping for long hours and once she is asleep, she is hard to arouse.

## 2024-11-25 LAB
ALBUMIN SERPL BCP-MCNC: 3.3 G/DL (ref 3.4–5)
ANION GAP SERPL CALC-SCNC: 13 MMOL/L (ref 10–20)
BUN SERPL-MCNC: 8 MG/DL (ref 6–23)
CALCIUM SERPL-MCNC: 8.3 MG/DL (ref 8.6–10.6)
CHLORIDE SERPL-SCNC: 109 MMOL/L (ref 98–107)
CO2 SERPL-SCNC: 20 MMOL/L (ref 21–32)
CREAT SERPL-MCNC: 0.45 MG/DL (ref 0.5–1.05)
EGFRCR SERPLBLD CKD-EPI 2021: >90 ML/MIN/1.73M*2
ERYTHROCYTE [DISTWIDTH] IN BLOOD BY AUTOMATED COUNT: 13.2 % (ref 11.5–14.5)
GLUCOSE BLD MANUAL STRIP-MCNC: 191 MG/DL (ref 74–99)
GLUCOSE BLD MANUAL STRIP-MCNC: 193 MG/DL (ref 74–99)
GLUCOSE BLD MANUAL STRIP-MCNC: 204 MG/DL (ref 74–99)
GLUCOSE BLD MANUAL STRIP-MCNC: 226 MG/DL (ref 74–99)
GLUCOSE SERPL-MCNC: 205 MG/DL (ref 74–99)
HCT VFR BLD AUTO: 34.4 % (ref 36–46)
HGB BLD-MCNC: 11.5 G/DL (ref 12–16)
MAGNESIUM SERPL-MCNC: 1.77 MG/DL (ref 1.6–2.4)
MCH RBC QN AUTO: 31.6 PG (ref 26–34)
MCHC RBC AUTO-ENTMCNC: 33.4 G/DL (ref 32–36)
MCV RBC AUTO: 95 FL (ref 80–100)
NRBC BLD-RTO: 0 /100 WBCS (ref 0–0)
PHOSPHATE SERPL-MCNC: 2.9 MG/DL (ref 2.5–4.9)
PLATELET # BLD AUTO: 202 X10*3/UL (ref 150–450)
POTASSIUM SERPL-SCNC: 3.9 MMOL/L (ref 3.5–5.3)
RBC # BLD AUTO: 3.64 X10*6/UL (ref 4–5.2)
SODIUM SERPL-SCNC: 138 MMOL/L (ref 136–145)
WBC # BLD AUTO: 7.8 X10*3/UL (ref 4.4–11.3)

## 2024-11-25 PROCEDURE — 2500000004 HC RX 250 GENERAL PHARMACY W/ HCPCS (ALT 636 FOR OP/ED): Performed by: PHYSICIAN ASSISTANT

## 2024-11-25 PROCEDURE — 36415 COLL VENOUS BLD VENIPUNCTURE: CPT

## 2024-11-25 PROCEDURE — 2500000001 HC RX 250 WO HCPCS SELF ADMINISTERED DRUGS (ALT 637 FOR MEDICARE OP): Performed by: PHYSICIAN ASSISTANT

## 2024-11-25 PROCEDURE — 1100000001 HC PRIVATE ROOM DAILY

## 2024-11-25 PROCEDURE — 2500000002 HC RX 250 W HCPCS SELF ADMINISTERED DRUGS (ALT 637 FOR MEDICARE OP, ALT 636 FOR OP/ED): Performed by: PHYSICIAN ASSISTANT

## 2024-11-25 PROCEDURE — 92526 ORAL FUNCTION THERAPY: CPT | Mod: GN

## 2024-11-25 PROCEDURE — 82947 ASSAY GLUCOSE BLOOD QUANT: CPT

## 2024-11-25 PROCEDURE — 2500000001 HC RX 250 WO HCPCS SELF ADMINISTERED DRUGS (ALT 637 FOR MEDICARE OP)

## 2024-11-25 PROCEDURE — 80069 RENAL FUNCTION PANEL: CPT

## 2024-11-25 PROCEDURE — 85027 COMPLETE CBC AUTOMATED: CPT

## 2024-11-25 PROCEDURE — 83735 ASSAY OF MAGNESIUM: CPT

## 2024-11-25 ASSESSMENT — COGNITIVE AND FUNCTIONAL STATUS - GENERAL
DAILY ACTIVITIY SCORE: 12
EATING MEALS: A LOT
TURNING FROM BACK TO SIDE WHILE IN FLAT BAD: A LITTLE
WALKING IN HOSPITAL ROOM: A LITTLE
STANDING UP FROM CHAIR USING ARMS: A LITTLE
HELP NEEDED FOR BATHING: A LOT
TOILETING: A LOT
MOVING TO AND FROM BED TO CHAIR: A LITTLE
CLIMB 3 TO 5 STEPS WITH RAILING: A LOT
DRESSING REGULAR LOWER BODY CLOTHING: A LOT
MOBILITY SCORE: 18
DRESSING REGULAR UPPER BODY CLOTHING: A LOT
PERSONAL GROOMING: A LOT

## 2024-11-25 ASSESSMENT — LIFESTYLE VARIABLES
HOW OFTEN DO YOU HAVE A DRINK CONTAINING ALCOHOL: PATIENT UNABLE TO ANSWER
AUDIT-C TOTAL SCORE: -1
HOW OFTEN DO YOU HAVE 6 OR MORE DRINKS ON ONE OCCASION: PATIENT UNABLE TO ANSWER
AUDIT-C TOTAL SCORE: -1
HOW MANY STANDARD DRINKS CONTAINING ALCOHOL DO YOU HAVE ON A TYPICAL DAY: PATIENT UNABLE TO ANSWER
SKIP TO QUESTIONS 9-10: 0

## 2024-11-25 ASSESSMENT — PAIN SCALES - WONG BAKER: WONGBAKER_NUMERICALRESPONSE: NO HURT

## 2024-11-25 ASSESSMENT — PAIN - FUNCTIONAL ASSESSMENT: PAIN_FUNCTIONAL_ASSESSMENT: WONG-BAKER FACES

## 2024-11-25 NOTE — PROGRESS NOTES
Speech-Language Pathology  Adult Inpatient Swallow Treatment    Patient Name: Loren Mitchell  MRN: [id number]  Today's Date: 11/25/2024   Start Time: 1025  Stop Time: 1040  Time Calculation (min): 15    Impression:   Swallow re-assessment completed 2/2 RN reports pt w/ significant difficulty w/ PO intake the previous date; i.e., unable to draw liquid through straw + absent bolus manipulation ? 2/2 increased confusion compared to initial eval. Pt awake/alert and upright in bed for session. Frequent tangential/unintelligible speech and inconsistently able to follow commands.     Trials of ice chip x1, tsp sips water x3, single/consecutive sips water via straw, and 3 oz protocol via straw/cup were given/attempted. Normal oral management of trials w/ adequate oral acceptance. Pt unable to successfully complete 3 oz protocol via straw/cup despite MAX cues/encouragement. Consumed ~9 oz water total though not consecutively. Immediate cough x1 w/ final straw sips water. No further trials given 2/2 c/f aspiration.     Pt no longer demonstrating swallowing safety/efficiency w/ oral diet. Recommend NPO w/ frequent aggressive oral care. 10 ice chips or tsp sips water per hour allowed w/ nursing for pleasure, safe swallow stimulation, and after oral care to prevent buildup of bacteria within the oropharynx. Additionally question need for GOC discussions re: assuming the risk of aspiration w/ continued oral intake given baseline dementia/chronic disease progression; i.e., suspect pt will remain at risk for aspiration, malnutrition and dehydration regardless of any further evaluation/instrumental assessment. MD aware.      Recommendations:  NPO with frequent aggressive oral care.  10 ice chips or tsp sips water per hour allowed for pleasure, safe swallow stimulation, and after oral care to prevent buildup of bacteria within the oropharynx  Question need for GOC discussions re: assuming the risk of aspiration w/ continued oral  intake    Goal:   Pt will tolerate least restrictive diet with no overt clinical s/s aspiration 100% of the time.   Start Date: 11/23/2024  End Date: 12/23/2024  Status: Not Progressing       Plan:  SLP Services Indicated: Yes  Frequency: pending GOC discussions  Discussed POC with patient  SLP - OK to Discharge    Pain:   0-10  0 = No pain.     Inpatient Education:  Extensive education provided to patient regarding current swallow function, recommendations/results, and POC.      Consultations/Referrals/Coordination of Services:   GOC

## 2024-11-25 NOTE — PROGRESS NOTES
BENNY spoke with patient son, Michael [phone]. He confirmed that he wanted referrals sent to 1) [facility] ([facility]), 2) [facility] and 3) [facility]. [facility] currently checking for OON benefits, but [facility] is able to accept. Michael is aware. BENNY will continue to follow to facilitate discharge plan.       LAZARA Jackson

## 2024-11-25 NOTE — CONSULTS
"Nutrition Initial Assessment:   Nutrition Assessment    Reason for Assessment: Calorie count    Patient is a 79 y.o. female admitted s/p fall, injuries include a SDH. PMHx is significant for dementia-- baseline is A+O x1. S/p bedside swallow with SLP today recommending NPO. Unclear what GOC/plan of care is.      Nutrition History:  Energy Intake: Good > 75 %  Food and Nutrient History: RDN met with pt's spouse and son who reported that pt was eating well without issue prior to admission. Per  pt has resided in the nursing home for several years and has gained weight since admission (128 lbs up to 150 lbs).  reports that she will sit in the dining room and eat, sometimes it takes her up to 90 mins to eat a meal.  Food Allergies/Intolerances:  None  GI Symptoms: None  Oral Problems: Swallowing difficulty       Anthropometrics:  Height: 167.6 cm (5' 6\")   Weight: 60 kg (132 lb 4.4 oz)   BMI (Calculated): 21.36  IBW/kg (Dietitian Calculated): 59.1 kg  Weight History:   Wt Readings from Last 20 Encounters:   11/19/24 60 kg (132 lb 4.4 oz)   11/18/24 60 kg (132 lb 4.4 oz)   09/16/20 58.1 kg (128 lb)   05/13/20 59 kg (130 lb)   01/15/20 59 kg (130 lb)     Nutrition Focused Physical Exam Findings:    Subcutaneous Fat Loss:   Orbital Fat Pads: Well nourished (slightly bulging fat pads)  Buccal Fat Pads: Well nourished (full, rounded cheeks)  Triceps: Well nourished (ample fat tissue)  Muscle Wasting:  Temporalis: Well nourished (well-defined muscle)  Pectoralis (Clavicular Region): Well nourished (clavicle not visible)  Deltoid/Trapezius: Well nourished (rounded appearance at arm, shoulder, neck)  Interosseous: Well nourished (muscle bulges)  Physical Findings:  Skin:  (incision to heads, sacrum)    Nutrition Significant Labs:  BG POCT trend:   Results from last 7 days   Lab Units 11/25/24  1253 11/25/24  0828 11/24/24  1658 11/24/24  1135 11/24/24  0729   POCT GLUCOSE mg/dL 191* 204* 198* 218* 250*    , Renal " Lab Trend:   Results from last 7 days   Lab Units 11/25/24  0847 11/24/24  0753 11/23/24  0735 11/22/24  0738   POTASSIUM mmol/L 3.9 3.7 3.4* 3.4*   PHOSPHORUS mg/dL 2.9 2.2* 3.2 3.7   SODIUM mmol/L 138 140 142 143   MAGNESIUM mg/dL 1.77 1.89 1.66 1.78   EGFR mL/min/1.73m*2 >90 >90 89 >90   BUN mg/dL 8 7 12 10   CREATININE mg/dL 0.45* 0.55 0.64 0.59        Nutrition Specific Medications:  Scheduled medications  cyanocobalamin, 500 mcg, oral, Daily  insulin lispro, 0-15 Units, subcutaneous, TID AC  levothyroxine, 50 mcg, oral, Daily  polyethylene glycol, 17 g, oral, Daily  sennosides-docusate sodium, 1 tablet, oral, Nightly    I/O:   Last BM Date: 11/24/24; Stool Appearance: Loose (11/24/24 2149)    Dietary Orders (From admission, onward)       Start     Ordered    11/25/24 1139  NPO Diet; Effective now  Diet effective now         11/25/24 1140    11/23/24 0712  Calorie count  Once         11/23/24 0712                     Estimated Needs:   Total Energy Estimated Needs (kCal):  (6127-0107)  Method for Estimating Needs: 25-30 kcal/kg  Total Protein Estimated Needs (g):  (60-70)  Method for Estimating Needs: 1.0-1.2 g/kg  Total Fluid Estimated Needs (mL):  (per team)          Nutrition Diagnosis   Malnutrition Diagnosis  Patient has Malnutrition Diagnosis: No    Nutrition Diagnosis  Patient has Nutrition Diagnosis: Yes  Diagnosis Status (1): New  Nutrition Diagnosis 1: Swallowing difficulity  Related to (1): dysphagia, altered mental status  As Evidenced by (1): SLP sumit 11/25 recommending NPO.       Nutrition Interventions/Recommendations         Nutrition Prescription:  Individualized Nutrition Prescription Provided for : consider GOC discussion -- would recommend resuming diet if family okay with risks/family chooses comfort care otherwise will need to consider dobhoff placement  Given pt currently NPO and no plan for oral intake at this time will discontinue calorie count        Nutrition Interventions:    Interventions: Meals and snacks, Enteral intake  Meals and Snacks: Other (Comment)  diet per family/pt wishes  Enteral Intake: Insert enteral feeding tube  If in line with GOC could place dobhoff and start Isosource 1.5 @ 15 ml/hr and increase by 10 ml q8h to goal rate of 45 ml/hr = 1620 kcal, 73 g protein, 825 ml free H20  Free H20 flush per team        Nutrition Monitoring and Evaluation   Food/Nutrient Related History Monitoring  Monitoring and Evaluation Plan: Energy intake  Energy Intake: Estimated energy intake  Criteria: meet >50% estimated needs      Time Spent (min): 45 minutes

## 2024-11-25 NOTE — CARE PLAN
Problem: Skin  Goal: Promote/optimize nutrition  Outcome: Progressing  Goal: Promote skin healing  Outcome: Progressing     Problem: Fall/Injury  Goal: Not fall by end of shift  Outcome: Progressing  Goal: Be free from injury by end of the shift  Outcome: Progressing     Problem: Pain - Adult  Goal: Verbalizes/displays adequate comfort level or baseline comfort level  Outcome: Progressing     Problem: Safety - Adult  Goal: Free from fall injury  Outcome: Progressing     Problem: Discharge Planning  Goal: Discharge to home or other facility with appropriate resources  Outcome: Progressing     Problem: Pain  Goal: Takes deep breaths with improved pain control throughout the shift  Outcome: Progressing  Goal: Turns in bed with improved pain control throughout the shift  Outcome: Progressing     Problem: Diabetes  Goal: Achieve decreasing blood glucose levels by end of shift  Outcome: Progressing  Goal: Increase stability of blood glucose readings by end of shift  Outcome: Progressing  Goal: Maintain glucose levels >70mg/dl to <250mg/dl throughout shift  Outcome: Progressing   The patient's goals for the shift include defer    The clinical goals for the shift include pt will be HDS

## 2024-11-26 ENCOUNTER — APPOINTMENT (OUTPATIENT)
Dept: CARDIOLOGY | Facility: HOSPITAL | Age: 79
End: 2024-11-26
Payer: MEDICARE

## 2024-11-26 ENCOUNTER — APPOINTMENT (OUTPATIENT)
Dept: RADIOLOGY | Facility: HOSPITAL | Age: 79
End: 2024-11-26
Payer: MEDICARE

## 2024-11-26 LAB
25(OH)D3 SERPL-MCNC: 43 NG/ML (ref 30–100)
GLUCOSE BLD MANUAL STRIP-MCNC: 130 MG/DL (ref 74–99)
GLUCOSE BLD MANUAL STRIP-MCNC: 156 MG/DL (ref 74–99)
GLUCOSE BLD MANUAL STRIP-MCNC: 206 MG/DL (ref 74–99)
GLUCOSE BLD MANUAL STRIP-MCNC: 235 MG/DL (ref 74–99)
TSH SERPL-ACNC: 1.85 MIU/L (ref 0.44–3.98)
VIT B12 SERPL-MCNC: 1269 PG/ML (ref 211–911)

## 2024-11-26 PROCEDURE — 2500000001 HC RX 250 WO HCPCS SELF ADMINISTERED DRUGS (ALT 637 FOR MEDICARE OP): Performed by: PHYSICIAN ASSISTANT

## 2024-11-26 PROCEDURE — 84443 ASSAY THYROID STIM HORMONE: CPT | Performed by: INTERNAL MEDICINE

## 2024-11-26 PROCEDURE — 74018 RADEX ABDOMEN 1 VIEW: CPT

## 2024-11-26 PROCEDURE — 93005 ELECTROCARDIOGRAM TRACING: CPT

## 2024-11-26 PROCEDURE — 99223 1ST HOSP IP/OBS HIGH 75: CPT | Performed by: INTERNAL MEDICINE

## 2024-11-26 PROCEDURE — 82947 ASSAY GLUCOSE BLOOD QUANT: CPT

## 2024-11-26 PROCEDURE — G0316 PR PROLONGED INPATIENT/OBSERVATION EM SVC EA 15 MIN: HCPCS | Performed by: INTERNAL MEDICINE

## 2024-11-26 PROCEDURE — 2500000004 HC RX 250 GENERAL PHARMACY W/ HCPCS (ALT 636 FOR OP/ED): Performed by: PHYSICIAN ASSISTANT

## 2024-11-26 PROCEDURE — 2500000001 HC RX 250 WO HCPCS SELF ADMINISTERED DRUGS (ALT 637 FOR MEDICARE OP)

## 2024-11-26 PROCEDURE — 2500000002 HC RX 250 W HCPCS SELF ADMINISTERED DRUGS (ALT 637 FOR MEDICARE OP, ALT 636 FOR OP/ED): Performed by: PHYSICIAN ASSISTANT

## 2024-11-26 PROCEDURE — 82607 VITAMIN B-12: CPT | Performed by: INTERNAL MEDICINE

## 2024-11-26 PROCEDURE — 36415 COLL VENOUS BLD VENIPUNCTURE: CPT | Performed by: INTERNAL MEDICINE

## 2024-11-26 PROCEDURE — 2500000004 HC RX 250 GENERAL PHARMACY W/ HCPCS (ALT 636 FOR OP/ED)

## 2024-11-26 PROCEDURE — 82306 VITAMIN D 25 HYDROXY: CPT | Performed by: INTERNAL MEDICINE

## 2024-11-26 PROCEDURE — 1100000001 HC PRIVATE ROOM DAILY

## 2024-11-26 RX ORDER — ACETAMINOPHEN 10 MG/ML
1000 INJECTION, SOLUTION INTRAVENOUS EVERY 8 HOURS
Status: DISCONTINUED | OUTPATIENT
Start: 2024-11-26 | End: 2024-11-26

## 2024-11-26 RX ORDER — LANOLIN ALCOHOL/MO/W.PET/CERES
500 CREAM (GRAM) TOPICAL DAILY
Status: DISCONTINUED | OUTPATIENT
Start: 2024-11-27 | End: 2024-11-27

## 2024-11-26 RX ORDER — BISACODYL 10 MG/1
10 SUPPOSITORY RECTAL DAILY
Status: DISCONTINUED | OUTPATIENT
Start: 2024-11-26 | End: 2024-11-26

## 2024-11-26 RX ORDER — MAGNESIUM SULFATE HEPTAHYDRATE 40 MG/ML
2 INJECTION, SOLUTION INTRAVENOUS ONCE
Status: COMPLETED | OUTPATIENT
Start: 2024-11-26 | End: 2024-11-26

## 2024-11-26 RX ORDER — ACETAMINOPHEN 160 MG/5ML
975 SOLUTION ORAL EVERY 8 HOURS SCHEDULED
Status: DISCONTINUED | OUTPATIENT
Start: 2024-11-26 | End: 2024-12-04 | Stop reason: HOSPADM

## 2024-11-26 RX ORDER — LEVOTHYROXINE SODIUM 50 UG/1
50 TABLET ORAL DAILY
Status: DISCONTINUED | OUTPATIENT
Start: 2024-11-27 | End: 2024-12-04 | Stop reason: HOSPADM

## 2024-11-26 RX ORDER — MEMANTINE HYDROCHLORIDE 10 MG/1
10 TABLET ORAL 2 TIMES DAILY
Status: DISCONTINUED | OUTPATIENT
Start: 2024-11-26 | End: 2024-12-04 | Stop reason: HOSPADM

## 2024-11-26 RX ORDER — LEVOTHYROXINE SODIUM ANHYDROUS 100 UG/5ML
37.5 INJECTION, POWDER, LYOPHILIZED, FOR SOLUTION INTRAVENOUS
Status: DISCONTINUED | OUTPATIENT
Start: 2024-11-29 | End: 2024-11-26

## 2024-11-26 RX ORDER — SODIUM CHLORIDE 9 MG/ML
50 INJECTION, SOLUTION INTRAVENOUS CONTINUOUS
Status: DISCONTINUED | OUTPATIENT
Start: 2024-11-26 | End: 2024-11-27

## 2024-11-26 RX ORDER — POLYETHYLENE GLYCOL 3350 17 G/17G
17 POWDER, FOR SOLUTION ORAL DAILY
Status: DISCONTINUED | OUTPATIENT
Start: 2024-11-27 | End: 2024-12-04 | Stop reason: HOSPADM

## 2024-11-26 RX ORDER — FLUOXETINE 20 MG/5ML
40 SOLUTION ORAL DAILY
Status: DISCONTINUED | OUTPATIENT
Start: 2024-11-27 | End: 2024-12-04 | Stop reason: HOSPADM

## 2024-11-26 RX ORDER — INSULIN LISPRO 100 [IU]/ML
0-15 INJECTION, SOLUTION INTRAVENOUS; SUBCUTANEOUS EVERY 6 HOURS
Status: DISCONTINUED | OUTPATIENT
Start: 2024-11-26 | End: 2024-12-04 | Stop reason: HOSPADM

## 2024-11-26 RX ORDER — ACETAMINOPHEN 10 MG/ML
1000 INJECTION, SOLUTION INTRAVENOUS EVERY 8 HOURS PRN
Status: DISCONTINUED | OUTPATIENT
Start: 2024-11-26 | End: 2024-11-26

## 2024-11-26 RX ORDER — ATORVASTATIN CALCIUM 10 MG/1
10 TABLET, FILM COATED ORAL EVERY EVENING
Status: DISCONTINUED | OUTPATIENT
Start: 2024-11-26 | End: 2024-12-04 | Stop reason: HOSPADM

## 2024-11-26 RX ORDER — BISACODYL 10 MG/1
10 SUPPOSITORY RECTAL DAILY PRN
Status: DISCONTINUED | OUTPATIENT
Start: 2024-11-26 | End: 2024-11-27

## 2024-11-26 RX ORDER — FLUOXETINE 20 MG/5ML
40 SOLUTION ORAL DAILY
Status: DISCONTINUED | OUTPATIENT
Start: 2024-11-26 | End: 2024-11-26

## 2024-11-26 RX ORDER — ACETAMINOPHEN 500 MG
5 TABLET ORAL NIGHTLY
Status: DISCONTINUED | OUTPATIENT
Start: 2024-11-26 | End: 2024-12-04 | Stop reason: HOSPADM

## 2024-11-26 ASSESSMENT — PAIN SCALES - GENERAL
PAINLEVEL_OUTOF10: 0 - NO PAIN
PAINLEVEL_OUTOF10: 0 - NO PAIN

## 2024-11-26 NOTE — CARE PLAN
The patient's goals for the shift include      The clinical goals for the shift include pt will be HDS

## 2024-11-26 NOTE — CARE PLAN
The patient's goals for the shift include  patient free from falls    The clinical goals for the shift include patient free from falls

## 2024-11-26 NOTE — PROGRESS NOTES
[facility]  TRAUMA SERVICE - PROGRESS NOTE    Patient Name: Loren Mitchell  MRN: [id number]  Admit Date: 1119  : 1945  AGE: 79 y.o.   GENDER: female  ==============================================================================  MECHANISM OF INJURY:   79 YOF s/p fall     LOC (yes/no?): unknown  Anticoagulant / Anti-platelet Rx? (for what dx?): no  Referring Facility Name (N/A for scene EMR run): [facility]    Injuries/problems:  - L SDH with midline shift  - dysphagia acute on chronic  - Hx A/O x0-1 at baseline end stage dementia, HTN, recurrent UTIs, CAD, HLD, depression, type II DM (insulin dependent)    INCIDENTAL FINDINGS:  none    PROCEDURES:  : L inez hole x2 with SD evacuation    ==============================================================================  TODAY'S ASSESSMENT AND PLAN OF CARE:  ## SDH  ###End stage dementia baseline Aox0-1 at baseline.   - NSGY s/o        -> okay chemoppx       -> fu in 2 weeks with [facility] (arranged)  - q4h neuro checks  - cth stable  for worsened mentation  - Continue Keppra 500mg x7d (end pm). Iv for now until proves taking PO effectively  - Pain control with PRN Tylenol  - PT/OT    ## Recurrent UTI  - Initially started on Nitrofurantoin empirically (stop  as negative UA). New ua neg  o/n neg, urine Cx final no growth (final read )  - no leukocytosis  - cxr no infiltrate, but atelectasis. Have RT see BID for ezpap since cannot participate in self hygiene with IS    ## PMHx  - Continue home statin, Prozac, Synthroid, Memantine  - holding home 10 meqK, telmisartan for now    ## FEN/GI: dysphagia, dm. A1C 7.4 this admit  - SLP rec minced/moist/thin liquids, family amenable to short term dobhoff if warranted, patient having increased PO intake today, hold off on DHT for now  - andres counts  - recent BM  - Phos wnl  - with meals #3 lispro. Holding home lantus, trulicity, glimepiride with poor  intake    ## PPX  - SCDs  - LVX    Social: dnr/dni on code status discussion    Dispo: continue care on RNF, SNF tentatively this week   SLP recs GOC discussion, 11/26 plan to discuss with family regarding   Patient's pre-hospitalization capabilities and        Patient discussed with attending, Dr. Malini Kemp MD  Trauma, Critical Care, and Acute Care Surgery  Floor: [phone]   TSICU: [id number]   ==============================================================================  CHIEF COMPLAINT / OVERNIGHT EVENTS:   Patient unable to voice concerns. End stage dementia at baseline. Patient reported to have improved PO intake, when seen this AM had untouched dinner tray at bedside, dobhoff decision was delayed in prior context/understanding that patient was improving. Plans to discuss with family regarding patient's pre- and post-hospitalization prognosis and whether current status is necessitating dobhoff or more permanent caloric intake intervention.    GCS:   E4  V2-3 Inappropriate vs incomprehensible word choice  M5-6 Weak, follows questionably    MEDICAL HISTORY / ROS:  Admission history and ROS reviewed. Pertinent changes as follows:    PHYSICAL EXAM:  Heart Rate:  [58-72]   Temp:  [36.2 °C (97.2 °F)-37.2 °C (99 °F)]   Resp:  [15-18]   BP: (112-147)/(63-76)   SpO2:  [9 %-96 %]     Physical Exam:  General: frail, appears stated age  HEENT: left head with incision c/d/I and SD drain now removed, no scleral icterus, reactive pupils BL, EOM intact  Cardiovascular: RRR, capillary refill <3sec, 2+ palpable radial, femoral , DP/PT bilaterally  Pulmonary: breathing comfortably on RA  Abdomen: soft, nondistended, nonperitonitic  Skin: warm and dry. Incision on left scalp OR dressing removed and  c/d/I. SD drain now removed  Neuro: A/O x0 ( baseline), GCS 8 (E1, V2, M5) in AM, improved to GCS 12 (E4, V3, M5)  Lines: PIVs    IMAGING SUMMARY:  (summary of new imaging findings, not a copy of dictation)  No new  imaging    LABS:  Results from last 7 days   Lab Units 11/25/24  0848 11/23/24  0735 11/21/24  0353   WBC AUTO x10*3/uL 7.8 9.4 10.6   HEMOGLOBIN g/dL 11.5* 11.2* 10.5*   HEMATOCRIT % 34.4* 32.8* 31.6*   PLATELETS AUTO x10*3/uL 202 181 183   NEUTROS PCT AUTO %  --  71.1  --    LYMPHS PCT AUTO %  --  19.7  --    MONOS PCT AUTO %  --  8.0  --    EOS PCT AUTO %  --  0.4  --      Results from last 7 days   Lab Units 11/20/24  0124 11/19/24  1338   APTT seconds 22* 26*   INR  1.0 1.1     Results from last 7 days   Lab Units 11/25/24  0847 11/24/24  0753 11/23/24  0735 11/21/24  0353 11/20/24  0124 11/19/24  1338   SODIUM mmol/L 138 140 142   < > 148* 149*   POTASSIUM mmol/L 3.9 3.7 3.4*   < > 3.6 3.9   CHLORIDE mmol/L 109* 110* 110*   < > 115* 115*   CO2 mmol/L 20* 22 24   < > 23 25   BUN mg/dL 8 7 12   < > 13 11   CREATININE mg/dL 0.45* 0.55 0.64   < > 0.66 0.76   CALCIUM mg/dL 8.3* 8.2* 8.3*   < > 8.4* 8.0*   PROTEIN TOTAL g/dL  --   --   --   --  5.8* 6.1*   BILIRUBIN TOTAL mg/dL  --   --   --   --  0.5 0.6   ALK PHOS U/L  --   --   --   --  65 73   ALT U/L  --   --   --   --  11 10   AST U/L  --   --   --   --  9 13   GLUCOSE mg/dL 205* 234* 259*   < > 180* 244*    < > = values in this interval not displayed.     Results from last 7 days   Lab Units 11/20/24  0124 11/19/24  1338   BILIRUBIN TOTAL mg/dL 0.5 0.6   BILIRUBIN DIRECT mg/dL 0.1 0.1           I have reviewed all medications, laboratory results, and imaging pertinent for today's encounter.

## 2024-11-26 NOTE — PROGRESS NOTES
[facility]  TRAUMA SERVICE - PROGRESS NOTE    Patient Name: Loren Mitchell  MRN: [id number]  Admit Date: 1119  : 1945  AGE: 79 y.o.   GENDER: female  ==============================================================================  MECHANISM OF INJURY:   79 YOF s/p fall     LOC (yes/no?): unknown  Anticoagulant / Anti-platelet Rx? (for what dx?): no  Referring Facility Name (N/A for scene EMR run): [facility]    Injuries/problems:  - L SDH with midline shift  - dysphagia acute on chronic  - Hx A/O x0-1 at baseline end stage dementia, HTN, recurrent UTIs, CAD, HLD, depression, type II DM (insulin dependent)    INCIDENTAL FINDINGS:  none    PROCEDURES:  : L inez hole x2 with SD evacuation    ==============================================================================  TODAY'S ASSESSMENT AND PLAN OF CARE:  ## SDH d/t unwitnessed fall  ###End stage dementia baseline Aox0-1 at baseline, lives in memory unit    - NSGY s/o :okay chemoppx; fu in 2 weeks with [facility] (arranged)  - q4h neuro checks  - cth stable  for worsened mentation - unremarkable  - PT/OT    ## Recurrent UTI  - Initially started on Nitrofurantoin empirically (stop  as negative UA). New ua neg  o/n neg, urine Cx final no growth (final read )  - cxr no infiltrate, but atelectasis.   - RT consult placed for  BID for ezpap since cannot participate in self hygiene with IS    ## PMHx  - Continue home statin, Prozac, Synthroid, Memantine   Adjusting medications to dobhoff compliant forms  - holding home 10 meqK, telmisartan for now  - Consulted geriatrics for general prognosis, GOC , and med recommendations   Appreciate recs:    ## FEN/GI: acute on chronic dysphagia, dm. A1C 7.4 this admit  - SLP previously rec minced/moist/thin liquids, due to return to baseline mentation with acute on chronic dysphagia rec'd NPO, Prior notes documenting family amenable to short term dobhoff if  warranted  - Placing dobhoff with initiation of TF after confirmation of placement via KUB  - Daily RFP/Mg given TF  - NS @ 75 while titrate TF  - andres counts per nutrition recs  - recent BM 3x  - Phos wnl  - with meals #3 lispro. Holding home lantus, trulicity, glimepiride with poor intake    ## PPX  - SCDs  - LVX    Social: dnr/dni on code status discussion    Dispo: continue care on RNF, SNF tentatively this week   SLP recs GOC discussion, attempts made to contact  - unsuccessful at both patient's residence and personal phone. Left voicemail to contact the team.    Was able to contact patient via number: [phone]       Patient discussed with attending, Dr. Malini Kemp MD  Trauma, Critical Care, and Acute Care Surgery  Floor: [id number]   TSICU: [id number]   ==============================================================================  CHIEF COMPLAINT / OVERNIGHT EVENTS:   Patient unable to voice concerns. AOx1 (self). End stage dementia at baseline, reportedly at baseline prior to hospital admission. Per nursing family had called asking for updates however provider team was not notified of this. Voicemails left by SW and author. Patient remains NPO limited intake, plan for dobhoff today if cleared by family.    GCS: unchanged  E4  V2-3 Inappropriate vs incomprehensible word choice  M5-6 Weak, follows questionably    MEDICAL HISTORY / ROS:  Admission history and ROS reviewed. Pertinent changes as follows:    PHYSICAL EXAM:  Heart Rate:  [53-74]   Temp:  [35.9 °C (96.6 °F)-37.2 °C (99 °F)]   Resp:  [14-16]   BP: (112-160)/(64-84)   SpO2:  [96 %-98 %]     Physical Exam:  General: frail, appears stated age  HEENT: without scleral icterus, reactive pupils BL, EOM intact  Cardiovascular: RRR, capillary refill <3sec, 2+ palpable radial, femoral , DP/PT bilaterally  Pulmonary: breathing comfortably on RA  Abdomen: soft, nondistended, nonperitonitic  Skin: warm and dry. Incision on left scalp c/d/I.    Neuro: A/O x1 (baseline 0-1),   Lines: PIVs    IMAGING SUMMARY:  (summary of new imaging findings, not a copy of dictation)  No new imaging    LABS:  Results from last 7 days   Lab Units 11/25/24  0848 11/23/24  0735 11/21/24  0353   WBC AUTO x10*3/uL 7.8 9.4 10.6   HEMOGLOBIN g/dL 11.5* 11.2* 10.5*   HEMATOCRIT % 34.4* 32.8* 31.6*   PLATELETS AUTO x10*3/uL 202 181 183   NEUTROS PCT AUTO %  --  71.1  --    LYMPHS PCT AUTO %  --  19.7  --    MONOS PCT AUTO %  --  8.0  --    EOS PCT AUTO %  --  0.4  --      Results from last 7 days   Lab Units 11/20/24  0124 11/19/24  1338   APTT seconds 22* 26*   INR  1.0 1.1     Results from last 7 days   Lab Units 11/25/24  0847 11/24/24  0753 11/23/24  0735 11/21/24  0353 11/20/24  0124 11/19/24  1338   SODIUM mmol/L 138 140 142   < > 148* 149*   POTASSIUM mmol/L 3.9 3.7 3.4*   < > 3.6 3.9   CHLORIDE mmol/L 109* 110* 110*   < > 115* 115*   CO2 mmol/L 20* 22 24   < > 23 25   BUN mg/dL 8 7 12   < > 13 11   CREATININE mg/dL 0.45* 0.55 0.64   < > 0.66 0.76   CALCIUM mg/dL 8.3* 8.2* 8.3*   < > 8.4* 8.0*   PROTEIN TOTAL g/dL  --   --   --   --  5.8* 6.1*   BILIRUBIN TOTAL mg/dL  --   --   --   --  0.5 0.6   ALK PHOS U/L  --   --   --   --  65 73   ALT U/L  --   --   --   --  11 10   AST U/L  --   --   --   --  9 13   GLUCOSE mg/dL 205* 234* 259*   < > 180* 244*    < > = values in this interval not displayed.     Results from last 7 days   Lab Units 11/20/24  0124 11/19/24  1338   BILIRUBIN TOTAL mg/dL 0.5 0.6   BILIRUBIN DIRECT mg/dL 0.1 0.1           I have reviewed all medications, laboratory results, and imaging pertinent for today's encounter.

## 2024-11-26 NOTE — CONSULTS
Inpatient consult to Geriatric Medicine  Consult performed by: Kylah Cerna MD  Consult ordered by: Davi Nayak MD      Primary Team: Trauma    Admit Date: 11/19/2024    Emergency Contact:   Extended Emergency Contact Information  Primary Emergency Contact: Eliot Mitchell  Home Phone: [phone]  Mobile Phone: [phone]  Relation: Spouse  Secondary Emergency Contact: aaliyah Mitchell  Mobile Phone: [phone]  Relation: Son  Preferred language: English   needed? No     Reason For Consult: Geriatric Management, Goals of Care Discussion    History Of Present Illness:  Loren Mitchell is a 79 y.o. female presenting with dementia, T2DM, hypothyroidism, HTN, CKD, HLD, CAD, depression, recurrent UTIs, who presented to ED as transfer following a fall at [facility] with CTH and C-spine at OSH showing large left SDH (acute and subacute components) with rightward midline shift and epidural. On 11/19, 2x Left inez hole was placed for hematoma evacuation with NSGY. Course was complicated by UTI which was treated empirically w/ Nitrofurantoin for 1 day on 11/21. The status of the SDH is overall stable but patient has had fluctuating mental status post-operatively regarding her PO intake. Per primary team, family was amenable to short term Dobhoff tube if needed but  was deferred for now as she showed increased PO intake. Today, the primary team states she is likely near her baseline noted to be confused, difficult to arouse and SLP recommending NPO due to significant aspiration risks.     History per patient:  Unable to communicate    History per family/caregiver: (obtained in addition due to patient’s severe dementia)  Per family, patient has been at the memory care unit since [facility] in 2021. She is largely dependent for all ADLs and iADLs. Her cognition has remained impaired including not recognizing her surroundings but is sometimes able to recognize her family members. She does not speak more  than a few words /day. Of note, family shared that the dysphagia had been a chronic issue before this hospitalization.    Family expressed their desire in moving forward with the Dobhoff tube placement. The overall prognosis for the patient and the inherent risks of agitation / confusion with the tube being in place was reviewed with family. Family expressed interest in seeing how the patient does in the coming days to see if she would return to her baseline.     What matters most to the patient:  Unable to say     Prior to Admission Meds  Prior to Admission Medications   Prescriptions Last Dose Informant Patient Reported? Taking?   FLUoxetine (PROzac) 40 mg capsule  Other Yes Yes   Sig: Take 1 capsule (40 mg) by mouth once daily.   HumaLOG KwikPen Insulin 100 unit/mL injection  Other Yes Yes   Sig: Inject under the skin 3 times daily (morning, midday, late afternoon). 151-200 = 3 units; 201-250 = 5 units; 251-300 = 7 units; 301-350 = 8 units; 351-400 = 9 units; 401-500 = 10 units   Toujeo SoloStar U-300 Insulin 300 unit/mL (1.5 mL) injection  Other Yes Yes   Sig: Inject 51 Units under the skin once daily at bedtime.   Trulicity 0.75 mg/0.5 mL pen injector 10/29/2024 Other Yes Yes   Sig: Inject 0.75 mg under the skin 1 (one) time per week. On Tuesday. Total dose is 2.25mg once weekly   Trulicity 1.5 mg/0.5 mL pen injector injection 10/29/2024 Other Yes Yes   Sig: Inject 1.5 mg under the skin 1 (one) time per week. On Tuesday. Total dose is 2.25mg once weekly   atorvastatin (Lipitor) 10 mg tablet  Other Yes Yes   Sig: Take 1 tablet (10 mg) by mouth once daily in the evening.   cholecalciferol (Vitamin D3) 50 MCG (2000 UT) tablet  Other Yes No   Sig: Take 1 tablet (50 mcg) by mouth once daily.   cyanocobalamin (Vitamin B-12) 500 mcg tablet  Other Yes No   Sig: Take 1 tablet (500 mcg) by mouth once daily.   glimepiride (Amaryl) 1 mg tablet  Other Yes Yes   Sig: Take 1 tablet (1 mg) by mouth once daily in the evening.  Total evening dose is 3mg   glimepiride (Amaryl) 2 mg tablet  Other Yes Yes   Sig: Take 1 tablet (2 mg) by mouth once daily in the evening. Total evening dose is 3mg   glimepiride (Amaryl) 4 mg tablet  Other Yes No   Sig: Take 1 tablet (4 mg) by mouth once daily in the morning.   levothyroxine (Synthroid, Levoxyl) 50 mcg tablet  Other Yes Yes   Sig: Take 1 tablet (50 mcg) by mouth once daily.   loperamide (Imodium A-D) 2 mg tablet  Other Yes No   Sig: Take 0.5-1 tablets (1-2 mg) by mouth 4 times a day as needed for diarrhea.   memantine (Namenda) 10 mg tablet  Other Yes Yes   Sig: Take 1 tablet (10 mg) by mouth 2 times a day.   menthol-zinc oxide (Calmoseptine) 0.44-20.6 % ointment  Other Yes No   Sig: Apply 1 Application topically 4 times a day as needed for irritation.   nitrofurantoin (Macrodantin) 50 mg capsule  Other Yes Yes   Sig: Take 1 capsule (50 mg) by mouth once daily in the evening.   potassium chloride ER (Micro-K) 10 mEq ER capsule  Other Yes Yes   Sig: Take 1 capsule (10 mEq) by mouth once daily.   telmisartan (MIcarDIS) 80 mg tablet  Other Yes Yes   Sig: Take 1 tablet (80 mg) by mouth once daily.      Facility-Administered Medications: None        Current Meds in Hospital  Current Facility-Administered Medications   Medication Dose Route Frequency Provider Last Rate Last Admin    atorvastatin (Lipitor) tablet 10 mg  10 mg oral q PM Fer Gilbert PA-C   10 mg at 11/24/24 2149    cyanocobalamin (Vitamin B-12) tablet 500 mcg  500 mcg oral Daily LISET Del Valle   500 mcg at 11/25/24 0905    enoxaparin (Lovenox) syringe 30 mg  30 mg subcutaneous BID Chas Zendejas PA-C   30 mg at 11/26/24 0916    FLUoxetine (PROzac) capsule 40 mg  40 mg oral Daily Fer Gilbert PA-C   40 mg at 11/25/24 0905    insulin lispro injection 0-15 Units  0-15 Units subcutaneous TID AC Fer Gilbert PA-C   6 Units at 11/25/24 0908    levothyroxine (Synthroid, Levoxyl) tablet 50 mcg  50 mcg oral Daily Fer AVELAR  KYUNG Gilbert   50 mcg at 11/25/24 0905    memantine (Namenda) tablet 10 mg  10 mg oral BID Fer AVELAR NEVIN Gilbert-C   10 mg at 11/25/24 0905    polyethylene glycol (Glycolax, Miralax) packet 17 g  17 g oral Daily Fer AVELAR NEVIN Gilbert-C   17 g at 11/25/24 0905    sennosides-docusate sodium (Prabha-Colace) 8.6-50 mg per tablet 1 tablet  1 tablet oral Nightly Fer G NEVIN Gilbert-C   1 tablet at 11/24/24 2150    sodium chloride 0.9% infusion  75 mL/hr intravenous Continuous Zahrak SANJAY Kemp MD 75 mL/hr at 11/26/24 1125 75 mL/hr at 11/26/24 1125      Past Medical History  Past Medical History:   Diagnosis Date    CKD (chronic kidney disease)     Coronary artery disease     Dementia     Disease of thyroid gland     Hyperlipidemia     Hypertension     Other specified health status     Nonsmoker      Surgical History  Back Surgery, Spinal Fusion, Hysterectomy    Family History  Her family history is not on file.     Social History  SheAlcohol use questions deferred to the physician. Drug use questions deferred to the physician. No history on file for tobacco use.  -Alcohol use: No  -Tobacco use: No  -Illicit drug use: No  -Exercise: ####  -Spiritual needs: Latter day  -Marital Status:   Occupation: RN  Highest Level of Education: College Graduate  : No  Current living environment: Memory Care Facility    Activities of Daily Living:  Basic ADLs: (I= independent, A= assistance, D= dependent)  Bathing: D , Dressing: D , Toileting: D , Transferring: D , Continence: D , Feeding: D     Carreon Index:  0  Instrumental ADLs: (I= independent, A= assistance, D= dependent)  Ability to use phone: D , Shopping: D , Cooking: D , Housekeeping: D , Laundry: D , Transportation: D , Medications: D , Handle Finances: D    Sanford Scale:  0    Allergies  Sulfa (sulfonamide antibiotics) and Penicillins     Documents on file and valid:  Advance Directive/Living Will: Yes   Health Care Power of : Yes  Code Status: DNR and No  Intubation      Confusion Assessment Method (CAM)  Not on file.    Issa Cognitive Assessment (MoCA)  Not on file.    Geriatric Depression Scale (GDS)  Not on file.    Mini-Cog (Early Dementia Detection)  Not on file.    Folstein Mini-Mental State Exam (MMSE)  Not on file.    Patient Health Questionnaire (PHQ-9)  Not on file.     Physical Exam  Constitutional:       Appearance: Normal appearance.   Cardiovascular:      Rate and Rhythm: Normal rate and regular rhythm.   Pulmonary:      Effort: Pulmonary effort is normal.   Abdominal:      General: There is no distension.   Neurological:      Mental Status: She is alert. Mental status is at baseline.      Comments: Aox0, Unable to follow command   Psychiatric:         Speech: She is noncommunicative.         Last Recorded Vitals      11/25/2024     4:20 PM 11/25/2024     7:49 PM 11/26/2024     1:59 AM 11/26/2024     4:11 AM 11/26/2024     6:00 AM 11/26/2024     7:00 AM 11/26/2024    11:00 AM   Vitals   Systolic 124 159 160 112  149 140   Diastolic 64 73 77 69  84 76   BP Location Right arm Right arm Right arm Right arm Right arm Right arm Right arm   Heart Rate 61 74 59 65  53 53   Temp 37.2 °C (99 °F) 36.9 °C (98.4 °F) 35.9 °C (96.6 °F) 36.2 °C (97.1 °F)  36.4 °C (97.6 °F) 36 °C (96.8 °F)   Resp 16 14 14 14  16 16      Vitals:    11/19/24 0955   Weight: 60 kg (132 lb 4.4 oz)        Confusion Assessment Method(CAM) for diagnosis of delirium:    1.  Acute onset or fluctuating course: absent/present: Absent  2.  Inattention: absent/present: Absent  3.  Disorganized thinking: absent/present: Absent  4.  Altered level of consciousness: absent/present: Present  CAM: positive    AT Score For Assessment of Delirium and Cognitive Impairment:    Alertness: 0  Normal(fully alert,but not agitated, throughout assessment)=0  Mild sleepiness for <10 seconds after walking, then normal=0  Clearly abnormal=4  2.  AMT4: 2  No mistakes=0  One mistake=1  Two or more  mistakes/untestable=2  3.  Attention: 2  Achieves seven months or more correctly=0  Starts but scores <7 months/ refuses to start=1  Untestable(cannot start because unwell, drowsy, inattentive)=2  4.  Acute: 4  No=0  Yes=4    Total Score: 8  4 or above: Possible delirium +/- cognitive impairment  1-3: Possible cognitive impairment  0: Delirium or severe cognitive impairment unlikely (but delirium still possible if (4) information incomplete)     Relevant Results  Lab Results   Component Value Date    TSH 1.42 05/13/2020    HGBA1C 7.4 (H) 11/23/2024     Results from last 7 days   Lab Units 11/25/24  0848 11/25/24  0847 11/24/24  0753 11/23/24  0735 11/22/24  0738 11/21/24  0353 11/20/24  0124 11/19/24  1338 11/19/24  1338   WBC AUTO x10*3/uL 7.8  --   --  9.4  --  10.6 10.9   < >  --    HEMOGLOBIN g/dL 11.5*  --   --  11.2*  --  10.5* 11.0*   < >  --    HEMATOCRIT % 34.4*  --   --  32.8*  --  31.6* 34.8*   < >  --    ALT U/L  --   --   --   --   --   --  11  --  10   AST U/L  --   --   --   --   --   --  9  --  13   SODIUM mmol/L  --  138 140 142   < > 144 148*  --  149*   POTASSIUM mmol/L  --  3.9 3.7 3.4*   < > 3.3* 3.6  --  3.9   CHLORIDE mmol/L  --  109* 110* 110*   < > 116* 115*  --  115*   CREATININE mg/dL  --  0.45* 0.55 0.64   < > 0.51 0.66  --  0.76   BUN mg/dL  --  8 7 12   < > 11 13  --  11   CO2 mmol/L  --  20* 22 24   < > 22 23  --  25   INR   --   --   --   --   --   --  1.0  --  1.1   HEMOGLOBIN A1C %  --   --   --  7.4*  --   --   --   --   --     < > = values in this interval not displayed.       Recent Imaging Results      XR chest 1 view  Narrative: Interpreted By:  Gavin Garcia,   STUDY:  XR CHEST 1 VIEW; 11/23/2024 5:21 am      INDICATION:  Signs/Symptoms:fever.      COMPARISON:  11/18/2024.      ACCESSION NUMBER(S):  [id number]      ORDERING CLINICIAN:  JAVIER SAMSON      FINDINGS:          CARDIOMEDIASTINAL SILHOUETTE:  Cardiomediastinal silhouette is normal in size and  configuration.      LUNGS:  Low lung volumes with minimal bibasilar atelectasis. No pneumothorax.      ABDOMEN:  No remarkable upper abdominal findings.      BONES:  No acute osseous changes.      Impression: 1.  There are low lung volumes with basilar atelectasis. If fever  continues follow-up with PA and lateral x-ray to exclude basilar  infiltrate/pneumonia..          Signed by: Gavin Garcia 11/23/2024 4:06 PM  Dictation workstation:   [id number]      Head/Brain Imaging  === Results for orders placed during the hospital encounter of 11/19/24 ===    CT head wo IV contrast [[id number]] 11/22/2024    Status: Normal  Similar low-density extra-axial collection along the left cerebral  convexity measuring 1.3 cm. Similar mass effect and 0.6 cm of  left-to-right midline shift. Ongoing but decreased pneumocephalus.    Trace amount of hyperdensity along the falx which may represent a  small amount of blood products is unchanged.    Interval removal of the left-sided subdural drain.    MACRO:  None    Signed by: Ysabel Contreras 11/22/2024 4:31 PM  Dictation workstation:   [id number]  No results found for this or any previous visit.        DATA:  EKG: QTC  Encounter Date: 11/19/24   ECG 12 Lead   Result Value    Ventricular Rate 63    Atrial Rate 63    VT Interval 190    QRS Duration 100    QT Interval 436    QTC Calculation(Bazett) 446    P Axis 21    R Axis -45    T Axis -82    QRS Count 10    Q Onset 212    P Onset 117    P Offset 170    T Offset 430    QTC Fredericia 443    Narrative    Normal sinus rhythm  Left axis deviation  Septal infarct (cited on or before 18-NOV-2024)  Abnormal ECG  When compared with ECG of 19-NOV-2024 03:30,  No significant change was found  See ED provider note for full interpretation and clinical correlation  Confirmed by Kwame Yoo ([id number]) on 11/20/2024 5:49:16 AM     Anti-psychotics in 48 hours:  Opioids/Benzodiazepines in 48 hours:  Anticholinergics on board:No  Restraints:No  Indwelling  catheters: Yes  Last BM:   UO in 24 hours:   Activity in the past 24 hours:  Need for ambulatory devices:    Assessment/Plan   Loren Mitchell is a 79 y.o. female presenting with dementia, T2DM, hypothyroidism, HTN, CKD, HLD, CAD, depression, recurrent UTIs, who presented to ED as transfer following a fall at [facility] facility with CTH and C-spine at OSH showing large left SDH (acute and subacute components) with rightward midline shift and epidural. SDH remains stable however now there is concern for her poor PO intake. Currently she remains at risk of aspiration given her end-stage dementia and chronic dysphagia.     A family meeting was held with  and son who agreed to trial dobhoff tube as a temporizing measure to see if she would at least return to her baseline.  states that they would not be agreeable to a more permanent PEG tube at this time.    1. Dementia - Late stage  - Functional Assessment Staging (FAST Scale): 7b - Severe Dementia  Plan:   Continue with Fluoxetine 40mg qD  Continue with Memantine 10mg BID  Continue with cyanocobalamin 500mcg every day  Consider Consult to Pet therapy if family amenable  Monitor closely for signs of acute delirium  Repeat TSH, Vitamin B12 - added to labs    2. Dysphagia w/ risk of aspiration (Acute on chronic), Poor PO Intake  Plan:   Initiate Dobhoff tube for tube feed  Trial 3-5 ice chips/hour to work on swallowing  Continue to work with SLP    3. DM (Type 2)  - HgbA1c: 7.2  Plan:   Monitor for hyperglycemia iso Tube Feeds  Continue w/ Insulin sliding scale #3    4. Recurrent UTI  - Significant recurrent UTI history.   - Family states patient becomes agitated/hallucinates  Plan:   Monitor for signs/symptoms of UTI    5. Acute fall in patient with stooped posture, no gait impairment  Fall was unwitnessed  Recommend checking vitamin D levels as that can contribute to falls - added to labs      Care Transitions:  -Recommended level for discharge:  Moderate intensity level of continued care   -Home going considerations: Planned for RNF  -Primary care physician: Johan He MD    Goals of Care:  -Health care power of : Spouse  -Living will: Yes  Code status: DNR/DNI    4M AGE-FRIENDLY INITIATIVE:  What matters most to patient: NA  Medications: Memantine, Fluoxetine  Mentation: CAM positive, 4AT 8  Mobility: Ambulating w/out support prior to hospitalization    Geriatric medicine will continue to follow the patient. Thank you for allowing geriatric medicine to be involved in the care of your patient. Geriatric medicine consultation team is available during work hours Monday through Friday. For any emergency issues requiring immediate assistance over the weekend, please page Geriatrics pager [phone]    Consult Billing Time  Prep time on date of patient encounter(minutes): 30  Time directly with patient/family/caregiver(minutes): 45  Documentation time(minutes): 30  TOTAL TIME(minutes): 115    Junior Rebollar MD PhD     I saw and evaluated the patient.  I personally obtained the key and critical portions of the history and physical exam or was physically present for key and critical portions performed by the resident. I reviewed the resident’s documentation and discussed the patient with the resident.  I agree with the resident’s medical decision making as documented in their note with the exception/addition of the following:   See few additions in italics    Kylah Cerna MD

## 2024-11-26 NOTE — PROGRESS NOTES
Patient was denied at [facility], as the facility does not have memory beds available at this time. Back up facility is [facility], who is able to accept. BENNY requested that the facility start pre-cert. SW attempted to contact patient , Eliot at [phone], to provide an update, but there was no answer. BENNY did leave a voicemail requesting a call back. SW will continue to follow to facilitate discharge plan.     Update @ 13:20: pre-cert stopped as team may place a dobhoff pending family discussion. [facility] still able to accommodate.       LAZARA Jackson

## 2024-11-27 LAB
ALBUMIN SERPL BCP-MCNC: 3.2 G/DL (ref 3.4–5)
ANION GAP SERPL CALC-SCNC: 11 MMOL/L (ref 10–20)
BUN SERPL-MCNC: 9 MG/DL (ref 6–23)
CALCIUM SERPL-MCNC: 8.5 MG/DL (ref 8.6–10.6)
CHLORIDE SERPL-SCNC: 112 MMOL/L (ref 98–107)
CO2 SERPL-SCNC: 22 MMOL/L (ref 21–32)
CREAT SERPL-MCNC: 0.68 MG/DL (ref 0.5–1.05)
EGFRCR SERPLBLD CKD-EPI 2021: 88 ML/MIN/1.73M*2
GLUCOSE BLD MANUAL STRIP-MCNC: 225 MG/DL (ref 74–99)
GLUCOSE BLD MANUAL STRIP-MCNC: 243 MG/DL (ref 74–99)
GLUCOSE BLD MANUAL STRIP-MCNC: 250 MG/DL (ref 74–99)
GLUCOSE BLD MANUAL STRIP-MCNC: 306 MG/DL (ref 74–99)
GLUCOSE SERPL-MCNC: 216 MG/DL (ref 74–99)
MAGNESIUM SERPL-MCNC: 1.93 MG/DL (ref 1.6–2.4)
PHOSPHATE SERPL-MCNC: 3.6 MG/DL (ref 2.5–4.9)
POTASSIUM SERPL-SCNC: 3.4 MMOL/L (ref 3.5–5.3)
SODIUM SERPL-SCNC: 142 MMOL/L (ref 136–145)

## 2024-11-27 PROCEDURE — 97535 SELF CARE MNGMENT TRAINING: CPT | Mod: GO

## 2024-11-27 PROCEDURE — 2500000001 HC RX 250 WO HCPCS SELF ADMINISTERED DRUGS (ALT 637 FOR MEDICARE OP): Performed by: PHYSICIAN ASSISTANT

## 2024-11-27 PROCEDURE — 2500000005 HC RX 250 GENERAL PHARMACY W/O HCPCS

## 2024-11-27 PROCEDURE — 82947 ASSAY GLUCOSE BLOOD QUANT: CPT

## 2024-11-27 PROCEDURE — 2500000001 HC RX 250 WO HCPCS SELF ADMINISTERED DRUGS (ALT 637 FOR MEDICARE OP)

## 2024-11-27 PROCEDURE — 2500000004 HC RX 250 GENERAL PHARMACY W/ HCPCS (ALT 636 FOR OP/ED)

## 2024-11-27 PROCEDURE — 80069 RENAL FUNCTION PANEL: CPT

## 2024-11-27 PROCEDURE — 1100000001 HC PRIVATE ROOM DAILY

## 2024-11-27 PROCEDURE — 97530 THERAPEUTIC ACTIVITIES: CPT | Mod: GP

## 2024-11-27 PROCEDURE — 36415 COLL VENOUS BLD VENIPUNCTURE: CPT

## 2024-11-27 PROCEDURE — 2500000002 HC RX 250 W HCPCS SELF ADMINISTERED DRUGS (ALT 637 FOR MEDICARE OP, ALT 636 FOR OP/ED)

## 2024-11-27 PROCEDURE — 97530 THERAPEUTIC ACTIVITIES: CPT | Mod: GO

## 2024-11-27 PROCEDURE — 2500000004 HC RX 250 GENERAL PHARMACY W/ HCPCS (ALT 636 FOR OP/ED): Performed by: PHYSICIAN ASSISTANT

## 2024-11-27 PROCEDURE — 83735 ASSAY OF MAGNESIUM: CPT

## 2024-11-27 PROCEDURE — 2500000002 HC RX 250 W HCPCS SELF ADMINISTERED DRUGS (ALT 637 FOR MEDICARE OP, ALT 636 FOR OP/ED): Performed by: PHYSICIAN ASSISTANT

## 2024-11-27 PROCEDURE — 99233 SBSQ HOSP IP/OBS HIGH 50: CPT

## 2024-11-27 RX ORDER — BISACODYL 10 MG/1
10 SUPPOSITORY RECTAL DAILY
Status: DISCONTINUED | OUTPATIENT
Start: 2024-11-27 | End: 2024-11-28

## 2024-11-27 RX ORDER — DEXTROSE 50 % IN WATER (D50W) INTRAVENOUS SYRINGE
12.5
Status: DISCONTINUED | OUTPATIENT
Start: 2024-11-27 | End: 2024-12-04 | Stop reason: HOSPADM

## 2024-11-27 RX ORDER — AMOXICILLIN 250 MG
1 CAPSULE ORAL NIGHTLY
Status: DISCONTINUED | OUTPATIENT
Start: 2024-11-27 | End: 2024-12-03

## 2024-11-27 RX ORDER — DEXTROSE 50 % IN WATER (D50W) INTRAVENOUS SYRINGE
25
Status: DISCONTINUED | OUTPATIENT
Start: 2024-11-27 | End: 2024-12-04 | Stop reason: HOSPADM

## 2024-11-27 RX ORDER — OLANZAPINE 5 MG/1
10 TABLET ORAL 2 TIMES DAILY PRN
Status: DISCONTINUED | OUTPATIENT
Start: 2024-11-27 | End: 2024-12-04 | Stop reason: HOSPADM

## 2024-11-27 RX ORDER — DEXTROSE 50 % IN WATER (D50W) INTRAVENOUS SYRINGE
12.5
Status: DISCONTINUED | OUTPATIENT
Start: 2024-11-27 | End: 2024-11-27 | Stop reason: SDUPTHER

## 2024-11-27 RX ORDER — INSULIN GLARGINE 100 [IU]/ML
40 INJECTION, SOLUTION SUBCUTANEOUS NIGHTLY
Status: DISCONTINUED | OUTPATIENT
Start: 2024-11-27 | End: 2024-11-28

## 2024-11-27 RX ORDER — DEXTROSE 50 % IN WATER (D50W) INTRAVENOUS SYRINGE
25
Status: DISCONTINUED | OUTPATIENT
Start: 2024-11-27 | End: 2024-11-27 | Stop reason: SDUPTHER

## 2024-11-27 ASSESSMENT — COGNITIVE AND FUNCTIONAL STATUS - GENERAL
TURNING FROM BACK TO SIDE WHILE IN FLAT BAD: A LITTLE
STANDING UP FROM CHAIR USING ARMS: A LITTLE
CLIMB 3 TO 5 STEPS WITH RAILING: A LOT
PERSONAL GROOMING: A LOT
WALKING IN HOSPITAL ROOM: A LITTLE
CLIMB 3 TO 5 STEPS WITH RAILING: TOTAL
PERSONAL GROOMING: A LOT
TOILETING: TOTAL
TOILETING: A LOT
DRESSING REGULAR LOWER BODY CLOTHING: A LOT
MOVING TO AND FROM BED TO CHAIR: A LITTLE
WALKING IN HOSPITAL ROOM: A LITTLE
WALKING IN HOSPITAL ROOM: TOTAL
MOBILITY SCORE: 18
DRESSING REGULAR UPPER BODY CLOTHING: A LOT
HELP NEEDED FOR BATHING: TOTAL
DAILY ACTIVITIY SCORE: 6
DRESSING REGULAR LOWER BODY CLOTHING: A LOT
MOVING FROM LYING ON BACK TO SITTING ON SIDE OF FLAT BED WITH BEDRAILS: A LOT
CLIMB 3 TO 5 STEPS WITH RAILING: A LOT
DAILY ACTIVITIY SCORE: 12
TOILETING: A LOT
DRESSING REGULAR LOWER BODY CLOTHING: TOTAL
EATING MEALS: TOTAL
MOBILITY SCORE: 8
DAILY ACTIVITIY SCORE: 12
MOVING TO AND FROM BED TO CHAIR: A LITTLE
HELP NEEDED FOR BATHING: A LOT
PERSONAL GROOMING: TOTAL
HELP NEEDED FOR BATHING: A LOT
DRESSING REGULAR UPPER BODY CLOTHING: TOTAL
DRESSING REGULAR UPPER BODY CLOTHING: A LOT
TURNING FROM BACK TO SIDE WHILE IN FLAT BAD: A LOT
STANDING UP FROM CHAIR USING ARMS: A LITTLE
STANDING UP FROM CHAIR USING ARMS: TOTAL
MOVING TO AND FROM BED TO CHAIR: TOTAL
EATING MEALS: A LOT
TURNING FROM BACK TO SIDE WHILE IN FLAT BAD: A LITTLE
EATING MEALS: A LOT
MOBILITY SCORE: 18

## 2024-11-27 ASSESSMENT — PAIN SCALES - PAIN ASSESSMENT IN ADVANCED DEMENTIA (PAINAD)
CONSOLABILITY: NO NEED TO CONSOLE
TOTALSCORE: 0
BODYLANGUAGE: RELAXED
TOTALSCORE: MEDICATION (SEE MAR);REPOSITIONED;EMOTIONAL SUPPORT;RELAXATION TECHNIQUE;PRAYERS;REST
FACIALEXPRESSION: SMILING OR INEXPRESSIVE
BREATHING: NORMAL

## 2024-11-27 ASSESSMENT — PAIN SCALES - WONG BAKER
WONGBAKER_NUMERICALRESPONSE: HURTS LITTLE MORE
WONGBAKER_NUMERICALRESPONSE: NO HURT

## 2024-11-27 ASSESSMENT — PAIN SCALES - GENERAL
PAINLEVEL_OUTOF10: 5 - MODERATE PAIN
PAINLEVEL_OUTOF10: 0 - NO PAIN

## 2024-11-27 ASSESSMENT — PAIN - FUNCTIONAL ASSESSMENT
PAIN_FUNCTIONAL_ASSESSMENT: UNABLE TO SELF-REPORT
PAIN_FUNCTIONAL_ASSESSMENT: UNABLE TO SELF-REPORT

## 2024-11-27 ASSESSMENT — ACTIVITIES OF DAILY LIVING (ADL): HOME_MANAGEMENT_TIME_ENTRY: 8

## 2024-11-27 ASSESSMENT — PAIN DESCRIPTION - LOCATION: LOCATION: GENERALIZED

## 2024-11-27 NOTE — PROGRESS NOTES
Subjective   Follow up for continued geriatric management:    RENATO HAMILTON. Dobhoff placed yesterday evening for poor PO intake. Per nursing report, patient was pulling at dobhoff tube - soft restraints placed. Otherwise, tolerating tube feed nutrition and no acute agitation requiring pharmacological restraint. Patient seen at bedside this morning asleep w/ soft restraints. Unable to gather history. Exam otherwise unremarkable.    During rounds, patient seen awake in bed sitting up. Not in acute distress but non-verbal.        Objective     Current Facility-Administered Medications   Medication Dose Route Frequency Provider Last Rate Last Admin    acetaminophen (Tylenol) oral liquid 1,000 mg  1,000 mg nasogastric tube q8h KULWANT Fer G Gilbert, PA-C   1,000 mg at 11/27/24 0629    atorvastatin (Lipitor) tablet 10 mg  10 mg nasogastric tube q PM Fer G Gilbert, PA-C   10 mg at 11/26/24 2151    bisacodyl (Dulcolax) suppository 10 mg  10 mg rectal Daily Cenk SANJAY Kemp MD        cyanocobalamin (Vitamin B-12) tablet 500 mcg  500 mcg nasogastric tube Daily Fer G Gilbert, PA-C   500 mcg at 11/27/24 0858    enoxaparin (Lovenox) syringe 30 mg  30 mg subcutaneous BID Chas Zendejas PA-C   30 mg at 11/27/24 0858    FLUoxetine (PROzac) solution 40 mg  40 mg nasogastric tube Daily Fer G Gilbert, PA-C   40 mg at 11/27/24 0858    insulin lispro injection 0-15 Units  0-15 Units subcutaneous q6h Fer G Gilbert, PA-C   12 Units at 11/27/24 0629    levothyroxine (Synthroid, Levoxyl) tablet 50 mcg  50 mcg nasogastric tube Daily Fer G Gilbert, PA-C   50 mcg at 11/27/24 0629    melatonin tablet 5 mg  5 mg nasogastric tube Nightly Fer G Gilbert, PA-C   5 mg at 11/26/24 2151    memantine (Namenda) tablet 10 mg  10 mg nasogastric tube BID Fer G Gilbert, PA-C   10 mg at 11/27/24 0858    polyethylene glycol (Glycolax, Miralax) packet 17 g  17 g nasogastric tube Daily Fer G Gilbert, PA-C        potassium phosphates 30 mmol in dextrose 5%  250 mL IV  30 mmol intravenous Once Rachana Kemp MD        sennosides-docusate sodium (Prabha-Colace) 8.6-50 mg per tablet 1 tablet  1 tablet nasogastric tube Nightly Rachana Kemp MD        sodium chloride 0.9% infusion  50 mL/hr intravenous Continuous Fer Gilbert PA-C 50 mL/hr at 11/26/24 2206 50 mL/hr at 11/26/24 2206       Physical Exam  Constitutional:       General: She is sleeping.      Appearance: Normal appearance.      Interventions: She is restrained.       Confusion Assessment Method(CAM) for diagnosis of delirium:    1.  Acute onset or fluctuating course: absent/present: Present  2.  Inattention: absent/present: Present  3.  Disorganized thinking: absent/present: Present  4.  Altered level of consciousness: absent/present: Present  CAM: positive    AT Score For Assessment of Delirium and Cognitive Impairment:    Alertness: 4  Normal(fully alert,but not agitated, throughout assessment)=0  Mild sleepiness for <10 seconds after walking, then normal=0  Clearly abnormal=4  2.  AMT4: 2  No mistakes=0  One mistake=1  Two or more mistakes/untestable=2  3.  Attention: 2  Achieves seven months or more correctly=0  Starts but scores <7 months/ refuses to start=1  Untestable(cannot start because unwell, drowsy, inattentive)=2  4.  Acute: 0  No=0  Yes=4    Total Score: 4  4 or above: Possible delirium +/- cognitive impairment  1-3: Possible cognitive impairment  0: Delirium or severe cognitive impairment unlikely(but delirium still possible if (4) information incomplete)      Last Recorded Vitals      11/26/2024     7:00 AM 11/26/2024    11:00 AM 11/26/2024     4:58 PM 11/26/2024     7:56 PM 11/26/2024    11:15 PM 11/27/2024     3:19 AM 11/27/2024     9:00 AM   Vitals   Systolic 149 140 156 156 111 112 116   Diastolic 84 76 73 66 53 64 68   BP Location Right arm Right arm Right arm Right arm Right arm Right arm Right arm   Heart Rate 53 53 53 52 57 60 51   Temp 36.4 °C (97.6 °F) 36 °C (96.8 °F) 36.5 °C  (97.7 °F) 36.6 °C (97.9 °F) 36.5 °C (97.7 °F) 35.5 °C (95.9 °F) 36 °C (96.8 °F)   Resp 16 16 17 17 17 17 16      Vitals:    11/19/24 0955   Weight: 60 kg (132 lb 4.4 oz)        Relevant Results  Lab Results   Component Value Date    TSH 1.85 11/26/2024    ARRRWCED70 1,269 (H) 11/26/2024    VITD25 43 11/26/2024    HGBA1C 7.4 (H) 11/23/2024     Results from last 7 days   Lab Units 11/27/24  0916 11/25/24  0848 11/25/24  0847 11/24/24  0753 11/23/24  0735 11/22/24  0738 11/21/24  0353   WBC AUTO x10*3/uL  --  7.8  --   --  9.4  --  10.6   HEMOGLOBIN g/dL  --  11.5*  --   --  11.2*  --  10.5*   HEMATOCRIT %  --  34.4*  --   --  32.8*  --  31.6*   SODIUM mmol/L 142  --  138 140 142   < > 144   POTASSIUM mmol/L 3.4*  --  3.9 3.7 3.4*   < > 3.3*   CHLORIDE mmol/L 112*  --  109* 110* 110*   < > 116*   CREATININE mg/dL 0.68  --  0.45* 0.55 0.64   < > 0.51   BUN mg/dL 9  --  8 7 12   < > 11   CO2 mmol/L 22  --  20* 22 24   < > 22   HEMOGLOBIN A1C %  --   --   --   --  7.4*  --   --     < > = values in this interval not displayed.      XR abdomen 1 view  Narrative: Interpreted By:  Kyle Garvin and Dervishi Mario   STUDY:  XR ABDOMEN 1 VIEW;  11/26/2024 7:47 pm      INDICATION:  Signs/Symptoms:s/p dobhoff placement.          COMPARISON:  None.      ACCESSION NUMBER(S):  [id number]      ORDERING CLINICIAN:  BRAD PORTER      FINDINGS:  Single AP radiographs of the abdomen:      An enteric tube is noted with the tip projecting over the gastric  fundus.      Nonobstructive bowel gas pattern. Limited evaluation of  pneumoperitoneum on supine imaging, however no gross evidence of free  air is noted.      Visualized lungs are clear.      Osseous structures demonstrate no acute bony changes.      Impression: 1.  Enteric tube overlies the gastric fundus. Otherwise unremarkable  radiographs of the abdomen.      I personally reviewed the images/study and I agree with the findings  as stated by Resident Alex Allen MD.       MACRO:  None      Signed by: Kyle Garvin 11/27/2024 6:02 AM  Dictation workstation:   [id number]    DATA:  EKG: QTC  Encounter Date: 11/19/24   ECG 12 Lead   Result Value    Ventricular Rate 63    Atrial Rate 63    WA Interval 190    QRS Duration 100    QT Interval 436    QTC Calculation(Bazett) 446    P Axis 21    R Axis -45    T Axis -82    QRS Count 10    Q Onset 212    P Onset 117    P Offset 170    T Offset 430    QTC Fredericia 443    Narrative    Normal sinus rhythm  Left axis deviation  Septal infarct (cited on or before 18-NOV-2024)  Abnormal ECG  When compared with ECG of 19-NOV-2024 03:30,  No significant change was found  See ED provider note for full interpretation and clinical correlation  Confirmed by Kwame Yoo ([id number]) on 11/20/2024 5:49:16 AM      Anti-psychotics in 48 hours: None  Opioids/Benzodiazepines in 48 hours: None  Anticholinergics on board:No  Restraints:Yes - Soft restraints  Indwelling catheters:No  Last BM: 11/26  UO in 24 hours:   Activity in the past 24 hours: Resting in bed  Need for ambulatory devices: NA       Assessment/Plan   Loren Mitchell is a 79 y.o. female presenting with dementia, T2DM, hypothyroidism, HTN, CKD, HLD, CAD, depression, recurrent UTIs, who presented to ED as transfer following a fall at memory care facility with CTH and C-spine at OSH showing large left SDH (acute and subacute components) with rightward midline shift and epidural. SDH remains stable, there is concern for her poor PO intake c/b acute on chronic dysphagia. Now with dobhoff tube for nutrition tolerating well so far.    To reduce the risk of further agitation iso tube feeds, we recommend having a 1-1 sitter to help re-direct. Physical restraints would be last line. In addition, we recommend adding on long-acting insulin at bedtime.     1. Dementia - Late stage  - Functional Assessment Staging (FAST Scale): 7b - Severe Dementia  Plan:   Continue with Fluoxetine 40mg qD  Continue with  Memantine 10mg BID  Discontinue cyanocobalamin 500mcg every day given elevated levels  Consider 1-1 sitter versus physical restraint for redirection  Consider Consult to Pet therapy if family amenable  Monitor closely for signs of acute delirium  Repeat TSH - 1.85 wnl  Vitamin B12 - 1268    2. Dysphagia w/ risk of aspiration (Acute on chronic), Poor PO Intake  Plan:   Dobhoff tube for tube feed - tolerating well  Continue w/ TF as tolerated  Continue 3-5 ice chips/hour to work on swallowing  Continue NS 50cc/hr  Continue to work with SLP     3. DM (Type 2)  - HgbA1c: 7.2  - POC Glucose 306 this morning iso Tube Feeds  Plan:   Monitor for hyperglycemia iso Tube Feeds  Continue w/ Insulin sliding scale #3  Consider adding on nighttime long-acting insulin      4. Recurrent UTI  - Significant recurrent UTI history.   - Family states patient becomes agitated/hallucinates  Plan:   Monitor for signs/symptoms of UTI     5. Acute fall in patient with stooped posture, no gait impairment  Fall was unwitnessed  Recommend checking vitamin D levels as that can contribute to falls  Vitamin D - 43 wnl            4M AGE-FRIENDLY INITIATIVE:  What matters most to patient:   Medications: Memantine  Mentation: CAM+  Mobility: Ambulating    Geriatric medicine will continue to follow the patient. Thank you for allowing geriatric medicine to be involved in the care of your patient. Geriatric medicine consultation team is available during work hours Monday through Friday. For any emergency issues requiring immediate assistance over the weekend, please page Geriatrics pager [phone]    Junior Rebollar MD PhD

## 2024-11-27 NOTE — CARE PLAN
The patient's goals for the shift include  free from falls    The clinical goals for the shift include Pt will be free from falls this shift

## 2024-11-27 NOTE — PROGRESS NOTES
Occupational Therapy    Occupational Therapy Treatment    Name: Loren Mitchell  MRN: [id number]  : 1945  Date: 24  Room: [address]    Time Calculation  Start Time: 1226  Stop Time: 1249  Time Calculation (min): 23 min    Assessment:  OT Assessment: Pt made no progress towards therapeutic goals this session. Pt is unable to follow commands throughout session. Pt resistive to all mobility but once initiated able to tolerate. Pt required assist for all tasks and mobility. Pt is appropriate for MOD intensity with  supervision at a memory care facility but has limited skilled OT needs as she is unable to follow commands and is likely near her baseline function.  Prognosis: Poor  Barriers to Discharge: None  Evaluation/Treatment Tolerance: Other (Comment) (cognition)  Medical Staff Made Aware: Yes  End of Session Communication: Bedside nurse  End of Session Patient Position: Bed, 4 rail up, Alarm on (R soft wrist restraint)  Plan:  Treatment Interventions: ADL retraining, Functional transfer training, Patient/family training, Equipment evaluation/education, Compensatory technique education  OT Frequency: 1 time per week  OT Discharge Recommendations: 24 hr supervision due to cognition (Memory care)  Equipment Recommended upon Discharge:  (TBD at next level of care)  OT Recommended Transfer Status: Dependent  OT - OK to Discharge: Yes    Subjective   General:  OT Last Visit  OT Received On: 24  Reason for Referral: Pt presents after being found down after fall at [facility] care facility. Found to have large mixed density L SDH w sig MLS.  s/p L inez holes for SDH evac. SDD d/c'd .  Past Medical History Relevant to Rehab: HTN, HLD, hypoT, CKD, dementia (Ox0 at baseline), CAD, recurrent UTIs  Co-Treatment: PT  Co-Treatment Reason: to maximize safety with mobility and therapeutic potential in pt with AMPAC <10 and A&Ox0 at baseline  Prior to Session Communication: Bedside nurse  Patient  Position Received: Bed, 4 rail up, Alarm on (R soft wrist restraint)  Family/Caregiver Present: No  General Comment: Pt confused but alert and agreeable to OT tx. Pt AOx0 and unable to follow commands throughout session. Pt resistive to all mobility but able to tolerate once initiated.   Precautions:  Medical Precautions: Fall precautions    Lines/Tubes/Drains:  NG/OG/Feeding Tube Right nostril (Active)   Number of days: 0       External Urinary Catheter Female (Active)   Number of days: 4       Cognition:  Overall Cognitive Status: Impaired at baseline  Arousal/Alertness: Generalized responses  Orientation Level: Disoriented X4  Following Commands: Other (Comment) (No command following noted even with routine objects)  Safety Judgment: Decreased awareness of need for assistance  Problem Solving:  (assist required for all problem solving skills)  Cognition Comments: unintelligible speech  Attention: Exceptions to WFL  Alternating Attention: Impaired  Divided Attention: Impaired  Selective Attention: Impaired  Sustained Attention: Impaired  Insight: No response  Impulsive: Moderately  Processing Speed: Delayed    Pain Assessment:  Pain Assessment  Pain Assessment: Unable to self-report (no signs of pain or discomfort during session)     Objective   Activities of Daily Living:     UE Dressing  UE Dressing Comments: Pt required MaxAx1 to doff and don gown- pt able to assist in threading UE sleeves     Toileting  Toileting Comments: Pt required DepAx1 for perineal hygiene with MaxAx2 to sustain sidelying after roll    Functional Standing Tolerance:  Functional Mobility  Functional Mobility Performed: No  Bed Mobility/Transfers:   Bed Mobility  Bed Mobility: Yes  Bed Mobility 1  Bed Mobility 1: Rolling right, Rolling left  Level of Assistance 1: Maximum assistance, +2  Bed Mobility Comments 1: Pt resists initial mobility but once initiated able to tolerate without issues  Bed Mobility 2  Bed Mobility  2: Side lying left  to sit  Level of Assistance 2: Moderate assistance, +2  Bed Mobility Comments 2: HOB elevated, ModAx2 for UB and LB management  Bed Mobility 3  Bed Mobility 3: Sitting to supine  Level of Assistance 3: Maximum assistance, +2  Transfers  Transfer: Yes  Transfer 1  Transfer From 1: Bed to  Transfer to 1: Stand  Technique 1: Sit to stand  Transfer Device 1:  (hand held)  Transfer Level of Assistance 1: Maximum assistance  Trials/Comments 1: pt resisting, retropulsive and pushing away therefore transfer was discontinued, unable to lift off bed    Balance:  Dynamic Sitting Balance  Dynamic Sitting-Balance Support: Feet supported  Dynamic Sitting-Level of Assistance: Moderate assistance  Dynamic Sitting-Balance: Reaching for objects, Lateral lean, Forward lean  Dynamic Sitting-Comments: ModA and retropaulsive during dynamic activities like reaching to thread UE or kicking  Static Sitting Balance  Static Sitting-Balance Support: Feet supported  Static Sitting-Level of Assistance: Close supervision    Therapy/Activity:      Therapeutic Activity  Therapeutic Activity Performed: Yes  Therapeutic Activity 1: Functional mobility/transfer training as noted (rolling, supine <-> sit, attempted sit to stand) requiring skilled assistance and cueing for participation, safety, training  Therapeutic Activity 2: Increased time spent on cueing and attempting to get pt to follow commands (novel, routine, mobility)     Outcome Measures:  [facility] Daily Activity  Putting on and taking off regular lower body clothing: Total  Bathing (including washing, rinsing, drying): Total  Putting on and taking off regular upper body clothing: Total  Toileting, which includes using toilet, bedpan or urinal: Total  Taking care of personal grooming such as brushing teeth: Total  Eating Meals: Total  Daily Activity - Total Score: 6     Education Documentation  Body Mechanics, taught by Nate Cowart OT at 11/27/2024  2:42 PM.  Learner:  Patient  Readiness: Acceptance  Method: Explanation, Demonstration  Response: No Evidence of Learning    ADL Training, taught by Nate Cowart OT at 11/27/2024  2:42 PM.  Learner: Patient  Readiness: Acceptance  Method: Explanation, Demonstration  Response: No Evidence of Learning    Education Comments  No comments found.        Goals:  Encounter Problems       Encounter Problems (Active)       ADLs       Patient with complete upper body dressing with moderate assist level of assistance (Not Progressing)       Start:  11/21/24    Expected End:  12/05/24            Patient will feed self with supervision level of assistance. (Not Progressing)       Start:  11/21/24    Expected End:  12/05/24            Patient will complete daily grooming tasks with moderate level of assistance. (Not Progressing)       Start:  11/21/24    Expected End:  12/05/24               TRANSFERS       Patient will perform bed mobility moderate assist level of assistance in order to improve safety and independence with mobility (Not Progressing)       Start:  11/21/24    Expected End:  12/05/24            Patient will complete functional transfers with moderate assist level of assistance. (Not Progressing)       Start:  11/21/24    Expected End:  12/05/24               VISION       Patient will complete Visual Scanning Task task regarding all areas of activity with minimal cues. (Not Progressing)       Start:  11/21/24    Expected End:  12/05/24 11/27/24 at 2:45 PM   Nate Cowart, OT   [phone]

## 2024-11-27 NOTE — PROGRESS NOTES
Patient was accepted at [facility]. She will require a pre-cert, which will likely not start until patient has been restraint free for 24 hours. Family (son and spouse) updated at the bedside. SW will continue to follow to facilitate discharge plan.       LAZARA Jackson

## 2024-11-27 NOTE — PROGRESS NOTES
[facility]  TRAUMA SERVICE - PROGRESS NOTE    Patient Name: Loren Mitchell  MRN: [id number]  Admit Date: 1119  : 1945  AGE: 79 y.o.   GENDER: female  ==============================================================================  MECHANISM OF INJURY:   79 YOF s/p fall     LOC (yes/no?): unknown  Anticoagulant / Anti-platelet Rx? (for what dx?): no  Referring Facility Name (N/A for scene EMR run): [facility]    Injuries/problems:  - L SDH with midline shift  - dysphagia acute on chronic  - Hx A/O x0-1 at baseline end stage dementia, HTN, recurrent UTIs, CAD, HLD, depression, type II DM (insulin dependent)    INCIDENTAL FINDINGS:  none    PROCEDURES:  : L inez hole x2 with SD evacuation    ==============================================================================  TODAY'S ASSESSMENT AND PLAN OF CARE:  ## SDH d/t unwitnessed fall  ###End stage dementia baseline Aox0-1 at baseline, lives in memory unit  - NSGY s/o :okay chemoppx; fu in 2 weeks with [facility] (arranged)  - q4h neuro checks  - cth stable  for worsened mentation - unremarkable  - PT/OT  - 1:1 sitter vs restraints while dobhoff in place to help with delirium    ## Recurrent UTI  - Initially started on Nitrofurantoin empirically (stop  as negative UA). New ua neg  o/n neg, urine Cx final no growth (final read )  - cxr no infiltrate, but atelectasis.   - RT consult placed for  BID for ezpap since cannot participate in self hygiene with IS    ## PMHx  - Continue home statin, Prozac, Synthroid, Memantine   Adjusting medications to dobhoff compliant forms  - holding home 10 meqK, telmisartan for now  - Consulted geriatrics for general prognosis, GOC , and med recommendations   Appreciate recs    ## FEN/GI: acute on chronic dysphagia, dm. A1C 7.4 this admit  - SLP previously rec minced/moist/thin liquids, due to return to baseline mentation with acute on chronic dysphagia rec'd NPO, Prior  notes documenting family amenable to short term dobhoff if warranted  - Dobhoff placed 11/26, Goal Isosource 45/h (at goal 11/27)  - Daily RFP/Mg given TF  - OK for <10 ice chips to improve swallowing  - Discontinue NS   - andres counts per nutrition recs  - recent BM 3x  - with meals #3 lispro. Holding home lantus, trulicity, glimepiride with poor intake  - Started Glargine 40 units nightly (correlates with patient's home Toujeo)  - Do NOT give Amaryl per Geriatrics  - Discontinuing Cyanocobalamin     ## PPX  - SCDs  - LVX    Social: dnr/dni on code status discussion    Dispo: continue care on RNF, SW working on facility placement  PT: Moderate intensity  OT: 24/7 supervision d/t cognition and memory care    Patient's  Eliot: [phone]     Patient discussed with attending, Dr. Malini Kemp MD  Trauma, Critical Care, and Acute Care Surgery  Floor: [id number]   TSICU: [id number]   ==============================================================================  CHIEF COMPLAINT / OVERNIGHT EVENTS:   Extensive discussion with patient's family yesterday wherein they were agreeable to temporary dobhoff placement. NAEON. Patient unable to voice concerns. AOx1 (self). End stage dementia at baseline, reportedly at baseline prior to hospital admission. Dobhoff placed last night, TF advanced to goal 45. Patient's mentation improved after voiding and passing 2 large BM last night. UO challenges charting as patient's purewick was out of place.    GCS: improved  E4  V3-4   M6     MEDICAL HISTORY / ROS:  Admission history and ROS reviewed. Pertinent changes as follows:    PHYSICAL EXAM:  Heart Rate:  [51-60]   Temp:  [35.5 °C (95.9 °F)-36.6 °C (97.9 °F)]   Resp:  [16-17]   BP: (111-156)/(53-77)   SpO2:  [94 %-100 %]     Physical Exam:  General: frail, appears stated age  HEENT: without scleral icterus, reactive pupils BL, EOM intact  Cardiovascular: RRR, capillary refill <3sec, 2+ palpable radial, femoral , DP/PT  bilaterally  Pulmonary: breathing comfortably on RA  Abdomen: soft, nondistended  Pelvis: Purewick in place  Skin: warm and dry. Incision on left scalp c/d/I.   Neuro: A/O x1 (baseline 0-1),   Lines: PIVs    IMAGING SUMMARY:  (summary of new imaging findings, not a copy of dictation)  No new imaging    LABS:  Results from last 7 days   Lab Units 11/25/24  0848 11/23/24  0735 11/21/24  0353   WBC AUTO x10*3/uL 7.8 9.4 10.6   HEMOGLOBIN g/dL 11.5* 11.2* 10.5*   HEMATOCRIT % 34.4* 32.8* 31.6*   PLATELETS AUTO x10*3/uL 202 181 183   NEUTROS PCT AUTO %  --  71.1  --    LYMPHS PCT AUTO %  --  19.7  --    MONOS PCT AUTO %  --  8.0  --    EOS PCT AUTO %  --  0.4  --            Results from last 7 days   Lab Units 11/27/24  0916 11/25/24  0847 11/24/24  0753   SODIUM mmol/L 142 138 140   POTASSIUM mmol/L 3.4* 3.9 3.7   CHLORIDE mmol/L 112* 109* 110*   CO2 mmol/L 22 20* 22   BUN mg/dL 9 8 7   CREATININE mg/dL 0.68 0.45* 0.55   CALCIUM mg/dL 8.5* 8.3* 8.2*   GLUCOSE mg/dL 216* 205* 234*                 I have reviewed all medications, laboratory results, and imaging pertinent for today's encounter.

## 2024-11-27 NOTE — PROGRESS NOTES
Physical Therapy    Physical Therapy Treatment    Patient Name: Loren Mitchell  MRN: [id number]  Department: [place] [address]  Room: [facility]80[facility]  Today's Date: 11/27/2024  Time Calculation  Start Time: 1226  Stop Time: 1249  Time Calculation (min): 23 min         Assessment/Plan   PT Assessment  End of Session Communication: Bedside nurse  Assessment Comment: Pt with improved tolerance to session. Pt soiled with BM at start of session requiring depependent hygiene/pericare from RN and OT with PT assisting to maintain pt in sidelying.  Max A to roll, mod A sidelying to sit, SBA for static sitting balance, and mod A for dynamic sitting balance.  Pt sat EOB ~8 minutes before attempting to lay down with head at foot of bed so Max A to redirect pt to lay with head at HOB.  Pt continues to benefit from skilled PT and remains appropriate for moderate intensity PT upon discharge.  End of Session Patient Position: Bed, 4 rail up, Alarm on (R soft wrist restraint)  PT Plan  Inpatient/Swing Bed or Outpatient: Inpatient  PT Plan  Treatment/Interventions: Bed mobility, Transfer training, Gait training, Balance training, Neuromuscular re-education, Strengthening, Endurance training, Range of motion, Therapeutic exercise, Therapeutic activity, Home exercise program, Wheelchair management, Positioning  PT Plan: Ongoing PT  PT Frequency: 3 times per week  PT Discharge Recommendations: Moderate intensity level of continued care  Equipment Recommended upon Discharge:  (TBD at next level of care)  PT Recommended Transfer Status: Total assist  PT - OK to Discharge: Yes (eval complete and discharge recommendations made)      General Visit Information:   PT  Visit  PT Received On: 11/27/24  General  Reason for Referral: Pt presents after being found down after fall at [facility] care facility. Found to have large mixed density L SDH w sig MLS. 11/19 s/p L inez holes for SDH evac. SDD d/c'd 11/21.  Past Medical History Relevant to Rehab: HTN, HLD,  hypoT, CKD, dementia (Ox0 at baseline), CAD, recurrent UTIs  Co-Treatment: OT  Co-Treatment Reason: to maximize safety with mobility and therapeutic potential in pt with AMPAC <10 and A&Ox0 at baseline  Prior to Session Communication: Bedside nurse  Patient Position Received: Bed, 4 rail up, Alarm on (R soft wrist restraint)  General Comment: Pt cleared for PT by RN.  Pt alert and agreeable to PT. +PIV, NG tube, external catheter    Subjective   Precautions:  Precautions  Medical Precautions: Fall precautions    Objective   Pain:  Pain Assessment  Pain Assessment: Unable to self-report (no overt signs of pain or discomfort throughout session)  Cognition:  Cognition  Overall Cognitive Status: Impaired at baseline  Orientation Level: Disoriented X4  Following Commands:  (does not follow any commanbds)  Coordination:  Movements are Fluid and Coordinated: Yes (pt kicking B LEs in sitting, able to pull bed sheets up and pull up gown with both UEs)  Postural Control:  Postural Control  Postural Control: Impaired  Static Sitting Balance  Static Sitting-Balance Support: Bilateral upper extremity supported, Feet unsupported  Static Sitting-Level of Assistance: Close supervision  Static Sitting-Comment/Number of Minutes: sitting ~8 minutes  Dynamic Sitting Balance  Dynamic Sitting-Balance Support: Bilateral upper extremity supported, Feet supported  Dynamic Sitting-Level of Assistance: Moderate assistance  Dynamic Sitting-Balance:  (LE movements-not following commands for ther ex but moving B LEs in small repetitive kicking motions sitting EOB)  Activity Tolerance:  Activity Tolerance  Endurance: Tolerates less than 10 min exercise, no significant change in vital signs  Treatments:  Therapeutic Exercise  Therapeutic Exercise Performed: No    Therapeutic Activity  Therapeutic Activity Performed: Yes  Therapeutic Activity 1: bed mobility, attempted transfer, increased time reorienting pt and attempting command following    Bed  Mobility  Bed Mobility: Yes  Bed Mobility 1  Bed Mobility 1: Rolling right, Rolling left  Level of Assistance 1: Maximum assistance, +2  Bed Mobility Comments 1: pt resistive to movement but then allowing with 2nd attempt  Bed Mobility 2  Bed Mobility  2: Side lying left to sit  Level of Assistance 2: Moderate assistance, +2  Bed Mobility Comments 2: HOB partially elevated  Bed Mobility 3  Bed Mobility 3: Sitting to supine  Level of Assistance 3: Maximum assistance, +2  Bed Mobility Comments 3: pt resistive to movement but remains sitting with close supervision once sitting EOB    Ambulation/Gait Training  Ambulation/Gait Training Performed: No  Transfers  Transfer: Yes  Transfer 1  Transfer From 1: Bed to  Transfer to 1: Stand  Technique 1: Sit to stand  Transfer Device 1:  (no device)  Transfer Level of Assistance 1: Maximum assistance (PT anterior to pt)  Trials/Comments 1: pt resisting, retropulsive and pushing against PT so discontinued    Stairs  Stairs: No    Outcome Measures:  [facility] Basic Mobility  Turning from your back to your side while in a flat bed without using bedrails: A lot  Moving from lying on your back to sitting on the side of a flat bed without using bedrails: A lot  Moving to and from bed to chair (including a wheelchair): Total  Standing up from a chair using your arms (e.g. wheelchair or bedside chair): Total  To walk in hospital room: Total  Climbing 3-5 steps with railing: Total  Basic Mobility - Total Score: 8    Education Documentation  Body Mechanics, taught by Nanda Durham, PT at 11/27/2024  1:09 PM.  Learner: Patient  Readiness: Acceptance  Method: Explanation, Demonstration  Response: No Evidence of Learning    Mobility Training, taught by Nanda Durham PT at 11/27/2024  1:09 PM.  Learner: Patient  Readiness: Acceptance  Method: Explanation, Demonstration  Response: No Evidence of Learning    Education Comments  No comments found.        Encounter Problems       Encounter Problems  (Active)       PT Problem       Patient will perform bed mobility with </= MOD A x1 to reduce risk of developing decubitus ulcers.  (Progressing)       Start:  11/21/24    Expected End:  12/05/24            Patient will perform sit to stand and stand to sit transfers with </= MAX A x1 and LRD to increase functional strength.  (Progressing)       Start:  11/21/24    Expected End:  12/05/24            Patient will ambulate at least 15 ft. with </= MAX A x1 and LRD to improve tolerance of household distances.  (Not Progressing)       Start:  11/21/24    Expected End:  12/05/24            Patient will participate in therapeutic exercise program with VSS and cues as needed.    (Not Progressing)       Start:  11/21/24    Expected End:  12/05/24            Patient will score at least 42/56 on the Function in Sitting Test to improve sitting balance required for functional tasks.   (Progressing)       Start:  11/21/24    Expected End:  12/05/24               Pain - Adult

## 2024-11-28 LAB
ALBUMIN SERPL BCP-MCNC: 3.3 G/DL (ref 3.4–5)
ANION GAP SERPL CALC-SCNC: 12 MMOL/L (ref 10–20)
BUN SERPL-MCNC: 9 MG/DL (ref 6–23)
CALCIUM SERPL-MCNC: 8.7 MG/DL (ref 8.6–10.6)
CHLORIDE SERPL-SCNC: 111 MMOL/L (ref 98–107)
CO2 SERPL-SCNC: 22 MMOL/L (ref 21–32)
CREAT SERPL-MCNC: 0.52 MG/DL (ref 0.5–1.05)
EGFRCR SERPLBLD CKD-EPI 2021: >90 ML/MIN/1.73M*2
GLUCOSE BLD MANUAL STRIP-MCNC: 179 MG/DL (ref 74–99)
GLUCOSE BLD MANUAL STRIP-MCNC: 182 MG/DL (ref 74–99)
GLUCOSE BLD MANUAL STRIP-MCNC: 198 MG/DL (ref 74–99)
GLUCOSE BLD MANUAL STRIP-MCNC: 261 MG/DL (ref 74–99)
GLUCOSE BLD MANUAL STRIP-MCNC: 264 MG/DL (ref 74–99)
GLUCOSE SERPL-MCNC: 161 MG/DL (ref 74–99)
MAGNESIUM SERPL-MCNC: 1.81 MG/DL (ref 1.6–2.4)
PHOSPHATE SERPL-MCNC: 3.6 MG/DL (ref 2.5–4.9)
POTASSIUM SERPL-SCNC: 3.7 MMOL/L (ref 3.5–5.3)
SODIUM SERPL-SCNC: 141 MMOL/L (ref 136–145)

## 2024-11-28 PROCEDURE — 80069 RENAL FUNCTION PANEL: CPT

## 2024-11-28 PROCEDURE — 2500000002 HC RX 250 W HCPCS SELF ADMINISTERED DRUGS (ALT 637 FOR MEDICARE OP, ALT 636 FOR OP/ED): Performed by: PHYSICIAN ASSISTANT

## 2024-11-28 PROCEDURE — 2500000004 HC RX 250 GENERAL PHARMACY W/ HCPCS (ALT 636 FOR OP/ED): Performed by: PHYSICIAN ASSISTANT

## 2024-11-28 PROCEDURE — 1100000001 HC PRIVATE ROOM DAILY

## 2024-11-28 PROCEDURE — 82947 ASSAY GLUCOSE BLOOD QUANT: CPT

## 2024-11-28 PROCEDURE — 2500000001 HC RX 250 WO HCPCS SELF ADMINISTERED DRUGS (ALT 637 FOR MEDICARE OP)

## 2024-11-28 PROCEDURE — 36415 COLL VENOUS BLD VENIPUNCTURE: CPT

## 2024-11-28 PROCEDURE — 2500000001 HC RX 250 WO HCPCS SELF ADMINISTERED DRUGS (ALT 637 FOR MEDICARE OP): Performed by: PHYSICIAN ASSISTANT

## 2024-11-28 PROCEDURE — 99231 SBSQ HOSP IP/OBS SF/LOW 25: CPT | Performed by: PHYSICIAN ASSISTANT

## 2024-11-28 PROCEDURE — 83735 ASSAY OF MAGNESIUM: CPT

## 2024-11-28 RX ORDER — POTASSIUM CHLORIDE 1.5 G/1.58G
20 POWDER, FOR SOLUTION ORAL ONCE
Status: COMPLETED | OUTPATIENT
Start: 2024-11-28 | End: 2024-11-28

## 2024-11-28 RX ORDER — MAGNESIUM SULFATE HEPTAHYDRATE 40 MG/ML
2 INJECTION, SOLUTION INTRAVENOUS ONCE
Status: COMPLETED | OUTPATIENT
Start: 2024-11-28 | End: 2024-11-28

## 2024-11-28 RX ORDER — INSULIN GLARGINE 100 [IU]/ML
51 INJECTION, SOLUTION SUBCUTANEOUS NIGHTLY
Status: DISCONTINUED | OUTPATIENT
Start: 2024-11-28 | End: 2024-11-29

## 2024-11-28 SDOH — ECONOMIC STABILITY: HOUSING INSECURITY
IN THE LAST 12 MONTHS, WAS THERE A TIME WHEN YOU WERE NOT ABLE TO PAY THE MORTGAGE OR RENT ON TIME?: PATIENT UNABLE TO ANSWER

## 2024-11-28 SDOH — ECONOMIC STABILITY: HOUSING INSECURITY: IN THE PAST 12 MONTHS, HOW MANY TIMES HAVE YOU MOVED WHERE YOU WERE LIVING?: 0

## 2024-11-28 SDOH — ECONOMIC STABILITY: FOOD INSECURITY
WITHIN THE PAST 12 MONTHS, YOU WORRIED THAT YOUR FOOD WOULD RUN OUT BEFORE YOU GOT THE MONEY TO BUY MORE.: PATIENT UNABLE TO ANSWER

## 2024-11-28 SDOH — ECONOMIC STABILITY: TRANSPORTATION INSECURITY
IN THE PAST 12 MONTHS, HAS LACK OF TRANSPORTATION KEPT YOU FROM MEDICAL APPOINTMENTS OR FROM GETTING MEDICATIONS?: PATIENT UNABLE TO ANSWER

## 2024-11-28 SDOH — SOCIAL STABILITY: SOCIAL INSECURITY: WERE YOU ABLE TO COMPLETE ALL THE BEHAVIORAL HEALTH SCREENINGS?: NO

## 2024-11-28 SDOH — ECONOMIC STABILITY: FOOD INSECURITY
WITHIN THE PAST 12 MONTHS, THE FOOD YOU BOUGHT JUST DIDN'T LAST AND YOU DIDN'T HAVE MONEY TO GET MORE.: PATIENT UNABLE TO ANSWER

## 2024-11-28 SDOH — ECONOMIC STABILITY: FOOD INSECURITY
HOW HARD IS IT FOR YOU TO PAY FOR THE VERY BASICS LIKE FOOD, HOUSING, MEDICAL CARE, AND HEATING?: PATIENT UNABLE TO ANSWER

## 2024-11-28 SDOH — ECONOMIC STABILITY: HOUSING INSECURITY: AT ANY TIME IN THE PAST 12 MONTHS, WERE YOU HOMELESS OR LIVING IN A SHELTER (INCLUDING NOW)?: PATIENT UNABLE TO ANSWER

## 2024-11-28 SDOH — SOCIAL STABILITY: SOCIAL INSECURITY: WITHIN THE LAST YEAR, HAVE YOU BEEN AFRAID OF YOUR PARTNER OR EX-PARTNER?: PATIENT UNABLE TO ANSWER

## 2024-11-28 SDOH — HEALTH STABILITY: PHYSICAL HEALTH
ON AVERAGE, HOW MANY DAYS PER WEEK DO YOU ENGAGE IN MODERATE TO STRENUOUS EXERCISE (LIKE A BRISK WALK)?: PATIENT UNABLE TO ANSWER

## 2024-11-28 SDOH — SOCIAL STABILITY: SOCIAL INSECURITY
WITHIN THE LAST YEAR, HAVE YOU BEEN HUMILIATED OR EMOTIONALLY ABUSED IN OTHER WAYS BY YOUR PARTNER OR EX-PARTNER?: PATIENT UNABLE TO ANSWER

## 2024-11-28 SDOH — HEALTH STABILITY: PHYSICAL HEALTH: ON AVERAGE, HOW MANY MINUTES DO YOU ENGAGE IN EXERCISE AT THIS LEVEL?: PATIENT UNABLE TO ANSWER

## 2024-11-28 SDOH — SOCIAL STABILITY: SOCIAL INSECURITY
WITHIN THE LAST YEAR, HAVE YOU BEEN RAPED OR FORCED TO HAVE ANY KIND OF SEXUAL ACTIVITY BY YOUR PARTNER OR EX-PARTNER?: PATIENT UNABLE TO ANSWER

## 2024-11-28 SDOH — SOCIAL STABILITY: SOCIAL INSECURITY
WITHIN THE LAST YEAR, HAVE YOU BEEN KICKED, HIT, SLAPPED, OR OTHERWISE PHYSICALLY HURT BY YOUR PARTNER OR EX-PARTNER?: PATIENT UNABLE TO ANSWER

## 2024-11-28 SDOH — ECONOMIC STABILITY: INCOME INSECURITY
IN THE PAST 12 MONTHS HAS THE ELECTRIC, GAS, OIL, OR WATER COMPANY THREATENED TO SHUT OFF SERVICES IN YOUR HOME?: PATIENT UNABLE TO ANSWER

## 2024-11-28 ASSESSMENT — COGNITIVE AND FUNCTIONAL STATUS - GENERAL
CLIMB 3 TO 5 STEPS WITH RAILING: A LITTLE
PATIENT BASELINE BEDBOUND: UNABLE TO ASSESS AT THIS TIME
PERSONAL GROOMING: A LOT
EATING MEALS: A LOT
STANDING UP FROM CHAIR USING ARMS: A LITTLE
TURNING FROM BACK TO SIDE WHILE IN FLAT BAD: A LITTLE
HELP NEEDED FOR BATHING: A LOT
WALKING IN HOSPITAL ROOM: A LITTLE
TURNING FROM BACK TO SIDE WHILE IN FLAT BAD: A LITTLE
DRESSING REGULAR UPPER BODY CLOTHING: A LOT
MOBILITY SCORE: 18
MOBILITY SCORE: 19
MOVING TO AND FROM BED TO CHAIR: A LITTLE
MOVING TO AND FROM BED TO CHAIR: A LITTLE
TOILETING: TOTAL
DAILY ACTIVITIY SCORE: 12
HELP NEEDED FOR BATHING: A LOT
STANDING UP FROM CHAIR USING ARMS: A LITTLE
DRESSING REGULAR LOWER BODY CLOTHING: A LOT
WALKING IN HOSPITAL ROOM: A LITTLE
DRESSING REGULAR UPPER BODY CLOTHING: A LOT
TOILETING: A LOT
CLIMB 3 TO 5 STEPS WITH RAILING: A LOT
PERSONAL GROOMING: A LOT
DRESSING REGULAR LOWER BODY CLOTHING: A LOT

## 2024-11-28 ASSESSMENT — ACTIVITIES OF DAILY LIVING (ADL)
BATHING: UNABLE TO ASSESS
TOILETING: UNABLE TO ASSESS
ADEQUATE_TO_COMPLETE_ADL: UNABLE TO ASSESS
LACK_OF_TRANSPORTATION: PATIENT UNABLE TO ANSWER
WALKS IN HOME: UNABLE TO ASSESS
JUDGMENT_ADEQUATE_SAFELY_COMPLETE_DAILY_ACTIVITIES: UNABLE TO ASSESS
FEEDING YOURSELF: UNABLE TO ASSESS
PATIENT'S MEMORY ADEQUATE TO SAFELY COMPLETE DAILY ACTIVITIES?: UNABLE TO ASSESS
DRESSING YOURSELF: UNABLE TO ASSESS
HEARING - LEFT EAR: FUNCTIONAL
GROOMING: UNABLE TO ASSESS
HEARING - RIGHT EAR: FUNCTIONAL

## 2024-11-28 ASSESSMENT — PAIN SCALES - WONG BAKER
WONGBAKER_NUMERICALRESPONSE: NO HURT
WONGBAKER_NUMERICALRESPONSE: HURTS LITTLE BIT

## 2024-11-28 ASSESSMENT — PAIN - FUNCTIONAL ASSESSMENT: PAIN_FUNCTIONAL_ASSESSMENT: WONG-BAKER FACES

## 2024-11-28 NOTE — PROGRESS NOTES
[facility]  TRAUMA SERVICE - PROGRESS NOTE    Patient Name: Loren Mitchell  MRN: [id number]  Admit Date: 1119  : 1945  AGE: 79 y.o.   GENDER: female  ==============================================================================  MECHANISM OF INJURY:   79 YOF s/p fall     LOC (yes/no?): unknown  Anticoagulant / Anti-platelet Rx? (for what dx?): no  Referring Facility Name (N/A for scene EMR run): [facility]    Injuries/problems:  - Large L hematoma with midline shift  - dysphagia, acute on chronic  - Hx A/O x0-1 at baseline end stage dementia, HTN, recurrent UTIs, CAD, HLD, depression, type II DM A1C 7.4/166 (insulin dependent)    INCIDENTAL FINDINGS:  none    PROCEDURES:  : L inez hole x2 with SD evacuation    ==============================================================================  TODAY'S ASSESSMENT AND PLAN OF CARE:  ## SDH d/t unwitnessed fall  ###End stage dementia baseline Aox0-1 at baseline, lives in memory unit  - NSGY s/o :okay chemoppx; fu Cth / for CTH and 12/3 for appt  - q4h neuro checks  - PT/OT  - avoid further restraints/sitter  - zyprexa 10 mg BID as needed agitation, recent Qtc <500    ## Recurrent UTI  - Initially started on Nitrofurantoin empirically (stop  as negative UA/cx)    ## PMHx  - Continue home statin, Prozac, Synthroid, Memantine  - holding home 10 meqK, telmisartan for now  - Geriatrics following    ## FEN/GI: acute on chronic dysphagia, dm  - SLP previously rec minced/moist/thin liquids, now NPO. -  -family okay with dobhoff, but no long term feeding tube  - Dobhoff placed , Goal Isosource 45/h  - mildly low K/mag repleted, recheck  if in house  - OK for <10 ice chips to improve swallowing  - having Bms with current regimen  - with meals #3 lispro, home lantus 51 units nightly. Holding home trulicity, glimepiride (hold oral agent per katie)  - no further B12 per katie    ## PPX  - SCDs  - LVX    Social:  dnr/dni on code status discussion    Dispo: continue care on RNF, med clear SNF    Patient's  [place]li[place]: [phone]     Patient discussed with attending, Dr. Malini Gilbert PA-C  Trauma, Critical Care, and Acute Care Surgery  Floor: [id number]   TSICU: [id number]   ==============================================================================  CHIEF COMPLAINT / OVERNIGHT EVENTS:   No acute events or agitation concern overnight    MEDICAL HISTORY / ROS:  Admission history and ROS reviewed. Pertinent changes as follows:  none    PHYSICAL EXAM:  Heart Rate:  [56-66]   Temp:  [35.9 °C (96.6 °F)-36.2 °C (97.1 °F)]   Resp:  [15-17]   BP: (124-154)/(62-75)   SpO2:  [95 %-97 %]     Physical Exam:  General: frail, appears stated age  HEENT: without scleral icterus, reactive pupils BL. Bridled dobhoff in place  Cardiovascular: Rate controlled rhythm  Pulmonary: breathing comfortably on RA  Abdomen: soft, nondistended  Gu: deferred  Skin: warm and dry. Incision on left scalp c/d/I.   Neuro: GCS 13 (M6/E4/V3) best. Waxes and wanes    IMAGING SUMMARY:  (summary of new imaging findings, not a copy of dictation)  No new imaging    LABS:  Results from last 7 days   Lab Units 11/25/24  0848 11/23/24  0735   WBC AUTO x10*3/uL 7.8 9.4   HEMOGLOBIN g/dL 11.5* 11.2*   HEMATOCRIT % 34.4* 32.8*   PLATELETS AUTO x10*3/uL 202 181   NEUTROS PCT AUTO %  --  71.1   LYMPHS PCT AUTO %  --  19.7   MONOS PCT AUTO %  --  8.0   EOS PCT AUTO %  --  0.4           Results from last 7 days   Lab Units 11/28/24  0732 11/27/24  0916 11/25/24  0847   SODIUM mmol/L 141 142 138   POTASSIUM mmol/L 3.7 3.4* 3.9   CHLORIDE mmol/L 111* 112* 109*   CO2 mmol/L 22 22 20*   BUN mg/dL 9 9 8   CREATININE mg/dL 0.52 0.68 0.45*   CALCIUM mg/dL 8.7 8.5* 8.3*   GLUCOSE mg/dL 161* 216* 205*                 I have reviewed all medications, laboratory results, and imaging pertinent for today's encounter.

## 2024-11-29 LAB
GLUCOSE BLD MANUAL STRIP-MCNC: 138 MG/DL (ref 74–99)
GLUCOSE BLD MANUAL STRIP-MCNC: 170 MG/DL (ref 74–99)
GLUCOSE BLD MANUAL STRIP-MCNC: 182 MG/DL (ref 74–99)
GLUCOSE BLD MANUAL STRIP-MCNC: 190 MG/DL (ref 74–99)
GLUCOSE BLD MANUAL STRIP-MCNC: 203 MG/DL (ref 74–99)
GLUCOSE BLD MANUAL STRIP-MCNC: 225 MG/DL (ref 74–99)
MAGNESIUM SERPL-MCNC: 1.92 MG/DL (ref 1.6–2.4)

## 2024-11-29 PROCEDURE — 2500000001 HC RX 250 WO HCPCS SELF ADMINISTERED DRUGS (ALT 637 FOR MEDICARE OP): Performed by: PHYSICIAN ASSISTANT

## 2024-11-29 PROCEDURE — 2500000002 HC RX 250 W HCPCS SELF ADMINISTERED DRUGS (ALT 637 FOR MEDICARE OP, ALT 636 FOR OP/ED)

## 2024-11-29 PROCEDURE — 99233 SBSQ HOSP IP/OBS HIGH 50: CPT

## 2024-11-29 PROCEDURE — 2500000001 HC RX 250 WO HCPCS SELF ADMINISTERED DRUGS (ALT 637 FOR MEDICARE OP)

## 2024-11-29 PROCEDURE — 1100000001 HC PRIVATE ROOM DAILY

## 2024-11-29 PROCEDURE — 36415 COLL VENOUS BLD VENIPUNCTURE: CPT

## 2024-11-29 PROCEDURE — 2500000004 HC RX 250 GENERAL PHARMACY W/ HCPCS (ALT 636 FOR OP/ED): Performed by: PHYSICIAN ASSISTANT

## 2024-11-29 PROCEDURE — 82947 ASSAY GLUCOSE BLOOD QUANT: CPT

## 2024-11-29 PROCEDURE — 2500000002 HC RX 250 W HCPCS SELF ADMINISTERED DRUGS (ALT 637 FOR MEDICARE OP, ALT 636 FOR OP/ED): Performed by: PHYSICIAN ASSISTANT

## 2024-11-29 PROCEDURE — 83735 ASSAY OF MAGNESIUM: CPT

## 2024-11-29 PROCEDURE — 97530 THERAPEUTIC ACTIVITIES: CPT | Mod: GP,CQ

## 2024-11-29 PROCEDURE — 99231 SBSQ HOSP IP/OBS SF/LOW 25: CPT

## 2024-11-29 RX ORDER — INSULIN GLARGINE 100 [IU]/ML
55 INJECTION, SOLUTION SUBCUTANEOUS NIGHTLY
Status: DISCONTINUED | OUTPATIENT
Start: 2024-11-29 | End: 2024-12-04 | Stop reason: HOSPADM

## 2024-11-29 RX ORDER — INSULIN GLARGINE 300 U/ML
INJECTION, SOLUTION SUBCUTANEOUS
Qty: 6 EACH | Refills: 2 | Status: SHIPPED | OUTPATIENT
Start: 2024-11-29

## 2024-11-29 ASSESSMENT — COGNITIVE AND FUNCTIONAL STATUS - GENERAL
DRESSING REGULAR LOWER BODY CLOTHING: A LOT
TURNING FROM BACK TO SIDE WHILE IN FLAT BAD: A LITTLE
CLIMB 3 TO 5 STEPS WITH RAILING: A LITTLE
TOILETING: TOTAL
MOVING TO AND FROM BED TO CHAIR: A LITTLE
HELP NEEDED FOR BATHING: A LOT
MOBILITY SCORE: 19
DAILY ACTIVITIY SCORE: 11
WALKING IN HOSPITAL ROOM: A LITTLE
CLIMB 3 TO 5 STEPS WITH RAILING: TOTAL
EATING MEALS: A LOT
MOBILITY SCORE: 19
HELP NEEDED FOR BATHING: A LOT
CLIMB 3 TO 5 STEPS WITH RAILING: A LITTLE
EATING MEALS: A LOT
STANDING UP FROM CHAIR USING ARMS: A LITTLE
DRESSING REGULAR UPPER BODY CLOTHING: A LOT
TOILETING: TOTAL
TURNING FROM BACK TO SIDE WHILE IN FLAT BAD: TOTAL
MOVING FROM LYING ON BACK TO SITTING ON SIDE OF FLAT BED WITH BEDRAILS: TOTAL
WALKING IN HOSPITAL ROOM: A LITTLE
PERSONAL GROOMING: A LOT
PERSONAL GROOMING: A LOT
DRESSING REGULAR UPPER BODY CLOTHING: A LOT
WALKING IN HOSPITAL ROOM: TOTAL
TURNING FROM BACK TO SIDE WHILE IN FLAT BAD: A LITTLE
STANDING UP FROM CHAIR USING ARMS: TOTAL
DAILY ACTIVITIY SCORE: 11
DRESSING REGULAR LOWER BODY CLOTHING: A LOT
MOBILITY SCORE: 6
MOVING TO AND FROM BED TO CHAIR: A LITTLE
MOVING TO AND FROM BED TO CHAIR: TOTAL
STANDING UP FROM CHAIR USING ARMS: A LITTLE

## 2024-11-29 ASSESSMENT — PAIN SCALES - GENERAL
PAINLEVEL_OUTOF10: 0 - NO PAIN
PAINLEVEL_OUTOF10: 5 - MODERATE PAIN
PAINLEVEL_OUTOF10: 5 - MODERATE PAIN

## 2024-11-29 ASSESSMENT — PAIN SCALES - WONG BAKER
WONGBAKER_NUMERICALRESPONSE: NO HURT
WONGBAKER_NUMERICALRESPONSE: HURTS LITTLE BIT

## 2024-11-29 ASSESSMENT — PAIN - FUNCTIONAL ASSESSMENT
PAIN_FUNCTIONAL_ASSESSMENT: WONG-BAKER FACES
PAIN_FUNCTIONAL_ASSESSMENT: WONG-BAKER FACES

## 2024-11-29 ASSESSMENT — PAIN DESCRIPTION - LOCATION: LOCATION: GENERALIZED

## 2024-11-29 NOTE — PROGRESS NOTES
Nutrition Note:   Nutrition Assessment    Reason for Assessment: Enteral assessment/recommendation (TF)  Corpak placed and pt started on entral nutrition support-- Isosource 1.5 @ 45 ml/hr. Asked by Geriatrics to reassess enteral formula due to elevated blood sugars.     Nutrition Significant Labs:  BG POCT trend:   Results from last 7 days   Lab Units 11/29/24  1219 11/29/24  0752 11/29/24  0631 11/28/24  2223 11/28/24  1516   POCT GLUCOSE mg/dL 182* 138* 225* 198* 261*    , Renal Lab Trend:   Results from last 7 days   Lab Units 11/29/24  0906 11/28/24  0732 11/27/24  0916 11/25/24  0847 11/24/24  0753   POTASSIUM mmol/L  --  3.7 3.4* 3.9 3.7   PHOSPHORUS mg/dL  --  3.6 3.6 2.9 2.2*   SODIUM mmol/L  --  141 142 138 140   MAGNESIUM mg/dL 1.92 1.81 1.93 1.77 1.89   EGFR mL/min/1.73m*2  --  >90 88 >90 >90   BUN mg/dL  --  9 9 8 7   CREATININE mg/dL  --  0.52 0.68 0.45* 0.55        Nutrition Specific Medications:  Scheduled medications  insulin glargine, 51 Units, subcutaneous, Nightly  insulin lispro, 0-15 Units, subcutaneous, q6h  levothyroxine, 50 mcg, nasogastric tube, Daily  polyethylene glycol, 17 g, nasogastric tube, Daily  sennosides-docusate sodium, 1 tablet, nasogastric tube, Nightly      I/O:   Last BM Date: 11/29/24; Stool Appearance: Mucous (11/29/24 1320)    Dietary Orders (From admission, onward)       Start     Ordered    11/28/24 1103  May Not Participate in Room Service  ( ROOM SERVICE MAY NOT PARTICIPATE)  Once        Question:  .  Answer:  Yes    11/28/24 1102    11/28/24 0914  Enteral feeding with NPO Isosource 1.5; NG (nasogastric tube); 45; 20; Water; Bottled water  Diet effective now        Comments: Keep HOB 30 degrees   Question Answer Comment   Tube feeding formula: Isosource 1.5    Feeding route: NG (nasogastric tube)    Tube feeding cyclic rate (mL/hr): 45    Tube feeding flush (mL): 20    Flush type: Water    Water type: Bottled water        11/28/24 0913                     Estimated  Needs:   Total Energy Estimated Needs (kCal):  (9315-9834)  Method for Estimating Needs: 25-30 kcal/kg  Total Protein Estimated Needs (g):  (60-70)  Method for Estimating Needs: 1.0-1.2 g/kg  Total Fluid Estimated Needs (mL):  (per team)      Nutrition Diagnosis   Malnutrition Diagnosis  Patient has Malnutrition Diagnosis: No    Nutrition Diagnosis  Patient has Nutrition Diagnosis: Yes  Diagnosis Status (1): Ongoing  Nutrition Diagnosis 1: Swallowing difficulity  Related to (1): dysphagia, altered mental status  As Evidenced by (1): SLP sumit 11/25 recommending NPO.       Nutrition Interventions/Recommendations         Nutrition Prescription:  Individualized Nutrition Prescription Provided for : Consider changing to a diabetic enteral formula-- though this also may increase stool volume as it is a higher fat/fiber formula.        Nutrition Interventions:   Interventions: Enteral intake  Enteral Intake: Other (Comment)  Increase Isosource 1.5 @ 50 ml/hr x22 hr (hold pre/post synthroid) + adjust insulin for blood sugar control = 1650, 75 g protein, 840 ml free H20, 194 g carb per day  Can alternatively trial Glucerna 1.5 @ 50 ml/hr x22 hr (hold pre/post synthroid) = 1650 kcal, 91 g protein, 835ml free H20, 146 g carb per day  Additional Interventions: Free H20 flush per team      Nutrition Monitoring and Evaluation   Food/Nutrient Related History Monitoring  Monitoring and Evaluation Plan: Enteral and parenteral nutrition intake  Criteria: tolerate enteral feeds @ goal         Biochemical Data, Medical Tests and Procedures  Monitoring and Evaluation Plan: Glucose/endocrine profile  Criteria: POC glucose <180 mg/dl              Time Spent (min): 15 minutes

## 2024-11-29 NOTE — PROGRESS NOTES
Physical Therapy    Physical Therapy Treatment    Patient Name: Loren Mitchell  MRN: [id number]  Department: [place] [address]  Room: [facility]8081  Today's Date: 11/29/2024  Time Calculation  Start Time: 1240  Stop Time: 1257  Time Calculation (min): 17 min         Assessment/Plan   PT Assessment  PT Assessment Results: Impaired balance, Decreased mobility, Decreased coordination, Decreased cognition, Impaired judgement, Decreased safety awareness  Rehab Prognosis: Fair  Barriers to Discharge: Medical acuity  Evaluation/Treatment Tolerance: Treatment limited secondary to agitation  Medical Staff Made Aware: Yes  End of Session Communication: Bedside nurse  Assessment Comment: Pt with minimal appropriate participation in PT this date due to agitation/dementia. Remains appropriate for mod intensity therapy  End of Session Patient Position: Bed, 3 rail up, Alarm on  PT Plan  Inpatient/Swing Bed or Outpatient: Inpatient  PT Plan  Treatment/Interventions: Bed mobility, Transfer training, Gait training, Balance training, Neuromuscular re-education, Strengthening, Endurance training, Range of motion, Therapeutic exercise, Therapeutic activity, Home exercise program, Wheelchair management, Positioning  PT Plan: Ongoing PT  PT Frequency: 3 times per week  PT Discharge Recommendations: Moderate intensity level of continued care  Equipment Recommended upon Discharge:  (TBD at next level of care)  PT Recommended Transfer Status: Total assist  PT - OK to Discharge: Yes (eval complete and discharge recommendations made)      General Visit Information:   PT  Visit  PT Received On: 11/29/24  Response to Previous Treatment: Patient unable to report, no changes reported from family or staff  General  Prior to Session Communication: Bedside nurse  Patient Position Received: Bed, 3 rail up, Alarm on  General Comment: Pt alert, non-sensical  Per handoff with RN, pt is appropriate for therapy, vitals are stable and pain is controlled. Other  concerns prior to tx are: none    Subjective   Precautions:  Precautions  Medical Precautions: Fall precautions    Objective   Pain:  Pain Assessment  Pain Assessment: Muñiz-Baker FACES  0-10 (Numeric) Pain Score: 0 - No pain  Cognition:  Cognition  Overall Cognitive Status: Impaired at baseline  Orientation Level: Disoriented X4    Treatments:     Therapeutic Activity  Therapeutic Activity Performed: Yes  Therapeutic Activity 1: MaxA/DEP of 3 this PTA and two RNs to address significant hygiene needs due to pt resistance    Bed Mobility  Bed Mobility: Yes  Bed Mobility 1  Bed Mobility 1: Rolling right, Rolling left  Level of Assistance 1: Dependent  Bed Mobility Comments 1: x2-3, pt actively resistant, required to address hygiene needs  Bed Mobility 2  Bed Mobility  2: Scooting  Level of Assistance 2: Dependent, +2  Bed Mobility 3  Bed Mobility 3: Supine sitting  Bed Mobility Comments 3: attempts at supine->sitting. First attempt with maxA to move pt's LEs to EOB, pt then actively moving LEs back into the bed. Further attempts at moving LEs toward EOB unsuccessful due to pt kicking at this PTA.    Outcome Measures:     [facility] Basic Mobility  Turning from your back to your side while in a flat bed without using bedrails: Total  Moving from lying on your back to sitting on the side of a flat bed without using bedrails: Total  Moving to and from bed to chair (including a wheelchair): Total  Standing up from a chair using your arms (e.g. wheelchair or bedside chair): Total  To walk in hospital room: Total  Climbing 3-5 steps with railing: Total  Basic Mobility - Total Score: 6    Education Documentation  Precautions, taught by Laure Shah PTA at 11/29/2024  1:17 PM.  Learner: Patient  Readiness: Nonacceptance  Method: Explanation, Demonstration  Response: No Evidence of Learning  Comment: safe mobility    Body Mechanics, taught by Laure Shah PTA at 11/29/2024  1:17 PM.  Learner: Patient  Readiness:  Nonacceptance  Method: Explanation, Demonstration  Response: No Evidence of Learning  Comment: safe mobility    Mobility Training, taught by Laure Shah PTA at 11/29/2024  1:17 PM.  Learner: Patient  Readiness: Nonacceptance  Method: Explanation, Demonstration  Response: No Evidence of Learning  Comment: safe mobility    Education Comments  No comments found.        OP EDUCATION:       Encounter Problems       Encounter Problems (Active)       PT Problem       Patient will perform bed mobility with </= MOD A x1 to reduce risk of developing decubitus ulcers.  (Progressing)       Start:  11/21/24    Expected End:  12/05/24            Patient will perform sit to stand and stand to sit transfers with </= MAX A x1 and LRD to increase functional strength.  (Progressing)       Start:  11/21/24    Expected End:  12/05/24            Patient will ambulate at least 15 ft. with </= MAX A x1 and LRD to improve tolerance of household distances.  (Progressing)       Start:  11/21/24    Expected End:  12/05/24            Patient will participate in therapeutic exercise program with VSS and cues as needed.    (Progressing)       Start:  11/21/24    Expected End:  12/05/24            Patient will score at least 42/56 on the Function in Sitting Test to improve sitting balance required for functional tasks.   (Progressing)       Start:  11/21/24    Expected End:  12/05/24               Pain - Adult            LIZ Ingram

## 2024-11-29 NOTE — PROGRESS NOTES
[facility]  TRAUMA SERVICE - PROGRESS NOTE    Patient Name: Loren Mitchell  MRN: [id number]  Admit Date: 1119  : 1945  AGE: 79 y.o.   GENDER: female  ==============================================================================  MECHANISM OF INJURY:   79 YOF s/p fall     LOC (yes/no?): unknown  Anticoagulant / Anti-platelet Rx? (for what dx?): no  Referring Facility Name (N/A for scene EMR run): [facility]    Injuries/problems:  - Large L hematoma with midline shift  - dysphagia, acute on chronic  - Hx A/O x0-1 at baseline end stage dementia, HTN, recurrent UTIs, CAD, HLD, depression, type II DM A1C 7.4/166 (insulin dependent)    INCIDENTAL FINDINGS:  none    PROCEDURES:  : L inez hole x2 with SD evacuation    ==============================================================================  TODAY'S ASSESSMENT AND PLAN OF CARE:  ## SDH d/t unwitnessed fall  ###End stage dementia baseline Aox0-1 at baseline, lives in memory unit  - NSGY s/o :okay chemoppx; fu Cth 12/ for CTH and 12/3 for appt  - q4h neuro checks  - PT/OT  - PRN zyprexa changed to Seroquel per katie recs  - sitter free since  170    ## Recurrent UTI  - Initially started on Nitrofurantoin empirically (stop  as negative UA/cx)    ## PMHx  - Continue home statin, Prozac, Synthroid, Memantine  - holding home 10 meqK, telmisartan for now  - Geriatrics following   -recommend increasing Lantus to 55 at bedtime, nutrition reeval d/t elevated BGL    ## FEN/GI: acute on chronic dysphagia, dm  - SLP previously rec minced/moist/thin liquids, now NPO. -  -family okay with dobhoff, but no long term feeding tube  - Dobhoff placed , Goal Isosource 45/h   -increased to 50mL/h x22 hrs (hold pre/post synthroid) per nutrition recs  - mildly low K/mag repleted, recheck  if in house  - OK for <10 ice chips to improve swallowing  - having Bms with current regimen  - with meals #3 lispro, home lantus  51 units nightly. Holding home trulicity, glimepiride (hold oral agent per katie)  - no further B12 per katie    ## PPX  - SCDs  - LVX    Social: dnr/dni on code status discussion    Dispo: continue care on RNF, medically clear, SNF precert pending for Debra Casarez    Patient's  [place]li[place]: [phone]     Patient discussed with attending, Dr. Malini Mathis PANeydaC  Trauma, Critical Care, and Acute Care Surgery  Floor: [id number]   TSICU: [id number]   ==============================================================================  CHIEF COMPLAINT / OVERNIGHT EVENTS:   No acute events or agitation concern overnight. Sitter and restraint free, no PRNs required overnight. Resting comfortably, GCS 12-13.    MEDICAL HISTORY / ROS:  Admission history and ROS reviewed. Pertinent changes as follows:  none    PHYSICAL EXAM:  Heart Rate:  []   Temp:  [36.3 °C (97.3 °F)-36.8 °C (98.3 °F)]   Resp:  [15-16]   BP: (100-157)/(62-87)   SpO2:  [95 %-98 %]     Physical Exam:  General: frail, appears stated age, no sitter or restraints present  HEENT: without scleral icterus, reactive pupils BL. Bridled dobhoff in place  Cardiovascular: Rate controlled rhythm  Pulmonary: breathing comfortably on RA  Abdomen: soft, nondistended  Gu: deferred  Skin: warm and dry. Incision on left scalp c/d/I.   Neuro: GCS 13 (M6/E4/V3) best. Waxes and wanes    IMAGING SUMMARY:  (summary of new imaging findings, not a copy of dictation)  No new imaging    LABS:  Results from last 7 days   Lab Units 11/25/24  0848 11/23/24  0735   WBC AUTO x10*3/uL 7.8 9.4   HEMOGLOBIN g/dL 11.5* 11.2*   HEMATOCRIT % 34.4* 32.8*   PLATELETS AUTO x10*3/uL 202 181   NEUTROS PCT AUTO %  --  71.1   LYMPHS PCT AUTO %  --  19.7   MONOS PCT AUTO %  --  8.0   EOS PCT AUTO %  --  0.4           Results from last 7 days   Lab Units 11/28/24  0732 11/27/24  0916 11/25/24  0847   SODIUM mmol/L 141 142 138   POTASSIUM mmol/L 3.7 3.4* 3.9   CHLORIDE mmol/L 111* 112* 109*   CO2  mmol/L 22 22 20*   BUN mg/dL 9 9 8   CREATININE mg/dL 0.52 0.68 0.45*   CALCIUM mg/dL 8.7 8.5* 8.3*   GLUCOSE mg/dL 161* 216* 205*                 I have reviewed all medications, laboratory results, and imaging pertinent for today's encounter.

## 2024-11-29 NOTE — CARE PLAN
The patient's goals for the shift include      The clinical goals for the shift include Remain safe and oain free    Over the shift, the patient did not make progress toward the following goals. Barriers to progression include   Problem: Skin  Goal: Decreased wound size/increased tissue granulation at next dressing change  Outcome: Progressing  Goal: Participates in plan/prevention/treatment measures  Outcome: Progressing  Goal: Prevent/manage excess moisture  Outcome: Progressing  Goal: Prevent/minimize sheer/friction injuries  Outcome: Progressing  Goal: Promote/optimize nutrition  Outcome: Progressing  Goal: Promote skin healing  Outcome: Progressing   . Recommendations to address these barriers include .

## 2024-11-29 NOTE — PROGRESS NOTES
Subjective   Patient was found sitting up in bed. A&Ox1. Patient stated that she has no pain. Patient was not oriented to event and place but was talking about that she fell and hurt herself and that is why she is here. Patient would often stare and not answering questions when asked.          Objective     Current Facility-Administered Medications   Medication Dose Route Frequency Provider Last Rate Last Admin    acetaminophen (Tylenol) oral liquid 1,000 mg  1,000 mg nasogastric tube q8h KULWANT Fer G Vladimir, PA-C   1,000 mg at 11/29/24 0616    atorvastatin (Lipitor) tablet 10 mg  10 mg nasogastric tube q PM Fer ROSIO Gilbert PA-C   10 mg at 11/28/24 2042    dextrose 50 % injection 12.5 g  12.5 g intravenous q15 min PRN Rachana Kemp MD        dextrose 50 % injection 25 g  25 g intravenous q15 min PRNAY Kemp MD        enoxaparin (Lovenox) syringe 30 mg  30 mg subcutaneous BID Chas Zendejas PA-C   30 mg at 11/29/24 0837    FLUoxetine (PROzac) solution 40 mg  40 mg nasogastric tube Daily Fer G Vladimir PA-C   40 mg at 11/29/24 0838    glucagon (Glucagen) injection 1 mg  1 mg intramuscular q15 min PRN Rachana Kemp MD        glucagon (Glucagen) injection 1 mg  1 mg intramuscular q15 min PRN Rachana Kemp MD        insulin glargine (Lantus) injection 51 Units  51 Units subcutaneous Nightly Ferpriya Gilbert PA-C   51 Units at 11/28/24 2042    insulin lispro injection 0-15 Units  0-15 Units subcutaneous q6h Fer G Vladimir, PA-C   6 Units at 11/29/24 0600    levothyroxine (Synthroid, Levoxyl) tablet 50 mcg  50 mcg nasogastric tube Daily Fer G Vladimir, PA-C   50 mcg at 11/29/24 0616    melatonin tablet 5 mg  5 mg nasogastric tube Nightly Fer G Gilbert, PA-C   5 mg at 11/28/24 2042    memantine (Namenda) tablet 10 mg  10 mg nasogastric tube BID Fer G Vladimir, PA-C   10 mg at 11/29/24 0838    OLANZapine (ZyPREXA) tablet 10 mg  10 mg oral BID PRN Rachana Kemp MD        polyethylene glycol  (Glycolax, Miralax) packet 17 g  17 g nasogastric tube Daily Fer Gilbert PA-C        sennosides-docusate sodium (Prabha-Colace) 8.6-50 mg per tablet 1 tablet  1 tablet nasogastric tube Nightly Rachana Kemp MD   1 tablet at 11/28/24 2042       Physical Exam  Constitutional:       General: She is awake.      Appearance: Normal appearance.      Comments: Dobhoff in place   HENT:      Mouth/Throat:      Mouth: Mucous membranes are dry.      Pharynx: Oropharynx is clear.   Eyes:      Extraocular Movements: Extraocular movements intact.      Conjunctiva/sclera: Conjunctivae normal.   Cardiovascular:      Rate and Rhythm: Normal rate and regular rhythm.      Heart sounds: Normal heart sounds.   Pulmonary:      Effort: Pulmonary effort is normal.      Breath sounds: Normal breath sounds. No wheezing, rhonchi or rales.   Skin:     General: Skin is warm and dry.      Capillary Refill: Capillary refill takes less than 2 seconds.      Coloration: Skin is pale.   Neurological:      Mental Status: Mental status is at baseline. She is disoriented and confused.   Psychiatric:         Attention and Perception: She is inattentive.         Mood and Affect: Affect is blunt and flat.         Speech: She is noncommunicative.         Behavior: Behavior is uncooperative and slowed.         Cognition and Memory: Cognition is impaired. Memory is impaired.         Judgment: Judgment is impulsive and inappropriate.         Confusion Assessment Method(CAM) for diagnosis of delirium:    1.  Acute onset or fluctuating course: absent/present: Present  2.  Inattention: absent/present: Present  3.  Disorganized thinking: absent/present: Present  4.  Altered level of consciousness: absent/present: Absent  CAM: positive    AT Score For Assessment of Delirium and Cognitive Impairment:    Alertness: 4  Normal(fully alert,but not agitated, throughout assessment)=0  Mild sleepiness for <10 seconds after walking, then normal=0  Clearly  abnormal=4  2.  AMT4: 2  No mistakes=0  One mistake=1  Two or more mistakes/untestable=2  3.  Attention: 2  Achieves seven months or more correctly=0  Starts but scores <7 months/ refuses to start=1  Untestable(cannot start because unwell, drowsy, inattentive)=2  4.  Acute: 4  No=0  Yes=4    Total Score: 12  4 or above: Possible delirium +/- cognitive impairment  1-3: Possible cognitive impairment  0: Delirium or severe cognitive impairment unlikely(but delirium still possible if (4) information incomplete)      Last Recorded Vitals      11/28/2024     9:47 AM 11/28/2024    12:01 PM 11/28/2024     3:13 PM 11/28/2024     9:00 PM 11/29/2024    12:21 AM 11/29/2024     4:21 AM 11/29/2024     7:50 AM   Vitals   Systolic 151 149 148 155 100 132 157   Diastolic 71 87 64 78 62 84 76   BP Location Right arm Right arm Left arm Right arm Right arm Right arm Right arm   Heart Rate 56 100 60 58 73 76 61   Temp 36.2 °C (97.1 °F) 36.8 °C (98.3 °F) 36.4 °C (97.6 °F) 36.3 °C (97.3 °F) 36.3 °C (97.3 °F) 36.3 °C (97.3 °F) 36.4 °C (97.6 °F)   Resp 15 16 16 16 16 15 16      Vitals:    11/19/24 0955   Weight: 60 kg (132 lb 4.4 oz)        Relevant Results  Lab Results   Component Value Date    TSH 1.85 11/26/2024    BZFQCVUG90 1,269 (H) 11/26/2024    VITD25 43 11/26/2024    HGBA1C 7.4 (H) 11/23/2024     Results from last 7 days   Lab Units 11/28/24  0732 11/27/24  0916 11/25/24  0848 11/25/24  0847 11/24/24  0753 11/23/24  0735   WBC AUTO x10*3/uL  --   --  7.8  --   --  9.4   HEMOGLOBIN g/dL  --   --  11.5*  --   --  11.2*   HEMATOCRIT %  --   --  34.4*  --   --  32.8*   SODIUM mmol/L 141 142  --  138   < > 142   POTASSIUM mmol/L 3.7 3.4*  --  3.9   < > 3.4*   CHLORIDE mmol/L 111* 112*  --  109*   < > 110*   CREATININE mg/dL 0.52 0.68  --  0.45*   < > 0.64   BUN mg/dL 9 9  --  8   < > 12   CO2 mmol/L 22 22  --  20*   < > 24   HEMOGLOBIN A1C %  --   --   --   --   --  7.4*    < > = values in this interval not displayed.          XR  abdomen 1 view  Narrative: Interpreted By:  Kyle Garvin and Dervishi Mario   STUDY:  XR ABDOMEN 1 VIEW;  11/26/2024 7:47 pm      INDICATION:  Signs/Symptoms:s/p dobhoff placement.          COMPARISON:  None.      ACCESSION NUMBER(S):  [id number]      ORDERING CLINICIAN:  BRAD PORTER      FINDINGS:  Single AP radiographs of the abdomen:      An enteric tube is noted with the tip projecting over the gastric  fundus.      Nonobstructive bowel gas pattern. Limited evaluation of  pneumoperitoneum on supine imaging, however no gross evidence of free  air is noted.      Visualized lungs are clear.      Osseous structures demonstrate no acute bony changes.      Impression: 1.  Enteric tube overlies the gastric fundus. Otherwise unremarkable  radiographs of the abdomen.      I personally reviewed the images/study and I agree with the findings  as stated by Resident Alex Allen MD.      MACRO:  None      Signed by: Kyle Garvin 11/27/2024 6:02 AM  Dictation workstation:   [id number]          DATA:  EKG: QTC  Encounter Date: 11/19/24   ECG 12 Lead   Result Value    Ventricular Rate 63    Atrial Rate 63    FL Interval 190    QRS Duration 100    QT Interval 436    QTC Calculation(Bazett) 446    P Axis 21    R Axis -45    T Axis -82    QRS Count 10    Q Onset 212    P Onset 117    P Offset 170    T Offset 430    QTC Fredericia 443    Narrative    Normal sinus rhythm  Left axis deviation  Septal infarct (cited on or before 18-NOV-2024)  Abnormal ECG  When compared with ECG of 19-NOV-2024 03:30,  No significant change was found  See ED provider note for full interpretation and clinical correlation  Confirmed by Kwame Yoo ([id number]) on 11/20/2024 5:49:16 AM      Anti-psychotics in 48 hours: None  Opioids/Benzodiazepines in 48 hours: None  Anticholinergics on board:Yes - Memantine  Restraints:No  Indwelling catheters:No  Last BM: 11/29 0500  UO in 24 hours: 3851  Activity in the past 24 hours: per PT  Need for ambulatory  devices: Total assist per PT                  Assessment/Plan   Loren Mitchell is a 79 y.o. female on day 10 of admission with pertinent PMH of late stage dementia, T2DM, hypothyroidism, HTN, CKD, HLD, CAD, depression, recurrent UTIs who presented as a fall from [facility] care facility with large left  Subdural hematoma (Multi) with rightward midline shift and epidural. SDH continues to remain stable. Concern for malnutrition due to poor PO intake and acute on chronic dysphagia. Dobhoff placed 11/27.    Principal Problem:    Subdural hematoma (Multi)  Active Problems:    SDH (subdural hematoma) (Multi)    Postoperative delirium    CVA (cerebral vascular accident) (Multi)    Intracranial hemorrhage (Multi)    Severe late onset Alzheimer's dementia without behavioral disturbance, psychotic disturbance, mood disturbance, or anxiety (Multi)    Diabetes mellitus, type 2 (Multi)    HTN (hypertension)    Hyperlipidemia    Hypothyroidism    Polycythemia    Chronic renal insufficiency    Coronary artery disease involving native coronary artery of native heart without angina pectoris    Urinary tract infection    Sinus symptom    Seasonal allergies    Abnormal EKG    Osteoarthritis    Lumbar disc disease    Spinal stenosis    Chronic low back pain    History of hysterectomy    1. Late stated Severe Dementia  - Continue fluoxetine 40 mg every day, Memantine 10 mg BID  - Consider 1 v 1 sitter rather then physical restraints to help redirect the patient  - Consider consult to pet therapy  - Continue to monitor for signs of acute delirium  - TSH 1.85  - Vitamin B12 1268 recommend to discontinue cyanocobalamin due to elevated levels  - Recommend to change zyprexa to seroquel for agitation PRN    2. Acute on Chronic Dysphagia with risk of aspiration, Poor PO intake with concerns for malnutrition  - Dobhoff in place  - continue with Tube Feeds as tolerated  -Recommend nutrition to reevaluate feed solution due to increased blood  sugars  - Continue with oral care and hygiene  - Continue with ice chips 3-5/hour  - Continue IV NS 50 ml/hr  - Continue with SLP and nutrition    3. T2DM  - HgbA1C 7.2  - -261  - Recommend continuing with ISS  - Recommend increasing Lantus at bedtime to 55 due to increased requirements for ISS    4. H/o recurrent UTI  - continue to montior for s/s of UTI     5. Acute fall with no history of gait impairment  - Continue with PT/OT  - Vitamin D level 43           4M AGE-FRIENDLY INITIATIVE:  What matters most to patient: unable to obtain  Medications: Zyprexa, Memantine  Mentation: CAM + AT4 8  Mobility: Total assist per PT; moderate intensity level of continued care    Geriatric medicine will continue to follow the patient. Thank you for allowing geriatric medicine to be involved in the care of your patient. Geriatric medicine consultation team is available during work hours Monday through Friday. For any emergency issues requiring immediate assistance over the weekend, please page Geriatrics pager [phone]    Patient is staffed with Dr. Sarah Correia DO, PGY-3  [facility]

## 2024-11-29 NOTE — PROGRESS NOTES
Patient is now sitter free, effective yesterday. Patient is also pending pre-cert for [facility]. She is medically ready for discharge. SW will continue to follow to facilitate discharge plan.       LAZARA Jackson

## 2024-11-30 LAB
ALBUMIN SERPL BCP-MCNC: 3.6 G/DL (ref 3.4–5)
ANION GAP SERPL CALC-SCNC: 11 MMOL/L (ref 10–20)
BUN SERPL-MCNC: 17 MG/DL (ref 6–23)
CALCIUM SERPL-MCNC: 9.1 MG/DL (ref 8.6–10.6)
CHLORIDE SERPL-SCNC: 106 MMOL/L (ref 98–107)
CO2 SERPL-SCNC: 26 MMOL/L (ref 21–32)
CREAT SERPL-MCNC: 0.55 MG/DL (ref 0.5–1.05)
EGFRCR SERPLBLD CKD-EPI 2021: >90 ML/MIN/1.73M*2
GLUCOSE BLD MANUAL STRIP-MCNC: 174 MG/DL (ref 74–99)
GLUCOSE BLD MANUAL STRIP-MCNC: 186 MG/DL (ref 74–99)
GLUCOSE BLD MANUAL STRIP-MCNC: 191 MG/DL (ref 74–99)
GLUCOSE BLD MANUAL STRIP-MCNC: 197 MG/DL (ref 74–99)
GLUCOSE SERPL-MCNC: 182 MG/DL (ref 74–99)
MAGNESIUM SERPL-MCNC: 1.95 MG/DL (ref 1.6–2.4)
PHOSPHATE SERPL-MCNC: 4.3 MG/DL (ref 2.5–4.9)
POTASSIUM SERPL-SCNC: 4.2 MMOL/L (ref 3.5–5.3)
SODIUM SERPL-SCNC: 139 MMOL/L (ref 136–145)

## 2024-11-30 PROCEDURE — 1100000001 HC PRIVATE ROOM DAILY

## 2024-11-30 PROCEDURE — 82947 ASSAY GLUCOSE BLOOD QUANT: CPT

## 2024-11-30 PROCEDURE — 2500000002 HC RX 250 W HCPCS SELF ADMINISTERED DRUGS (ALT 637 FOR MEDICARE OP, ALT 636 FOR OP/ED): Performed by: PHYSICIAN ASSISTANT

## 2024-11-30 PROCEDURE — 2500000002 HC RX 250 W HCPCS SELF ADMINISTERED DRUGS (ALT 637 FOR MEDICARE OP, ALT 636 FOR OP/ED)

## 2024-11-30 PROCEDURE — 83735 ASSAY OF MAGNESIUM: CPT

## 2024-11-30 PROCEDURE — 2500000004 HC RX 250 GENERAL PHARMACY W/ HCPCS (ALT 636 FOR OP/ED): Performed by: PHYSICIAN ASSISTANT

## 2024-11-30 PROCEDURE — 2500000001 HC RX 250 WO HCPCS SELF ADMINISTERED DRUGS (ALT 637 FOR MEDICARE OP)

## 2024-11-30 PROCEDURE — 99231 SBSQ HOSP IP/OBS SF/LOW 25: CPT

## 2024-11-30 PROCEDURE — 80069 RENAL FUNCTION PANEL: CPT

## 2024-11-30 PROCEDURE — 36415 COLL VENOUS BLD VENIPUNCTURE: CPT

## 2024-11-30 PROCEDURE — 2500000001 HC RX 250 WO HCPCS SELF ADMINISTERED DRUGS (ALT 637 FOR MEDICARE OP): Performed by: PHYSICIAN ASSISTANT

## 2024-11-30 ASSESSMENT — COGNITIVE AND FUNCTIONAL STATUS - GENERAL
WALKING IN HOSPITAL ROOM: A LITTLE
DAILY ACTIVITIY SCORE: 11
EATING MEALS: A LOT
TOILETING: TOTAL
CLIMB 3 TO 5 STEPS WITH RAILING: TOTAL
HELP NEEDED FOR BATHING: A LOT
STANDING UP FROM CHAIR USING ARMS: A LITTLE
TURNING FROM BACK TO SIDE WHILE IN FLAT BAD: A LITTLE
MOVING TO AND FROM BED TO CHAIR: A LITTLE
PERSONAL GROOMING: A LOT
MOBILITY SCORE: 17
DRESSING REGULAR UPPER BODY CLOTHING: A LOT
DRESSING REGULAR LOWER BODY CLOTHING: A LOT

## 2024-11-30 ASSESSMENT — PAIN SCALES - WONG BAKER: WONGBAKER_NUMERICALRESPONSE: NO HURT

## 2024-11-30 ASSESSMENT — PAIN SCALES - GENERAL
PAINLEVEL_OUTOF10: 0 - NO PAIN
PAINLEVEL_OUTOF10: 0 - NO PAIN
PAINLEVEL_OUTOF10: 5 - MODERATE PAIN

## 2024-11-30 ASSESSMENT — PAIN - FUNCTIONAL ASSESSMENT: PAIN_FUNCTIONAL_ASSESSMENT: 0-10

## 2024-11-30 NOTE — PROGRESS NOTES
[facility]  TRAUMA SERVICE - PROGRESS NOTE    Patient Name: Loren Mitchell  MRN: [id number]  Admit Date: 1119  : 1945  AGE: 79 y.o.   GENDER: female  ==============================================================================  MECHANISM OF INJURY:   79 YOF s/p fall     LOC (yes/no?): unknown  Anticoagulant / Anti-platelet Rx? (for what dx?): no  Referring Facility Name (N/A for scene EMR run): [facility]    Injuries/problems:  - Large L hematoma with midline shift  - dysphagia, acute on chronic  - Hx A/O x0-1 at baseline end stage dementia, HTN, recurrent UTIs, CAD, HLD, depression, type II DM A1C 7.4/166 (insulin dependent)    INCIDENTAL FINDINGS:  none    PROCEDURES:  : L inez hole x2 with SD evacuation    ==============================================================================  TODAY'S ASSESSMENT AND PLAN OF CARE:  ## SDH d/t unwitnessed fall  ###End stage dementia baseline Aox0-1 at baseline, lives in memory unit  - NSGY s/o :okay chemoppx; fu Cth 12/ for CTH and 12/3 for appt  - q4h neuro checks  - PT/OT  - PRN zyprexa changed to Seroquel per katie recs  - sitter free since  170    ## Recurrent UTI  - Initially started on Nitrofurantoin empirically (stop  as negative UA/cx)    ## PMHx  - Continue home statin, Prozac, Synthroid, Memantine  - holding home 10 meqK, telmisartan for now  - Geriatrics following   -recommend increasing Lantus to 55 at bedtime, nutrition reeval d/t elevated BGL    ## FEN/GI: acute on chronic dysphagia, dm  - SLP previously rec minced/moist/thin liquids, now NPO.   -family okay with dobhoff, but no long term feeding tube  - Dobhoff placed , Goal Isosource 45/h   -increased to 50mL/h x22 hrs (hold pre/post synthroid) per nutrition recs  - mildly low K/mag repleted, recheck  if in house  - OK for <10 ice chips to improve swallowing  - having Bms with current regimen  - with meals #3 lispro, home lantus  51 units nightly. Holding home trulicity, glimepiride (hold oral agent per katie)  - no further B12 per katie    ## PPX  - SCDs  - LVX    Social: dnr/dni on code status discussion    Dispo: continue care on RNF, medically clear, SNF precert still pending for [facility]    Patient's  [place]: [phone]     Patient discussed with attending, SUNIL ChappellC  Trauma, Critical Care, and Acute Care Surgery  Floor: [id number]   TSICU: [id number]   ==============================================================================  CHIEF COMPLAINT / OVERNIGHT EVENTS:   No acute events or agitation concern overnight. Resting comfortably, GCS 12-13. BGLs under 200 today after Lantus adjustment yesterday.    MEDICAL HISTORY / ROS:  Admission history and ROS reviewed. Pertinent changes as follows:  none    PHYSICAL EXAM:  Heart Rate:  [55-69]   Temp:  [36.6 °C (97.8 °F)-36.9 °C (98.4 °F)]   Resp:  [16-18]   BP: (110-137)/(51-81)   SpO2:  [96 %-97 %]     Physical Exam:  General: frail, appears stated age, no sitter or restraints present  HEENT: without scleral icterus, reactive pupils BL. Bridled dobhoff in place  Cardiovascular: Rate controlled rhythm  Pulmonary: breathing comfortably on RA  Abdomen: soft, nondistended  Gu: deferred  Skin: warm and dry. Incision on left scalp c/d/I.   MSK: moving all extremities. 2+ radial and DP pulses bilaterally  Neuro: GCS 13 (M6/E4/V3) best. Waxes and wanes, pleasantly confused    IMAGING SUMMARY:  (summary of new imaging findings, not a copy of dictation)  No new imaging    LABS:  Results from last 7 days   Lab Units 11/25/24  0848   WBC AUTO x10*3/uL 7.8   HEMOGLOBIN g/dL 11.5*   HEMATOCRIT % 34.4*   PLATELETS AUTO x10*3/uL 202           Results from last 7 days   Lab Units 11/30/24  0954 11/28/24  0732 11/27/24  0916   SODIUM mmol/L 139 141 142   POTASSIUM mmol/L 4.2 3.7 3.4*   CHLORIDE mmol/L 106 111* 112*   CO2 mmol/L 26 22 22   BUN mg/dL 17 9 9   CREATININE mg/dL 0.55  0.52 0.68   CALCIUM mg/dL 9.1 8.7 8.5*   GLUCOSE mg/dL 182* 161* 216*                 I have reviewed all medications, laboratory results, and imaging pertinent for today's encounter.

## 2024-11-30 NOTE — CARE PLAN
Problem: Skin  Goal: Decreased wound size/increased tissue granulation at next dressing change  Outcome: Progressing  Goal: Participates in plan/prevention/treatment measures  Outcome: Progressing  Goal: Prevent/manage excess moisture  Outcome: Progressing  Goal: Prevent/minimize sheer/friction injuries  Outcome: Progressing  Goal: Promote/optimize nutrition  Outcome: Progressing  Goal: Promote skin healing  Outcome: Progressing     Problem: Fall/Injury  Goal: Not fall by end of shift  Outcome: Progressing  Goal: Be free from injury by end of the shift  Outcome: Progressing  Goal: Verbalize understanding of personal risk factors for fall in the hospital  Outcome: Progressing  Goal: Verbalize understanding of risk factor reduction measures to prevent injury from fall in the home  Outcome: Progressing  Goal: Use assistive devices by end of the shift  Outcome: Progressing  Goal: Pace activities to prevent fatigue by end of the shift  Outcome: Progressing     Problem: Safety - Medical Restraint  Goal: Remains free of injury from restraints (Restraint for Interference with Medical Device)  Outcome: Progressing  Goal: Free from restraint(s) (Restraint for Interference with Medical Device)  Outcome: Progressing     Problem: Pain - Adult  Goal: Verbalizes/displays adequate comfort level or baseline comfort level  Outcome: Progressing     Problem: Safety - Adult  Goal: Free from fall injury  Outcome: Progressing     Problem: Discharge Planning  Goal: Discharge to home or other facility with appropriate resources  Outcome: Progressing     Problem: Chronic Conditions and Co-morbidities  Goal: Patient's chronic conditions and co-morbidity symptoms are monitored and maintained or improved  Outcome: Progressing     Problem: Pain  Goal: Takes deep breaths with improved pain control throughout the shift  Outcome: Progressing  Goal: Turns in bed with improved pain control throughout the shift  Outcome: Progressing  Goal: Walks  with improved pain control throughout the shift  Outcome: Progressing  Goal: Performs ADL's with improved pain control throughout shift  Outcome: Progressing  Goal: Participates in PT with improved pain control throughout the shift  Outcome: Progressing  Goal: Free from opioid side effects throughout the shift  Outcome: Progressing  Goal: Free from acute confusion related to pain meds throughout the shift  Outcome: Progressing     Problem: Diabetes  Goal: Achieve decreasing blood glucose levels by end of shift  Outcome: Progressing  Goal: Increase stability of blood glucose readings by end of shift  Outcome: Progressing  Goal: Maintain electrolyte levels within acceptable range throughout shift  Outcome: Progressing  Goal: Maintain glucose levels >70mg/dl to <250mg/dl throughout shift  Outcome: Progressing  Goal: No changes in neurological exam by end of shift  Outcome: Progressing  Goal: Vital signs within normal range for age by end of shift  Outcome: Progressing     Problem: Nutrition  Goal: Less than 5 days NPO/clear liquids  Outcome: Progressing  Goal: Oral intake greater than 50%  Outcome: Progressing  Goal: Oral intake greater 75%  Outcome: Progressing  Goal: Consume prescribed supplement  Outcome: Progressing  Goal: Adequate PO fluid intake  Outcome: Progressing  Goal: Nutrition support goals are met within 48 hrs  Outcome: Progressing  Goal: Nutrition support is meeting 75% of nutrient needs  Outcome: Progressing  Goal: Tube feed tolerance  Outcome: Progressing  Goal: BG  mg/dL  Outcome: Progressing  Goal: Lab values WNL  Outcome: Progressing  Goal: Electrolytes WNL  Outcome: Progressing  Goal: Promote healing  Outcome: Progressing  Goal: Maintain stable weight  Outcome: Progressing  Goal: Reduce weight from edema/fluid  Outcome: Progressing  Goal: Gradual weight gain  Outcome: Progressing  Goal: Improve ostomy output  Outcome: Progressing   The patient's goals for the shift include      The clinical  goals for the shift include pt to remain safe during shift    Over the shift, the patient did not make progress toward the following goals. Barriers to progression include ***. Recommendations to address these barriers include ***.

## 2024-11-30 NOTE — CARE PLAN
The patient's goals for the shift include      The clinical goals for the shift include Remain safe      Problem: Skin  Goal: Decreased wound size/increased tissue granulation at next dressing change  Outcome: Progressing  Goal: Participates in plan/prevention/treatment measures  Outcome: Progressing  Goal: Prevent/manage excess moisture  Outcome: Progressing  Goal: Prevent/minimize sheer/friction injuries  Outcome: Progressing  Goal: Promote/optimize nutrition  Outcome: Progressing  Goal: Promote skin healing  Outcome: Progressing     Problem: Fall/Injury  Goal: Not fall by end of shift  Outcome: Progressing  Goal: Be free from injury by end of the shift  Outcome: Progressing  Goal: Verbalize understanding of personal risk factors for fall in the hospital  Outcome: Progressing  Goal: Verbalize understanding of risk factor reduction measures to prevent injury from fall in the home  Outcome: Progressing  Goal: Use assistive devices by end of the shift  Outcome: Progressing  Goal: Pace activities to prevent fatigue by end of the shift  Outcome: Progressing     Problem: Safety - Medical Restraint  Goal: Remains free of injury from restraints (Restraint for Interference with Medical Device)  Outcome: Progressing  Goal: Free from restraint(s) (Restraint for Interference with Medical Device)  Outcome: Progressing     Problem: Pain - Adult  Goal: Verbalizes/displays adequate comfort level or baseline comfort level  Outcome: Progressing

## 2024-12-01 ENCOUNTER — APPOINTMENT (OUTPATIENT)
Dept: RADIOLOGY | Facility: HOSPITAL | Age: 79
End: 2024-12-01
Payer: MEDICARE

## 2024-12-01 VITALS
SYSTOLIC BLOOD PRESSURE: 142 MMHG | HEIGHT: 66 IN | TEMPERATURE: 96.7 F | WEIGHT: 132.28 LBS | HEART RATE: 57 BPM | BODY MASS INDEX: 21.26 KG/M2 | OXYGEN SATURATION: 98 % | RESPIRATION RATE: 16 BRPM | DIASTOLIC BLOOD PRESSURE: 69 MMHG

## 2024-12-01 LAB
GLUCOSE BLD MANUAL STRIP-MCNC: 151 MG/DL (ref 74–99)
GLUCOSE BLD MANUAL STRIP-MCNC: 191 MG/DL (ref 74–99)
GLUCOSE BLD MANUAL STRIP-MCNC: 194 MG/DL (ref 74–99)
MAGNESIUM SERPL-MCNC: 2.15 MG/DL (ref 1.6–2.4)

## 2024-12-01 PROCEDURE — 2500000001 HC RX 250 WO HCPCS SELF ADMINISTERED DRUGS (ALT 637 FOR MEDICARE OP)

## 2024-12-01 PROCEDURE — 70450 CT HEAD/BRAIN W/O DYE: CPT

## 2024-12-01 PROCEDURE — 2500000002 HC RX 250 W HCPCS SELF ADMINISTERED DRUGS (ALT 637 FOR MEDICARE OP, ALT 636 FOR OP/ED): Performed by: PHYSICIAN ASSISTANT

## 2024-12-01 PROCEDURE — 2500000004 HC RX 250 GENERAL PHARMACY W/ HCPCS (ALT 636 FOR OP/ED): Performed by: PHYSICIAN ASSISTANT

## 2024-12-01 PROCEDURE — 2500000002 HC RX 250 W HCPCS SELF ADMINISTERED DRUGS (ALT 637 FOR MEDICARE OP, ALT 636 FOR OP/ED)

## 2024-12-01 PROCEDURE — 82947 ASSAY GLUCOSE BLOOD QUANT: CPT

## 2024-12-01 PROCEDURE — 99231 SBSQ HOSP IP/OBS SF/LOW 25: CPT

## 2024-12-01 PROCEDURE — 36415 COLL VENOUS BLD VENIPUNCTURE: CPT

## 2024-12-01 PROCEDURE — 83735 ASSAY OF MAGNESIUM: CPT

## 2024-12-01 PROCEDURE — 2500000001 HC RX 250 WO HCPCS SELF ADMINISTERED DRUGS (ALT 637 FOR MEDICARE OP): Performed by: PHYSICIAN ASSISTANT

## 2024-12-01 PROCEDURE — 1100000001 HC PRIVATE ROOM DAILY

## 2024-12-01 PROCEDURE — 70450 CT HEAD/BRAIN W/O DYE: CPT | Performed by: RADIOLOGY

## 2024-12-01 ASSESSMENT — PAIN - FUNCTIONAL ASSESSMENT
PAIN_FUNCTIONAL_ASSESSMENT: 0-10
PAIN_FUNCTIONAL_ASSESSMENT: 0-10

## 2024-12-01 ASSESSMENT — COGNITIVE AND FUNCTIONAL STATUS - GENERAL
TOILETING: TOTAL
WALKING IN HOSPITAL ROOM: A LITTLE
TURNING FROM BACK TO SIDE WHILE IN FLAT BAD: A LITTLE
CLIMB 3 TO 5 STEPS WITH RAILING: TOTAL
DRESSING REGULAR LOWER BODY CLOTHING: A LOT
MOVING TO AND FROM BED TO CHAIR: A LITTLE
DAILY ACTIVITIY SCORE: 11
MOBILITY SCORE: 17
PERSONAL GROOMING: A LOT
EATING MEALS: A LOT
DRESSING REGULAR UPPER BODY CLOTHING: A LOT
HELP NEEDED FOR BATHING: A LOT
STANDING UP FROM CHAIR USING ARMS: A LITTLE

## 2024-12-01 ASSESSMENT — PAIN SCALES - GENERAL
PAINLEVEL_OUTOF10: 0 - NO PAIN

## 2024-12-01 NOTE — CARE PLAN
The patient's goals for the shift include      The clinical goals for the shift include remain safe from injury      Problem: Skin  Goal: Decreased wound size/increased tissue granulation at next dressing change  Outcome: Progressing  Goal: Participates in plan/prevention/treatment measures  Outcome: Progressing  Goal: Prevent/manage excess moisture  Outcome: Progressing  Goal: Prevent/minimize sheer/friction injuries  Outcome: Progressing  Goal: Promote/optimize nutrition  Outcome: Progressing  Goal: Promote skin healing  Outcome: Progressing     Problem: Fall/Injury  Goal: Not fall by end of shift  Outcome: Progressing  Goal: Be free from injury by end of the shift  Outcome: Progressing  Goal: Verbalize understanding of personal risk factors for fall in the hospital  Outcome: Progressing  Goal: Verbalize understanding of risk factor reduction measures to prevent injury from fall in the home  Outcome: Progressing  Goal: Use assistive devices by end of the shift  Outcome: Progressing  Goal: Pace activities to prevent fatigue by end of the shift  Outcome: Progressing

## 2024-12-01 NOTE — PROGRESS NOTES
[facility]  TRAUMA SERVICE - PROGRESS NOTE    Patient Name: Loren Mitchell  MRN: [id number]  Admit Date: 1119  : 1945  AGE: 79 y.o.   GENDER: female  ==============================================================================  MECHANISM OF INJURY:   79 YOF s/p fall     LOC (yes/no?): unknown  Anticoagulant / Anti-platelet Rx? (for what dx?): no  Referring Facility Name (N/A for scene EMR run): [facility]    Injuries/problems:  - Large L hematoma with midline shift  - dysphagia, acute on chronic  - Hx A/O x0-1 at baseline end stage dementia, HTN, recurrent UTIs, CAD, HLD, depression, type II DM A1C 7.4/166 (insulin dependent)    INCIDENTAL FINDINGS:  none    PROCEDURES:  : L inez hole x2 with SD evacuation    ==============================================================================  TODAY'S ASSESSMENT AND PLAN OF CARE:  ## SDH d/t unwitnessed fall  ###End stage dementia baseline Aox0-1 at baseline, lives in memory unit  - NSGY s/o :okay chemoppx; fu Cth  for CTH and 12/3 for appt  - q4h neuro checks  - PT/OT  - PRN zyprexa changed to Seroquel per katie recs  - sitter free since  1700  - patient scheduled for outpatient 2 week f/up CTH tomorrow (), will order to be done inpatient     ## Recurrent UTI  - Initially started on Nitrofurantoin empirically (stop  as negative UA/cx)    ## PMHx  - Continue home statin, Prozac, Synthroid, Memantine  - holding home 10 meqK, telmisartan for now  - Geriatrics following   -recommend increasing Lantus to 55 at bedtime, nutrition reeval d/t elevated BGL    ## FEN/GI: acute on chronic dysphagia, dm  - SLP previously rec minced/moist/thin liquids, now NPO.   -family okay with dobhoff, but no long term feeding tube  - Dobhoff placed , Goal Isosource 45/h   -increased to 50mL/h x22 hrs (hold pre/post synthroid) per nutrition recs  - OK for <10 ice chips to improve swallowing  - having Bms with current  regimen  - with meals #3 lispro, home lantus 51 units nightly. Holding home trulicity, glimepiride (hold oral agent per katie)  - no further B12 per katie    ## PPX  - SCDs  - LVX    Social: dnr/dni on code status discussion    Dispo: continue care on RNF, medically clear, SNF precert still pending for Debra Casarez    Patient's  Eliot: [phone]     Patient discussed with attending, Dr. Malini Mathis PA-C  Trauma, Critical Care, and Acute Care Surgery  Floor: [id number]   TSICU: [id number]   ==============================================================================  CHIEF COMPLAINT / OVERNIGHT EVENTS:   No acute events or agitation concern overnight. Resting comfortably, GCS 12-13 (at baseline). 2 week outpatient CTH ordered.     MEDICAL HISTORY / ROS:  Admission history and ROS reviewed. Pertinent changes as follows:  none    PHYSICAL EXAM:  Heart Rate:  [54-67]   Temp:  [35.6 °C (96.1 °F)-36.3 °C (97.3 °F)]   Resp:  [16-18]   BP: (101-167)/(51-81)   SpO2:  [94 %-99 %]     Physical Exam:  General: frail, appears stated age, no sitter or restraints present  HEENT: without scleral icterus, reactive pupils BL. Bridled dobhoff in place  Cardiovascular: Rate controlled rhythm  Pulmonary: breathing comfortably on RA  Abdomen: soft, nondistended  Gu: deferred  Skin: warm and dry. Incision on left scalp c/d/I.   MSK: moving all extremities. 2+ radial and DP pulses bilaterally  Neuro: GCS 13 (M6/E4/V3) best. Waxes and wanes, pleasantly confused    IMAGING SUMMARY:  (summary of new imaging findings, not a copy of dictation)  No new imaging    LABS:  Results from last 7 days   Lab Units 11/25/24  0848   WBC AUTO x10*3/uL 7.8   HEMOGLOBIN g/dL 11.5*   HEMATOCRIT % 34.4*   PLATELETS AUTO x10*3/uL 202           Results from last 7 days   Lab Units 11/30/24  0954 11/28/24  0732 11/27/24  0916   SODIUM mmol/L 139 141 142   POTASSIUM mmol/L 4.2 3.7 3.4*   CHLORIDE mmol/L 106 111* 112*   CO2 mmol/L 26 22 22   BUN  mg/dL 17 9 9   CREATININE mg/dL 0.55 0.52 0.68   CALCIUM mg/dL 9.1 8.7 8.5*   GLUCOSE mg/dL 182* 161* 216*                 I have reviewed all medications, laboratory results, and imaging pertinent for today's encounter.

## 2024-12-01 NOTE — CARE PLAN
The patient's goals for the shift include      The clinical goals for the shift include Remain safe and keep pt free from injury       Problem: Skin  Goal: Decreased wound size/increased tissue granulation at next dressing change  Outcome: Progressing  Flowsheets (Taken 11/30/2024 2209)  Decreased wound size/increased tissue granulation at next dressing change:   Promote sleep for wound healing   Protective dressings over bony prominences  Goal: Participates in plan/prevention/treatment measures  Outcome: Progressing  Flowsheets (Taken 11/30/2024 2209)  Participates in plan/prevention/treatment measures:   Elevate heels   Discuss with provider PT/OT consult  Goal: Prevent/manage excess moisture  Outcome: Progressing  Flowsheets (Taken 11/30/2024 2209)  Prevent/manage excess moisture:   Cleanse incontinence/protect with barrier cream   Moisturize dry skin   Follow provider orders for dressing changes   Monitor for/manage infection if present  Goal: Prevent/minimize sheer/friction injuries  Outcome: Progressing  Flowsheets (Taken 11/30/2024 2209)  Prevent/minimize sheer/friction injuries:   Turn/reposition every 2 hours/use positioning/transfer devices   Complete micro-shifts as needed if patient unable. Adjust patient position to relieve pressure points, not a full turn   HOB 30 degrees or less   Use pull sheet  Goal: Promote/optimize nutrition  Outcome: Progressing  Flowsheets (Taken 11/30/2024 2209)  Promote/optimize nutrition:   Monitor/record intake including meals   Consume > 50% meals/supplements   Offer water/supplements/favorite foods  Goal: Promote skin healing  Outcome: Progressing  Flowsheets (Taken 11/30/2024 2209)  Promote skin healing:   Assess skin/pad under line(s)/device(s)   Protective dressings over bony prominences   Rotate device position/do not position patient on device   Turn/reposition every 2 hours/use positioning/transfer devices     Problem: Fall/Injury  Goal: Not fall by end of  shift  Outcome: Progressing  Goal: Be free from injury by end of the shift  Outcome: Progressing  Goal: Verbalize understanding of personal risk factors for fall in the hospital  Outcome: Progressing  Goal: Verbalize understanding of risk factor reduction measures to prevent injury from fall in the home  Outcome: Progressing  Goal: Use assistive devices by end of the shift  Outcome: Progressing  Goal: Pace activities to prevent fatigue by end of the shift  Outcome: Progressing     Problem: Safety - Medical Restraint  Goal: Remains free of injury from restraints (Restraint for Interference with Medical Device)  Outcome: Progressing  Goal: Free from restraint(s) (Restraint for Interference with Medical Device)  Outcome: Progressing     Problem: Pain - Adult  Goal: Verbalizes/displays adequate comfort level or baseline comfort level  Outcome: Progressing     Problem: Safety - Adult  Goal: Free from fall injury  Outcome: Progressing     Problem: Discharge Planning  Goal: Discharge to home or other facility with appropriate resources  Outcome: Progressing     Problem: Chronic Conditions and Co-morbidities  Goal: Patient's chronic conditions and co-morbidity symptoms are monitored and maintained or improved  Outcome: Progressing     Problem: Pain  Goal: Takes deep breaths with improved pain control throughout the shift  Outcome: Progressing  Goal: Turns in bed with improved pain control throughout the shift  Outcome: Progressing  Goal: Walks with improved pain control throughout the shift  Outcome: Progressing  Goal: Performs ADL's with improved pain control throughout shift  Outcome: Progressing  Goal: Participates in PT with improved pain control throughout the shift  Outcome: Progressing  Goal: Free from opioid side effects throughout the shift  Outcome: Progressing  Goal: Free from acute confusion related to pain meds throughout the shift  Outcome: Progressing     Problem: Diabetes  Goal: Achieve decreasing blood  glucose levels by end of shift  Outcome: Progressing  Goal: Increase stability of blood glucose readings by end of shift  Outcome: Progressing  Goal: Maintain electrolyte levels within acceptable range throughout shift  Outcome: Progressing  Goal: Maintain glucose levels >70mg/dl to <250mg/dl throughout shift  Outcome: Progressing  Goal: No changes in neurological exam by end of shift  Outcome: Progressing  Goal: Vital signs within normal range for age by end of shift  Outcome: Progressing     Problem: Nutrition  Goal: Less than 5 days NPO/clear liquids  Outcome: Progressing  Goal: Oral intake greater than 50%  Outcome: Progressing  Goal: Oral intake greater 75%  Outcome: Progressing  Goal: Consume prescribed supplement  Outcome: Progressing  Goal: Adequate PO fluid intake  Outcome: Progressing  Goal: Nutrition support goals are met within 48 hrs  Outcome: Progressing  Goal: Nutrition support is meeting 75% of nutrient needs  Outcome: Progressing  Goal: Tube feed tolerance  Outcome: Progressing  Goal: BG  mg/dL  Outcome: Progressing  Goal: Lab values WNL  Outcome: Progressing  Goal: Electrolytes WNL  Outcome: Progressing  Goal: Promote healing  Outcome: Progressing  Goal: Maintain stable weight  Outcome: Progressing  Goal: Reduce weight from edema/fluid  Outcome: Progressing  Goal: Gradual weight gain  Outcome: Progressing  Goal: Improve ostomy output  Outcome: Progressing

## 2024-12-02 ENCOUNTER — APPOINTMENT (OUTPATIENT)
Dept: RADIOLOGY | Facility: HOSPITAL | Age: 79
End: 2024-12-02
Payer: MEDICARE

## 2024-12-02 LAB
GLUCOSE BLD MANUAL STRIP-MCNC: 144 MG/DL (ref 74–99)
GLUCOSE BLD MANUAL STRIP-MCNC: 188 MG/DL (ref 74–99)
GLUCOSE BLD MANUAL STRIP-MCNC: 194 MG/DL (ref 74–99)
GLUCOSE BLD MANUAL STRIP-MCNC: 219 MG/DL (ref 74–99)
GLUCOSE BLD MANUAL STRIP-MCNC: 234 MG/DL (ref 74–99)
MAGNESIUM SERPL-MCNC: 2.31 MG/DL (ref 1.6–2.4)

## 2024-12-02 PROCEDURE — 2500000002 HC RX 250 W HCPCS SELF ADMINISTERED DRUGS (ALT 637 FOR MEDICARE OP, ALT 636 FOR OP/ED): Performed by: PHYSICIAN ASSISTANT

## 2024-12-02 PROCEDURE — 97530 THERAPEUTIC ACTIVITIES: CPT | Mod: GP,CQ

## 2024-12-02 PROCEDURE — 2500000001 HC RX 250 WO HCPCS SELF ADMINISTERED DRUGS (ALT 637 FOR MEDICARE OP): Performed by: PHYSICIAN ASSISTANT

## 2024-12-02 PROCEDURE — 2500000004 HC RX 250 GENERAL PHARMACY W/ HCPCS (ALT 636 FOR OP/ED): Performed by: PHYSICIAN ASSISTANT

## 2024-12-02 PROCEDURE — 99233 SBSQ HOSP IP/OBS HIGH 50: CPT | Performed by: INTERNAL MEDICINE

## 2024-12-02 PROCEDURE — 83735 ASSAY OF MAGNESIUM: CPT

## 2024-12-02 PROCEDURE — 2500000001 HC RX 250 WO HCPCS SELF ADMINISTERED DRUGS (ALT 637 FOR MEDICARE OP)

## 2024-12-02 PROCEDURE — 2500000002 HC RX 250 W HCPCS SELF ADMINISTERED DRUGS (ALT 637 FOR MEDICARE OP, ALT 636 FOR OP/ED)

## 2024-12-02 PROCEDURE — 99232 SBSQ HOSP IP/OBS MODERATE 35: CPT | Performed by: STUDENT IN AN ORGANIZED HEALTH CARE EDUCATION/TRAINING PROGRAM

## 2024-12-02 PROCEDURE — 97535 SELF CARE MNGMENT TRAINING: CPT | Mod: GO

## 2024-12-02 PROCEDURE — 36415 COLL VENOUS BLD VENIPUNCTURE: CPT

## 2024-12-02 PROCEDURE — 82947 ASSAY GLUCOSE BLOOD QUANT: CPT

## 2024-12-02 PROCEDURE — 1100000001 HC PRIVATE ROOM DAILY

## 2024-12-02 ASSESSMENT — COGNITIVE AND FUNCTIONAL STATUS - GENERAL
MOBILITY SCORE: 6
DRESSING REGULAR UPPER BODY CLOTHING: TOTAL
WALKING IN HOSPITAL ROOM: TOTAL
MOVING FROM LYING ON BACK TO SITTING ON SIDE OF FLAT BED WITH BEDRAILS: TOTAL
DAILY ACTIVITIY SCORE: 6
DRESSING REGULAR LOWER BODY CLOTHING: TOTAL
EATING MEALS: TOTAL
PERSONAL GROOMING: TOTAL
TOILETING: TOTAL
HELP NEEDED FOR BATHING: TOTAL
CLIMB 3 TO 5 STEPS WITH RAILING: TOTAL
TURNING FROM BACK TO SIDE WHILE IN FLAT BAD: TOTAL
STANDING UP FROM CHAIR USING ARMS: TOTAL
MOVING TO AND FROM BED TO CHAIR: TOTAL

## 2024-12-02 ASSESSMENT — ACTIVITIES OF DAILY LIVING (ADL): HOME_MANAGEMENT_TIME_ENTRY: 15

## 2024-12-02 ASSESSMENT — PAIN SCALES - WONG BAKER: WONGBAKER_NUMERICALRESPONSE: NO HURT

## 2024-12-02 ASSESSMENT — PAIN - FUNCTIONAL ASSESSMENT
PAIN_FUNCTIONAL_ASSESSMENT: WONG-BAKER FACES
PAIN_FUNCTIONAL_ASSESSMENT: 0-10

## 2024-12-02 ASSESSMENT — PAIN SCALES - GENERAL
PAINLEVEL_OUTOF10: 0 - NO PAIN
PAINLEVEL_OUTOF10: 0 - NO PAIN

## 2024-12-02 NOTE — PROGRESS NOTES
[facility]  TRAUMA SERVICE - PROGRESS NOTE    Patient Name: Loren Mitchell  MRN: [id number]  Admit Date: 1119  : 1945  AGE: 79 y.o.   GENDER: female  [id number]/8081-A  ==============================================================================  MECHANISM OF INJURY:   79 YOF s/p fall     LOC (yes/no?): unknown  Anticoagulant / Anti-platelet Rx? (for what dx?): no  Referring Facility Name (N/A for scene EMR run): [facility]    Injuries/problems:  - Large L hematoma with midline shift  - dysphagia, acute on chronic  - Hx A/O x0-1 at baseline end stage dementia, HTN, recurrent UTIs, CAD, HLD, depression, type II DM A1C 7.4/166 (insulin dependent)    INCIDENTAL FINDINGS:  none    PROCEDURES:  : L inez hole x2 with SD evacuation    ==============================================================================  TODAY'S ASSESSMENT AND PLAN OF CARE:  ## SDH d/t unwitnessed fall  ###End stage dementia baseline Aox0-1 at baseline, lives in memory unit  - NSGY s/o :okay chemoppx; fu Cth  for CTH and 12/3 for appt  - q4h neuro checks  - PT/OT  - PRN zyprexa per katie recs (PRN for when pt attempts dobhoff removal)   EKG ordered, awaiting collection  - sitter free since  1700  - patient scheduled for outpatient 2 week f/up CTH tomorrow (), will order to be done inpatient    -CTH overall stable / improved slightly wrt bleed and mass effect    ## Recurrent UTI  - Initially started on Nitrofurantoin empirically (stop  as negative UA/cx)    ## PMHx  - Continue home statin, Prozac, Synthroid, Memantine  - holding home 10 meqK, telmisartan for now  - Geriatrics following   -recommend increasing Lantus to 55 at bedtime, nutrition reeval d/t elevated BGL    ## FEN/GI: acute on chronic dysphagia, dm  - SLP previously rec minced/moist/thin liquids, now NPO.   -family okay with dobhoff, but no long term feeding tube  - Dobhoff placed , at Goal Isosource  45/h   -increased to 50mL/h x22 hrs (hold pre/post synthroid) per nutrition recs  - OK for <10 ice chips to improve swallowing  - having Bms with current regimen  - with meals #3 lispro, home lantus 51 units nightly. Holding home trulicity, glimepiride (hold oral agent per katie)  - no further B12 per katie    ## PPX  - SCDs  - LVX    Social: dnr/dni after code status discussion    Dispo: continue care on RNF, medically clear. Proceeding with discharge planning, precert pending for Debra Casarez.     Patient's  Eliot: [phone]     Patient discussed with attending, Dr. Nicholas Kmep MD  Trauma, Critical Care, and Acute Care Surgery  Floor: [id number]   TSICU: [id number]   ==============================================================================  CHIEF COMPLAINT / OVERNIGHT EVENTS:   No acute events or agitation concern overnight. Resting comfortably, GCS 13 (baseline). Remains poor PO intake outside of dobhoff which was initially placed for comfort, family declined additional interventions. Geriatrics attempted GOC discuss patient's son unable to come in afternoon, requesting that the patient be discharged with dobhoff and that he will feed her himself at facility. Continues to decline additional interventions.    Passing BM per patient on current regimen    MEDICAL HISTORY / ROS:  Admission history and ROS reviewed. Pertinent changes as follows:  none    PHYSICAL EXAM:  Heart Rate:  [57-64]   Temp:  [35.8 °C (96.4 °F)-36 °C (96.8 °F)]   Resp:  [16]   BP: (116-142)/(64-77)   SpO2:  [95 %-98 %]     Physical Exam:  General: frail, appears stated age, no sitter or restraints present  HEENT: without scleral icterus, reactive pupils BL. Bridled dobhoff in place, head inez hole surgical site c/d/i  Cardiovascular: Rate controlled rhythm  Pulmonary: breathing comfortably on RA  Abdomen: soft, nondistended  Skin: warm and dry. Incision on left scalp c/d/I.   MSK: moving all extremities weakly at her  baseline. 2+ radial and DP pulses bilaterally  Neuro: GCS 13 (M6/E4/V3) best. Waxes and wanes, pleasantly confused, drifts off to sleep during questioning and evaluation    IMAGING SUMMARY:  (summary of new imaging findings, not a copy of dictation)  No new imaging    LABS:              Results from last 7 days   Lab Units 11/30/24  0954 11/28/24  0732 11/27/24  0916   SODIUM mmol/L 139 141 142   POTASSIUM mmol/L 4.2 3.7 3.4*   CHLORIDE mmol/L 106 111* 112*   CO2 mmol/L 26 22 22   BUN mg/dL 17 9 9   CREATININE mg/dL 0.55 0.52 0.68   CALCIUM mg/dL 9.1 8.7 8.5*   GLUCOSE mg/dL 182* 161* 216*                 I have reviewed all medications, laboratory results, and imaging pertinent for today's encounter.

## 2024-12-02 NOTE — PROGRESS NOTES
Subjective   Patient was found asleep in her bed, arousable to sternal rub with arm movements, but no eye opening. Quickly drifted off back to sleep. Did not answer any questions. Per nurse, patient did not sleep last night and received report that patient has been generally agitated and combative at handoff. Patient has been generally redirectable, but continues to attempt taking out her Dobhoff.       Objective     Current Facility-Administered Medications   Medication Dose Route Frequency Provider Last Rate Last Admin    acetaminophen (Tylenol) oral liquid 1,000 mg  1,000 mg nasogastric tube q8h KULWANT NEVIN Trujillo-C   1,000 mg at 12/02/24 0539    atorvastatin (Lipitor) tablet 10 mg  10 mg nasogastric tube q PM SUNIL TrujilloC   10 mg at 12/01/24 2044    dextrose 50 % injection 12.5 g  12.5 g intravenous q15 min PRN Rachana Kemp MD        dextrose 50 % injection 25 g  25 g intravenous q15 min PRN Rachana Kemp MD        enoxaparin (Lovenox) syringe 30 mg  30 mg subcutaneous BID Chas Zendejas PA-C   30 mg at 12/02/24 0923    FLUoxetine (PROzac) solution 40 mg  40 mg nasogastric tube Daily SUNIL TrujilloC   40 mg at 12/02/24 0924    glucagon (Glucagen) injection 1 mg  1 mg intramuscular q15 min PRN Rachana Kemp MD        glucagon (Glucagen) injection 1 mg  1 mg intramuscular q15 min PRN Cenbrendon Kemp MD        insulin glargine (Lantus) injection 55 Units  55 Units subcutaneous Nightly Monica Mathis PA-C   55 Units at 12/01/24 2100    insulin lispro injection 0-15 Units  0-15 Units subcutaneous q6h SUNIL TrujilloC   3 Units at 12/02/24 0433    levothyroxine (Synthroid, Levoxyl) tablet 50 mcg  50 mcg nasogastric tube Daily FerNEVIN Zepeda-C   50 mcg at 12/02/24 0539    melatonin tablet 5 mg  5 mg nasogastric tube Nightly NEVIN Trujillo-C   5 mg at 12/01/24 2044    memantine (Namenda) tablet 10 mg  10 mg nasogastric tube BID Fer G Gilbert, PA-C   10 mg at 12/02/24 0924     OLANZapine (ZyPREXA) tablet 10 mg  10 mg oral BID PRN Rachana Kemp MD   10 mg at 12/01/24 2043    polyethylene glycol (Glycolax, Miralax) packet 17 g  17 g nasogastric tube Daily Fer ROSIO Gilbert PA-C   17 g at 12/02/24 0925    sennosides-docusate sodium (Prabha-Colace) 8.6-50 mg per tablet 1 tablet  1 tablet nasogastric tube Nightly Rachana Kemp MD   1 tablet at 12/01/24 2058       Physical Exam  Vitals and nursing note reviewed.   Constitutional:       General: She is not in acute distress.     Appearance: She is not ill-appearing.      Comments: Asleep, arousable to sternal rub, unwilling to engage with team this morning, Dobhoff in place   Cardiovascular:      Rate and Rhythm: Normal rate and regular rhythm.      Pulses: Normal pulses.   Pulmonary:      Effort: Pulmonary effort is normal.   Skin:     General: Skin is warm and dry.      Capillary Refill: Capillary refill takes less than 2 seconds.   Neurological:      Comments: Unable to assess today, baseline AxO 0-1         Confusion Assessment Method(CAM) for diagnosis of delirium:    CAM: Unable to assess, sleeping/unwilling to engage    AT Score For Assessment of Delirium and Cognitive Impairment:    4AT: Unable to assess, sleeping/unwilling to engage      Last Recorded Vitals      12/1/2024     8:00 AM 12/1/2024    12:40 PM 12/1/2024     3:00 PM 12/1/2024     7:43 PM 12/1/2024    11:40 PM 12/2/2024     3:58 AM 12/2/2024     7:00 AM   Vitals   Systolic 114 127 131 142 116 128    Diastolic 62 66 61 69 65 64    BP Location Right arm Right arm  Right arm Right arm Right arm    Heart Rate 58 54 56 57 64 57    Temp 36.1 °C (97 °F) 36.3 °C (97.3 °F) 36.5 °C (97.7 °F) 35.9 °C (96.7 °F)  35.8 °C (96.4 °F) 0 °C (32 °F)   Resp 18 16 16 16 16 16       Vitals:    11/19/24 0955   Weight: 60 kg (132 lb 4.4 oz)        Relevant Results  Lab Results   Component Value Date    TSH 1.85 11/26/2024    ATGSYAOI26 1,269 (H) 11/26/2024    VITD25 43 11/26/2024    HGBA1C  7.4 (H) 11/23/2024     Results from last 7 days   Lab Units 11/30/24  0954 11/28/24  0732 11/27/24  0916   SODIUM mmol/L 139 141 142   POTASSIUM mmol/L 4.2 3.7 3.4*   CHLORIDE mmol/L 106 111* 112*   CREATININE mg/dL 0.55 0.52 0.68   BUN mg/dL 17 9 9   CO2 mmol/L 26 22 22          CT head wo IV contrast  Narrative: Interpreted By:  Oliver Uriostegui,   STUDY:  CT HEAD WO IV CONTRAST;  12/1/2024 7:25 pm      INDICATION:  Signs/Symptoms:2 week monitoring of SDH.      COMPARISON:  11/22/2024      ACCESSION NUMBER(S):  [id number]      ORDERING CLINICIAN:  KATEY RODRIGUEZ      TECHNIQUE:  Axial CT images of the head were obtained without intravenous  contrast administration.      FINDINGS:  Postoperative changes compatible with a previous left-sided inez  holes are again noted.      There is residual but improved pneumocephalus adjacent to the left  frontal lobe when compared with 11/22/2024.      There is a residual but smaller mixed attenuation predominantly  low-density left hemispheric subdural hematoma currently measuring  approximately 11 mm in thickness adjacent to the left frontal lobe  compared with 16 mm in thickness on the prior CT study dated  11/22/2024. There is a residual but improved mass effect upon the  adjacent left cerebral hemisphere. There is residual but improved  bowing of the midline structures from left to right currently  measuring 4 mm compared with 11/22/2024 where it measured 6 mm.      The above findings are superimposed upon moderate brain parenchymal  volume loss.      There are nonspecific white matter changes noted within cerebral  hemispheres bilaterally which while nonspecific, given the patient's  age, likely represent sequelae of small-vessel ischemic change.      A small air/fluid levels noted within the sphenoid sinus. The  remaining visualized paranasal sinuses and mastoid air cells are  clear.      A partially imaged nasogastric tube is noted extending through the  right nasal  cavity.      Impression: Postoperative changes compatible with a previous left-sided inez  holes are again noted.      There is residual but improved pneumocephalus adjacent to the left  frontal lobe when compared with 11/22/2024.      There is a residual but smaller mixed attenuation predominantly  low-density left hemispheric subdural hematoma currently measuring  approximately 11 mm in thickness adjacent to the left frontal lobe  compared with 16 mm in thickness on the prior CT study dated  11/22/2024. There is a residual but improved mass effect upon the  adjacent left cerebral hemisphere. There is residual but improved  bowing of the midline structures from left to right currently  measuring 4 mm compared with 11/22/2024 where it measured 6 mm.      The above findings are superimposed upon moderate brain parenchymal  volume loss.      There are nonspecific white matter changes noted within cerebral  hemispheres bilaterally which while nonspecific, given the patient's  age, likely represent sequelae of small-vessel ischemic change.      MACRO:  None.      Signed by: Oliver Uriostegui 12/2/2024 6:37 AM  Dictation workstation:   [id number]        DATA:  EKG: QTC  Encounter Date: 11/19/24   ECG 12 Lead   Result Value    Ventricular Rate 63    Atrial Rate 63    AR Interval 190    QRS Duration 100    QT Interval 436    QTC Calculation(Bazett) 446    P Axis 21    R Axis -45    T Axis -82    QRS Count 10    Q Onset 212    P Onset 117    P Offset 170    T Offset 430    QTC Fredericia 443    Narrative    Normal sinus rhythm  Left axis deviation  Septal infarct (cited on or before 18-NOV-2024)  Abnormal ECG  When compared with ECG of 19-NOV-2024 03:30,  No significant change was found  See ED provider note for full interpretation and clinical correlation  Confirmed by Kwame Yoo ([id number]) on 11/20/2024 5:49:16 AM      Anti-psychotics in 48 hours: 10 mgs Zyprexa given 11/30: 13:37, 21:25, 12/01: 08:12,  20:43  Opioids/Benzodiazepines in 48 hours:No  Anticholinergics on board:Yes - Memantine  Restraints:No  Indwelling catheters:No  Last BM: 12/2  UO in 24 hours: 800 mL  Activity in the past 24 hours: per PT  Need for ambulatory devices: Total assist per PT                  Assessment/Plan   Loren Mitchell is a 79 y.o. female on day 13 of admission with pertinent PMH of late stage dementia, depression, T2DM, hypothyroidism, HTN, HLD, CAD, CKD, and recurrent UTIs presenting from a memory care facility after an unwitnessed fall with a large left Subdural hematoma (Multi) with rightward midline shift and epidural s/p L inez holes. SDH continues to remain stable on CT head 12/1. Concern for malnutrition due to poor PO intake and acute on chronic dysphagia. Dobhoff placed 11/27, medical POA declines long term feeding tube.    Principal Problem:    Subdural hematoma (Multi)  Active Problems:    SDH (subdural hematoma) (Multi)    Postoperative delirium    CVA (cerebral vascular accident) (Multi)    Intracranial hemorrhage (Multi)    Severe late onset Alzheimer's dementia without behavioral disturbance, psychotic disturbance, mood disturbance, or anxiety (Multi)    Diabetes mellitus, type 2 (Multi)    HTN (hypertension)    Hyperlipidemia    Hypothyroidism    Polycythemia    Chronic renal insufficiency    Coronary artery disease involving native coronary artery of native heart without angina pectoris    Urinary tract infection    Sinus symptom    Seasonal allergies    Abnormal EKG    Osteoarthritis    Lumbar disc disease    Spinal stenosis    Chronic low back pain    History of hysterectomy    1. Late Stage Severe Dementia  - Continue fluoxetine 40 mg every day, Memantine 10 mg BID  - Consider 1 v 1 sitter rather then physical restraints to help redirect the patient  - Consider consult to pet therapy  - Continue to monitor for signs of acute delirium  - TSH 1.85  - Vitamin B12 1268 recommend to discontinue cyanocobalamin  due to elevated levels  - Continue Zyprexa 10 mg PRN BID for attempts to move Dobhoff  - Recommend EKG due to increasing Zyprexa use, Qtc monitoring    2. Acute on Chronic Dysphagia with risk of aspiration, c/f malnutrition for poor PO intake  - Dobhoff in place  - continue with Tube Feeds as tolerated  - Continue with oral care and hygiene  - Continue with ice chips 3-5/hour  - Continue IV NS 50 ml/hr  - Continue with SLP and nutrition  - Per goals of care conversation with Eliot Mitchell, medical POA, recommend removal of Dobhoff at the time of discharge/transfer to [facility], continue with pleasure feeds    3. T2DM  - HgbA1C 7.4, 11/23  - -234  - Recommend continuing with ISS  - Recommend continuing Lantus 55 at bedtime    4. H/o recurrent UTI  - continue to montior for s/s of UTI      5. Acute fall with no history of gait impairment  - Continue with PT/OT  - Vitamin D level 43, 11/26            4M AGE-FRIENDLY INITIATIVE:  What matters most to patient: Unable to assess  Medications: Zyprexa,Memantine  Mentation: Unable to assess  Mobility: Total assist per PT; moderate intensity level of continued care    Geriatric medicine will continue to follow the patient. Thank you for allowing geriatric medicine to be involved in the care of your patient. Geriatric medicine consultation team is available during work hours Monday through Friday. For any emergency issues requiring immediate assistance over the weekend, please page Geriatrics pager [phone]      Lyndsey Juarez, PhD, MS3  Patient is staffed with Dr. Cerna

## 2024-12-02 NOTE — PROGRESS NOTES
Physical Therapy    Physical Therapy Treatment    Patient Name: Loren Mitchell  MRN: [id number]  Department: [place] [address]  Room: [facility]8081-A  Today's Date: 12/2/2024  Time Calculation  Start Time: 1416  Stop Time: 1431  Time Calculation (min): 15 min         Assessment/Plan   PT Assessment  PT Assessment Results: Impaired balance, Decreased mobility, Decreased cognition, Impaired judgement, Decreased safety awareness  Rehab Prognosis: Fair  Barriers to Discharge: Medical acuity  Evaluation/Treatment Tolerance: Patient tolerated treatment well  Medical Staff Made Aware: Yes  End of Session Communication: Bedside nurse  Assessment Comment: Pt able to engage in tapping balloon this date, appears to briefly enjoy task. DEP for bed mobility and unsafe sitting EOB. Remains appropriate for mod intensity therapy  End of Session Patient Position: Bed, 3 rail up, Alarm on  PT Plan  Inpatient/Swing Bed or Outpatient: Inpatient  PT Plan  Treatment/Interventions: Bed mobility, Transfer training, Gait training, Balance training, Neuromuscular re-education, Strengthening, Endurance training, Range of motion, Therapeutic exercise, Therapeutic activity, Home exercise program, Wheelchair management, Positioning  PT Plan: Ongoing PT  PT Frequency: 3 times per week  PT Discharge Recommendations: Moderate intensity level of continued care  Equipment Recommended upon Discharge:  (TBD at next level of care)  PT Recommended Transfer Status: Total assist  PT - OK to Discharge: Yes (eval complete and discharge recommendations made)      General Visit Information:   PT  Visit  PT Received On: 12/02/24  Response to Previous Treatment: Patient unable to report, no changes reported from family or staff  General  Co-Treatment: OT  Co-Treatment Reason: To maximize pt and therapist safety and pt mobility  Prior to Session Communication: Bedside nurse  Patient Position Received: Bed, 3 rail up, Alarm on  General Comment: Pt alert, non-sensical  Per  handoff with RN, pt is appropriate for therapy, vitals are stable and pain is controlled. Other concerns prior to tx are: none    Subjective   Precautions:  Precautions  Medical Precautions: Fall precautions    Objective   Pain:  Pain Assessment  Pain Assessment: Muñiz-Baker FACES  0-10 (Numeric) Pain Score: 0 - No pain  Cognition:  Cognition  Overall Cognitive Status: Impaired at baseline  Orientation Level: Unable to assess    Treatments:     Therapeutic Activity  Therapeutic Activity Performed: Yes  Therapeutic Activity 1: With attempts to engage pt, minimal pt response to cues to complete tasks, focus on or identify objects or use. However, when balloon tossed at pt, pt responds by catching and tapping balloon back to therapist ~8x, then stops engaging    Bed Mobility  Bed Mobility: Yes  Bed Mobility 1  Bed Mobility 1: Supine to sitting, Sitting to supine  Level of Assistance 1: Dependent, +2  Bed Mobility Comments 1: Upon briefly achieving sitting EOB, but pt actively resisting, demos posterior lean and sliding toward EOB, while attempting to hit OT.    Outcome Measures:    [facility] Basic Mobility  Turning from your back to your side while in a flat bed without using bedrails: Total  Moving from lying on your back to sitting on the side of a flat bed without using bedrails: Total  Moving to and from bed to chair (including a wheelchair): Total  Standing up from a chair using your arms (e.g. wheelchair or bedside chair): Total  To walk in hospital room: Total  Climbing 3-5 steps with railing: Total  Basic Mobility - Total Score: 6    Education Documentation  Precautions, taught by Laure Shah PTA at 12/2/2024  3:12 PM.  Learner: Patient  Readiness: Nonacceptance  Method: Explanation  Response: No Evidence of Learning  Comment: mobility    Body Mechanics, taught by Laure Shah PTA at 12/2/2024  3:12 PM.  Learner: Patient  Readiness: Nonacceptance  Method: Explanation  Response: No Evidence of  Learning  Comment: mobility    Mobility Training, taught by Laure Shah PTA at 12/2/2024  3:12 PM.  Learner: Patient  Readiness: Nonacceptance  Method: Explanation  Response: No Evidence of Learning  Comment: mobility    Education Comments  No comments found.        OP EDUCATION:       Encounter Problems       Encounter Problems (Active)       PT Problem       Patient will perform bed mobility with </= MOD A x1 to reduce risk of developing decubitus ulcers.  (Progressing)       Start:  11/21/24    Expected End:  12/05/24            Patient will perform sit to stand and stand to sit transfers with </= MAX A x1 and LRD to increase functional strength.  (Progressing)       Start:  11/21/24    Expected End:  12/05/24            Patient will ambulate at least 15 ft. with </= MAX A x1 and LRD to improve tolerance of household distances.  (Progressing)       Start:  11/21/24    Expected End:  12/05/24            Patient will participate in therapeutic exercise program with VSS and cues as needed.    (Progressing)       Start:  11/21/24    Expected End:  12/05/24            Patient will score at least 42/56 on the Function in Sitting Test to improve sitting balance required for functional tasks.   (Progressing)       Start:  11/21/24    Expected End:  12/05/24               Pain - Adult            LIZ Ingram

## 2024-12-02 NOTE — CARE PLAN
The patient's goals for the shift include      The clinical goals for the shift include pt will remain free from injury      Problem: Skin  Goal: Decreased wound size/increased tissue granulation at next dressing change  Outcome: Progressing  Flowsheets (Taken 12/1/2024 2223)  Decreased wound size/increased tissue granulation at next dressing change:   Protective dressings over bony prominences   Promote sleep for wound healing  Goal: Participates in plan/prevention/treatment measures  Outcome: Progressing  Flowsheets (Taken 12/1/2024 2223)  Participates in plan/prevention/treatment measures: Elevate heels  Goal: Prevent/manage excess moisture  Outcome: Progressing  Flowsheets (Taken 12/1/2024 2223)  Prevent/manage excess moisture:   Cleanse incontinence/protect with barrier cream   Monitor for/manage infection if present  Goal: Prevent/minimize sheer/friction injuries  Outcome: Progressing  Flowsheets (Taken 12/1/2024 2223)  Prevent/minimize sheer/friction injuries:   Use pull sheet   Increase activity/out of bed for meals   Complete micro-shifts as needed if patient unable. Adjust patient position to relieve pressure points, not a full turn   HOB 30 degrees or less   Turn/reposition every 2 hours/use positioning/transfer devices  Goal: Promote/optimize nutrition  Outcome: Progressing  Flowsheets (Taken 12/1/2024 2223)  Promote/optimize nutrition:   Monitor/record intake including meals   Offer water/supplements/favorite foods  Goal: Promote skin healing  Outcome: Progressing  Flowsheets (Taken 12/1/2024 2223)  Promote skin healing:   Turn/reposition every 2 hours/use positioning/transfer devices   Protective dressings over bony prominences   Assess skin/pad under line(s)/device(s)   Ensure correct size (line/device) and apply per  instructions     Problem: Fall/Injury  Goal: Not fall by end of shift  Outcome: Progressing  Goal: Be free from injury by end of the shift  Outcome: Progressing  Goal:  Verbalize understanding of personal risk factors for fall in the hospital  Outcome: Progressing  Goal: Verbalize understanding of risk factor reduction measures to prevent injury from fall in the home  Outcome: Progressing  Goal: Use assistive devices by end of the shift  Outcome: Progressing  Goal: Pace activities to prevent fatigue by end of the shift  Outcome: Progressing     Problem: Safety - Medical Restraint  Goal: Remains free of injury from restraints (Restraint for Interference with Medical Device)  Outcome: Progressing  Goal: Free from restraint(s) (Restraint for Interference with Medical Device)  Outcome: Progressing     Problem: Pain - Adult  Goal: Verbalizes/displays adequate comfort level or baseline comfort level  Outcome: Progressing     Problem: Safety - Adult  Goal: Free from fall injury  Outcome: Progressing     Problem: Discharge Planning  Goal: Discharge to home or other facility with appropriate resources  Outcome: Progressing     Problem: Chronic Conditions and Co-morbidities  Goal: Patient's chronic conditions and co-morbidity symptoms are monitored and maintained or improved  Outcome: Progressing     Problem: Pain  Goal: Takes deep breaths with improved pain control throughout the shift  Outcome: Progressing  Goal: Turns in bed with improved pain control throughout the shift  Outcome: Progressing  Goal: Walks with improved pain control throughout the shift  Outcome: Progressing  Goal: Performs ADL's with improved pain control throughout shift  Outcome: Progressing  Goal: Participates in PT with improved pain control throughout the shift  Outcome: Progressing  Goal: Free from opioid side effects throughout the shift  Outcome: Progressing  Goal: Free from acute confusion related to pain meds throughout the shift  Outcome: Progressing     Problem: Diabetes  Goal: Achieve decreasing blood glucose levels by end of shift  Outcome: Progressing  Goal: Increase stability of blood glucose readings by  end of shift  Outcome: Progressing  Goal: Maintain electrolyte levels within acceptable range throughout shift  Outcome: Progressing  Goal: Maintain glucose levels >70mg/dl to <250mg/dl throughout shift  Outcome: Progressing  Goal: No changes in neurological exam by end of shift  Outcome: Progressing  Goal: Vital signs within normal range for age by end of shift  Outcome: Progressing     Problem: Nutrition  Goal: Less than 5 days NPO/clear liquids  Outcome: Progressing  Goal: Oral intake greater than 50%  Outcome: Progressing  Goal: Oral intake greater 75%  Outcome: Progressing  Goal: Consume prescribed supplement  Outcome: Progressing  Goal: Adequate PO fluid intake  Outcome: Progressing  Goal: Nutrition support goals are met within 48 hrs  Outcome: Progressing  Goal: Nutrition support is meeting 75% of nutrient needs  Outcome: Progressing  Goal: Tube feed tolerance  Outcome: Progressing  Goal: BG  mg/dL  Outcome: Progressing  Goal: Lab values WNL  Outcome: Progressing  Goal: Electrolytes WNL  Outcome: Progressing  Goal: Promote healing  Outcome: Progressing  Goal: Maintain stable weight  Outcome: Progressing  Goal: Reduce weight from edema/fluid  Outcome: Progressing  Goal: Gradual weight gain  Outcome: Progressing  Goal: Improve ostomy output  Outcome: Progressing

## 2024-12-02 NOTE — CARE PLAN
Problem: Skin  Goal: Promote/optimize nutrition  Outcome: Progressing  Flowsheets (Taken 12/2/2024 1204)  Promote/optimize nutrition: (tube feed) Consume > 50% meals/supplements     Problem: Fall/Injury  Goal: Not fall by end of shift  Outcome: Progressing   The patient's goals for the shift include      The clinical goals for the shift include pt will remain free from injury

## 2024-12-02 NOTE — PROGRESS NOTES
Occupational Therapy    OT Treatment    Patient Name: Loren Mitchell  MRN: [id number]  Department: [place] [address]  Room: [facility]8081  Today's Date: 12/2/2024  Time Calculation  Start Time: 1416  Stop Time: 1431  Time Calculation (min): 15 min        Assessment:  OT Assessment:  (Continue to recommend 24/7 supervision/assist to address ADLs/cognition/mobility and moderate intensity OT.)  End of Session Communication: Bedside nurse  End of Session Patient Position: Bed, 3 rail up, Alarm on     Plan:  Treatment Interventions: ADL retraining, Functional transfer training, Patient/family training, Equipment evaluation/education, Compensatory technique education  OT Frequency: 1 time per week  OT Discharge Recommendations: 24 hr supervision due to cognition (Memory care)  OT Recommended Transfer Status: Dependent  OT - OK to Discharge: Yes  Treatment Interventions: ADL retraining, Functional transfer training, Patient/family training, Equipment evaluation/education, Compensatory technique education    Subjective   Previous Visit Info:  OT Last Visit  OT Received On: 12/02/24  General:  General  Family/Caregiver Present: No  Co-Treatment: PT  Co-Treatment Reason: to maximize safety with mobility and therapeutic potential in pt with AMPAC <10 and A&Ox0 at baseline  Prior to Session Communication: Bedside nurse  Patient Position Received: Bed, 3 rail up, Alarm on  General Comment:  (pleasantly confused)  Precautions:  Medical Precautions: Fall precautions          Pain:  Pain Assessment  Pain Assessment: 0-10  0-10 (Numeric) Pain Score: 0 - No pain    Objective    Cognition:  Cognition  Overall Cognitive Status: Impaired at baseline (oriented to first name only)  Safety/Judgement: Exceptions to WFL  Complex Functional Tasks: Maximal  Insight: Severe     Activities of Daily Living: Grooming  Grooming Level of Assistance: Dependent  Grooming Where Assessed: Bed level  Grooming Comments:  (face hygiene with bath wipes, attempted to  provide tactile/verbal cues but pt did not initiate ADL)     Bed Mobility/Transfers: Bed Mobility 1  Bed Mobility 1: Supine to sitting, Sitting to supine  Level of Assistance 1: Dependent (x2)  Bed Mobility 2  Bed Mobility  2: Scooting  Level of Assistance 2: Dependent (x2, draw sheet)    Sitting Balance:  Static Sitting Balance  Static Sitting-Level of Assistance:  (DEP assist x 1, pt only able to sit EOB for ~5 seconds, heavy posterior lean/resisting therapist)    Outcome Measures:[facility] Daily Activity  Putting on and taking off regular lower body clothing: Total  Bathing (including washing, rinsing, drying): Total  Putting on and taking off regular upper body clothing: Total  Toileting, which includes using toilet, bedpan or urinal: Total  Taking care of personal grooming such as brushing teeth: Total  Eating Meals: Total  Daily Activity - Total Score: 6   and Brief Confusion Assessment Method (bCAM)  CAM Result: CAM +    Education Documentation  Body Mechanics, taught by Sindy Branch OT at 12/2/2024  4:01 PM.  Learner: Patient  Readiness: Nonacceptance  Method: Explanation  Response: No Evidence of Learning    ADL Training, taught by Sindy Branch OT at 12/2/2024  4:01 PM.  Learner: Patient  Readiness: Nonacceptance  Method: Explanation  Response: No Evidence of Learning    Education Comments  No comments found.        Goals:  Encounter Problems       Encounter Problems (Active)       ADLs       Patient with complete upper body dressing with moderate assist level of assistance (Not Progressing)       Start:  11/21/24    Expected End:  12/05/24            Patient will feed self with supervision level of assistance. (Not Progressing)       Start:  11/21/24    Expected End:  12/05/24            Patient will complete daily grooming tasks with moderate level of assistance. (Not Progressing)       Start:  11/21/24    Expected End:  12/05/24               TRANSFERS       Patient will perform bed mobility moderate  assist level of assistance in order to improve safety and independence with mobility (Not Progressing)       Start:  11/21/24    Expected End:  12/05/24            Patient will complete functional transfers with moderate assist level of assistance. (Not Progressing)       Start:  11/21/24    Expected End:  12/05/24               VISION       Patient will complete Visual Scanning Task task regarding all areas of activity with minimal cues. (Not Progressing)       Start:  11/21/24    Expected End:  12/05/24

## 2024-12-03 ENCOUNTER — DOCUMENTATION (OUTPATIENT)
Dept: NEUROSURGERY | Facility: HOSPITAL | Age: 79
End: 2024-12-03

## 2024-12-03 ENCOUNTER — APPOINTMENT (OUTPATIENT)
Dept: CARDIOLOGY | Facility: HOSPITAL | Age: 79
End: 2024-12-03
Payer: MEDICARE

## 2024-12-03 ENCOUNTER — APPOINTMENT (OUTPATIENT)
Dept: NEUROSURGERY | Facility: HOSPITAL | Age: 79
End: 2024-12-03
Payer: MEDICARE

## 2024-12-03 LAB
ALBUMIN SERPL BCP-MCNC: 3.3 G/DL (ref 3.4–5)
ANION GAP SERPL CALC-SCNC: 12 MMOL/L (ref 10–20)
BUN SERPL-MCNC: 27 MG/DL (ref 6–23)
CALCIUM SERPL-MCNC: 8.9 MG/DL (ref 8.6–10.6)
CHLORIDE SERPL-SCNC: 107 MMOL/L (ref 98–107)
CO2 SERPL-SCNC: 27 MMOL/L (ref 21–32)
CREAT SERPL-MCNC: 0.65 MG/DL (ref 0.5–1.05)
EGFRCR SERPLBLD CKD-EPI 2021: 89 ML/MIN/1.73M*2
GLUCOSE BLD MANUAL STRIP-MCNC: 128 MG/DL (ref 74–99)
GLUCOSE BLD MANUAL STRIP-MCNC: 186 MG/DL (ref 74–99)
GLUCOSE BLD MANUAL STRIP-MCNC: 210 MG/DL (ref 74–99)
GLUCOSE BLD MANUAL STRIP-MCNC: 247 MG/DL (ref 74–99)
GLUCOSE SERPL-MCNC: 203 MG/DL (ref 74–99)
MAGNESIUM SERPL-MCNC: 2.23 MG/DL (ref 1.6–2.4)
PHOSPHATE SERPL-MCNC: 4.5 MG/DL (ref 2.5–4.9)
POTASSIUM SERPL-SCNC: 4.5 MMOL/L (ref 3.5–5.3)
SODIUM SERPL-SCNC: 141 MMOL/L (ref 136–145)

## 2024-12-03 PROCEDURE — 99232 SBSQ HOSP IP/OBS MODERATE 35: CPT | Performed by: STUDENT IN AN ORGANIZED HEALTH CARE EDUCATION/TRAINING PROGRAM

## 2024-12-03 PROCEDURE — 2500000001 HC RX 250 WO HCPCS SELF ADMINISTERED DRUGS (ALT 637 FOR MEDICARE OP)

## 2024-12-03 PROCEDURE — 2500000002 HC RX 250 W HCPCS SELF ADMINISTERED DRUGS (ALT 637 FOR MEDICARE OP, ALT 636 FOR OP/ED): Performed by: PHYSICIAN ASSISTANT

## 2024-12-03 PROCEDURE — 99233 SBSQ HOSP IP/OBS HIGH 50: CPT | Performed by: INTERNAL MEDICINE

## 2024-12-03 PROCEDURE — 82947 ASSAY GLUCOSE BLOOD QUANT: CPT

## 2024-12-03 PROCEDURE — 36415 COLL VENOUS BLD VENIPUNCTURE: CPT

## 2024-12-03 PROCEDURE — 2500000004 HC RX 250 GENERAL PHARMACY W/ HCPCS (ALT 636 FOR OP/ED): Performed by: PHYSICIAN ASSISTANT

## 2024-12-03 PROCEDURE — 83735 ASSAY OF MAGNESIUM: CPT

## 2024-12-03 PROCEDURE — 2500000002 HC RX 250 W HCPCS SELF ADMINISTERED DRUGS (ALT 637 FOR MEDICARE OP, ALT 636 FOR OP/ED)

## 2024-12-03 PROCEDURE — 2500000001 HC RX 250 WO HCPCS SELF ADMINISTERED DRUGS (ALT 637 FOR MEDICARE OP): Performed by: PHYSICIAN ASSISTANT

## 2024-12-03 PROCEDURE — 93005 ELECTROCARDIOGRAM TRACING: CPT

## 2024-12-03 PROCEDURE — 80069 RENAL FUNCTION PANEL: CPT

## 2024-12-03 PROCEDURE — 1100000001 HC PRIVATE ROOM DAILY

## 2024-12-03 RX ORDER — AMOXICILLIN 250 MG
1 CAPSULE ORAL 2 TIMES DAILY
Status: DISCONTINUED | OUTPATIENT
Start: 2024-12-03 | End: 2024-12-04 | Stop reason: HOSPADM

## 2024-12-03 ASSESSMENT — PAIN SCALES - WONG BAKER: WONGBAKER_NUMERICALRESPONSE: NO HURT

## 2024-12-03 ASSESSMENT — PAIN SCALES - GENERAL: PAINLEVEL_OUTOF10: 0 - NO PAIN

## 2024-12-03 NOTE — PROGRESS NOTES
Transitional Care Coordinator Note: Patient discussed in morning rounds, per medical team (trauma) patient is medically ready. Discharge dispo: Plan for patient to discharge to [facility], patient pending pre-cert.       Iza Henry RN BSN   Transitional Care Coordinator

## 2024-12-03 NOTE — PROGRESS NOTES
Speech-Language Pathology  Therapy Communication Note  Patient Name: Loren Mitchell  MRN: [id number]  Today's Date: 12/3/2024   Time: 1325    Discipline: Speech-Language Pathology    Reason: Attempt    Comment:  Per EMR, pt/family declining further SLP intervention; plan to discharge w/ dobhoff and feed pt at next level of care (see medical team's note on 12/2). No further SLP services indicated; will sign off/complete order.

## 2024-12-03 NOTE — CARE PLAN
The patient's goals for the shift include      The clinical goals for the shift include pt will remain free from injury      Problem: Skin  Goal: Decreased wound size/increased tissue granulation at next dressing change  Outcome: Progressing  Flowsheets (Taken 12/2/2024 2217)  Decreased wound size/increased tissue granulation at next dressing change:   Protective dressings over bony prominences   Promote sleep for wound healing  Goal: Participates in plan/prevention/treatment measures  Outcome: Progressing  Flowsheets (Taken 12/2/2024 2217)  Participates in plan/prevention/treatment measures: Elevate heels  Goal: Prevent/manage excess moisture  Outcome: Progressing  Flowsheets (Taken 12/2/2024 2217)  Prevent/manage excess moisture: Monitor for/manage infection if present  Goal: Prevent/minimize sheer/friction injuries  Outcome: Progressing  Flowsheets (Taken 12/2/2024 2217)  Prevent/minimize sheer/friction injuries:   Use pull sheet   Increase activity/out of bed for meals   HOB 30 degrees or less   Turn/reposition every 2 hours/use positioning/transfer devices  Goal: Promote/optimize nutrition  Outcome: Progressing  Goal: Promote skin healing  Outcome: Progressing     Problem: Fall/Injury  Goal: Not fall by end of shift  Outcome: Progressing  Goal: Be free from injury by end of the shift  Outcome: Progressing  Goal: Verbalize understanding of personal risk factors for fall in the hospital  Outcome: Progressing  Goal: Verbalize understanding of risk factor reduction measures to prevent injury from fall in the home  Outcome: Progressing  Goal: Use assistive devices by end of the shift  Outcome: Progressing  Goal: Pace activities to prevent fatigue by end of the shift  Outcome: Progressing     Problem: Safety - Medical Restraint  Goal: Remains free of injury from restraints (Restraint for Interference with Medical Device)  Outcome: Progressing  Goal: Free from restraint(s) (Restraint for Interference with Medical  Device)  Outcome: Progressing     Problem: Pain - Adult  Goal: Verbalizes/displays adequate comfort level or baseline comfort level  Outcome: Progressing     Problem: Safety - Adult  Goal: Free from fall injury  Outcome: Progressing     Problem: Discharge Planning  Goal: Discharge to home or other facility with appropriate resources  Outcome: Progressing     Problem: Chronic Conditions and Co-morbidities  Goal: Patient's chronic conditions and co-morbidity symptoms are monitored and maintained or improved  Outcome: Progressing     Problem: Pain  Goal: Takes deep breaths with improved pain control throughout the shift  Outcome: Progressing  Goal: Turns in bed with improved pain control throughout the shift  Outcome: Progressing  Goal: Walks with improved pain control throughout the shift  Outcome: Progressing  Goal: Performs ADL's with improved pain control throughout shift  Outcome: Progressing  Goal: Participates in PT with improved pain control throughout the shift  Outcome: Progressing  Goal: Free from opioid side effects throughout the shift  Outcome: Progressing  Goal: Free from acute confusion related to pain meds throughout the shift  Outcome: Progressing     Problem: Diabetes  Goal: Achieve decreasing blood glucose levels by end of shift  Outcome: Progressing  Goal: Increase stability of blood glucose readings by end of shift  Outcome: Progressing  Goal: Maintain electrolyte levels within acceptable range throughout shift  Outcome: Progressing  Goal: Maintain glucose levels >70mg/dl to <250mg/dl throughout shift  Outcome: Progressing  Goal: No changes in neurological exam by end of shift  Outcome: Progressing  Goal: Vital signs within normal range for age by end of shift  Outcome: Progressing     Problem: Nutrition  Goal: Less than 5 days NPO/clear liquids  Outcome: Progressing  Goal: Oral intake greater than 50%  Outcome: Progressing  Goal: Oral intake greater 75%  Outcome: Progressing  Goal: Consume  prescribed supplement  Outcome: Progressing  Goal: Adequate PO fluid intake  Outcome: Progressing  Goal: Nutrition support goals are met within 48 hrs  Outcome: Progressing  Goal: Nutrition support is meeting 75% of nutrient needs  Outcome: Progressing  Goal: Tube feed tolerance  Outcome: Progressing  Goal: BG  mg/dL  Outcome: Progressing  Goal: Lab values WNL  Outcome: Progressing  Goal: Electrolytes WNL  Outcome: Progressing  Goal: Promote healing  Outcome: Progressing  Goal: Maintain stable weight  Outcome: Progressing  Goal: Reduce weight from edema/fluid  Outcome: Progressing  Goal: Gradual weight gain  Outcome: Progressing  Goal: Improve ostomy output  Outcome: Progressing

## 2024-12-03 NOTE — PROGRESS NOTES
Pre-cert obtained. Patient pending discharge tomorrow going to [facility]. Transport set for 2pm tomorrow. Patient will need a IMM, goldenrod and 7000. Patient family will also need to be notified of discharge plan.       LAZARA Jackson

## 2024-12-03 NOTE — PROGRESS NOTES
[facility]  TRAUMA SERVICE - PROGRESS NOTE    Patient Name: Loren Mitchell  MRN: [id number]  Admit Date: 1119  : 1945  AGE: 79 y.o.   GENDER: female  8081/8081-A  ==============================================================================  MECHANISM OF INJURY:   79 YOF s/p fall     LOC (yes/no?): unknown  Anticoagulant / Anti-platelet Rx? (for what dx?): no  Referring Facility Name (N/A for scene EMR run): [facility]    Injuries/problems:  - Large L hematoma with midline shift  - dysphagia, acute on chronic  - Hx A/O x0-1 at baseline end stage dementia, HTN, recurrent UTIs, CAD, HLD, depression, type II DM A1C 7.4/166 (insulin dependent)    INCIDENTAL FINDINGS:  none    PROCEDURES:  : L inez hole x2 with SD evacuation    ==============================================================================  TODAY'S ASSESSMENT AND PLAN OF CARE:  ## SDH d/t unwitnessed fall  ###End stage dementia baseline Aox0-1 at baseline, lives in memory unit  - NSGY s/o :okay chemoppx; fu Cth  for CTH and 12/3 for appt  - q4h neuro checks  - PT/OT  - PRN zyprexa per katie recs (PRN for when pt attempts dobhoff removal)   EKG obtained, wnl  - sitter free since  1700  NSGY  - rCTH overall stable / improved slightly wrt bleed and mass effect  - NSGY contacted as patient was unable to make outpatient appointment 12/3 d/t   being inpatient; will see  prior to discharge    ## Recurrent UTI  - Initially started on Nitrofurantoin empirically (stop  as negative UA/cx)    ## PMHx  - Continue home statin, Prozac, Synthroid, Memantine  - holding home 10 meqK, telmisartan for now    ## FEN/GI: acute on chronic dysphagia, dm  - SLP previously rec minced/moist/thin liquids, now NPO.   -family okay with dobhoff, but no long term feeding tube  - Dobhoff placed , at Goal Isosource 45/h   -increased to 50mL/h x22 hrs (hold pre/post synthroid) per nutrition recs  - OK for  <10 ice chips to improve swallowing   SLP update: 12/3 family declining additional SLP intervention, s/o  - having Bms with current regimen  - with meals #3 lispro, home lantus 51 units nightly. Holding home trulicity, glimepiride (hold oral agent per katie)  - no further B12 per katie    ## PPX  - SCDs  - LVX    Social: dnr/dni after code status discussion    Dispo: continue care on RNF, medically clear. Planned discharge to [facility] tomorrow 2PM. Family needs to be updated. Needs goldenrod/7000/IMM    Patient's  [place]: [phone]     Patient discussed with attending, Dr. Nicholas Kemp MD  Trauma, Critical Care, and Acute Care Surgery  Floor: [id number]   TSU: [id number]   ==============================================================================  CHIEF COMPLAINT / OVERNIGHT EVENTS:   No acute events or agitation concern overnight. Resting comfortably, GCS 13 (baseline). Remains poor PO intake outside of dobhoff which was initially placed for comfort, family declined additional interventions. Per patient family, they are requesting that she be discharged with dobhoff to facility where it can be removed; son and  report that they will personally feed patient, informed of risks for aspiration and they are able to verbalize understanding and are still interested in feeding.    Previously passing BM, none in last 24h.    MEDICAL HISTORY / ROS:  Admission history and ROS reviewed. Pertinent changes as follows:  none    PHYSICAL EXAM:  Heart Rate:  [55-60]   Temp:  [35.6 °C (96.1 °F)-36.8 °C (98.2 °F)]   Resp:  [14-16]   BP: (122-144)/(60-72)   SpO2:  [94 %-96 %]     Physical Exam:  General: frail, appears stated age, no sitter or restraints present  HEENT: without scleral icterus, reactive pupils BL. Bridled dobhoff in place, head inez hole surgical site c/d/i  Cardiovascular: Rate controlled rhythm  Pulmonary: breathing comfortably on RA  Abdomen: soft, nondistended  Skin: warm and dry.    MSK: moving all extremities weakly at her baseline. 2+ radial and DP pulses bilaterally  Neuro: GCS 13 unchanged (M6/E4/V3) best. Waxes and wanes, pleasantly confused, drifts off to sleep during questioning and evaluation; required sternal rub to awaken this AM    IMAGING SUMMARY:  (summary of new imaging findings, not a copy of dictation)  No new imaging    LABS:              Results from last 7 days   Lab Units 12/03/24  0816 11/30/24  0954 11/28/24  0732   SODIUM mmol/L 141 139 141   POTASSIUM mmol/L 4.5 4.2 3.7   CHLORIDE mmol/L 107 106 111*   CO2 mmol/L 27 26 22   BUN mg/dL 27* 17 9   CREATININE mg/dL 0.65 0.55 0.52   CALCIUM mg/dL 8.9 9.1 8.7   GLUCOSE mg/dL 203* 182* 161*                 I have reviewed all medications, laboratory results, and imaging pertinent for today's encounter.

## 2024-12-04 VITALS
DIASTOLIC BLOOD PRESSURE: 69 MMHG | TEMPERATURE: 97.3 F | RESPIRATION RATE: 16 BRPM | SYSTOLIC BLOOD PRESSURE: 129 MMHG | WEIGHT: 132.28 LBS | OXYGEN SATURATION: 94 % | HEART RATE: 68 BPM | HEIGHT: 66 IN | BODY MASS INDEX: 21.26 KG/M2

## 2024-12-04 LAB
GLUCOSE BLD MANUAL STRIP-MCNC: 222 MG/DL (ref 74–99)
GLUCOSE BLD MANUAL STRIP-MCNC: 234 MG/DL (ref 74–99)
MAGNESIUM SERPL-MCNC: 2.16 MG/DL (ref 1.6–2.4)

## 2024-12-04 PROCEDURE — 2500000004 HC RX 250 GENERAL PHARMACY W/ HCPCS (ALT 636 FOR OP/ED): Performed by: PHYSICIAN ASSISTANT

## 2024-12-04 PROCEDURE — 36415 COLL VENOUS BLD VENIPUNCTURE: CPT

## 2024-12-04 PROCEDURE — 83735 ASSAY OF MAGNESIUM: CPT

## 2024-12-04 PROCEDURE — 2500000001 HC RX 250 WO HCPCS SELF ADMINISTERED DRUGS (ALT 637 FOR MEDICARE OP)

## 2024-12-04 PROCEDURE — 99238 HOSP IP/OBS DSCHRG MGMT 30/<: CPT | Performed by: STUDENT IN AN ORGANIZED HEALTH CARE EDUCATION/TRAINING PROGRAM

## 2024-12-04 PROCEDURE — 99232 SBSQ HOSP IP/OBS MODERATE 35: CPT

## 2024-12-04 PROCEDURE — 2500000001 HC RX 250 WO HCPCS SELF ADMINISTERED DRUGS (ALT 637 FOR MEDICARE OP): Performed by: PHYSICIAN ASSISTANT

## 2024-12-04 PROCEDURE — 82947 ASSAY GLUCOSE BLOOD QUANT: CPT

## 2024-12-04 PROCEDURE — 2500000002 HC RX 250 W HCPCS SELF ADMINISTERED DRUGS (ALT 637 FOR MEDICARE OP, ALT 636 FOR OP/ED): Performed by: PHYSICIAN ASSISTANT

## 2024-12-04 RX ORDER — ACETAMINOPHEN 160 MG/5ML
975 SOLUTION ORAL EVERY 8 HOURS SCHEDULED
Start: 2024-12-04

## 2024-12-04 RX ORDER — DEXTROSE 50 % IN WATER (D50W) INTRAVENOUS SYRINGE
12.5
Start: 2024-12-04

## 2024-12-04 RX ORDER — INSULIN LISPRO 100 [IU]/ML
0-15 INJECTION, SOLUTION INTRAVENOUS; SUBCUTANEOUS EVERY 6 HOURS
Start: 2024-12-04

## 2024-12-04 RX ORDER — INSULIN GLARGINE 100 [IU]/ML
55 INJECTION, SOLUTION SUBCUTANEOUS NIGHTLY
Start: 2024-12-04

## 2024-12-04 RX ORDER — ENOXAPARIN SODIUM 100 MG/ML
30 INJECTION SUBCUTANEOUS 2 TIMES DAILY
Start: 2024-12-04

## 2024-12-04 RX ORDER — DEXTROSE 50 % IN WATER (D50W) INTRAVENOUS SYRINGE
25
Start: 2024-12-04

## 2024-12-04 RX ORDER — ACETAMINOPHEN 500 MG
5 TABLET ORAL NIGHTLY PRN
Start: 2024-12-04

## 2024-12-04 ASSESSMENT — PAIN SCALES - GENERAL
PAINLEVEL_OUTOF10: 0 - NO PAIN
PAINLEVEL_OUTOF10: 0 - NO PAIN

## 2024-12-04 NOTE — SIGNIFICANT EVENT
Wound checked, incision c/d/I with minimal erythema along incision line, no breakdown, no underlying fluid collection.     CTH was reviewed and is stable.  Ok for discharge from a neurosurgical perspective.     Will arrange for 6-8 wk fu with [facility] at that time.     No acute neurosurgical intervention or additional neuroimaging needed at this time. Thank you for allowing us to participate in the care of this patient. Will sign off at this time. Please page with any questions or concerns.

## 2024-12-04 NOTE — PROGRESS NOTES
BENNY spoke with patient son, Michael [phone] to make him aware of the discharge plan for today. Michael stated that he had already received a phone call from the Geriatrics attending earlier in the day. IMM complete. BENNY still awaiting completed goldenrod to finish 7000. Transport time updated to 1:30p, per CCA.       LAZARA Jackson

## 2024-12-04 NOTE — PROGRESS NOTES
Subjective   No acute events overnight. Patient unable to meaningfully respond to any commands or questions but does track with eyes. NG tube in place. Planning for discharge today at 2 PM.        Objective     Current Facility-Administered Medications   Medication Dose Route Frequency Provider Last Rate Last Admin    acetaminophen (Tylenol) oral liquid 1,000 mg  1,000 mg nasogastric tube q8h KULWANT NEVIN Trujillo-C   1,000 mg at 12/04/24 0517    atorvastatin (Lipitor) tablet 10 mg  10 mg nasogastric tube q PM NEVIN Trujillo-C   10 mg at 12/03/24 2044    dextrose 50 % injection 12.5 g  12.5 g intravenous q15 min PRN Rachana Kemp MD        dextrose 50 % injection 25 g  25 g intravenous q15 min PRN Rachana Kemp MD        enoxaparin (Lovenox) syringe 30 mg  30 mg subcutaneous BID Chas Zendejas PA-C   30 mg at 12/04/24 0824    FLUoxetine (PROzac) solution 40 mg  40 mg nasogastric tube Daily SUNIL TrujilloC   40 mg at 12/04/24 0824    glucagon (Glucagen) injection 1 mg  1 mg intramuscular q15 min PRN Rachana Kemp MD        glucagon (Glucagen) injection 1 mg  1 mg intramuscular q15 min PRN Rachana Kemp MD        insulin glargine (Lantus) injection 55 Units  55 Units subcutaneous Nightly Monica Mathis PA-C   55 Units at 12/03/24 2051    insulin lispro injection 0-15 Units  0-15 Units subcutaneous q6h SUNIL TrujilloC   6 Units at 12/04/24 0517    levothyroxine (Synthroid, Levoxyl) tablet 50 mcg  50 mcg nasogastric tube Daily SUNIL TrujilloC   50 mcg at 12/04/24 0517    melatonin tablet 5 mg  5 mg nasogastric tube Nightly SUNIL TrujilloC   5 mg at 12/03/24 2044    memantine (Namenda) tablet 10 mg  10 mg nasogastric tube BID FerSUNIL ZepedaC   10 mg at 12/04/24 0824    OLANZapine (ZyPREXA) tablet 10 mg  10 mg oral BID PRN Cenk SANJAY Kemp MD   10 mg at 12/01/24 2043    polyethylene glycol (Glycolax, Miralax) packet 17 g  17 g nasogastric tube Daily Fer Gilbert PA-C    17 g at 12/04/24 0824    sennosides-docusate sodium (Prabha-Colace) 8.6-50 mg per tablet 1 tablet  1 tablet nasogastric tube BID Rachana Kemp MD   1 tablet at 12/04/24 0824       Physical Exam  Vitals reviewed.   Constitutional:       General: She is awake. She is not in acute distress.     Appearance: Normal appearance.   HENT:      Head:      Comments: Dobhoff in place  Eyes:      Extraocular Movements: Extraocular movements intact.   Cardiovascular:      Rate and Rhythm: Normal rate and regular rhythm.      Heart sounds: Normal heart sounds. No murmur heard.  Pulmonary:      Effort: Pulmonary effort is normal. No respiratory distress.      Breath sounds: Normal breath sounds.   Abdominal:      General: Abdomen is flat. Bowel sounds are normal.      Palpations: Abdomen is soft.      Tenderness: There is no abdominal tenderness.   Skin:     General: Skin is warm and dry.   Neurological:      Mental Status: She is alert. Mental status is at baseline. She is confused.      Comments: A&Ox0, unresponsive to commands   Psychiatric:         Behavior: Behavior is cooperative.       Confusion Assessment Method(CAM) for diagnosis of delirium:    1.  Acute onset or fluctuating course: absent  2.  Inattention: present  3.  Disorganized thinking: present  4.  Altered level of consciousness: present  CAM: negative    AT Score For Assessment of Delirium and Cognitive Impairment:    Alertness: 0  Normal(fully alert,but not agitated, throughout assessment)=0  Mild sleepiness for <10 seconds after walking, then normal=0  Clearly abnormal=4  2.  AMT4: 2  No mistakes=0  One mistake=1  Two or more mistakes/untestable=2  3.  Attention: 2  Achieves seven months or more correctly=0  Starts but scores <7 months/ refuses to start=1  Untestable(cannot start because unwell, drowsy, inattentive)=2  4.  Acute: 0  No=0  Yes=4    Total Score: 4  4 or above: Possible delirium +/- cognitive impairment  1-3: Possible cognitive impairment  0:  Delirium or severe cognitive impairment unlikely(but delirium still possible if (4) information incomplete)      Last Recorded Vitals      12/3/2024     8:59 AM 12/3/2024     5:00 PM 12/3/2024     7:35 PM 12/3/2024    11:12 PM 12/4/2024     3:45 AM 12/4/2024     8:27 AM 12/4/2024     9:05 AM   Vitals   Systolic 127 135 158 150 147 124 128   Diastolic 60 61 69 83 69 74 61   BP Location Right arm  Right arm Right arm Right arm Right arm Right arm   Heart Rate 56 62 63 65 60 69 62   Temp 36 °C (96.8 °F) 36.4 °C (97.6 °F) 36.5 °C (97.7 °F) 36.4 °C (97.5 °F) 36.4 °C (97.6 °F) 36.1 °C (97 °F) 36 °C (96.8 °F)   Resp 16 18 18 18 18 16 18      Vitals:    11/19/24 0955   Weight: 60 kg (132 lb 4.4 oz)        Relevant Results  Lab Results   Component Value Date    TSH 1.85 11/26/2024    OFJOAYPR59 1,269 (H) 11/26/2024    VITD25 43 11/26/2024    HGBA1C 7.4 (H) 11/23/2024     Results from last 7 days   Lab Units 12/03/24  0816 11/30/24  0954 11/28/24  0732   SODIUM mmol/L 141 139 141   POTASSIUM mmol/L 4.5 4.2 3.7   CHLORIDE mmol/L 107 106 111*   CREATININE mg/dL 0.65 0.55 0.52   BUN mg/dL 27* 17 9   CO2 mmol/L 27 26 22         DATA:  EKG: QTC  Encounter Date: 11/19/24   ECG 12 Lead   Result Value    Ventricular Rate 63    Atrial Rate 63    CT Interval 190    QRS Duration 100    QT Interval 436    QTC Calculation(Bazett) 446    P Axis 21    R Axis -45    T Axis -82    QRS Count 10    Q Onset 212    P Onset 117    P Offset 170    T Offset 430    QTC Fredericia 443    Narrative    Normal sinus rhythm  Left axis deviation  Septal infarct (cited on or before 18-NOV-2024)  Abnormal ECG  When compared with ECG of 19-NOV-2024 03:30,  No significant change was found  See ED provider note for full interpretation and clinical correlation  Confirmed by Kwame Yoo ([id number]) on 11/20/2024 5:49:16 AM      Anti-psychotics in 48 hours: none  Opioids/Benzodiazepines in 48 hours: none  Anticholinergics on board:No  Restraints:No  Indwelling  catheters:No  Last BM: 12/3  UO in 24 hours: 950mL  Activity in the past 24 hours: bedbound  Need for ambulatory devices: total assist          Assessment/Plan   79-year-old female with past medical history of severe dementia, resident in the memory care unit, depression, type II DM, hypothyroidism, hypertension, hyperlipidemia, CAD, CKD, history of recurrent UTIs, who presents after a unwitnessed fall and found to have a large left subdural hematoma with rightward midline shift status post left bur hole.    Geriatrics was consulted for goals of care discussion in the setting of malnutrition due to poor p.o. intake and acute on chronic dysphagia.     Acute fall, unwitnessed in a patient with no history of gait impairment  acute on chronic dysphagia, status post Dobbhoff  Acute hyperactive delirium, likely related to discomfort from the Dobbhoff  Severe dementia  Type II DM  History of recurrent UTIs  Acute Constipation     Plan:   -Conversation with patient's  this morning - patient would not want any long-term feeding tube; he would like the tube removed today before discharge and is planning on pleasure feeds once she is at SNF; expressed understanding and acceptance of risks of aspiration  -  planning to transition patient to hospice if patient declines with oral feeds   - Continue with Lantus and SSI for hyperglycemia  - Continue with delirium precautions: avoid anticholinergics, benzos, opioids. Encourage po intake, OOB to chair, stimulation during the day, uninterrupted  - Continue miralax + BID senna         4M AGE-FRIENDLY INITIATIVE:  What matters most to patient: Family  Medications: Memantine  Mentation: CAM positive, 4AT 8, oriented only to self at baseline  Mobility: Total assist per PT evaluation    Patient discharging today, geriatrics will sign off.     Bright Neal MD

## 2024-12-04 NOTE — NURSING NOTE
1445: RN gave report to Debra Casarez.     1500: Pt discharged to facility with all belongings.  di removed prior to discharge. IV removed and pt tolerated well.

## 2024-12-04 NOTE — CARE PLAN
The patient's goals for the shift include      The clinical goals for the shift include pt will remain free from injury      Problem: Skin  Goal: Decreased wound size/increased tissue granulation at next dressing change  Outcome: Progressing  Flowsheets (Taken 12/3/2024 2025)  Decreased wound size/increased tissue granulation at next dressing change:   Protective dressings over bony prominences   Promote sleep for wound healing  Goal: Participates in plan/prevention/treatment measures  Outcome: Progressing  Flowsheets (Taken 12/3/2024 2025)  Participates in plan/prevention/treatment measures: Elevate heels  Goal: Prevent/manage excess moisture  Outcome: Progressing  Flowsheets (Taken 12/3/2024 2025)  Prevent/manage excess moisture:   Cleanse incontinence/protect with barrier cream   Monitor for/manage infection if present   Follow provider orders for dressing changes  Goal: Prevent/minimize sheer/friction injuries  Outcome: Progressing  Flowsheets (Taken 12/3/2024 2025)  Prevent/minimize sheer/friction injuries:   Use pull sheet   Increase activity/out of bed for meals   HOB 30 degrees or less   Turn/reposition every 2 hours/use positioning/transfer devices  Goal: Promote/optimize nutrition  Outcome: Progressing  Flowsheets (Taken 12/3/2024 2025)  Promote/optimize nutrition:   Assist with feeding   Monitor/record intake including meals  Goal: Promote skin healing  Outcome: Progressing  Flowsheets (Taken 12/3/2024 2025)  Promote skin healing:   Turn/reposition every 2 hours/use positioning/transfer devices   Protective dressings over bony prominences   Assess skin/pad under line(s)/device(s)     Problem: Fall/Injury  Goal: Not fall by end of shift  Outcome: Progressing  Goal: Be free from injury by end of the shift  Outcome: Progressing  Goal: Verbalize understanding of personal risk factors for fall in the hospital  Outcome: Progressing  Goal: Verbalize understanding of risk factor reduction measures to prevent  injury from fall in the home  Outcome: Progressing  Goal: Use assistive devices by end of the shift  Outcome: Progressing  Goal: Pace activities to prevent fatigue by end of the shift  Outcome: Progressing     Problem: Safety - Medical Restraint  Goal: Remains free of injury from restraints (Restraint for Interference with Medical Device)  Outcome: Progressing  Goal: Free from restraint(s) (Restraint for Interference with Medical Device)  Outcome: Progressing     Problem: Pain - Adult  Goal: Verbalizes/displays adequate comfort level or baseline comfort level  Outcome: Progressing     Problem: Safety - Adult  Goal: Free from fall injury  Outcome: Progressing     Problem: Discharge Planning  Goal: Discharge to home or other facility with appropriate resources  Outcome: Progressing     Problem: Chronic Conditions and Co-morbidities  Goal: Patient's chronic conditions and co-morbidity symptoms are monitored and maintained or improved  Outcome: Progressing     Problem: Pain  Goal: Takes deep breaths with improved pain control throughout the shift  Outcome: Progressing  Goal: Turns in bed with improved pain control throughout the shift  Outcome: Progressing  Goal: Walks with improved pain control throughout the shift  Outcome: Progressing  Goal: Performs ADL's with improved pain control throughout shift  Outcome: Progressing  Goal: Participates in PT with improved pain control throughout the shift  Outcome: Progressing  Goal: Free from opioid side effects throughout the shift  Outcome: Progressing  Goal: Free from acute confusion related to pain meds throughout the shift  Outcome: Progressing     Problem: Diabetes  Goal: Achieve decreasing blood glucose levels by end of shift  Outcome: Progressing  Goal: Increase stability of blood glucose readings by end of shift  Outcome: Progressing  Goal: Maintain electrolyte levels within acceptable range throughout shift  Outcome: Progressing  Goal: Maintain glucose levels >70mg/dl  to <250mg/dl throughout shift  Outcome: Progressing  Goal: No changes in neurological exam by end of shift  Outcome: Progressing  Goal: Vital signs within normal range for age by end of shift  Outcome: Progressing     Problem: Nutrition  Goal: Less than 5 days NPO/clear liquids  Outcome: Progressing  Goal: Oral intake greater than 50%  Outcome: Progressing  Goal: Oral intake greater 75%  Outcome: Progressing  Goal: Consume prescribed supplement  Outcome: Progressing  Goal: Adequate PO fluid intake  Outcome: Progressing  Goal: Nutrition support goals are met within 48 hrs  Outcome: Progressing  Goal: Nutrition support is meeting 75% of nutrient needs  Outcome: Progressing  Goal: Tube feed tolerance  Outcome: Progressing  Goal: BG  mg/dL  Outcome: Progressing  Goal: Lab values WNL  Outcome: Progressing  Goal: Electrolytes WNL  Outcome: Progressing  Goal: Promote healing  Outcome: Progressing  Goal: Maintain stable weight  Outcome: Progressing  Goal: Reduce weight from edema/fluid  Outcome: Progressing  Goal: Gradual weight gain  Outcome: Progressing  Goal: Improve ostomy output  Outcome: Progressing

## 2024-12-04 NOTE — DISCHARGE SUMMARY
Discharge Diagnosis  Subdural hematoma (Multi)    Issues Requiring Follow-Up  Large left hematoma with midline shift     Test Results Pending At Discharge  Pending Labs       Order Current Status    Extra Urine Gray Tube Collected (11/19/24 1853)    Urinalysis with Reflex Culture and Microscopic Collected (11/19/24 1853)            Hospital Course  Ms. Loren Mitchell is an 80 yo female with PMH of dementia, T2DM, hypothyroidism, HTN, CKD, HLD, CAD, depression, recurrent UTIs who presented to [facility] as transfer from [facility] after a fall. Patient was found down at her facility. Unknown downtime. No Hx of blood thinner use. Patient was taken to [facility] and underwent CTH. This scan showed large left SDH measuring 2.6 cm with 1.4 cm rightward midline shift. Patient was transferred to [facility] where she underwent emergent OR with NSGY . She is now s/p x2 left inez holes of SDH evacuation and subdural drain placement. Pt rec: mod intensity continued care and OT recs 24 hr supervision. SDH drains removed 11/21 after repeat CT showed improvement of L SDH. Will need follow up at 2 weeks for a [facility].     For acute on chronic dysphagia, failed SLP swallow evals. Per family request, dobhoff placed for nutrition/medications before SNF transfer.    Day prior to discharge, 12/3, pt due for repeat speech therapy evaluation. Family declined. Geriatrics spoke with family regarding goals of care. Family decided to change code status to DNR/DNI. Family requesting dobhoff be discontinued prior to transfer to facility, family will feed patient by mouth. Family accepting risk of aspiration.     Transportation set up for patient 12/4 afternoon. At time of discharge patient was at baseline, dobhoff was removed and medications continued to rehab facility.               Pertinent Physical Exam At Time of Discharge  Physical Exam  Constitutional:       Comments: Hard to arouse, awoke to firm rubbing of shoulder   HENT:      Head:  Normocephalic.      Nose: Nose normal.      Mouth/Throat:      Mouth: Mucous membranes are dry.   Eyes:      Extraocular Movements: Extraocular movements intact.      Conjunctiva/sclera: Conjunctivae normal.   Cardiovascular:      Rate and Rhythm: Normal rate.      Pulses: Normal pulses.   Pulmonary:      Effort: Pulmonary effort is normal. No respiratory distress.      Comments: On room air  Abdominal:      General: There is no distension.      Palpations: Abdomen is soft.      Tenderness: There is no abdominal tenderness.   Musculoskeletal:      Comments: Moving extremities spontaneously   Skin:     General: Skin is warm and dry.   Neurological:      Comments: GCS 12 (E2V4M6), A&Ox0 (baseline)   Psychiatric:      Comments: Somnolent (baseline)         Home Medications     Medication List      START taking these medications     acetaminophen 650 mg/20.3 mL solution oral liquid; Commonly known as:   Tylenol; Take 31.2 mL (1,000 mg) by mouth every 8 hours.   * dextrose 50 % injection; Infuse 25 mL (12.5 g) into a venous catheter   every 15 minutes if needed (For blood glucose 41 to 70 mg/dL).   * dextrose 50 % injection; Infuse 50 mL (25 g) into a venous catheter   every 15 minutes if needed (For blood glucose less than or equal to 40   mg/dL).   enoxaparin 30 mg/0.3 mL syringe; Commonly known as: Lovenox; Inject 0.3   mL (30 mg) under the skin 2 times a day.   * glucagon 1 mg injection; Commonly known as: Glucagen; Inject 1 mg into   the muscle every 15 minutes if needed for low blood sugar - see comments   (low blood sugar).   * glucagon 1 mg injection; Commonly known as: Glucagen; Inject 1 mg into   the muscle every 15 minutes if needed for low blood sugar - see comments   (blood glucose < 40).   insulin glargine 100 unit/mL injection; Commonly known as: Lantus;   Inject 55 Units under the skin once daily at bedtime. Take as directed per   insulin instructions.; Replaces: Toujeo SoloStar U-300 Insulin 300 unit/mL    (1.5 mL) injection   insulin lispro 100 unit/mL injection; Inject 0-15 Units under the skin   every 6 hours. Take as directed per insulin instructions.; Replaces:   HumaLOG KwikPen Insulin 100 unit/mL injection   melatonin 5 mg tablet; Take 1 tablet (5 mg) by mouth as needed at   bedtime for sleep.  * This list has 4 medication(s) that are the same as other medications   prescribed for you. Read the directions carefully, and ask your doctor or   other care provider to review them with you.     CONTINUE taking these medications     atorvastatin 10 mg tablet; Commonly known as: Lipitor   Calmoseptine 0.44-20.6 % ointment; Generic drug: menthol-zinc oxide   cyanocobalamin 500 mcg tablet; Commonly known as: Vitamin B-12   FLUoxetine 40 mg capsule; Commonly known as: PROzac   levothyroxine 50 mcg tablet; Commonly known as: Synthroid, Levoxyl   loperamide 2 mg tablet; Commonly known as: Imodium A-D   memantine 10 mg tablet; Commonly known as: Namenda   nitrofurantoin 50 mg capsule; Commonly known as: Macrodantin   potassium chloride ER 10 mEq ER capsule; Commonly known as: Micro-K   Vitamin D3 50 MCG (2000 UT) tablet; Generic drug: cholecalciferol     STOP taking these medications     glimepiride 1 mg tablet; Commonly known as: Amaryl   glimepiride 2 mg tablet; Commonly known as: Amaryl   glimepiride 4 mg tablet; Commonly known as: Amaryl   HumaLOG KwikPen Insulin 100 unit/mL injection; Generic drug: insulin   lispro; Replaced by: insulin lispro 100 unit/mL injection   telmisartan 80 mg tablet; Commonly known as: MIcarDIS   Toujeo SoloStar U-300 Insulin 300 unit/mL (1.5 mL) injection; Generic   drug: insulin glargine; Replaced by: insulin glargine 100 unit/mL   injection   Trulicity 0.75 mg/0.5 mL pen injector; Generic drug: dulaglutide   Trulicity 1.5 mg/0.5 mL pen injector injection; Generic drug:   dulaglutide       Outpatient Follow-Up  Future Appointments   Date Time Provider Department Center   1/15/2025 10:30 AM  [facility] 2 [facility] [place]   1/21/2025  1:00 PM Adeola Lovett, APRN-CNP [id number]     Follow up outpatient with NSGY services. If not called for an appointment within 3 business days, please call [phone]      Hilary Alston PA-C

## 2024-12-06 ENCOUNTER — NURSING HOME VISIT (OUTPATIENT)
Dept: POST ACUTE CARE | Facility: EXTERNAL LOCATION | Age: 79
End: 2024-12-06
Payer: MEDICARE

## 2024-12-06 DIAGNOSIS — Z87.440 HISTORY OF FREQUENT URINARY TRACT INFECTIONS: ICD-10-CM

## 2024-12-06 DIAGNOSIS — I62.9 INTRACRANIAL HEMORRHAGE (MULTI): ICD-10-CM

## 2024-12-06 DIAGNOSIS — E11.9 TYPE 2 DIABETES MELLITUS WITHOUT COMPLICATION, WITH LONG-TERM CURRENT USE OF INSULIN (MULTI): ICD-10-CM

## 2024-12-06 DIAGNOSIS — E78.5 HYPERLIPIDEMIA, UNSPECIFIED HYPERLIPIDEMIA TYPE: ICD-10-CM

## 2024-12-06 DIAGNOSIS — F02.C0 SEVERE LATE ONSET ALZHEIMER'S DEMENTIA WITHOUT BEHAVIORAL DISTURBANCE, PSYCHOTIC DISTURBANCE, MOOD DISTURBANCE, OR ANXIETY (MULTI): ICD-10-CM

## 2024-12-06 DIAGNOSIS — Z79.4 TYPE 2 DIABETES MELLITUS WITHOUT COMPLICATION, WITH LONG-TERM CURRENT USE OF INSULIN (MULTI): ICD-10-CM

## 2024-12-06 DIAGNOSIS — E03.9 HYPOTHYROIDISM, UNSPECIFIED TYPE: ICD-10-CM

## 2024-12-06 DIAGNOSIS — I10 PRIMARY HYPERTENSION: ICD-10-CM

## 2024-12-06 DIAGNOSIS — G30.1 SEVERE LATE ONSET ALZHEIMER'S DEMENTIA WITHOUT BEHAVIORAL DISTURBANCE, PSYCHOTIC DISTURBANCE, MOOD DISTURBANCE, OR ANXIETY (MULTI): ICD-10-CM

## 2024-12-06 DIAGNOSIS — I25.10 CORONARY ARTERY DISEASE INVOLVING NATIVE CORONARY ARTERY OF NATIVE HEART WITHOUT ANGINA PECTORIS: ICD-10-CM

## 2024-12-06 DIAGNOSIS — N18.9 CHRONIC RENAL IMPAIRMENT, UNSPECIFIED CKD STAGE: ICD-10-CM

## 2024-12-06 DIAGNOSIS — S06.5XAA SUBDURAL HEMATOMA (MULTI): Primary | ICD-10-CM

## 2024-12-06 PROCEDURE — 99309 SBSQ NF CARE MODERATE MDM 30: CPT

## 2024-12-06 NOTE — ASSESSMENT & PLAN NOTE
>>ASSESSMENT AND PLAN FOR INTRACRANIAL HEMORRHAGE (MULTI) WRITTEN ON 12/6/2024  2:52 PM BY HERON MCRAE, APRN-CNP    This was secondary to a mechanical fall with unknown down time. She was taken to the OR for SDH evacuation and drain placement. Drains were discontinued and repeat CTH showed improvement 11/21. She is recommended to follow up for a repeat CTH in about 2 weeks.

## 2024-12-06 NOTE — ASSESSMENT & PLAN NOTE
Lab Results   Component Value Date    GLUCOSE 203 (H) 12/03/2024    CALCIUM 8.9 12/03/2024     12/03/2024    K 4.5 12/03/2024    CO2 27 12/03/2024     12/03/2024    BUN 27 (H) 12/03/2024    CREATININE 0.65 12/03/2024     Renal function is stable with GFR near 89

## 2024-12-06 NOTE — PROGRESS NOTES
Visit  Note   Subjective   Loren Mitchell is a 79 y.o. female who is being seen at [facility] and evaluated for multiple medical problems. Nursing notes, vital signs, and labs were reviewed in the local facility chart.  No chief complaint on file.     This is an 79-year-old female with a past medical history of dementia, type 2 diabetes, hypothyroidism, hypertension, chronic kidney disease, hyperlipidemia, coronary artery disease, depression, recurrent UTIs who was hospitalized on November 18, 2024 for large left subdural hematoma with rightward midline shift secondary to mechanical fall with unknown downtime.  She was immediately transferred to [facility] for an emergency consultation with neurosurgery.  Neurosurgery performed a subdural hematoma evacuation with subdural drain placement on November 19, 2024.  Repeat CT of the head showed improvement of left subdural hematoma after drains were removed on 11/21.  Her stay was complicated by repeated failed swallow evaluations and an NG tube was placed for nutrition.  A goal of care discussion took place the day before discharge and the family changed her CODE STATUS to DO NOT RESUSCITATE/DO NOT INTUBATE and decided to discontinue her NG tube.  Family decided they will feed the patient and accept the risks of aspiration which can result in further medical complications such as infection and/or death.    On examination today, she is very confused. She does not answer questions appropriately majority of the time. She does not physically appear to be in any distress.          Objective   There were no vitals taken for this visit.  Physical Exam  Vitals reviewed.   Constitutional:       Appearance: Normal appearance.   HENT:      Head: Normocephalic.      Comments: Incisions CDI, well approximated, open to air, no erythema edema or ecchymosis  Cardiovascular:      Rate and Rhythm: Normal rate and regular rhythm.      Heart sounds: Normal heart sounds.   Pulmonary:       Effort: Pulmonary effort is normal.      Breath sounds: Normal breath sounds.      Comments: Limited by patient compliance; unable to follow direction  Musculoskeletal:      Cervical back: Neck supple.      Right lower leg: No edema.      Left lower leg: No edema.   Skin:     General: Skin is warm and dry.   Neurological:      Mental Status: She is alert. She is disoriented.         Assessment & Plan  Subdural hematoma (Multi)  >>ASSESSMENT AND PLAN FOR INTRACRANIAL HEMORRHAGE (MULTI) WRITTEN ON 12/6/2024  2:52 PM BY LISET LEZAMA    This was secondary to a mechanical fall with unknown down time. She was taken to the OR for SDH evacuation and drain placement. Drains were discontinued and repeat CTH showed improvement 11/21. She is recommended to follow up for a repeat CTH in about 2 weeks.          Intracranial hemorrhage (Multi)         Chronic renal impairment, unspecified CKD stage  Lab Results   Component Value Date    GLUCOSE 203 (H) 12/03/2024    CALCIUM 8.9 12/03/2024     12/03/2024    K 4.5 12/03/2024    CO2 27 12/03/2024     12/03/2024    BUN 27 (H) 12/03/2024    CREATININE 0.65 12/03/2024     Renal function is stable with GFR near 89         Coronary artery disease involving native coronary artery of native heart without angina pectoris  Continue statin for protection. ASA is contraindicated at this time in presence of SDH.          Type 2 diabetes mellitus without complication, with long-term current use of insulin (Multi)  Continue lantus at 55 units nightly, and SSI. Blood sugars so far are variable.          Primary hypertension  She is currently stable without antihypertensive medications; recent blood pressure 120/59 mmHg.          Hyperlipidemia, unspecified hyperlipidemia type  Continue statin therapy         Hypothyroidism, unspecified type  Continue thyroid supplement   Lab Results   Component Value Date    TSH 1.85 11/26/2024              Severe late onset  Alzheimer's dementia without behavioral disturbance, psychotic disturbance, mood disturbance, or anxiety (Multi)  We will continue her namenda and supportive care while she is here         History of frequent urinary tract infections  History of frequent UTIs and urosepsis; will monitor closely           Family is feeding patient as well as administering medications crushed at this time and they have signed a waiver acknowledging risks of medical complications that can occur such as aspiration, infection, death. ST will continue to evaluate and make modifications when able. Her discharge plan will depend on her recovery, likely will require long term care.      Time for coordination of care was greater than 35 minutes [facility] chart review, visit and exam, discussion with nursing, therapy and/or social service staff.       Please excuse any errors in grammar or translation related to this dictation. Voice recognition software was utilized to prepare this document.

## 2024-12-06 NOTE — ASSESSMENT & PLAN NOTE
She is currently stable without antihypertensive medications; recent blood pressure 120/59 mmHg.

## 2024-12-06 NOTE — ASSESSMENT & PLAN NOTE
Continue thyroid supplement   Lab Results   Component Value Date    TSH 1.85 11/26/2024

## 2024-12-06 NOTE — LETTER
Patient: Loren Mitchell  : 1945    Encounter Date: 2024    Visit  Note   Subjective  Loren Mitchell is a 79 y.o. female who is being seen at [facility] and evaluated for multiple medical problems. Nursing notes, vital signs, and labs were reviewed in the local facility chart.  No chief complaint on file.     This is an 79-year-old female with a past medical history of dementia, type 2 diabetes, hypothyroidism, hypertension, chronic kidney disease, hyperlipidemia, coronary artery disease, depression, recurrent UTIs who was hospitalized on 2024 for large left subdural hematoma with rightward midline shift secondary to mechanical fall with unknown downtime.  She was immediately transferred to [facility] for an emergency consultation with neurosurgery.  Neurosurgery performed a subdural hematoma evacuation with subdural drain placement on 2024.  Repeat CT of the head showed improvement of left subdural hematoma after drains were removed on .  Her stay was complicated by repeated failed swallow evaluations and an NG tube was placed for nutrition.  A goal of care discussion took place the day before discharge and the family changed her CODE STATUS to DO NOT RESUSCITATE/DO NOT INTUBATE and decided to discontinue her NG tube.  Family decided they will feed the patient and accept the risks of aspiration which can result in further medical complications such as infection and/or death.    On examination today, she is very confused. She does not answer questions appropriately majority of the time. She does not physically appear to be in any distress.          Objective  There were no vitals taken for this visit.  Physical Exam  Vitals reviewed.   Constitutional:       Appearance: Normal appearance.   HENT:      Head: Normocephalic.      Comments: Incisions CDI, well approximated, open to air, no erythema edema or ecchymosis  Cardiovascular:      Rate and Rhythm: Normal rate  and regular rhythm.      Heart sounds: Normal heart sounds.   Pulmonary:      Effort: Pulmonary effort is normal.      Breath sounds: Normal breath sounds.      Comments: Limited by patient compliance; unable to follow direction  Musculoskeletal:      Cervical back: Neck supple.      Right lower leg: No edema.      Left lower leg: No edema.   Skin:     General: Skin is warm and dry.   Neurological:      Mental Status: She is alert. She is disoriented.         Assessment & Plan  Subdural hematoma (Multi)  >>ASSESSMENT AND PLAN FOR INTRACRANIAL HEMORRHAGE (MULTI) WRITTEN ON 12/6/2024  2:52 PM BY ALICJA LEZAMA-ATIF    This was secondary to a mechanical fall with unknown down time. She was taken to the OR for SDH evacuation and drain placement. Drains were discontinued and repeat CTH showed improvement 11/21. She is recommended to follow up for a repeat CTH in about 2 weeks.          Intracranial hemorrhage (Multi)         Chronic renal impairment, unspecified CKD stage  Lab Results   Component Value Date    GLUCOSE 203 (H) 12/03/2024    CALCIUM 8.9 12/03/2024     12/03/2024    K 4.5 12/03/2024    CO2 27 12/03/2024     12/03/2024    BUN 27 (H) 12/03/2024    CREATININE 0.65 12/03/2024     Renal function is stable with GFR near 89         Coronary artery disease involving native coronary artery of native heart without angina pectoris  Continue statin for protection. ASA is contraindicated at this time in presence of SDH.          Type 2 diabetes mellitus without complication, with long-term current use of insulin (Multi)  Continue lantus at 55 units nightly, and SSI. Blood sugars so far are variable.          Primary hypertension  She is currently stable without antihypertensive medications; recent blood pressure 120/59 mmHg.          Hyperlipidemia, unspecified hyperlipidemia type  Continue statin therapy         Hypothyroidism, unspecified type  Continue thyroid supplement   Lab Results   Component  Value Date    TSH 1.85 11/26/2024              Severe late onset Alzheimer's dementia without behavioral disturbance, psychotic disturbance, mood disturbance, or anxiety (Multi)  We will continue her namenda and supportive care while she is here         History of frequent urinary tract infections  History of frequent UTIs and urosepsis; will monitor closely           Family is feeding patient as well as administering medications crushed at this time and they have signed a waiver acknowledging risks of medical complications that can occur such as aspiration, infection, death. ST will continue to evaluate and make modifications when able. Her discharge plan will depend on her recovery, likely will require long term care.      Time for coordination of care was greater than 35 minutes [facility] chart review, visit and exam, discussion with nursing, therapy and/or social service staff.       Please excuse any errors in grammar or translation related to this dictation. Voice recognition software was utilized to prepare this document.       Electronically Signed By: LISET Galindo   12/6/24  3:06 PM

## 2024-12-06 NOTE — ASSESSMENT & PLAN NOTE
This was secondary to a mechanical fall with unknown down time. She was taken to the OR for SDH evacuation and drain placement. Drains were discontinued and repeat CTH showed improvement 11/21. She is recommended to follow up for a repeat CTH in about 2 weeks.

## 2024-12-09 NOTE — DOCUMENTATION CLARIFICATION NOTE
"    PATIENT:               LISA BALDERAS  ACCT #:                  [id number]  MRN:                       [id number]  :                       1945  ADMIT DATE:       2024 1:30 AM  DISCH DATE:        2024 3:13 PM  RESPONDING PROVIDER #:        [id number]          PROVIDER RESPONSE TEXT:    Brain compression with herniation    CDI QUERY TEXT:    Clarification    :    Instruction:    Based on your assessment of the patient and the clinical information, please provide the requested documentation by clicking on the appropriate radio button and enter any additional information if prompted.    Question: Please further clarify if there is a diagnosis related to the clinical information    When answering this query, please exercise your independent professional judgment. The fact that a question is being asked, does not imply that any particular answer is desired or expected.    The patient's clinical indicators include:  Clinical Information: 2024 ED :\" 79-year-old female significant past medical history of CAD, hypertension, chronic kidney disease,depression, hyperlipidemia, hypernatremia, hypothyroidism, cognitive impairment, hypertension, and recurrent urinary tract infections of dementia baseline ANO x 0 was found down at facility and presented to outside ED for evaluation.  She underwent a head CT and C-spine imaging which noted a large left-sided subdural hematoma with acute and subacute components, patient was excepted by neurosurgery \"    Clinical Indicators: 2024 CTH\" There is associated severe mass effect with sulcal effacement of the  left cerebral hemisphere and the left lateral ventricle. Similar  relative dilation of the occipital and temporal horns of the right  lateral ventricle. There is left-to-right midline shift measuring 1.4  cm, and previously 1.4 cm. There is subfalcine herniation. There is  medialization of the left uncus. \"    Treatment: Left inez holes for subdural " hematoma evacuation , q1 neuro checks , serial CTH, ICU,      Risk Factors fall, headstrike, dementia, advanced age  Options provided:  -- Brain compression with herniation  -- Brain compression without herniation  -- Other - I will add my own diagnosis  -- Refer to Clinical Documentation Reviewer    Query created by: Mae Turner on 12/9/2024 11:26 AM      Electronically signed by:  BLANE CLAROS MD 12/9/2024 11:54 AM

## 2024-12-10 ENCOUNTER — NURSING HOME VISIT (OUTPATIENT)
Dept: POST ACUTE CARE | Facility: EXTERNAL LOCATION | Age: 79
End: 2024-12-10
Payer: MEDICARE

## 2024-12-10 DIAGNOSIS — S06.5XAA SUBDURAL HEMATOMA (MULTI): Primary | ICD-10-CM

## 2024-12-10 DIAGNOSIS — Z79.4 TYPE 2 DIABETES MELLITUS WITHOUT COMPLICATION, WITH LONG-TERM CURRENT USE OF INSULIN (MULTI): ICD-10-CM

## 2024-12-10 DIAGNOSIS — I10 PRIMARY HYPERTENSION: ICD-10-CM

## 2024-12-10 DIAGNOSIS — E03.9 HYPOTHYROIDISM, ACQUIRED: ICD-10-CM

## 2024-12-10 DIAGNOSIS — F02.C0 SEVERE LATE ONSET ALZHEIMER'S DEMENTIA WITHOUT BEHAVIORAL DISTURBANCE, PSYCHOTIC DISTURBANCE, MOOD DISTURBANCE, OR ANXIETY (MULTI): ICD-10-CM

## 2024-12-10 DIAGNOSIS — G30.1 SEVERE LATE ONSET ALZHEIMER'S DEMENTIA WITHOUT BEHAVIORAL DISTURBANCE, PSYCHOTIC DISTURBANCE, MOOD DISTURBANCE, OR ANXIETY (MULTI): ICD-10-CM

## 2024-12-10 DIAGNOSIS — E11.9 TYPE 2 DIABETES MELLITUS WITHOUT COMPLICATION, WITH LONG-TERM CURRENT USE OF INSULIN (MULTI): ICD-10-CM

## 2024-12-10 PROBLEM — N18.2 STAGE 2 CHRONIC KIDNEY DISEASE: Status: ACTIVE | Noted: 2024-11-19

## 2024-12-10 PROBLEM — R94.31 ABNORMAL EKG: Status: RESOLVED | Noted: 2024-11-19 | Resolved: 2024-12-10

## 2024-12-10 PROBLEM — F05 POSTOPERATIVE DELIRIUM: Status: RESOLVED | Noted: 2024-11-19 | Resolved: 2024-12-10

## 2024-12-10 PROCEDURE — 99305 1ST NF CARE MODERATE MDM 35: CPT | Performed by: FAMILY MEDICINE

## 2024-12-10 NOTE — LETTER
Patient: Loren Mitchell  : 1945    Encounter Date: 12/10/2024    Admission H&P  Subjective   Loren Mitchell is a 79 y.o. female who is being seen for an admission H&P at [facility].  Nursing notes, vital signs, and labs were reviewed in the local facility chart and she  is being evaluated for multiple medical problems.   HPI   A review of the medical record indicates that this 79-year-old female was hospitalized on 2024 for a large left subdural hematoma with a midline shift after a mechanical fall.  She was evaluated at [facility] where neurosurgery performed a subdural hematoma evacuation with drain placement on 2024.  Repeat imaging showed improvement of the hematoma and the drains were removed on 2024.  Her stay was further complicated by failed swallowing evaluations and an NG tube was placed for maintenance of nutrition.  A goals of care discussion was documented and the family changed CODE STATUS to DNR.  The NG tube was discontinued despite ongoing aspiration.  Family excepted the risk of aspiration.  She comes to this facility for ongoing supportive care.  She is comfortable, pleasant, and smiling without any complaints although she is quite confused.  Objective   There were no vitals taken for this visit.  Physical Exam  Constitutional:       Appearance: Normal appearance.   HENT:      Head: Normocephalic.   Eyes:      Conjunctiva/sclera: Conjunctivae normal.   Cardiovascular:      Rate and Rhythm: Normal rate and regular rhythm.      Heart sounds: Normal heart sounds.   Pulmonary:      Effort: Pulmonary effort is normal.      Breath sounds: Normal breath sounds.   Musculoskeletal:      Cervical back: Neck supple.   Skin:     General: Skin is warm and dry.   Neurological:      Mental Status: She is alert.      Comments: Pleasantly confused         Assessment & Plan  Subdural hematoma (Multi)    This was secondary to a mechanical fall with unknown down time. She was taken  to the OR for SDH evacuation and drain placement. Drains were discontinued and repeat CTH showed improvement 11/21. She is recommended to follow up for a repeat CTH in about 2 weeks.  Scalp wound is well-healed           Severe late onset Alzheimer's dementia without behavioral disturbance, psychotic disturbance, mood disturbance, or anxiety (Multi)  She is in no distress and pleasantly confused although speech is mostly word salad and she does not answer any questions appropriately.  She will benefit from supportive care on our memory unit.         Type 2 diabetes mellitus without complication, with long-term current use of insulin (Multi)  Continue lantus at 55 units nightly, and SSI. Blood sugars so far are variable.            Primary hypertension  She is currently stable without antihypertensive medications; recent blood pressure 120/59 mmHg.            Hypothyroidism, acquired  Continue thyroid supplement   Lab Results   Component Value Date    TSH 1.85 11/26/2024                       Please excuse any errors in grammar or translation related to this dictation. Voice recognition software was utilized to prepare this document.       Electronically Signed By: Randall Piña MD   12/10/24 12:08 PM

## 2024-12-10 NOTE — PROGRESS NOTES
Admission H&P  Subjective   Loren Mitchell is a 79 y.o. female who is being seen for an admission H&P at [facility].  Nursing notes, vital signs, and labs were reviewed in the local facility chart and she  is being evaluated for multiple medical problems.   HPI   A review of the medical record indicates that this 79-year-old female was hospitalized on 11/18/2024 for a large left subdural hematoma with a midline shift after a mechanical fall.  She was evaluated at [facility] where neurosurgery performed a subdural hematoma evacuation with drain placement on 11/19/2024.  Repeat imaging showed improvement of the hematoma and the drains were removed on 11/21/2024.  Her stay was further complicated by failed swallowing evaluations and an NG tube was placed for maintenance of nutrition.  A goals of care discussion was documented and the family changed CODE STATUS to DNR.  The NG tube was discontinued despite ongoing aspiration.  Family excepted the risk of aspiration.  She comes to this facility for ongoing supportive care.  She is comfortable, pleasant, and smiling without any complaints although she is quite confused.  Objective   There were no vitals taken for this visit.  Physical Exam  Constitutional:       Appearance: Normal appearance.   HENT:      Head: Normocephalic.   Eyes:      Conjunctiva/sclera: Conjunctivae normal.   Cardiovascular:      Rate and Rhythm: Normal rate and regular rhythm.      Heart sounds: Normal heart sounds.   Pulmonary:      Effort: Pulmonary effort is normal.      Breath sounds: Normal breath sounds.   Musculoskeletal:      Cervical back: Neck supple.   Skin:     General: Skin is warm and dry.   Neurological:      Mental Status: She is alert.      Comments: Pleasantly confused         Assessment & Plan  Subdural hematoma (Multi)    This was secondary to a mechanical fall with unknown down time. She was taken to the OR for SDH evacuation and drain placement. Drains were discontinued  and repeat CTH showed improvement 11/21. She is recommended to follow up for a repeat CTH in about 2 weeks.  Scalp wound is well-healed           Severe late onset Alzheimer's dementia without behavioral disturbance, psychotic disturbance, mood disturbance, or anxiety (Multi)  She is in no distress and pleasantly confused although speech is mostly word salad and she does not answer any questions appropriately.  She will benefit from supportive care on our memory unit.         Type 2 diabetes mellitus without complication, with long-term current use of insulin (Multi)  Continue lantus at 55 units nightly, and SSI. Blood sugars so far are variable.            Primary hypertension  She is currently stable without antihypertensive medications; recent blood pressure 120/59 mmHg.            Hypothyroidism, acquired  Continue thyroid supplement   Lab Results   Component Value Date    TSH 1.85 11/26/2024                       Please excuse any errors in grammar or translation related to this dictation. Voice recognition software was utilized to prepare this document.

## 2024-12-10 NOTE — ASSESSMENT & PLAN NOTE
She is in no distress and pleasantly confused although speech is mostly word salad and she does not answer any questions appropriately.  She will benefit from supportive care on our memory unit.

## 2024-12-10 NOTE — ASSESSMENT & PLAN NOTE
This was secondary to a mechanical fall with unknown down time. She was taken to the OR for SDH evacuation and drain placement. Drains were discontinued and repeat CTH showed improvement 11/21. She is recommended to follow up for a repeat CTH in about 2 weeks.  Scalp wound is well-healed

## 2024-12-10 NOTE — ASSESSMENT & PLAN NOTE
Continue thyroid supplement   Lab Results   Component Value Date    TSH 1.85 11/26/2024

## 2024-12-10 NOTE — ASSESSMENT & PLAN NOTE
She is currently stable without antihypertensive medications; recent blood pressure 120/59 mmHg.

## 2024-12-11 ENCOUNTER — TELEPHONE (OUTPATIENT)
Dept: PRIMARY CARE | Facility: CLINIC | Age: 79
End: 2024-12-11
Payer: MEDICARE

## 2024-12-11 NOTE — TELEPHONE ENCOUNTER
Patient's , Eliot called to ask if you could have [facility] (Memory Unit) have Patient Feed herself. Family is going to nursing home 3 x  day to feed her. He said she is able to feed herself.   He would like a phone call to discuss his concerns.  Eliot [place] [phone] or  Cell phone [phone]   Thanks.

## 2024-12-13 ENCOUNTER — NURSING HOME VISIT (OUTPATIENT)
Dept: POST ACUTE CARE | Facility: EXTERNAL LOCATION | Age: 79
End: 2024-12-13
Payer: MEDICARE

## 2024-12-13 DIAGNOSIS — Z79.4 TYPE 2 DIABETES MELLITUS WITHOUT COMPLICATION, WITH LONG-TERM CURRENT USE OF INSULIN (MULTI): ICD-10-CM

## 2024-12-13 DIAGNOSIS — E11.9 TYPE 2 DIABETES MELLITUS WITHOUT COMPLICATION, WITH LONG-TERM CURRENT USE OF INSULIN (MULTI): ICD-10-CM

## 2024-12-13 DIAGNOSIS — G30.1 SEVERE LATE ONSET ALZHEIMER'S DEMENTIA WITHOUT BEHAVIORAL DISTURBANCE, PSYCHOTIC DISTURBANCE, MOOD DISTURBANCE, OR ANXIETY (MULTI): ICD-10-CM

## 2024-12-13 DIAGNOSIS — F02.C0 SEVERE LATE ONSET ALZHEIMER'S DEMENTIA WITHOUT BEHAVIORAL DISTURBANCE, PSYCHOTIC DISTURBANCE, MOOD DISTURBANCE, OR ANXIETY (MULTI): ICD-10-CM

## 2024-12-13 DIAGNOSIS — S06.5XAA SUBDURAL HEMATOMA (MULTI): Primary | ICD-10-CM

## 2024-12-13 DIAGNOSIS — R13.19 OTHER DYSPHAGIA: ICD-10-CM

## 2024-12-13 PROCEDURE — 99308 SBSQ NF CARE LOW MDM 20: CPT

## 2024-12-13 NOTE — LETTER
Patient: Loren Mitchell  : 1945    Encounter Date: 2024    Visit  Note   Subjective  Loren Mitchell is a 79 y.o. female who is being seen at [facility] and evaluated for multiple medical problems. Nursing notes, vital signs, and labs were reviewed in the local facility chart.  No chief complaint on file.     This is an 79 year old female evaluated today following hospitalization for SDH evacuation. On examination today, she is up in the dining area and is talking however not following direction or making complete sentences. She appears much better to me than her initial examination.          Objective  There were no vitals taken for this visit.  Physical Exam  Vitals reviewed.   Constitutional:       Appearance: Normal appearance.   HENT:      Head: Normocephalic.   Cardiovascular:      Rate and Rhythm: Normal rate and regular rhythm.   Pulmonary:      Effort: Pulmonary effort is normal. No respiratory distress.      Breath sounds: Normal breath sounds. No wheezing or rales.      Comments: Limited by patient inability to follow commands; diminished on auscultation  Musculoskeletal:      Cervical back: Neck supple.   Skin:     General: Skin is warm and dry.   Neurological:      Mental Status: She is alert. She is disoriented.   Psychiatric:         Mood and Affect: Mood normal.         Behavior: Behavior normal.         Assessment & Plan  Subdural hematoma (Multi)  This was secondary to a mechanical fall with unknown down time. She was taken to the OR for SDH evacuation and drain placement. Drains were discontinued and repeat CTH showed improvement . She is recommended to follow up for a repeat CTH in about 1 week now. Noticeable neuro-cognitive improvement though it is mild she seems more alert and aware with residual speech deficits.            Severe late onset Alzheimer's dementia without behavioral disturbance, psychotic disturbance, mood disturbance, or anxiety (Multi)  She does not  appear to be in distress. She does not answer questions appropriately today and is mostly word salad; this is some progress as on initial examination with me, she was not speaking hardly at all. She will continue to benefit from supportive care on our memory unit .         Type 2 diabetes mellitus without complication, with long-term current use of insulin (Multi)  Continue lantus at 55 units nightly, and SSI. Blood sugars are variable.          Other dysphagia  ST continues to work with her. She has a diet waiver signed on her chart as it was not her families preference for NG feedings. She is now feeding herself and appears to be having  some progress with ST. Will continue to work with ST appreciate their recommendations.                 Please excuse any errors in grammar or translation related to this dictation. Voice recognition software was utilized to prepare this document.       Electronically Signed By: LISET Galindo   12/14/24 10:20 AM

## 2024-12-14 PROBLEM — R13.19 OTHER DYSPHAGIA: Status: ACTIVE | Noted: 2024-12-14

## 2024-12-14 NOTE — ASSESSMENT & PLAN NOTE
This was secondary to a mechanical fall with unknown down time. She was taken to the OR for SDH evacuation and drain placement. Drains were discontinued and repeat CTH showed improvement 11/21. She is recommended to follow up for a repeat CTH in about 1 week now. Noticeable neuro-cognitive improvement though it is mild she seems more alert and aware with residual speech deficits.

## 2024-12-14 NOTE — PROGRESS NOTES
Visit  Note   Subjective   Loren Mitchell is a 79 y.o. female who is being seen at [facility] and evaluated for multiple medical problems. Nursing notes, vital signs, and labs were reviewed in the local facility chart.  No chief complaint on file.     This is an 79 year old female evaluated today following hospitalization for SDH evacuation. On examination today, she is up in the dining area and is talking however not following direction or making complete sentences. She appears much better to me than her initial examination.          Objective   There were no vitals taken for this visit.  Physical Exam  Vitals reviewed.   Constitutional:       Appearance: Normal appearance.   HENT:      Head: Normocephalic.   Cardiovascular:      Rate and Rhythm: Normal rate and regular rhythm.   Pulmonary:      Effort: Pulmonary effort is normal. No respiratory distress.      Breath sounds: Normal breath sounds. No wheezing or rales.      Comments: Limited by patient inability to follow commands; diminished on auscultation  Musculoskeletal:      Cervical back: Neck supple.   Skin:     General: Skin is warm and dry.   Neurological:      Mental Status: She is alert. She is disoriented.   Psychiatric:         Mood and Affect: Mood normal.         Behavior: Behavior normal.         Assessment & Plan  Subdural hematoma (Multi)  This was secondary to a mechanical fall with unknown down time. She was taken to the OR for SDH evacuation and drain placement. Drains were discontinued and repeat CTH showed improvement 11/21. She is recommended to follow up for a repeat CTH in about 1 week now. Noticeable neuro-cognitive improvement though it is mild she seems more alert and aware with residual speech deficits.            Severe late onset Alzheimer's dementia without behavioral disturbance, psychotic disturbance, mood disturbance, or anxiety (Multi)  She does not appear to be in distress. She does not answer questions appropriately  today and is mostly word salad; this is some progress as on initial examination with me, she was not speaking hardly at all. She will continue to benefit from supportive care on our memory unit .         Type 2 diabetes mellitus without complication, with long-term current use of insulin (Multi)  Continue lantus at 55 units nightly, and SSI. Blood sugars are variable.          Other dysphagia  ST continues to work with her. She has a diet waiver signed on her chart as it was not her families preference for NG feedings. She is now feeding herself and appears to be having  some progress with ST. Will continue to work with ST appreciate their recommendations.                 Please excuse any errors in grammar or translation related to this dictation. Voice recognition software was utilized to prepare this document.

## 2024-12-14 NOTE — ASSESSMENT & PLAN NOTE
ST continues to work with her. She has a diet waiver signed on her chart as it was not her families preference for NG feedings. She is now feeding herself and appears to be having  some progress with . Will continue to work with ST appreciate their recommendations.

## 2024-12-14 NOTE — ASSESSMENT & PLAN NOTE
She does not appear to be in distress. She does not answer questions appropriately today and is mostly word salad; this is some progress as on initial examination with me, she was not speaking hardly at all. She will continue to benefit from supportive care on our memory unit .

## 2024-12-16 ENCOUNTER — NURSING HOME VISIT (OUTPATIENT)
Dept: POST ACUTE CARE | Facility: EXTERNAL LOCATION | Age: 79
End: 2024-12-16
Payer: MEDICARE

## 2024-12-16 DIAGNOSIS — Z79.4 TYPE 2 DIABETES MELLITUS WITHOUT COMPLICATION, WITH LONG-TERM CURRENT USE OF INSULIN (MULTI): ICD-10-CM

## 2024-12-16 DIAGNOSIS — R13.10 DYSPHAGIA, UNSPECIFIED TYPE: ICD-10-CM

## 2024-12-16 DIAGNOSIS — I10 PRIMARY HYPERTENSION: ICD-10-CM

## 2024-12-16 DIAGNOSIS — S06.5XAA SUBDURAL HEMATOMA (MULTI): Primary | ICD-10-CM

## 2024-12-16 DIAGNOSIS — E11.9 TYPE 2 DIABETES MELLITUS WITHOUT COMPLICATION, WITH LONG-TERM CURRENT USE OF INSULIN (MULTI): ICD-10-CM

## 2024-12-16 PROCEDURE — 99309 SBSQ NF CARE MODERATE MDM 30: CPT

## 2024-12-16 NOTE — PROGRESS NOTES
Visit  Note   Subjective   Loren Mitchell is a 79 y.o. female who is being seen at [facility] and evaluated for multiple medical problems. Nursing notes, vital signs, and labs were reviewed in the local facility chart.  No chief complaint on file.     This is an 79 year old female evaluated today for general examination during her rehabilitation stay at [facility]. She is currently residing on our memory care unit. She has no concerns for provider today and is pleasantly confused. She states she is happy to see me today.          Objective   There were no vitals taken for this visit.  Physical Exam  Vitals reviewed.   Constitutional:       Appearance: Normal appearance.   HENT:      Head: Normocephalic.   Cardiovascular:      Rate and Rhythm: Normal rate and regular rhythm.   Pulmonary:      Effort: Pulmonary effort is normal.      Breath sounds: Normal breath sounds.      Comments: Limited; patient unable to follow direction  Musculoskeletal:      Cervical back: Neck supple.   Skin:     General: Skin is warm and dry.   Neurological:      Mental Status: She is alert.   Psychiatric:      Comments: smiling         Assessment & Plan  Primary hypertension  She is currently stable without antihypertensive medications; recent blood pressure 131/63 mmHg.          Subdural hematoma (Multi)  This was secondary to a mechanical fall with unknown down time. She was taken to the OR for SDH evacuation and drain placement. Drains were discontinued and repeat CTH showed improvement 11/21. She is recommended to follow up for a repeat CTH in about 1 week now. Noticeable neuro-cognitive improvement though it is mild she seems more alert and aware with residual speech deficits. She is pleasantly confused on examination today.         Type 2 diabetes mellitus without complication, with long-term current use of insulin (Multi)  Continue lantus at 55 units nightly, and SSI. Blood sugars are variable.          Dysphagia, unspecified  type  Reported improving with her feeding status; appreciate ST recommendations. Diet waiver again is signed and on the chart.           Lovenox was discontinued for DVT prophylaxis today as she is now ambulatory.    Please excuse any errors in grammar or translation related to this dictation. Voice recognition software was utilized to prepare this document.

## 2024-12-16 NOTE — LETTER
Patient: Loren Mitchell  : 1945    Encounter Date: 2024    Visit  Note   Subjective  Loren Mitchell is a 79 y.o. female who is being seen at [facility] and evaluated for multiple medical problems. Nursing notes, vital signs, and labs were reviewed in the local facility chart.  No chief complaint on file.     This is an 79 year old female evaluated today for general examination during her rehabilitation stay at [facility]. She is currently residing on our memory care unit. She has no concerns for provider today and is pleasantly confused. She states she is happy to see me today.          Objective  There were no vitals taken for this visit.  Physical Exam  Vitals reviewed.   Constitutional:       Appearance: Normal appearance.   HENT:      Head: Normocephalic.   Cardiovascular:      Rate and Rhythm: Normal rate and regular rhythm.   Pulmonary:      Effort: Pulmonary effort is normal.      Breath sounds: Normal breath sounds.      Comments: Limited; patient unable to follow direction  Musculoskeletal:      Cervical back: Neck supple.   Skin:     General: Skin is warm and dry.   Neurological:      Mental Status: She is alert.   Psychiatric:      Comments: smiling         Assessment & Plan  Primary hypertension  She is currently stable without antihypertensive medications; recent blood pressure 131/63 mmHg.          Subdural hematoma (Multi)  This was secondary to a mechanical fall with unknown down time. She was taken to the OR for SDH evacuation and drain placement. Drains were discontinued and repeat CTH showed improvement . She is recommended to follow up for a repeat CTH in about 1 week now. Noticeable neuro-cognitive improvement though it is mild she seems more alert and aware with residual speech deficits. She is pleasantly confused on examination today.         Type 2 diabetes mellitus without complication, with long-term current use of insulin (Multi)  Continue lantus at 55 units  nightly, and SSI. Blood sugars are variable.          Dysphagia, unspecified type  Reported improving with her feeding status; appreciate ST recommendations. Diet waiver again is signed and on the chart.           Lovenox was discontinued for DVT prophylaxis today as she is now ambulatory.    Please excuse any errors in grammar or translation related to this dictation. Voice recognition software was utilized to prepare this document.       Electronically Signed By: LISET Galindo   12/16/24  5:35 PM

## 2024-12-16 NOTE — ASSESSMENT & PLAN NOTE
This was secondary to a mechanical fall with unknown down time. She was taken to the OR for SDH evacuation and drain placement. Drains were discontinued and repeat CTH showed improvement 11/21. She is recommended to follow up for a repeat CTH in about 1 week now. Noticeable neuro-cognitive improvement though it is mild she seems more alert and aware with residual speech deficits. She is pleasantly confused on examination today.

## 2024-12-16 NOTE — ASSESSMENT & PLAN NOTE
She is currently stable without antihypertensive medications; recent blood pressure 131/63 mmHg.

## 2024-12-17 ENCOUNTER — APPOINTMENT (OUTPATIENT)
Dept: NEUROSURGERY | Facility: HOSPITAL | Age: 79
End: 2024-12-17
Payer: MEDICARE

## 2025-01-13 DIAGNOSIS — E11.9 TYPE 2 DIABETES MELLITUS WITHOUT COMPLICATION, WITH LONG-TERM CURRENT USE OF INSULIN (HCC): Primary | ICD-10-CM

## 2025-01-13 DIAGNOSIS — Z79.4 TYPE 2 DIABETES MELLITUS WITHOUT COMPLICATION, WITH LONG-TERM CURRENT USE OF INSULIN (HCC): Primary | ICD-10-CM

## 2025-01-14 RX ORDER — PEN NEEDLE, DIABETIC, SAFETY 31 G X1/4"
1 NEEDLE, DISPOSABLE MISCELLANEOUS 4 TIMES DAILY
Qty: 100 EACH | Refills: 11 | Status: SHIPPED | OUTPATIENT
Start: 2025-01-14

## 2025-01-15 ENCOUNTER — APPOINTMENT (OUTPATIENT)
Dept: RADIOLOGY | Facility: HOSPITAL | Age: 80
End: 2025-01-15
Payer: MEDICARE

## 2025-01-21 ENCOUNTER — APPOINTMENT (OUTPATIENT)
Dept: NEUROSURGERY | Facility: HOSPITAL | Age: 80
End: 2025-01-21
Payer: MEDICARE

## 2025-01-27 ENCOUNTER — NURSING HOME VISIT (OUTPATIENT)
Dept: POST ACUTE CARE | Facility: EXTERNAL LOCATION | Age: 80
End: 2025-01-27
Payer: MEDICARE

## 2025-01-27 DIAGNOSIS — E11.9 TYPE 2 DIABETES MELLITUS WITHOUT COMPLICATION, WITH LONG-TERM CURRENT USE OF INSULIN (MULTI): Primary | ICD-10-CM

## 2025-01-27 DIAGNOSIS — Z79.4 TYPE 2 DIABETES MELLITUS WITHOUT COMPLICATION, WITH LONG-TERM CURRENT USE OF INSULIN (MULTI): Primary | ICD-10-CM
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
## 2025-01-27 PROCEDURE — 99348 HOME/RES VST EST LOW MDM 30: CPT

## 2025-01-27 NOTE — LETTER
Patient: Loren Mitchell  : 1945    Encounter Date: 2025    Visit  Note   Subjective  Loren Mitchell is a 79 y.o. female who is being seen at [facility] and evaluated for multiple medical problems. Nursing notes, vital signs, and labs were reviewed in the local facility chart. Orders and medications were reviewed on the local chart.  No chief complaint on file.     This is an 79 year old female evaluated today for concerns with hypoglycemia. She also had a fall this morning and there are no reported head injuries or concerns today. She is currently in the dining room. She does not have any concerns and is pleasantly confused.          Objective  There were no vitals taken for this visit.  Physical Exam  Vitals reviewed.   Constitutional:       Appearance: Normal appearance.   HENT:      Head: Normocephalic.   Cardiovascular:      Rate and Rhythm: Normal rate and regular rhythm.   Pulmonary:      Effort: Pulmonary effort is normal. No respiratory distress.      Breath sounds: Normal breath sounds. No wheezing, rhonchi or rales.      Comments: Limited my patient compliance; she's unable to follow direction  Musculoskeletal:      Cervical back: Neck supple.   Skin:     General: Skin is warm and dry.   Neurological:      Mental Status: She is alert. Mental status is at baseline.         Assessment & Plan  Fall, initial encounter  Vitals were all within normal limits; fall was witnessed with her nurse no reported injuries.        Type 2 diabetes mellitus without complication, with long-term current use of insulin (Multi)  Changed lantus to 35 units nightly, and SSI. Blood sugars are variable.                 Please excuse any errors in grammar or translation related to this dictation. Voice recognition software was utilized to prepare this document.       Electronically Signed By: LISET Galindo   25  2:32 PM

## 2025-01-27 NOTE — PROGRESS NOTES
Visit  Note   Subjective   Loren Mitchell is a 79 y.o. female who is being seen at [facility] and evaluated for multiple medical problems. Nursing notes, vital signs, and labs were reviewed in the local facility chart. Orders and medications were reviewed on the local chart.  No chief complaint on file.     This is an 79 year old female evaluated today for concerns with hypoglycemia. She also had a fall this morning and there are no reported head injuries or concerns today. She is currently in the dining room. She does not have any concerns and is pleasantly confused.          Objective   There were no vitals taken for this visit.  Physical Exam  Vitals reviewed.   Constitutional:       Appearance: Normal appearance.   HENT:      Head: Normocephalic.   Cardiovascular:      Rate and Rhythm: Normal rate and regular rhythm.   Pulmonary:      Effort: Pulmonary effort is normal. No respiratory distress.      Breath sounds: Normal breath sounds. No wheezing, rhonchi or rales.      Comments: Limited my patient compliance; she's unable to follow direction  Musculoskeletal:      Cervical back: Neck supple.   Skin:     General: Skin is warm and dry.   Neurological:      Mental Status: She is alert. Mental status is at baseline.         Assessment & Plan  Fall, initial encounter  Vitals were all within normal limits; fall was witnessed with her nurse no reported injuries.        Type 2 diabetes mellitus without complication, with long-term current use of insulin (Multi)  Changed lantus to 35 units nightly, and SSI. Blood sugars are variable.                 Please excuse any errors in grammar or translation related to this dictation. Voice recognition software was utilized to prepare this document.

## 2025-01-28 ENCOUNTER — NURSING HOME VISIT (OUTPATIENT)
Dept: POST ACUTE CARE | Facility: EXTERNAL LOCATION | Age: 80
End: 2025-01-28
Payer: MEDICARE

## 2025-01-28 DIAGNOSIS — E11.9 TYPE 2 DIABETES MELLITUS WITHOUT COMPLICATION, WITH LONG-TERM CURRENT USE OF INSULIN (MULTI): Primary | ICD-10-CM

## 2025-01-28 DIAGNOSIS — Z79.4 TYPE 2 DIABETES MELLITUS WITHOUT COMPLICATION, WITH LONG-TERM CURRENT USE OF INSULIN (MULTI): Primary | ICD-10-CM

## 2025-01-28 PROCEDURE — 99349 HOME/RES VST EST MOD MDM 40: CPT | Performed by: FAMILY MEDICINE

## 2025-01-28 NOTE — LETTER
Patient: Loren Mitchell  : 1945    Encounter Date: 2025    Visit  Note   Subjective  Loren Mitchell is a 79 y.o. female who is being seen at [facility] and evaluated for multiple medical problems. Nursing notes, vital signs, and labs were reviewed in the local facility chart. Orders and medications were reviewed on the local chart.  No chief complaint on file.     HPI   I was asked to see this patient by nursing staff because she continues to have hypoglycemic spells in the morning and severe hyperglycemic spells in the evening.  She was seen earlier this week and her basal insulin was reduced from 50 units down to 35 units but she continued to have a hypoglycemic spell below 100 this morning.  A review of her chart indicates that she is spiking up to 300 or 400 every day after meals.  Objective  There were no vitals taken for this visit.  Physical Exam  Constitutional:       Appearance: Normal appearance.   HENT:      Head: Normocephalic.   Pulmonary:      Effort: Pulmonary effort is normal.   Musculoskeletal:      Cervical back: Neck supple.   Skin:     General: Skin is warm and dry.   Psychiatric:         Mood and Affect: Mood normal.       Assessment/Plan  Assessment & Plan  Type 2 diabetes mellitus without complication, with long-term current use of insulin (Multi)  A careful review of the blood sugar record indicates that this patient is having hypoglycemia fairly regularly in the morning and severe hyperglycemia in the evening.  I would assume that her sliding scale mealtime insulin is not adequate and she continues to have a basal insulin level that is too high and driving her numbers down in the morning.  We will continue the reduced basal insulin at 35 units.  I will increase her sliding scale to start at 4 units and range up to 20 units for a glucose level of 400.  We will continue close monitoring with Accu-Cheks 4 times daily              Please excuse any errors in grammar or  translation related to this dictation. Voice recognition software was utilized to prepare this document.     Electronically Signed By: Randall Piña MD   1/28/25  4:31 PM

## 2025-01-28 NOTE — PROGRESS NOTES
Visit  Note   Subjective   Loren Mitchell is a 79 y.o. female who is being seen at [facility] and evaluated for multiple medical problems. Nursing notes, vital signs, and labs were reviewed in the local facility chart. Orders and medications were reviewed on the local chart.  No chief complaint on file.     HPI   I was asked to see this patient by nursing staff because she continues to have hypoglycemic spells in the morning and severe hyperglycemic spells in the evening.  She was seen earlier this week and her basal insulin was reduced from 50 units down to 35 units but she continued to have a hypoglycemic spell below 100 this morning.  A review of her chart indicates that she is spiking up to 300 or 400 every day after meals.  Objective   There were no vitals taken for this visit.  Physical Exam  Constitutional:       Appearance: Normal appearance.   HENT:      Head: Normocephalic.   Pulmonary:      Effort: Pulmonary effort is normal.   Musculoskeletal:      Cervical back: Neck supple.   Skin:     General: Skin is warm and dry.   Psychiatric:         Mood and Affect: Mood normal.       Assessment/Plan   Assessment & Plan  Type 2 diabetes mellitus without complication, with long-term current use of insulin (Multi)  A careful review of the blood sugar record indicates that this patient is having hypoglycemia fairly regularly in the morning and severe hyperglycemia in the evening.  I would assume that her sliding scale mealtime insulin is not adequate and she continues to have a basal insulin level that is too high and driving her numbers down in the morning.  We will continue the reduced basal insulin at 35 units.  I will increase her sliding scale to start at 4 units and range up to 20 units for a glucose level of 400.  We will continue close monitoring with Accu-Cheks 4 times daily              Please excuse any errors in grammar or translation related to this dictation. Voice recognition software was  utilized to prepare this document.

## 2025-01-28 NOTE — ASSESSMENT & PLAN NOTE
A careful review of the blood sugar record indicates that this patient is having hypoglycemia fairly regularly in the morning and severe hyperglycemia in the evening.  I would assume that her sliding scale mealtime insulin is not adequate and she continues to have a basal insulin level that is too high and driving her numbers down in the morning.  We will continue the reduced basal insulin at 35 units.  I will increase her sliding scale to start at 4 units and range up to 20 units for a glucose level of 400.  We will continue close monitoring with Accu-Cheks 4 times daily

## 2025-02-05 LAB
ATRIAL RATE: 50 BPM
P AXIS: 16 DEGREES
P OFFSET: 179 MS
P ONSET: 126 MS
PR INTERVAL: 174 MS
Q ONSET: 213 MS
QRS COUNT: 9 BEATS
QRS DURATION: 100 MS
QT INTERVAL: 496 MS
QTC CALCULATION(BAZETT): 452 MS
QTC FREDERICIA: 467 MS
R AXIS: -35 DEGREES
T AXIS: -35 DEGREES
T OFFSET: 461 MS
VENTRICULAR RATE: 50 BPM

## 2025-02-20 ENCOUNTER — NURSING HOME VISIT (OUTPATIENT)
Dept: POST ACUTE CARE | Facility: EXTERNAL LOCATION | Age: 80
End: 2025-02-20
Payer: MEDICARE

## 2025-02-20 ENCOUNTER — LAB REQUISITION (OUTPATIENT)
Dept: LAB | Facility: HOSPITAL | Age: 80
End: 2025-02-20
Payer: MEDICARE

## 2025-02-20 DIAGNOSIS — G30.1 SEVERE LATE ONSET ALZHEIMER'S DEMENTIA WITHOUT BEHAVIORAL DISTURBANCE, PSYCHOTIC DISTURBANCE, MOOD DISTURBANCE, OR ANXIETY (MULTI): ICD-10-CM

## 2025-02-20 DIAGNOSIS — N18.2 STAGE 2 CHRONIC KIDNEY DISEASE: ICD-10-CM

## 2025-02-20 DIAGNOSIS — Z87.440 HISTORY OF FREQUENT URINARY TRACT INFECTIONS: ICD-10-CM

## 2025-02-20 DIAGNOSIS — F02.C0 SEVERE LATE ONSET ALZHEIMER'S DEMENTIA WITHOUT BEHAVIORAL DISTURBANCE, PSYCHOTIC DISTURBANCE, MOOD DISTURBANCE, OR ANXIETY (MULTI): ICD-10-CM

## 2025-02-20 DIAGNOSIS — R40.4 TRANSIENT ALTERATION OF AWARENESS: Primary | ICD-10-CM

## 2025-02-20 DIAGNOSIS — F02.80 DEMENTIA IN OTHER DISEASES CLASSIFIED ELSEWHERE, UNSPECIFIED SEVERITY, WITHOUT BEHAVIORAL DISTURBANCE, PSYCHOTIC DISTURBANCE, MOOD DISTURBANCE, AND ANXIETY (MULTI): ICD-10-CM

## 2025-02-20 LAB
ANION GAP SERPL CALC-SCNC: 17 MMOL/L (ref 10–20)
BUN SERPL-MCNC: 66 MG/DL (ref 6–23)
CALCIUM SERPL-MCNC: 9.9 MG/DL (ref 8.6–10.3)
CHLORIDE SERPL-SCNC: 127 MMOL/L (ref 98–107)
CO2 SERPL-SCNC: 21 MMOL/L (ref 21–32)
CREAT SERPL-MCNC: 1.48 MG/DL (ref 0.5–1.05)
EGFRCR SERPLBLD CKD-EPI 2021: 36 ML/MIN/1.73M*2
ERYTHROCYTE [DISTWIDTH] IN BLOOD BY AUTOMATED COUNT: 13.9 % (ref 11.5–14.5)
GLUCOSE SERPL-MCNC: 473 MG/DL (ref 74–99)
HCT VFR BLD AUTO: 52.1 % (ref 36–46)
HGB BLD-MCNC: 16.1 G/DL (ref 12–16)
MCH RBC QN AUTO: 31.7 PG (ref 26–34)
MCHC RBC AUTO-ENTMCNC: 30.9 G/DL (ref 32–36)
MCV RBC AUTO: 103 FL (ref 80–100)
NRBC BLD-RTO: 0 /100 WBCS (ref 0–0)
PLATELET # BLD AUTO: 287 X10*3/UL (ref 150–450)
POTASSIUM SERPL-SCNC: 4.1 MMOL/L (ref 3.5–5.3)
RBC # BLD AUTO: 5.08 X10*6/UL (ref 4–5.2)
SODIUM SERPL-SCNC: 161 MMOL/L (ref 136–145)
WBC # BLD AUTO: 22.1 X10*3/UL (ref 4.4–11.3)

## 2025-02-20 PROCEDURE — 85027 COMPLETE CBC AUTOMATED: CPT

## 2025-02-20 PROCEDURE — 80048 BASIC METABOLIC PNL TOTAL CA: CPT

## 2025-02-20 PROCEDURE — 99348 HOME/RES VST EST LOW MDM 30: CPT

## 2025-02-20 NOTE — ASSESSMENT & PLAN NOTE
She is more drowsy on exam keeps placing her head on the table. She is not answering many questions and usual conversation, she is not appropriate answers. Her  is here visiting today as well and has noticed her interactions have changed recently as well.

## 2025-02-20 NOTE — PROGRESS NOTES
Visit  Note   Subjective   Loren Mitchell is a 79 y.o. female who is being seen at [facility] and evaluated for multiple medical problems. Nursing notes, vital signs, and labs were reviewed in the local facility chart. Orders and medications were reviewed on the local chart.  No chief complaint on file.     I was requested to see this patient today; she recently has had increased falling and her mentation has not been at baseline for her usual activities per nursing reports. On examination, she has her head down on the dining table with her eyes closed and is not interactive with provider today. She has severe dementia and is alert to self only.         Objective   There were no vitals taken for this visit.  Physical Exam  Vitals reviewed.   Constitutional:       Appearance: Normal appearance. She is ill-appearing.      Comments: drowsy   HENT:      Head: Normocephalic.   Cardiovascular:      Rate and Rhythm: Normal rate and regular rhythm.   Pulmonary:      Effort: Pulmonary effort is normal.      Breath sounds: Normal breath sounds.      Comments: Limited examination due to patient compliance  Musculoskeletal:      Cervical back: Neck supple.   Skin:     General: Skin is warm and dry.   Neurological:      Mental Status: She is alert. Mental status is at baseline. She is disoriented.         Assessment & Plan  History of frequent urinary tract infections  History of frequent UTIs and urosepsis; urinalysis with culture and sensitivity orders were given today         Severe late onset Alzheimer's dementia without behavioral disturbance, psychotic disturbance, mood disturbance, or anxiety (Multi)  She is more drowsy on exam keeps placing her head on the table. She is not answering many questions and usual conversation, she is not appropriate answers. Her  is here visiting today as well and has noticed her interactions have changed recently as well.          Stage 2 chronic kidney disease  Plan for labs  today         Transient alteration of awareness  Started infectious work up with stat labs, urinalysis CNS and CXR orders. Will follow up today after labs result.          Labs resulted and recommended hospitalization for expedited work up: + leukocytosis, OZZY/dehydration with hypernatremia, hyperglycemic    Please excuse any errors in grammar or translation related to this dictation. Voice recognition software was utilized to prepare this document.

## 2025-02-20 NOTE — LETTER
Patient: Loren Mitchell  : 1945    Encounter Date: 2025    Visit  Note   Subjective  Loren Mitchell is a 79 y.o. female who is being seen at [facility] and evaluated for multiple medical problems. Nursing notes, vital signs, and labs were reviewed in the local facility chart. Orders and medications were reviewed on the local chart.  No chief complaint on file.     I was requested to see this patient today; she recently has had increased falling and her mentation has not been at baseline for her usual activities per nursing reports. On examination, she has her head down on the dining table with her eyes closed and is not interactive with provider today. She has severe dementia and is alert to self only.         Objective  There were no vitals taken for this visit.  Physical Exam  Vitals reviewed.   Constitutional:       Appearance: Normal appearance. She is ill-appearing.      Comments: drowsy   HENT:      Head: Normocephalic.   Cardiovascular:      Rate and Rhythm: Normal rate and regular rhythm.   Pulmonary:      Effort: Pulmonary effort is normal.      Breath sounds: Normal breath sounds.      Comments: Limited examination due to patient compliance  Musculoskeletal:      Cervical back: Neck supple.   Skin:     General: Skin is warm and dry.   Neurological:      Mental Status: She is alert. Mental status is at baseline. She is disoriented.         Assessment & Plan  History of frequent urinary tract infections  History of frequent UTIs and urosepsis; urinalysis with culture and sensitivity orders were given today         Severe late onset Alzheimer's dementia without behavioral disturbance, psychotic disturbance, mood disturbance, or anxiety (Multi)  She is more drowsy on exam keeps placing her head on the table. She is not answering many questions and usual conversation, she is not appropriate answers. Her  is here visiting today as well and has noticed her interactions have changed  recently as well.          Stage 2 chronic kidney disease  Plan for labs today         Transient alteration of awareness  Started infectious work up with stat labs, urinalysis CNS and CXR orders. Will follow up today after labs result.          Labs resulted and recommended hospitalization for expedited work up: + leukocytosis, OZZY/dehydration with hypernatremia, hyperglycemic    Please excuse any errors in grammar or translation related to this dictation. Voice recognition software was utilized to prepare this document.       Electronically Signed By: LISET Galindo   2/20/25  5:49 PM

## 2025-02-20 NOTE — ASSESSMENT & PLAN NOTE
History of frequent UTIs and urosepsis; urinalysis with culture and sensitivity orders were given today

## 2025-02-26 ENCOUNTER — NURSING HOME VISIT (OUTPATIENT)
Dept: POST ACUTE CARE | Facility: EXTERNAL LOCATION | Age: 80
End: 2025-02-26
Payer: MEDICARE

## 2025-02-26 DIAGNOSIS — G30.1 SEVERE LATE ONSET ALZHEIMER'S DEMENTIA WITHOUT BEHAVIORAL DISTURBANCE, PSYCHOTIC DISTURBANCE, MOOD DISTURBANCE, OR ANXIETY (MULTI): ICD-10-CM

## 2025-02-26 DIAGNOSIS — N18.2 STAGE 2 CHRONIC KIDNEY DISEASE: ICD-10-CM

## 2025-02-26 DIAGNOSIS — Z79.4 TYPE 2 DIABETES MELLITUS WITHOUT COMPLICATION, WITH LONG-TERM CURRENT USE OF INSULIN (MULTI): ICD-10-CM

## 2025-02-26 DIAGNOSIS — I10 PRIMARY HYPERTENSION: ICD-10-CM

## 2025-02-26 DIAGNOSIS — E11.9 TYPE 2 DIABETES MELLITUS WITHOUT COMPLICATION, WITH LONG-TERM CURRENT USE OF INSULIN (MULTI): ICD-10-CM

## 2025-02-26 DIAGNOSIS — R13.19 OTHER DYSPHAGIA: ICD-10-CM

## 2025-02-26 DIAGNOSIS — E87.0 HYPERNATREMIA: Primary | ICD-10-CM

## 2025-02-26 DIAGNOSIS — F02.C0 SEVERE LATE ONSET ALZHEIMER'S DEMENTIA WITHOUT BEHAVIORAL DISTURBANCE, PSYCHOTIC DISTURBANCE, MOOD DISTURBANCE, OR ANXIETY (MULTI): ICD-10-CM

## 2025-02-26 PROCEDURE — 99348 HOME/RES VST EST LOW MDM 30: CPT

## 2025-02-26 NOTE — LETTER
Patient: Loren Mitchell  : 1945    Encounter Date: 2025    Visit  Note   Subjective  Loren Mitchell is a 79 y.o. female who is being seen at [facility] and evaluated for multiple medical problems. Nursing notes, vital signs, and labs were reviewed in the local facility chart. Orders and medications were reviewed on the local chart.  No chief complaint on file.     Medical information was extracted from her medical chart as she is unable to provide or confirm any details regarding her hospitalization. This is an 79-year-old female with a past medical history of advanced dementia, type 2 diabetes, hypothyroidism, hypertension, CKD stage III, hyperlipidemia, coronary artery disease, depression, recurrent UTIs (Macrobid prophylaxis), multiple falls with large left SDH with midline shift (2024), who was hospitalized for altered mental status secondary to severe hypernatremia and sepsis.  She was admitted to the intensive care unit where she was treated with D5W and her sodium levels normalized.  Her mentation was back to baseline and she was treated initially with antibiotics for possible sepsis however there were no clear sources for infection found.  Blood cultures were negative x 4 days and urine culture was mixed microbiota.  She was discharged back to [facility] living [facility] where she resides long-term.    On examination today, she is resting in the recliner in a common area.  She is a bit drowsy today and does not speak much today on examination.  Spoke with nursing staff regarding her status and her  is requesting hospice consultation which we agree with.         Objective  There were no vitals taken for this visit.  Physical Exam  Vitals reviewed.   HENT:      Head: Normocephalic.   Cardiovascular:      Rate and Rhythm: Normal rate and regular rhythm.   Pulmonary:      Effort: Pulmonary effort is normal. No respiratory distress.      Breath sounds: Normal breath sounds.    Abdominal:      General: Bowel sounds are normal.      Palpations: Abdomen is soft.   Musculoskeletal:      Cervical back: Neck supple.      Right lower leg: No edema.      Left lower leg: No edema.   Skin:     General: Skin is warm and dry.   Neurological:      Mental Status: She is alert.         Assessment & Plan  Hypernatremia  This is the main reason for her hospitalization.  She spent some time in the intensive care unit and nephrology managed her case.  Her numbers trended normalizing after treatment with D5W.  Her CT head and neck were negative for any acute abnormalities.  Chest x-ray was negative for any acute concerns as well.  She is recommended to have a repeat BMP on Friday.  Initially she was started on antibiotics however her infectious workup was also negative and antibiotics were discontinued.       Severe late onset Alzheimer's dementia without behavioral disturbance, psychotic disturbance, mood disturbance, or anxiety (Multi)  She is drowsy on exam today; resting in recliner. Not talking today. Discussed most of her care with nursing staff here and plan for hospice consult.             Type 2 diabetes mellitus without complication, with long-term current use of insulin (Multi)  Continue lantus to 35 units nightly, and SSI 4-20 units with accuchecks 4 times a day.          Stage 2 chronic kidney disease  Plan for repeat labs on Friday.         Other dysphagia   She has a diet waiver signed on her chart as it was not her families preference for NG feedings.          Primary hypertension         Discussed case with nursing staff and the  is requesting a hospice consult which we are ordering today.  If hospice accepts, I anticipate we will discontinue the repeat labs and plan for comfort care medications/treatments only.    Time for coordination of care was greater than 35 minutes [facility] chart review, visit and exam, discussion with nursing, therapy and/or social service staff.     Please  excuse any errors in grammar or translation related to this dictation. Voice recognition software was utilized to prepare this document.       Electronically Signed By: LISET Galindo   2/26/25  4:23 PM

## 2025-02-26 NOTE — PROGRESS NOTES
Visit  Note   Subjective   Loren Mitchell is a 79 y.o. female who is being seen at [facility] and evaluated for multiple medical problems. Nursing notes, vital signs, and labs were reviewed in the local facility chart. Orders and medications were reviewed on the local chart.  No chief complaint on file.     Medical information was extracted from her medical chart as she is unable to provide or confirm any details regarding her hospitalization. This is an 79-year-old female with a past medical history of advanced dementia, type 2 diabetes, hypothyroidism, hypertension, CKD stage III, hyperlipidemia, coronary artery disease, depression, recurrent UTIs (Macrobid prophylaxis), multiple falls with large left SDH with midline shift (11/2024), who was hospitalized for altered mental status secondary to severe hypernatremia and sepsis.  She was admitted to the intensive care unit where she was treated with D5W and her sodium levels normalized.  Her mentation was back to baseline and she was treated initially with antibiotics for possible sepsis however there were no clear sources for infection found.  Blood cultures were negative x 4 days and urine culture was mixed microbiota.  She was discharged back to [facility] living [facility] where she resides long-term.    On examination today, she is resting in the recliner in a common area.  She is a bit drowsy today and does not speak much today on examination.  Spoke with nursing staff regarding her status and her  is requesting hospice consultation which we agree with.         Objective   There were no vitals taken for this visit.  Physical Exam  Vitals reviewed.   HENT:      Head: Normocephalic.   Cardiovascular:      Rate and Rhythm: Normal rate and regular rhythm.   Pulmonary:      Effort: Pulmonary effort is normal. No respiratory distress.      Breath sounds: Normal breath sounds.   Abdominal:      General: Bowel sounds are normal.      Palpations:  Abdomen is soft.   Musculoskeletal:      Cervical back: Neck supple.      Right lower leg: No edema.      Left lower leg: No edema.   Skin:     General: Skin is warm and dry.   Neurological:      Mental Status: She is alert.         Assessment & Plan  Hypernatremia  This is the main reason for her hospitalization.  She spent some time in the intensive care unit and nephrology managed her case.  Her numbers trended normalizing after treatment with D5W.  Her CT head and neck were negative for any acute abnormalities.  Chest x-ray was negative for any acute concerns as well.  She is recommended to have a repeat BMP on Friday.  Initially she was started on antibiotics however her infectious workup was also negative and antibiotics were discontinued.       Severe late onset Alzheimer's dementia without behavioral disturbance, psychotic disturbance, mood disturbance, or anxiety (Multi)  She is drowsy on exam today; resting in recliner. Not talking today. Discussed most of her care with nursing staff here and plan for hospice consult.             Type 2 diabetes mellitus without complication, with long-term current use of insulin (Multi)  Continue lantus to 35 units nightly, and SSI 4-20 units with accuchecks 4 times a day.          Stage 2 chronic kidney disease  Plan for repeat labs on Friday.         Other dysphagia   She has a diet waiver signed on her chart as it was not her families preference for NG feedings.          Primary hypertension         Discussed case with nursing staff and the  is requesting a hospice consult which we are ordering today.  If hospice accepts, I anticipate we will discontinue the repeat labs and plan for comfort care medications/treatments only.    Time for coordination of care was greater than 35 minutes [facility] chart review, visit and exam, discussion with nursing, therapy and/or social service staff.     Please excuse any errors in grammar or translation related to this dictation.  Voice recognition software was utilized to prepare this document.

## 2025-02-26 NOTE — ASSESSMENT & PLAN NOTE
She is drowsy on exam today; resting in recliner. Not talking today. Discussed most of her care with nursing staff here and plan for hospice consult.

## 2025-02-26 NOTE — ASSESSMENT & PLAN NOTE
She has a diet waiver signed on her chart as it was not her families preference for NG feedings.

## 2025-05-06 ENCOUNTER — NURSING HOME VISIT (OUTPATIENT)
Dept: POST ACUTE CARE | Facility: EXTERNAL LOCATION | Age: 80
End: 2025-05-06
Payer: MEDICARE

## 2025-05-06 DIAGNOSIS — E11.9 TYPE 2 DIABETES MELLITUS WITHOUT COMPLICATION, WITH LONG-TERM CURRENT USE OF INSULIN: Primary | ICD-10-CM

## 2025-05-06 DIAGNOSIS — Z79.4 TYPE 2 DIABETES MELLITUS WITHOUT COMPLICATION, WITH LONG-TERM CURRENT USE OF INSULIN: Primary | ICD-10-CM

## 2025-05-06 PROCEDURE — 99349 HOME/RES VST EST MOD MDM 40: CPT | Performed by: FAMILY MEDICINE

## 2025-05-06 NOTE — PROGRESS NOTES
Visit  Note   Subjective   Loren Mitchell is a 79 y.o. female who is being seen at [facility] and evaluated for multiple medical problems. Nursing notes, vital signs, and labs were reviewed in the local facility chart. Orders and medications were reviewed on the local chart.  No chief complaint on file.  I was asked to see this patient by nursing staff because her blood sugar has been recorded as below 100 almost every morning for the last week.  She is ranging from 60-80 in the morning and rising throughout the day.  HPI     Objective   There were no vitals taken for this visit.  Physical Exam  Constitutional:       Appearance: Normal appearance.   HENT:      Head: Normocephalic.   Pulmonary:      Effort: Pulmonary effort is normal.   Musculoskeletal:      Cervical back: Neck supple.   Skin:     General: Skin is warm and dry.   Psychiatric:         Mood and Affect: Mood normal.       Assessment/Plan   Assessment & Plan  Type 2 diabetes mellitus without complication, with long-term current use of insulin  This 87-year-old female is using basal insulin as well as sliding scale insulin 4 times a day.  Her blood sugars have been hypoglycemic  on a regular basis although her afternoon and evening blood sugars are generally in the 200+ range.  I think the best thing to do now is discontinue her nighttime sliding scale insulin but continue the 3 times daily mealtime insulin and the evening dose of basal insulin.  Will continue to monitor and make adjustments based on her blood sugar readings.              Please excuse any errors in grammar or translation related to this dictation. Voice recognition software was utilized to prepare this document.

## 2025-05-06 NOTE — LETTER
Patient: Loren Mitchell  : 1945    Encounter Date: 2025    Visit  Note   Subjective  Loren iMtchell is a 79 y.o. female who is being seen at [facility] and evaluated for multiple medical problems. Nursing notes, vital signs, and labs were reviewed in the local facility chart. Orders and medications were reviewed on the local chart.  No chief complaint on file.  I was asked to see this patient by nursing staff because her blood sugar has been recorded as below 100 almost every morning for the last week.  She is ranging from 60-80 in the morning and rising throughout the day.  HPI     Objective  There were no vitals taken for this visit.  Physical Exam  Constitutional:       Appearance: Normal appearance.   HENT:      Head: Normocephalic.   Pulmonary:      Effort: Pulmonary effort is normal.   Musculoskeletal:      Cervical back: Neck supple.   Skin:     General: Skin is warm and dry.   Psychiatric:         Mood and Affect: Mood normal.       Assessment/Plan  Assessment & Plan  Type 2 diabetes mellitus without complication, with long-term current use of insulin  This 87-year-old female is using basal insulin as well as sliding scale insulin 4 times a day.  Her blood sugars have been hypoglycemic  on a regular basis although her afternoon and evening blood sugars are generally in the 200+ range.  I think the best thing to do now is discontinue her nighttime sliding scale insulin but continue the 3 times daily mealtime insulin and the evening dose of basal insulin.  Will continue to monitor and make adjustments based on her blood sugar readings.              Please excuse any errors in grammar or translation related to this dictation. Voice recognition software was utilized to prepare this document.     Electronically Signed By: Randall Piña MD   25  8:28 AM

## 2025-05-06 NOTE — ASSESSMENT & PLAN NOTE
This 87-year-old female is using basal insulin as well as sliding scale insulin 4 times a day.  Her blood sugars have been hypoglycemic  on a regular basis although her afternoon and evening blood sugars are generally in the 200+ range.  I think the best thing to do now is discontinue her nighttime sliding scale insulin but continue the 3 times daily mealtime insulin and the evening dose of basal insulin.  Will continue to monitor and make adjustments based on her blood sugar readings.

## 2025-05-19 ENCOUNTER — NURSING HOME VISIT (OUTPATIENT)
Dept: POST ACUTE CARE | Facility: EXTERNAL LOCATION | Age: 80
End: 2025-05-19
Payer: MEDICARE

## 2025-05-19 DIAGNOSIS — I10 PRIMARY HYPERTENSION: ICD-10-CM

## 2025-05-19 DIAGNOSIS — G30.1 SEVERE LATE ONSET ALZHEIMER'S DEMENTIA WITHOUT BEHAVIORAL DISTURBANCE, PSYCHOTIC DISTURBANCE, MOOD DISTURBANCE, OR ANXIETY (MULTI): ICD-10-CM

## 2025-05-19 DIAGNOSIS — F02.C0 SEVERE LATE ONSET ALZHEIMER'S DEMENTIA WITHOUT BEHAVIORAL DISTURBANCE, PSYCHOTIC DISTURBANCE, MOOD DISTURBANCE, OR ANXIETY (MULTI): ICD-10-CM

## 2025-05-19 DIAGNOSIS — Z79.4 TYPE 2 DIABETES MELLITUS WITHOUT COMPLICATION, WITH LONG-TERM CURRENT USE OF INSULIN: Primary | ICD-10-CM

## 2025-05-19 DIAGNOSIS — E11.9 TYPE 2 DIABETES MELLITUS WITHOUT COMPLICATION, WITH LONG-TERM CURRENT USE OF INSULIN: Primary | ICD-10-CM

## 2025-05-19 PROBLEM — S06.5XAA SUBDURAL HEMATOMA (MULTI): Status: RESOLVED | Noted: 2024-11-18 | Resolved: 2025-05-19

## 2025-05-19 PROCEDURE — 99348 HOME/RES VST EST LOW MDM 30: CPT

## 2025-05-19 NOTE — PROGRESS NOTES
Visit  Note   Subjective   Loren Mitchell is a 79 y.o. female who is being seen at [facility] and evaluated for multiple medical problems. Nursing notes, vital signs, and labs were reviewed in the local facility chart. Orders and medications were reviewed on the local chart.  No chief complaint on file.     This is an 79 year old female I was requested to evaluate today due to some hypoglycemic episodes. She was evaluated by MD santiago and her insulins were adjusted a couple weeks ago. Upon review, she has had some lower numbers in the 70s mostly. A couple are in the 50s range. Discussed with her nursing staff today. The patient has no concerns today on examination.          Objective   There were no vitals taken for this visit.  Physical Exam  Vitals reviewed.   Constitutional:       Appearance: Normal appearance.   HENT:      Head: Normocephalic.   Cardiovascular:      Rate and Rhythm: Normal rate and regular rhythm.   Pulmonary:      Effort: Pulmonary effort is normal.      Breath sounds: Normal breath sounds.      Comments: Limited exam due to patients ability to follow direction  Musculoskeletal:      Cervical back: Neck supple.   Skin:     General: Skin is warm and dry.   Neurological:      Mental Status: She is alert.         Assessment & Plan  Type 2 diabetes mellitus without complication, with long-term current use of insulin  Her current insulin regimen includes basal insulin SSI with meals. Insulin glargine at night. I reduced the insulin glargine to 30 units. She does have higher numbers during mealtime checks then it appears to decrease until next check in the AM hours. I ordered an HS accucheck and discussed this with her nursing staff today. Did advise her a PM snack if she will eat before bedtime. We will continue to monitor and adjust this as warranted.          Severe late onset Alzheimer's dementia without behavioral disturbance, psychotic disturbance, mood disturbance, or anxiety  (Multi)  She is alert and pleasantly confused. Agreeable with exam however unable to follow directions. We will continue supportive care here on the memory care unit.          Primary hypertension  Blood pressures well controlled last check 110/60 mmHg                Please excuse any errors in grammar or translation related to this dictation. Voice recognition software was utilized to prepare this document.

## 2025-05-19 NOTE — ASSESSMENT & PLAN NOTE
Her current insulin regimen includes basal insulin SSI with meals. Insulin glargine at night. I reduced the insulin glargine to 30 units. She does have higher numbers during mealtime checks then it appears to decrease until next check in the AM hours. I ordered an HS accucheck and discussed this with her nursing staff today. Did advise her a PM snack if she will eat before bedtime. We will continue to monitor and adjust this as warranted.

## 2025-05-19 NOTE — ASSESSMENT & PLAN NOTE
She is alert and pleasantly confused. Agreeable with exam however unable to follow directions. We will continue supportive care here on the memory care unit.

## 2025-05-19 NOTE — LETTER
Patient: Loren Mitchell  : 1945    Encounter Date: 2025    Visit  Note   Subjective  Loren Mitchell is a 79 y.o. female who is being seen at [facility] and evaluated for multiple medical problems. Nursing notes, vital signs, and labs were reviewed in the local facility chart. Orders and medications were reviewed on the local chart.  No chief complaint on file.     This is an 79 year old female I was requested to evaluate today due to some hypoglycemic episodes. She was evaluated by MD santiago and her insulins were adjusted a couple weeks ago. Upon review, she has had some lower numbers in the 70s mostly. A couple are in the 50s range. Discussed with her nursing staff today. The patient has no concerns today on examination.          Objective  There were no vitals taken for this visit.  Physical Exam  Vitals reviewed.   Constitutional:       Appearance: Normal appearance.   HENT:      Head: Normocephalic.   Cardiovascular:      Rate and Rhythm: Normal rate and regular rhythm.   Pulmonary:      Effort: Pulmonary effort is normal.      Breath sounds: Normal breath sounds.      Comments: Limited exam due to patients ability to follow direction  Musculoskeletal:      Cervical back: Neck supple.   Skin:     General: Skin is warm and dry.   Neurological:      Mental Status: She is alert.         Assessment & Plan  Type 2 diabetes mellitus without complication, with long-term current use of insulin  Her current insulin regimen includes basal insulin SSI with meals. Insulin glargine at night. I reduced the insulin glargine to 30 units. She does have higher numbers during mealtime checks then it appears to decrease until next check in the AM hours. I ordered an HS accucheck and discussed this with her nursing staff today. Did advise her a PM snack if she will eat before bedtime. We will continue to monitor and adjust this as warranted.          Severe late onset Alzheimer's dementia without behavioral  disturbance, psychotic disturbance, mood disturbance, or anxiety (Multi)  She is alert and pleasantly confused. Agreeable with exam however unable to follow directions. We will continue supportive care here on the memory care unit.          Primary hypertension  Blood pressures well controlled last check 110/60 mmHg                Please excuse any errors in grammar or translation related to this dictation. Voice recognition software was utilized to prepare this document.     Electronically Signed By: LISET Galindo   5/19/25  5:53 PM

## (undated) DEVICE — ELECTRODE, ELECTROSURGICAL, BLADE, INSULATED, ENT/IMA, STERILE

## (undated) DEVICE — REST, HEAD, BAGEL, 9 IN

## (undated) DEVICE — DRAPE, SHEET, UTILITY, NON ABSORBENT, 18 X 26 IN, LF

## (undated) DEVICE — SPHERE, STEALTHSTATION, 5-PK

## (undated) DEVICE — SUTURE, MONOCRYL, 4-0, 18 IN, PS2, UNDYED

## (undated) DEVICE — DRAPE, CRANIOTOMY

## (undated) DEVICE — DRAPE, INSTRUMENT, W/POUCH, STERI DRAPE, 7 X 11 IN, DISPOSABLE, STERILE

## (undated) DEVICE — PROTECTOR, NERVE, ULNAR, PINK

## (undated) DEVICE — BUR, ROUTER BIT, FA2, TAPERED, 2.3MM

## (undated) DEVICE — TUBING, SUCTION, CONNECTING, STERILE 0.25 X 120 IN., LF

## (undated) DEVICE — DRAINAGE, MONITORING SYSTEM, EXTERNAL CSF

## (undated) DEVICE — DRILL, TWIST, AO, 3.2 X 195, SS, STERILE

## (undated) DEVICE — COVER, TABLE, UHC

## (undated) DEVICE — SYRINGE, HYPODERMIC, LUER LOCK, 6 CC

## (undated) DEVICE — BAG, PLASTIC, 10 X 17 IN

## (undated) DEVICE — PIN, SKULL, MAYFIELD, ADULT

## (undated) DEVICE — CONTAINER, SPECIMEN, 120 ML, STERILE

## (undated) DEVICE — Device

## (undated) DEVICE — MANIFOLD, 4 PORT NEPTUNE STANDARD

## (undated) DEVICE — COVER, CART, 45 X 27 X 48 IN, CLEAR

## (undated) DEVICE — SPONGE, HEMOSTATIC, CELLULOSE, SURGICEL, 2 X 14 IN

## (undated) DEVICE — BUR, PERFORATOR, CRANIAL, ACRA-CUT 14/11MM, CDM

## (undated) DEVICE — FLOSEAL, MATRIX, HEMOSTATIC, FULL STERILE PREP, 5ML

## (undated) DEVICE — SUTURE, NUROLON, 4-0, 18 IN, TF, CONTROL RELEASE, MULTIPACK, BLACK

## (undated) DEVICE — PAD, GROUNDING, ELECTROSURGICAL, W/9 FT CABLE, POLYHESIVE II, ADULT, LF

## (undated) DEVICE — BRUSH, SCRUB, W/SPONGE, W/NAIL CLEANER, DRY, LF

## (undated) DEVICE — MARKER, SKIN, RULER AND LABEL PACK, CUSTOM